# Patient Record
Sex: FEMALE | Race: WHITE | Employment: UNEMPLOYED | URBAN - METROPOLITAN AREA
[De-identification: names, ages, dates, MRNs, and addresses within clinical notes are randomized per-mention and may not be internally consistent; named-entity substitution may affect disease eponyms.]

---

## 2017-05-23 ENCOUNTER — ALLSCRIPTS OFFICE VISIT (OUTPATIENT)
Dept: OTHER | Facility: OTHER | Age: 27
End: 2017-05-23

## 2017-06-07 ENCOUNTER — GENERIC CONVERSION - ENCOUNTER (OUTPATIENT)
Dept: OTHER | Facility: OTHER | Age: 27
End: 2017-06-07

## 2017-06-27 ENCOUNTER — ALLSCRIPTS OFFICE VISIT (OUTPATIENT)
Dept: OTHER | Facility: OTHER | Age: 27
End: 2017-06-27

## 2017-06-27 DIAGNOSIS — S39.012A STRAIN OF MUSCLE, FASCIA AND TENDON OF LOWER BACK, INITIAL ENCOUNTER: ICD-10-CM

## 2017-09-12 ENCOUNTER — ALLSCRIPTS OFFICE VISIT (OUTPATIENT)
Dept: OTHER | Facility: OTHER | Age: 27
End: 2017-09-12

## 2017-09-12 DIAGNOSIS — Z00.00 ENCOUNTER FOR GENERAL ADULT MEDICAL EXAMINATION WITHOUT ABNORMAL FINDINGS: ICD-10-CM

## 2017-09-12 DIAGNOSIS — E03.9 HYPOTHYROIDISM: ICD-10-CM

## 2017-09-12 DIAGNOSIS — F25.9 SCHIZOAFFECTIVE DISORDER (HCC): ICD-10-CM

## 2017-09-12 DIAGNOSIS — F31.10 BIPOLAR DISORDER, CURRENT EPISODE MANIC WITHOUT PSYCHOTIC FEATURES (HCC): ICD-10-CM

## 2017-09-12 DIAGNOSIS — G47.00 INSOMNIA: ICD-10-CM

## 2017-09-12 DIAGNOSIS — R41.89 OTHER SYMPTOMS AND SIGNS INVOLVING COGNITIVE FUNCTIONS AND AWARENESS: ICD-10-CM

## 2017-10-24 ENCOUNTER — ALLSCRIPTS OFFICE VISIT (OUTPATIENT)
Dept: OTHER | Facility: OTHER | Age: 27
End: 2017-10-24

## 2017-10-24 ENCOUNTER — TRANSCRIBE ORDERS (OUTPATIENT)
Dept: LAB | Facility: CLINIC | Age: 27
End: 2017-10-24

## 2017-10-24 ENCOUNTER — APPOINTMENT (OUTPATIENT)
Dept: LAB | Facility: CLINIC | Age: 27
End: 2017-10-24
Payer: COMMERCIAL

## 2017-10-24 DIAGNOSIS — R10.9 ABDOMINAL PAIN: ICD-10-CM

## 2017-10-24 DIAGNOSIS — F31.10 BIPOLAR DISORDER, CURRENT EPISODE MANIC WITHOUT PSYCHOTIC FEATURES (HCC): ICD-10-CM

## 2017-10-24 LAB — LITHIUM SERPL-SCNC: 0.7 MMOL/L (ref 0.5–1)

## 2017-10-24 PROCEDURE — 82784 ASSAY IGA/IGD/IGG/IGM EACH: CPT

## 2017-10-24 PROCEDURE — 83516 IMMUNOASSAY NONANTIBODY: CPT

## 2017-10-24 PROCEDURE — 80178 ASSAY OF LITHIUM: CPT

## 2017-10-24 PROCEDURE — 86255 FLUORESCENT ANTIBODY SCREEN: CPT

## 2017-10-24 PROCEDURE — 36415 COLL VENOUS BLD VENIPUNCTURE: CPT

## 2017-10-26 ENCOUNTER — GENERIC CONVERSION - ENCOUNTER (OUTPATIENT)
Dept: OTHER | Facility: OTHER | Age: 27
End: 2017-10-26

## 2017-10-26 LAB
ENDOMYSIUM IGA SER QL: POSITIVE
GLIADIN PEPTIDE IGA SER-ACNC: 18 UNITS (ref 0–19)
GLIADIN PEPTIDE IGG SER-ACNC: 66 UNITS (ref 0–19)
IGA SERPL-MCNC: 118 MG/DL (ref 87–352)
TTG IGA SER-ACNC: 7 U/ML (ref 0–3)
TTG IGG SER-ACNC: 10 U/ML (ref 0–5)

## 2017-10-26 NOTE — PROGRESS NOTES
Assessment  1  Abdominal pain (789 00) (R10 9)    Plan   Abdominal pain    · (1) CELIAC DISEASE AB PROFILE; Status:Active; Requested EMILY:[id number];   Bipolar affective disorder, current episode manic without psychotic symptoms    · Lithium Carbonate  MG Oral Tablet Extended Release; TAKE 1 TABLET  TWICE DAILY  Bipolar affective disorder, current episode manic without psychotic symptoms,  Schizoaffective disorder    · Bethanechol Chloride 25 MG Oral Tablet; TAKE 1 TABLET 3 TIMES DAIY    (1) LITHIUM; Status:Resulted - Requires Verification;   Done: 96HSX1168 12:00AM  TAA:50WNI5278; Ordered; For:Bipolar affective disorder, current episode manic without psychotic symptoms; Ordered By:Anita Meyer;      Discussion/Summary    Will continue current regime  Check celiac panel and lithium level  RTO 1 month  Chief Complaint  Pt presents today for bowel problems: constipated or has diarrhea  Pt states that brother has celiac disease and mother is getting tested now for it  Pt questioning does she have it? History of Present Illness  HPI: Pt presents concerned about celiac disease  She states her brother was just diagnosed  She suffers from diarrhea, abdominal bloating and cramping  Review of Systems    Constitutional: as noted in HPI    ENT: no ear ache, no loss of hearing, no nosebleeds or nasal discharge, no sore throat or hoarseness  Cardiovascular: no complaints of slow or fast heart rate, no chest pain, no palpitations, no leg claudication or lower extremity edema  Respiratory: no complaints of shortness of breath, no wheezing, no dyspnea on exertion, no orthopnea or PND  Breasts: no complaints of breast pain, breast lump or nipple discharge  Gastrointestinal: no complaints of abdominal pain, no constipation, no nausea or diarrhea, no vomiting, no bloody stools     Genitourinary: no complaints of dysuria, no incontinence, no pelvic pain, no dysmenorrhea, no vaginal discharge or abnormal vaginal bleeding  Musculoskeletal: no complaints of arthralgia, no myalgia, no joint swelling or stiffness, no limb pain or swelling  Neurological: no complaints of headache, no confusion, no numbness or tingling, no dizziness or fainting  ROS reviewed  Active Problems  1  Abdominal pain (789 00) (R10 9)   2  Acute bilateral low back pain without sciatica (724 2,338 19) (M54 5)   3  Acute renal insufficiency (593 9) (N28 9)   4  Apneic episode (786 03) (R06 81)   5  Bipolar affective disorder, current episode manic without psychotic symptoms (296 00)   (F31 10)   6  Cognitive changes (799 59) (R41 89)   7  Hematuria (599 70) (R31 9)   8  Hypothyroidism (244 9) (E03 9)   9  Incomplete emptying of bladder (788 21) (R33 9)   10  Insomnia (780 52) (G47 00)   11  Lumbar strain (847 2) (S39 012A)   12  Schizoaffective disorder (295 70) (F25 9)   13  Urinary tract infection (599 0) (N39 0)    Past Medical History  Active Problems And Past Medical History Reviewed: The active problems and past medical history were reviewed and updated today  Surgical History  Surgical History Reviewed: The surgical history was reviewed and updated today  Social History   · Denied: History of Alcohol Use (History)   · Marital History - Single   · Never A Smoker  The social history was reviewed and updated today  The social history was reviewed and is unchanged  Family History  Family History Reviewed: The family history was reviewed and updated today  Current Meds   1  Bethanechol Chloride 25 MG Oral Tablet Recorded   2  ChlorproMAZINE HCl - 25 MG Oral Tablet; Take 1 tablet daily; Therapy: 21Jan2016 to Recorded   3  Clobetasol Propionate 0 05 % External Shampoo; SHAMPOO HAIR/SCALP, LEAVE ON   15 MINUTES AND RINSE WELL  Teresa Spruce USE DAILY FOR 4 WEEKS; Therapy: 68KNP4605 to (Last Rx:21Jan2016)  Requested for: 21Jan2016 Ordered   4  ClonazePAM 2 MG Oral Tablet; Take 1 tablet daily;  Last Rx:18Xcf2442; Status: ACTIVE -   Renewal Voided Ordered   5  FluvoxaMINE Maleate 50 MG Oral Tablet; TAKE ONE TABLET BY MOUTH AT BEDTIME; Therapy: 65KUA2282 to (Evaluate:06Jan2015)  Requested for: 58Svz2532; Last   Rx:28Cca5528 Ordered   6  Gabapentin 300 MG Oral Capsule; TAKE 4 CAPSULE Bedtime; Therapy: 50IIF3144 to (Evaluate:13Jan2015)  Requested for: 71Ore6881; Last   Rx:70Mfq2046; Status: ACTIVE - Renewal Voided Ordered   7  Liothyronine Sodium 5 MCG Oral Tablet; TAKE ONE TABLET BY MOUTH ONCE DAILY; Therapy: 21Jan2016 to (Deonna Sloan)  Requested for: 23Jvz5764; Last   Rx:15Nrt6078 Ordered   8  Lithium Carbonate  MG Oral Tablet Extended Release; TAKE 1 TABLET DAILY; Therapy: 20DHZ2959 to [phone])  Requested for: 09Scr8399; Last   Rx:63Zzy4416 Ordered   9  Senna-Tabs 8 6 MG Oral Tablet; TAKE AS DIRECTED; Therapy: 75BSO1931 to Recorded   10  Trihexyphenidyl HCl - 2 MG Oral Tablet; TAKE 1 TABLET DAILY AS DIRECTED; Therapy: 21Jan2016 to Recorded   11  Vitamin D3 2000 UNIT Oral Tablet; TAKE 2 DAILY TO EQUAL 4000 UNITS; Therapy: (203 1086) to Recorded    The medication list was reviewed and updated today  Allergies  1  No Known Drug Allergies    Vitals   Recorded: 14YIW2397 09:23AM   Temperature 97 6 F   Heart Rate 64   Respiration 16   Systolic 398   Diastolic 70   Height 5 ft 3 in   Weight 139 lb 8 oz   BMI Calculated 24 71   BSA Calculated 1 66   O2 Saturation 98     Physical Exam    Constitutional   General appearance: No acute distress, well appearing and well nourished  Ears, Nose, Mouth, and Throat   External inspection of ears and nose: Normal     Otoscopic examination: Tympanic membranes translucent with normal light reflex  Canals patent without erythema  Nasal mucosa, septum, and turbinates: Normal without edema or erythema  Oropharynx: Normal with no erythema, edema, exudate or lesions      Pulmonary   Respiratory effort: No increased work of breathing or signs of respiratory distress  Auscultation of lungs: Clear to auscultation  Cardiovascular   Auscultation of heart: Normal rate and rhythm, normal S1 and S2, without murmurs  Examination of extremities for edema and/or varicosities: Normal     Abdomen   Abdomen: Non-tender, no masses      Psychiatric   Orientation to person, place, and time: Normal     Mood and affect: Normal          Signatures   Electronically signed by : SANDY Keller; Oct 25 2017  4:00PM EST                       (Author)    Electronically signed by : DYLAN Tobin ; Oct 25 2017  4:34PM EST                       (Author)

## 2018-01-03 ENCOUNTER — ALLSCRIPTS OFFICE VISIT (OUTPATIENT)
Dept: OTHER | Facility: OTHER | Age: 28
End: 2018-01-03

## 2018-01-03 ENCOUNTER — TRANSCRIBE ORDERS (OUTPATIENT)
Dept: LAB | Facility: CLINIC | Age: 28
End: 2018-01-03

## 2018-01-03 ENCOUNTER — APPOINTMENT (OUTPATIENT)
Dept: LAB | Facility: CLINIC | Age: 28
End: 2018-01-03
Payer: COMMERCIAL

## 2018-01-03 DIAGNOSIS — Z79.899 ENCOUNTER FOR LONG-TERM (CURRENT) USE OF MEDICATIONS: ICD-10-CM

## 2018-01-03 DIAGNOSIS — F25.9 SCHIZOAFFECTIVE DISORDER, UNSPECIFIED TYPE (HCC): ICD-10-CM

## 2018-01-03 DIAGNOSIS — R10.9 ABDOMINAL PAIN: ICD-10-CM

## 2018-01-03 DIAGNOSIS — Z79.899 ENCOUNTER FOR LONG-TERM (CURRENT) USE OF MEDICATIONS: Primary | ICD-10-CM

## 2018-01-03 LAB
25(OH)D3 SERPL-MCNC: 39.8 NG/ML (ref 30–100)
ALBUMIN SERPL BCP-MCNC: 4 G/DL (ref 3.5–5)
ALP SERPL-CCNC: 84 U/L (ref 46–116)
ALT SERPL W P-5'-P-CCNC: 27 U/L (ref 12–78)
ANION GAP SERPL CALCULATED.3IONS-SCNC: 6 MMOL/L (ref 4–13)
AST SERPL W P-5'-P-CCNC: 12 U/L (ref 5–45)
BASOPHILS # BLD AUTO: 0.03 THOUSANDS/ΜL (ref 0–0.1)
BASOPHILS NFR BLD AUTO: 0 % (ref 0–1)
BILIRUB SERPL-MCNC: 0.4 MG/DL (ref 0.2–1)
BUN SERPL-MCNC: 16 MG/DL (ref 5–25)
CALCIUM SERPL-MCNC: 9.1 MG/DL (ref 8.3–10.1)
CHLORIDE SERPL-SCNC: 106 MMOL/L (ref 100–108)
CO2 SERPL-SCNC: 27 MMOL/L (ref 21–32)
CREAT SERPL-MCNC: 0.86 MG/DL (ref 0.6–1.3)
EOSINOPHIL # BLD AUTO: 0.3 THOUSAND/ΜL (ref 0–0.61)
EOSINOPHIL NFR BLD AUTO: 3 % (ref 0–6)
ERYTHROCYTE [DISTWIDTH] IN BLOOD BY AUTOMATED COUNT: 13.1 % (ref 11.6–15.1)
FOLATE SERPL-MCNC: 17.1 NG/ML (ref 3.1–17.5)
GFR SERPL CREATININE-BSD FRML MDRD: 92 ML/MIN/1.73SQ M
GLUCOSE P FAST SERPL-MCNC: 94 MG/DL (ref 65–99)
HCT VFR BLD AUTO: 40.8 % (ref 34.8–46.1)
HGB BLD-MCNC: 13.5 G/DL (ref 11.5–15.4)
LITHIUM SERPL-SCNC: 0.4 MMOL/L (ref 0.5–1)
LYMPHOCYTES # BLD AUTO: 2.88 THOUSANDS/ΜL (ref 0.6–4.47)
LYMPHOCYTES NFR BLD AUTO: 32 % (ref 14–44)
MCH RBC QN AUTO: 29 PG (ref 26.8–34.3)
MCHC RBC AUTO-ENTMCNC: 33.1 G/DL (ref 31.4–37.4)
MCV RBC AUTO: 88 FL (ref 82–98)
MONOCYTES # BLD AUTO: 0.93 THOUSAND/ΜL (ref 0.17–1.22)
MONOCYTES NFR BLD AUTO: 11 % (ref 4–12)
NEUTROPHILS # BLD AUTO: 4.74 THOUSANDS/ΜL (ref 1.85–7.62)
NEUTS SEG NFR BLD AUTO: 54 % (ref 43–75)
NRBC BLD AUTO-RTO: 0 /100 WBCS
PLATELET # BLD AUTO: 204 THOUSANDS/UL (ref 149–390)
PMV BLD AUTO: 11 FL (ref 8.9–12.7)
POTASSIUM SERPL-SCNC: 4.1 MMOL/L (ref 3.5–5.3)
PROT SERPL-MCNC: 7.2 G/DL (ref 6.4–8.2)
RBC # BLD AUTO: 4.66 MILLION/UL (ref 3.81–5.12)
SODIUM SERPL-SCNC: 139 MMOL/L (ref 136–145)
T4 FREE SERPL-MCNC: 1.03 NG/DL (ref 0.76–1.46)
TSH SERPL DL<=0.05 MIU/L-ACNC: 1.99 UIU/ML (ref 0.36–3.74)
VIT B12 SERPL-MCNC: 834 PG/ML (ref 100–900)
WBC # BLD AUTO: 8.89 THOUSAND/UL (ref 4.31–10.16)

## 2018-01-03 PROCEDURE — 36415 COLL VENOUS BLD VENIPUNCTURE: CPT

## 2018-01-03 PROCEDURE — 80053 COMPREHEN METABOLIC PANEL: CPT

## 2018-01-03 PROCEDURE — 85025 COMPLETE CBC W/AUTO DIFF WBC: CPT

## 2018-01-03 PROCEDURE — 82746 ASSAY OF FOLIC ACID SERUM: CPT

## 2018-01-03 PROCEDURE — 84439 ASSAY OF FREE THYROXINE: CPT

## 2018-01-03 PROCEDURE — 84443 ASSAY THYROID STIM HORMONE: CPT

## 2018-01-03 PROCEDURE — 82306 VITAMIN D 25 HYDROXY: CPT

## 2018-01-03 PROCEDURE — 82607 VITAMIN B-12: CPT

## 2018-01-03 PROCEDURE — 80178 ASSAY OF LITHIUM: CPT

## 2018-01-04 NOTE — CONSULTS
Assessment   1  Abdominal pain (789 00) (R10 9)    Plan   Abdominal pain    · (1) BASIC METABOLIC PROFILE; Status:Active; Requested NWW:72QBZ6464; Perform:[facility]; XOY:13MGL1367;NQYEPXS; For:Abdominal pain; Ordered By:Umberto Hummel;   · (1) CBC/PLT/DIFF; Status:Active; Requested CKK:42BCS4270; Perform:[facility]; VIS:16PMZ9013;OEEOAMH; For:Abdominal pain; Ordered By:Umberto Hummel;   · (1) HEPATIC FUNCTION PANEL; Status:Active; Requested NAH:95TQN5319; Perform:[facility]; QYR:84YWA0193;BKCWLRM; For:Abdominal pain; Ordered By:Donte Hummel;   · EGD; Status:Hold For - Scheduling; Requested ARNOL:86GKR3071; Perform:[facility]; MZP:75YJV4381; Ordered; For:Abdominal pain; Ordered By:Umberto Hummel;    Discussion/Summary   Discussion Summary:    Abdominal pain/change of bowel habits: Could be secondary to celiac disease as patient is sedated panel was positive  EGD with small-bowel biopsy to confirm the diagnosis  Patient was counseled on the benefits and risks of endoscopic intervention including but not limited to bleeding, infection, bowel perforation  Labs to rule out liver function test abnormalities  follow-up after procedure  Counseling Documentation With Imm: The patient was counseled regarding diagnostic results,-- instructions for management,-- impressions,-- risks and benefits of treatment options  Chief Complaint   Chief Complaint Free Text Note Form: Patient presents for consult, abdominal pain comes and goes 6 months  History of Present Illness   HPI: 29 yo F with hx of cognitive disorder on lithium presented for evaluation of abdominal pain  Patient reported she has intermittent lower abdominal pain and intermittent diarrhea  she denies certain correlation between her symptoms and gluten intake  She reported intermittent diarrhea alternating with constipation  She was found to have positive celiac disease panel in the end of October   She has never received an upper endoscopy  Her brother was recently diagnosed with celiac disease  She is not a good historian but is seems she has has not been losing weight  Her last hepatic panel was in 2013 which was normal       Review of Systems   Complete-Female GI Adult:      Constitutional: No fever, no chills, feels well, no tiredness, no recent weight gain or weight loss  Eyes: No complaints of eye pain, no red eyes, no eyesight problems, no discharge, no dry eyes, no itching of eyes  ENT: no complaints of earache, no loss of hearing, no nose bleeds, no nasal discharge, no sore throat, no hoarseness  Cardiovascular: No complaints of slow heart rate, no fast heart rate, no chest pain, no palpitations, no leg claudication, no lower extremity edema  Respiratory: No complaints of shortness of breath, no wheezing, no cough, no SOB on exertion, no orthopnea, no PND  Gastrointestinal: abdominal pain  Genitourinary: No complaints of dysuria, no incontinence, no pelvic pain, no dysmenorrhea, no vaginal discharge or bleeding  Musculoskeletal: No complaints of arthralgias, no myalgias, no joint swelling or stiffness, no limb pain or swelling  Integumentary: No complaints of skin rash or lesions, no itching, no skin wounds, no breast pain or lump  Neurological: No complaints of headache, no confusion, no convulsions, no numbness, no dizziness or fainting, no tingling, no limb weakness, no difficulty walking  Psychiatric: Not suicidal, no sleep disturbance, no anxiety or depression, no change in personality, no emotional problems  Endocrine: No complaints of proptosis, no hot flashes, no muscle weakness, no deepening of the voice, no feelings of weakness  Hematologic/Lymphatic: No complaints of swollen glands, no swollen glands in the neck, does not bleed easily, does not bruise easily  ROS Reviewed:    ROS reviewed  Active Problems   1  Abdominal pain (799 00) (R10 9)   2  Acute bilateral low back pain without sciatica (724 2,338 19) (M54 5)   3  Acute renal insufficiency (593 9) (N28 9)   4  Apneic episode (786 03) (R06 81)   5  Bipolar affective disorder, current episode manic without psychotic symptoms (296 00)     (F31 10)   6  Cognitive changes (799 59) (R41 89)   7  Hematuria (599 70) (R31 9)   8  Hypothyroidism (244 9) (E03 9)   9  Incomplete emptying of bladder (788 21) (R33 9)   10  Insomnia (780 52) (G47 00)   11  Lumbar strain (847 2) (S39 012A)   12  Schizoaffective disorder (295 70) (F25 9)   13  Urinary tract infection (599 0) (N39 0)    Past Medical History   Active Problems And Past Medical History Reviewed: The active problems and past medical history were reviewed and updated today  Surgical History   Surgical History Reviewed: The surgical history was reviewed and updated today  Family History   Father    1  Family history of Benign Essential Hypertension  Brother    2  Family history of Benign Essential Hypertension  Family History    3  Family history of Nasolacrimal duct obstruction, acquired (375 56) (H04 559)  Family History Reviewed: The family history was reviewed and updated today  Social History    · Denied: History of Alcohol Use (History)   · Marital History - Single   · Never A Smoker  Social History Reviewed: The social history was reviewed and updated today  Current Meds    1  Bethanechol Chloride 25 MG Oral Tablet; TAKE 1 TABLET 3 TIMES DAIY; Last     Rx:24Oct2017 Ordered   2  ChlorproMAZINE HCl - 25 MG Oral Tablet; Take 1 tablet daily; Therapy: 21Jan2016 to Recorded   3  Clobetasol Propionate 0 05 % External Shampoo; SHAMPOO HAIR/SCALP, LEAVE ON     15 MINUTES AND RINSE WELL  Jack Obi USE DAILY FOR 4 WEEKS; Therapy: 87ZYE8803 to (Last Rx:21Jan2016)  Requested for: 21Jan2016 Ordered   4  ClonazePAM 2 MG Oral Tablet; Take 1 tablet daily; Last Rx:23Cju1304; Status: ACTIVE -     Renewal Voided Ordered   5   FluvoxaMINE Maleate 50 MG Oral Tablet; TAKE ONE TABLET BY MOUTH AT BEDTIME; Therapy: 85BYL7333 to (Evaluate:06Jan2015)  Requested for: 24Ujj5671; Last     Rx:32Vqo9029 Ordered   6  Gabapentin 300 MG Oral Capsule; TAKE 4 CAPSULE Bedtime; Therapy: 01RAW1155 to (Evaluate:13Jan2015)  Requested for: 86Akv9312; Last     Rx:61Ber7247; Status: ACTIVE - Renewal Voided Ordered   7  Liothyronine Sodium 5 MCG Oral Tablet; TAKE ONE TABLET BY MOUTH ONCE DAILY; Therapy: 21Jan2016 to (Evaluate:43Okd3618)  Requested for: 56BQV4928; Last     Rx:21Nov2017 Ordered   8  Lithium Carbonate  MG Oral Tablet Extended Release; TAKE 1 TABLET TWICE     DAILY; Therapy: 92OGC9030 to ([facility]:93WJH2769)  Requested for: 24Oct2017; Last     HK:86QGC0467 Ordered   9  Senna-Tabs 8 6 MG Oral Tablet; TAKE AS DIRECTED; Therapy: 83BCE8515 to Recorded   10  Trihexyphenidyl HCl - 2 MG Oral Tablet; TAKE 1 TABLET DAILY AS DIRECTED; Therapy: 21Jan2016 to Recorded   11  Vitamin D3 2000 UNIT Oral Tablet; TAKE 2 DAILY TO EQUAL 4000 UNITS; Therapy: (WLCQDOLY:88JFB0140) to Recorded  Medication List Reviewed: The medication list was reviewed and updated today  Allergies   1  No Known Drug Allergies    Physical Exam        Constitutional      General appearance: No acute distress, well appearing and well nourished  Eyes      Conjunctiva and lids: No swelling, erythema or discharge  Ears, Nose, Mouth, and Throat      Oropharynx: Normal with no erythema, edema, exudate or lesions  Pulmonary      Respiratory effort: No increased work of breathing or signs of respiratory distress  Cardiovascular      Examination of extremities for edema and/or varicosities: Normal        Abdomen      Abdomen: Non-tender, no masses         Musculoskeletal      Gait and station: Normal        Psychiatric      Orientation to person, place, and time: Normal           Signatures    Electronically signed by : Chris Worley MD; Dani  3 2018 2:37PM EST                       (Author)

## 2018-01-12 NOTE — PROGRESS NOTES
Assessment    1  Encounter for preventive health examination (V70 0) (Z00 00)    Plan  Bipolar affective disorder, current episode manic without psychotic symptoms    · ClonazePAM 2 MG Oral Tablet; Take 1 tablet daily  Bipolar affective disorder, current episode manic without psychotic symptoms, Cognitive  changes, Health Maintenance, Hypothyroidism, Insomnia, Schizoaffective disorder    · (1) CBC/PLT/DIFF; Status:Active; Requested for:70Jey7807;    · (1) COMPREHENSIVE METABOLIC PANEL; Status:Active; Requested for:30Kzj8836;    · (1) LIPID PANEL, FASTING; Status:Active; Requested for:68Pbs8558;    · (1) TSH; Status:Active; Requested for:69Ckt1985;    · (1) VITAMIN D 25-HYDROXY; Status:Active; Requested for:71Ppb6071;   PMH: Abdominal pain, suprapubic    · Gabapentin 300 MG Oral Capsule; TAKE 4 CAPSULE Bedtime    Discussion/Summary  healthy adult female Currently, she eats an adequate diet and has an adequate exercise regimen  Chief Complaint  Pt presents today for physical       History of Present Illness  , Adult Female: The patient is being seen for a health maintenance evaluation  The last health maintenance visit was 1 year(s) ago  Social History: Household members include parents  She is unmarried  Work status: not currently employed  The patient has never smoked cigarettes  She reports never drinking alcohol  She has never used illicit drugs  General Health: The patient's health since the last visit is described as good  She does not have regular dental visits  She denies vision problems  She denies hearing loss  Immunizations status: up to date  Lifestyle:  She consumes a diverse and healthy diet  She does not have any weight concerns  She exercises regularly  She does not use tobacco  She denies alcohol use  She denies drug use  Reproductive health: the patient is premenopausal   she reports normal menses  she uses no contraception  she is not sexually active  she denies prior pregnancies  Screening: cancer screening reviewed and updated  metabolic screening reviewed and updated  risk screening reviewed and updated  Review of Systems    Constitutional: as noted in HPI  Eyes: No complaints of eye pain, no red eyes, no eyesight problems, no discharge, no dry eyes, no itching of eyes  ENT: no complaints of earache, no loss of hearing, no nose bleeds, no nasal discharge, no sore throat, no hoarseness  Cardiovascular: No complaints of slow heart rate, no fast heart rate, no chest pain, no palpitations, no leg claudication, no lower extremity edema  Respiratory: No complaints of shortness of breath, no wheezing, no cough, no SOB on exertion, no orthopnea, no PND  Gastrointestinal: No complaints of abdominal pain, no constipation, no nausea or vomiting, no diarrhea, no bloody stools  Genitourinary: No complaints of dysuria, no incontinence, no pelvic pain, no dysmenorrhea, no vaginal discharge or bleeding  Musculoskeletal: No complaints of arthralgias, no myalgias, no joint swelling or stiffness, no limb pain or swelling  Integumentary: No complaints of skin rash or lesions, no itching, no skin wounds, no breast pain or lump  ROS reviewed  Active Problems    1  Abdominal pain (789 00) (R10 9)   2  Acute bilateral low back pain without sciatica (724 2,338 19) (M54 5)   3  Acute renal insufficiency (593 9) (N28 9)   4  Apneic episode (786 03) (R06 81)   5  Bipolar affective disorder, current episode manic without psychotic symptoms (296 00)   (F31 10)   6  Cognitive changes (799 59) (R41 89)   7  Hematuria (599 70) (R31 9)   8  Hypothyroidism (244 9) (E03 9)   9  Incomplete emptying of bladder (788 21) (R33 9)   10  Insomnia (780 52) (G47 00)   11  Lumbar strain (847 2) (S39 012A)   12  Schizoaffective disorder (295 70) (F25 9)   13   Urinary tract infection (599 0) (N39 0)    Family History  Father    · Family history of Benign Essential Hypertension  Brother    · Family history of Benign Essential Hypertension  Family History    · Family history of Nasolacrimal duct obstruction, acquired (615 65) (A16 584)    Social History    · Denied: History of Alcohol Use (History)   · Marital History - Single   · Never A Smoker    Current Meds   1  Bethanechol Chloride 25 MG Oral Tablet Recorded   2  ChlorproMAZINE HCl - 25 MG Oral Tablet; Take 1 tablet daily; Therapy: 21Jan2016 to Recorded   3  Clobetasol Propionate 0 05 % External Shampoo; SHAMPOO HAIR/SCALP, LEAVE ON   15 MINUTES AND RINSE WELL  [place] USE DAILY FOR 4 WEEKS; Therapy: 89EYQ6719 to (Last Rx:21Jan2016)  Requested for: 21Jan2016 Ordered   4  ClonazePAM 2 MG Oral Tablet; Take 1 tablet daily; Last Rx:89Vdz4679 Ordered   5  FluvoxaMINE Maleate 50 MG Oral Tablet; TAKE ONE TABLET BY MOUTH AT BEDTIME; Therapy: 89NMZ8452 to (Evaluate:06Jan2015)  Requested for: 91Loi6285; Last   Rx:06Lkq6444 Ordered   6  Gabapentin 300 MG Oral Capsule; TAKE 4 CAPSULE Bedtime; [facility] 52YCV6780 [facility]  Requested for: 72XOP7137; Last   Rx:05Cqk9285 Ordered   7  Liothyronine Sodium 5 MCG Oral Tablet; TAKE ONE TABLET BY MOUTH ONCE DAILY; Therapy: 21Jan2016 to (Shubham Tang)  Requested for: 12Rpg5912; Last   Rx:34Urj3954 Ordered   8  Lithium Carbonate  MG Oral Tablet Extended Release; TAKE 1 TABLET DAILY; Therapy: 78YXD7907 to [phone])  Requested for: 21Sgc3617; Last   Rx:41Quz0318 Ordered   9  Senna-Tabs 8 6 MG Oral Tablet; TAKE AS DIRECTED; Therapy: 99PEL8496 to Recorded   10  Trihexyphenidyl HCl - 2 MG Oral Tablet; TAKE 1 TABLET DAILY AS DIRECTED; Therapy: 21Jan2016 to Recorded    Allergies    1   No Known Drug Allergies    Vitals   Recorded: 12Sep2017 10:13AM   Temperature 97 4 F   Heart Rate 64   Respiration 16   Systolic 94   Diastolic 56   Height 5 ft 3 in   Weight 132 lb 4 oz   BMI Calculated 23 43   BSA Calculated 1 62   O2 Saturation 98     Physical Exam    Constitutional   General appearance: No acute distress, well appearing and well nourished  Ears, Nose, Mouth, and Throat   External inspection of ears and nose: Normal     Otoscopic examination: Tympanic membranes translucent with normal light reflex  Canals patent without erythema  Hearing: Normal     Nasal mucosa, septum, and turbinates: Normal without edema or erythema  Lips, teeth, and gums: Normal, good dentition  Oropharynx: Normal with no erythema, edema, exudate or lesions  Neck   Neck: Supple, symmetric, trachea midline, no masses  Thyroid: Normal, no thyromegaly  Pulmonary   Respiratory effort: No increased work of breathing or signs of respiratory distress  Auscultation of lungs: Clear to auscultation  Cardiovascular   Auscultation of heart: Normal rate and rhythm, normal S1 and S2, no murmurs  Carotid pulses: 2+ bilaterally  Examination of extremities for edema and/or varicosities: Normal     Abdomen   Abdomen: Non-tender, no masses  Liver and spleen: No hepatomegaly or splenomegaly  Examination for hernias: No hernia appreciated  Anus, perineum, and rectum: Normal sphincter tone, no masses, no prolapse  Stool sample for occult blood: Negative  Musculoskeletal   Gait and station: Normal     Digits and nails: Normal without clubbing or cyanosis  Joints, bones, and muscles: Normal     Range of motion: Normal     Stability: Normal     Muscle strength/tone: Normal     Skin   Skin and subcutaneous tissue: Normal without rashes or lesions  Psychiatric   Judgment and insight: Normal     Orientation to person, place, and time: Normal     Recent and remote memory: Intact      Mood and affect: Normal        Signatures   Electronically signed by : Cinda Major [facility]; Sep 13 2017  1:25PM EST                       (Author)    Electronically signed by : DYLAN Escamilla ; Sep 13 2017  3:55PM EST                       (Author)

## 2018-01-14 VITALS
HEIGHT: 63 IN | DIASTOLIC BLOOD PRESSURE: 56 MMHG | HEART RATE: 64 BPM | TEMPERATURE: 97.4 F | BODY MASS INDEX: 23.43 KG/M2 | WEIGHT: 132.25 LBS | RESPIRATION RATE: 16 BRPM | SYSTOLIC BLOOD PRESSURE: 94 MMHG | OXYGEN SATURATION: 98 %

## 2018-01-14 VITALS
WEIGHT: 131 LBS | HEART RATE: 80 BPM | DIASTOLIC BLOOD PRESSURE: 56 MMHG | BODY MASS INDEX: 23.21 KG/M2 | TEMPERATURE: 100.1 F | SYSTOLIC BLOOD PRESSURE: 100 MMHG | HEIGHT: 63 IN

## 2018-01-14 VITALS
BODY MASS INDEX: 24.72 KG/M2 | RESPIRATION RATE: 16 BRPM | HEIGHT: 63 IN | DIASTOLIC BLOOD PRESSURE: 70 MMHG | SYSTOLIC BLOOD PRESSURE: 104 MMHG | HEART RATE: 64 BPM | TEMPERATURE: 97.6 F | OXYGEN SATURATION: 98 % | WEIGHT: 139.5 LBS

## 2018-01-14 VITALS
TEMPERATURE: 98.4 F | BODY MASS INDEX: 24.12 KG/M2 | WEIGHT: 136.13 LBS | RESPIRATION RATE: 16 BRPM | SYSTOLIC BLOOD PRESSURE: 110 MMHG | DIASTOLIC BLOOD PRESSURE: 80 MMHG | HEART RATE: 88 BPM | HEIGHT: 63 IN

## 2018-01-17 NOTE — PROGRESS NOTES
Assessment    1  Insomnia (780 52) (G47 00)    Plan  Bipolar affective disorder, current episode manic without psychotic symptoms    · (1) LITHIUM; Status:Active; Requested WOT:[id number];   Hypothyroidism    · (1) VITAMIN D 25-HYDROXY; Status:Active; Requested TZR:[id number]; Insomnia    · Zolpidem Tartrate 10 MG Oral Tablet (Ambien); TAKE 1 TABLET AT BEDTIME AS  NEEDED FOR SLEEP    Discussion/Summary    Will restart ambien  Will check labs to rule out metabolic cause for insomnia  RTO 1 month  The patient was counseled regarding instructions for management, risk factor reductions, patient and family education, risks and benefits of treatment options, importance of compliance with treatment  Possible side effects of new medications were reviewed with the patient/guardian today  The treatment plan was reviewed with the patient/guardian  The patient/guardian understands and agrees with the treatment plan      Chief Complaint  Pt here for insomnia for three days, she is unable to sleep, she is very groggy  History of Present Illness  HPI: Pt presents complaining of intense insomnia for the last several days  She states she has had zero sleep over the past 3 days  She is not on any new medications that may have been the source  She has an extensive psychiatric history  She has been on [place] previously and desires trying it again  She has an upcoming appt with her psychiatrist in 2 weeks  She is due for labs  Review of Systems    Constitutional: as noted in HPI    ENT: no ear ache, no loss of hearing, no nosebleeds or nasal discharge, no sore throat or hoarseness  Cardiovascular: no complaints of slow or fast heart rate, no chest pain, no palpitations, no leg claudication or lower extremity edema  Respiratory: no complaints of shortness of breath, no wheezing, no dyspnea on exertion, no orthopnea or PND  Breasts: no complaints of breast pain, breast lump or nipple discharge     Gastrointestinal: no complaints of abdominal pain, no constipation, no nausea or diarrhea, no vomiting, no bloody stools  Genitourinary: no complaints of dysuria, no incontinence, no pelvic pain, no dysmenorrhea, no vaginal discharge or abnormal vaginal bleeding  Musculoskeletal: no complaints of arthralgia, no myalgia, no joint swelling or stiffness, no limb pain or swelling  Integumentary: no complaints of skin rash or lesion, no itching or dry skin, no skin wounds  ROS reviewed  Active Problems    1  Abdominal pain, suprapubic (789 09) (R10 2)   2  Abnormal blood chemistry (790 6) (R79 9)   3  Acute bilateral low back pain without sciatica (724 2,338 19) (M54 5)   4  Acute conjunctivitis (372 00) (H10 30)   5  Acute renal insufficiency (593 9) (N28 9)   6  Bipolar affective disorder, current episode manic without psychotic symptoms (296 00)   (F31 10)   7  Cervical Disorder (723 9)   8  Cognitive changes (799 59) (R41 89)   9  Dysuria (788 1) (R30 0)   10  Hematuria (599 70) (R31 9)   11  Hypothyroidism (244 9) (E03 9)   12  Incomplete emptying of bladder (788 21) (R33 9)   13  Insomnia (780 52) (G47 00)   14  Lactation symptom (676 90) (O92 79)   15  Loss of hair (704 00) (L65 9)   16  Schizoaffective disorder (295 70) (F25 9)   17  Screening for depression (V79 0) (Z13 89)   18  Sebaceous cyst (706 2) (L72 3)   19  Sleep disorder (780 50) (G47 9)   20  Urinary frequency (788 41) (R35 0)   21  Urinary tract infection (599 0) (N39 0)   22  Urinary urgency (788 63) (R39 15)   23  Voice disturbance (784 40) (R49 9)    Past Medical History  Active Problems And Past Medical History Reviewed: The active problems and past medical history were reviewed and updated today  Surgical History  Surgical History Reviewed: The surgical history was reviewed and updated today         Social History    · Denied: History of Alcohol Use (History)   · Marital History - Single   · Never A Smoker  The social history was reviewed and updated today  The social history was reviewed and is unchanged  Family History  Family History Reviewed: The family history was reviewed and updated today  Current Meds   1  ChlorproMAZINE HCl - 25 MG Oral Tablet; Take 1 tablet daily; Therapy: 21Jan2016 to Recorded   2  Clobetasol Propionate 0 05 % External Shampoo; SHAMPOO HAIR/SCALP, LEAVE ON   15 MINUTES AND RINSE WELL  Beverlyn Frisk USE DAILY FOR 4 WEEKS; Therapy: 55MHS0408 to (Last Rx:21Jan2016)  Requested for: 21Jan2016 Ordered   3  ClonazePAM 2 MG Oral Tablet; Take 1 tablet daily; Last Rx:07Txi3646 Ordered   4  FluvoxaMINE Maleate 50 MG Oral Tablet; TAKE ONE TABLET BY MOUTH AT BEDTIME; Therapy: 34XJF6102 to (Evaluate:06Jan2015)  Requested for: 05Wzi5604; Last   Rx:63Ibm4876 Ordered   5  Gabapentin 300 MG Oral Capsule; TAKE 4 CAPSULE Bedtime; Therapy: 59YQM4789 to (LRKERHID:50CEJ2961)  Requested for: 07YGH7496; Last   Rx:75Wqs1233 Ordered   6  Liothyronine Sodium 5 MCG Oral Tablet; TAKE 1 TABLET DAILY; Therapy: 21Jan2016 to (Evaluate:38Kom5925)  Requested for: 39CWS2846; Last   Rx:46Vhl8129 Ordered   7  Lithium Carbonate  MG Oral Tablet Extended Release; TAKE 0 5 TABLET; Therapy: 02Jun2016 to Recorded   8  Lithium Carbonate  MG Oral Tablet Extended Release; TAKE 1 TABLET DAILY; Therapy: 91GAW4442 to [phone])  Requested for: 87Snp7475; Last   Rx:31Kze7302 Ordered   9  Senna-Tabs 8 6 MG Oral Tablet; TAKE AS DIRECTED; Therapy: 72YEU7437 to Recorded   10  Trihexyphenidyl HCl - 2 MG Oral Tablet; TAKE 1 TABLET DAILY AS DIRECTED; Therapy: 21Jan2016 to Recorded    The medication list was reviewed and updated today  Allergies    1   No Known Drug Allergies    Vitals   Recorded: 27OOC4303 10:12AM   Temperature 98 5 F   Heart Rate 82   Respiration 16   Systolic 501   Diastolic 72   Height 5 ft 3 in   Weight 127 lb    BMI Calculated 22 5   BSA Calculated 1 6     Physical Exam    Constitutional   General appearance: No acute distress, well appearing and well nourished  Eyes   Conjunctiva and lids: No swelling, erythema or discharge  Pupils and irises: Equal, round and reactive to light  Pulmonary   Respiratory effort: No increased work of breathing or signs of respiratory distress  Auscultation of lungs: Clear to auscultation  Cardiovascular   Auscultation of heart: Normal rate and rhythm, normal S1 and S2, without murmurs  Examination of extremities for edema and/or varicosities: Normal     Carotid pulses: Normal     Psychiatric   Orientation to person, place, and time: Normal     Mood and affect: Abnormal   Mood and Affect: flat          Future Appointments    Date/Time Provider Specialty Site   06/23/2016 01:15 PM [facility]     Signatures   Electronically signed by : Mabel Landry, [facility]; Jun 2 2016  2:12PM EST                       (Author)

## 2018-01-18 NOTE — RESULT NOTES
Verified Results  (1) CELIAC DISEASE AB PROFILE 24Oct2017 09:47AM Isadora Dexter Order Number: [id number]     Test Name Result Flag Reference   tTG IGG 10 U/mL H 0 - 5   Negative        0 - 5                                Weak Positive   6 - 9                                Positive           >9   tTG IGA 7 U/mL H 0 - 3   Negative        0 -  3                                Weak Positive   4 - 10                                Positive           >10   Tissue Transglutaminase (tTG) has been identified   as the endomysial antigen  Studies have demonstr-   ated that endomysial IgA antibodies have over 99%   specificity for gluten sensitive enteropathy     GLIADA 18 units  0 - 19   Negative                   0 - 19                     Weak Positive             20 - 30                     Moderate to Strong Positive   >30   GLIADG 66 units H 0 - 19   Negative                   0 - 19                     Weak Positive             20 - 30                     Moderate to Strong Positive   >30   ENDOMYSIAL AB IGA Positive A Negative   Performed at:  [address] [place] [address]  [id number]  : Irina Hyatt MD, Phone:  [phone]    mg/dL  87  352

## 2018-02-07 RX ORDER — GABAPENTIN 300 MG/1
300 CAPSULE ORAL
COMMUNITY
End: 2018-07-12

## 2018-02-07 RX ORDER — MULTIVIT-MIN/IRON/FOLIC ACID/K 18-600-40
CAPSULE ORAL DAILY
COMMUNITY
End: 2018-04-11 | Stop reason: SDUPTHER

## 2018-02-07 RX ORDER — TRIHEXYPHENIDYL HYDROCHLORIDE 2 MG/1
2 TABLET ORAL
COMMUNITY
End: 2018-07-27 | Stop reason: HOSPADM

## 2018-02-07 RX ORDER — BETHANECHOL CHLORIDE 25 MG/1
25 TABLET ORAL 3 TIMES DAILY
COMMUNITY
End: 2018-07-27 | Stop reason: HOSPADM

## 2018-02-07 RX ORDER — LIOTHYRONINE SODIUM 5 UG/1
5 TABLET ORAL DAILY
COMMUNITY
End: 2018-02-19 | Stop reason: SDUPTHER

## 2018-02-07 RX ORDER — CHLORPROMAZINE HYDROCHLORIDE 25 MG/1
25 TABLET, FILM COATED ORAL
COMMUNITY
End: 2018-07-27 | Stop reason: HOSPADM

## 2018-02-07 RX ORDER — CLONAZEPAM 0.5 MG/1
1.25 TABLET ORAL
COMMUNITY
End: 2018-07-27 | Stop reason: HOSPADM

## 2018-02-07 RX ORDER — LITHIUM CARBONATE 450 MG
450 TABLET, EXTENDED RELEASE ORAL 2 TIMES DAILY
COMMUNITY
End: 2018-07-27 | Stop reason: HOSPADM

## 2018-02-07 RX ORDER — FLUVOXAMINE MALEATE 50 MG/1
50 TABLET, COATED ORAL
COMMUNITY
End: 2018-07-27 | Stop reason: HOSPADM

## 2018-02-08 ENCOUNTER — ANESTHESIA EVENT (OUTPATIENT)
Dept: PERIOP | Facility: HOSPITAL | Age: 28
End: 2018-02-08
Payer: COMMERCIAL

## 2018-02-09 ENCOUNTER — ANESTHESIA (OUTPATIENT)
Dept: PERIOP | Facility: HOSPITAL | Age: 28
End: 2018-02-09
Payer: COMMERCIAL

## 2018-02-09 ENCOUNTER — HOSPITAL ENCOUNTER (OUTPATIENT)
Facility: HOSPITAL | Age: 28
Setting detail: OUTPATIENT SURGERY
Discharge: HOME | End: 2018-02-09
Attending: INTERNAL MEDICINE | Admitting: INTERNAL MEDICINE
Payer: COMMERCIAL

## 2018-02-09 VITALS
OXYGEN SATURATION: 100 % | SYSTOLIC BLOOD PRESSURE: 101 MMHG | TEMPERATURE: 99 F | HEART RATE: 58 BPM | DIASTOLIC BLOOD PRESSURE: 58 MMHG | RESPIRATION RATE: 16 BRPM

## 2018-02-09 DIAGNOSIS — R10.9 ABDOMINAL PAIN: ICD-10-CM

## 2018-02-09 LAB — EXT PREGNANCY TEST URINE: NEGATIVE

## 2018-02-09 PROCEDURE — 88342 IMHCHEM/IMCYTCHM 1ST ANTB: CPT | Performed by: INTERNAL MEDICINE

## 2018-02-09 PROCEDURE — 88305 TISSUE EXAM BY PATHOLOGIST: CPT | Performed by: PATHOLOGY

## 2018-02-09 PROCEDURE — 81025 URINE PREGNANCY TEST: CPT | Performed by: STUDENT IN AN ORGANIZED HEALTH CARE EDUCATION/TRAINING PROGRAM

## 2018-02-09 PROCEDURE — 43239 EGD BIOPSY SINGLE/MULTIPLE: CPT | Performed by: INTERNAL MEDICINE

## 2018-02-09 PROCEDURE — 88342 IMHCHEM/IMCYTCHM 1ST ANTB: CPT | Performed by: PATHOLOGY

## 2018-02-09 PROCEDURE — 88305 TISSUE EXAM BY PATHOLOGIST: CPT | Performed by: INTERNAL MEDICINE

## 2018-02-09 RX ORDER — 5HYDROXYTRYPTOPHAN(OXITRIPTAN)
POWDER (GRAM) MISCELLANEOUS
COMMUNITY
End: 2018-04-11 | Stop reason: ALTCHOICE

## 2018-02-09 RX ORDER — SODIUM CHLORIDE, SODIUM LACTATE, POTASSIUM CHLORIDE, CALCIUM CHLORIDE 600; 310; 30; 20 MG/100ML; MG/100ML; MG/100ML; MG/100ML
125 INJECTION, SOLUTION INTRAVENOUS CONTINUOUS
Status: DISCONTINUED | OUTPATIENT
Start: 2018-02-09 | End: 2018-02-09 | Stop reason: HOSPADM

## 2018-02-09 RX ORDER — PROPOFOL 10 MG/ML
INJECTION, EMULSION INTRAVENOUS AS NEEDED
Status: DISCONTINUED | OUTPATIENT
Start: 2018-02-09 | End: 2018-02-09 | Stop reason: SURG

## 2018-02-09 RX ADMIN — PROPOFOL 100 MG: 10 INJECTION, EMULSION INTRAVENOUS at 10:53

## 2018-02-09 RX ADMIN — SODIUM CHLORIDE, POTASSIUM CHLORIDE, SODIUM LACTATE AND CALCIUM CHLORIDE 125 ML/HR: 600; 310; 30; 20 INJECTION, SOLUTION INTRAVENOUS at 09:56

## 2018-02-09 RX ADMIN — PROPOFOL 20 MG: 10 INJECTION, EMULSION INTRAVENOUS at 10:58

## 2018-02-09 RX ADMIN — PROPOFOL 20 MG: 10 INJECTION, EMULSION INTRAVENOUS at 10:56

## 2018-02-09 NOTE — ANESTHESIA PREPROCEDURE EVALUATION
Review of Systems/Medical History  Patient summary reviewed  Chart reviewed  No history of anesthetic complications     Cardiovascular  Negative cardio ROS    Pulmonary  Negative pulmonary ROS        GI/Hepatic      Comment: Suspected celiac disease     Negative  ROS        Endo/Other  History of thyroid disease , hypothyroidism,      GYN  Negative gynecology ROS Not currently pregnant ,          Hematology  Negative hematology ROS      Musculoskeletal  Negative musculoskeletal ROS        Neurology  Negative neurology ROS      Psychology   Psychiatric history (schizoaffective d/o),            Physical Exam    Airway    Mallampati score: II         Dental   No notable dental hx     Cardiovascular  Comment: Negative ROS,     Pulmonary      Other Findings      Lab Results   Component Value Date    WBC 8 89 01/03/2018    HGB 13 5 01/03/2018     01/03/2018     Lab Results   Component Value Date     01/03/2018    K 4 1 01/03/2018    BUN 16 01/03/2018    CREATININE 0 86 01/03/2018     Anesthesia Plan  ASA Score- 2     Anesthesia Type- IV sedation with anesthesia with ASA Monitors  Additional Monitors:   Airway Plan:         Plan Factors-    Induction- intravenous  Postoperative Plan-     Informed Consent- Anesthetic plan and risks discussed with patient and father  I personally reviewed this patient with the CRNA  Discussed and agreed on the Anesthesia Plan with the CRNA  Makayla Wu

## 2018-02-09 NOTE — OP NOTE
**** GI/ENDOSCOPY REPORT ****     PATIENT NAME: Christine Garcia - VISIT ID:  Patient ID: [id number] YOB: 1990     INTRODUCTION: Esophagogastroduodenoscopy - A 33 female patient presents   for an outpatient Esophagogastroduodenoscopy at [facility]  INDICATIONS: Abdominal pain  CONSENT: The benefits, risks, and alternatives to the procedure were   discussed and informed consent was obtained from the patient  PREPARATION:  EKG, pulse, pulse oximetry and blood pressure were monitored   throughout the procedure  MEDICATIONS: Anesthesia-check records MAC anesthesia  PROCEDURE:  The endoscope was passed without difficulty through the mouth   under direct visualization and advanced to the 2nd portion of the   duodenum  The scope was withdrawn and the mucosa was carefully examined  FINDINGS:   Esophagus: Very mild longitudinal furrow in the esophagus  A   cold forceps biopsy was taken to rule out EoE  Stomach: The stomach   appeared to be normal  A biopsy was taken  Duodenum: mild scaloping of   duodenal mucosal  A cold forceps biopsy was taken to rule out celiac   disease  COMPLICATIONS: There were no complications  IMPRESSIONS: Normal stomach  Biopsy taken  Scalop  RECOMMENDATIONS: Follow-up on the results of the biopsy specimens  PATHOLOGY SPECIMENS: Cold forceps random biopsy taken  Random biopsy   taken  Cold forceps random biopsy taken  ESTIMATED BLOOD LOSS: Insignificant  PROCEDURE CODES:     ICD-9 Codes: 789 00 Abdominal pain, unspecified site     ICD-10 Codes: R10 Abdominal and pelvic pain     PERFORMED BY: DYLAN Chavez  on 02/09/2018  Version 1, electronically signed by DYLAN Morrissey  on 02/09/2018 at   11:17

## 2018-02-09 NOTE — ANESTHESIA POSTPROCEDURE EVALUATION
Post-Op Assessment Note      CV Status:  Stable    Mental Status:  Somnolent    Hydration Status:  Euvolemic    PONV Controlled:  Controlled    Airway Patency:  Patent    Post Op Vitals Reviewed: Yes          Staff: Anesthesiologist           BP   102/66   Temp 99   Pulse 53   Resp 14   SpO2 100% 2L NC

## 2018-02-09 NOTE — H&P
Progress Note - Rody Epperson 33 y o  female MRN: [id number]    Unit/Bed#: RODRIGO DARNELL Encounter: [id number]        HPI:  29 yo F with hx of cognitive disorder on lithium presented for evaluation of abdominal pain  Patient reported she has intermittent lower abdominal pain and intermittent diarrhea  she denies certain correlation between her symptoms and gluten intake  She reported intermittent diarrhea alternating with constipation  She was found to have positive celiac disease panel in the end of October  She has never received an upper endoscopy  Her brother was recently diagnosed with celiac disease  She is not a good historian but is seems she has has not been losing weight  Her last hepatic panel was in 2013 which was normal    Objective:     Vitals: There were no vitals taken for this visit  ,There is no height or weight on file to calculate BMI  No intake or output data in the 24 hours ending 02/09/18 0845    Physical Exam:     General Appearance: Alert, appears stated age and cooperative  Lungs: Clear to auscultation bilaterally, no rales or rhonchi, no labored breathing/accessory muscle use  Heart: Regular rate and rhythm, S1, S2 normal, no murmur, click, rub or gallop  Abdomen: Soft, non-tender, non-distended; bowel sounds normal; no masses or no organomegaly  Extremities: No cyanosis, edema    Invasive Devices          No matching active lines, drains, or airways          Lab Results:              Invalid input(s): LABALBU            Imaging Studies: I have personally reviewed pertinent imaging studies  No results found  ASSESMENT/ PLAN:   Active Problems:    * No active hospital problems   *      EGD

## 2018-02-19 DIAGNOSIS — E03.9 HYPOTHYROIDISM, UNSPECIFIED TYPE: Primary | ICD-10-CM

## 2018-02-19 RX ORDER — LIOTHYRONINE SODIUM 5 UG/1
TABLET ORAL
Qty: 30 TABLET | Refills: 2 | Status: SHIPPED | OUTPATIENT
Start: 2018-02-19 | End: 2018-06-02 | Stop reason: SDUPTHER

## 2018-03-06 ENCOUNTER — TELEPHONE (OUTPATIENT)
Dept: GASTROENTEROLOGY | Facility: MEDICAL CENTER | Age: 28
End: 2018-03-06

## 2018-03-06 NOTE — TELEPHONE ENCOUNTER
----- Message from Roberto Hirsch MD sent at 3/5/2018  4:15 PM EST -----  EGD biopsy showed slight increased lymphocytes but not diagnostic for celiac disease  Please make sure patient has follow up appointment with me in 1-2 months   Thanks    ----- Message -----  From: Lab, Background User  Sent: 2/13/2018   8:09 AM  To: Roberto Hirsch MD

## 2018-03-06 NOTE — TELEPHONE ENCOUNTER
Called pt; lmom to call office  Patient already scheduled for f/u appt in May; however Per Dr Jing Selby, patient needs sooner appointment

## 2018-03-08 ENCOUNTER — TELEPHONE (OUTPATIENT)
Dept: GASTROENTEROLOGY | Facility: MEDICAL CENTER | Age: 28
End: 2018-03-08

## 2018-03-08 NOTE — TELEPHONE ENCOUNTER
----- Message from Jil Rodríguez MD sent at 3/5/2018  4:15 PM EST -----  EGD biopsy showed slight increased lymphocytes but not diagnostic for celiac disease  Please make sure patient has follow up appointment with me in 1-2 months   Thanks    ----- Message -----  From: Lab, Background User  Sent: 2/13/2018   8:09 AM  To: Jil Rodríguez MD

## 2018-04-09 ENCOUNTER — TELEPHONE (OUTPATIENT)
Dept: GASTROENTEROLOGY | Facility: CLINIC | Age: 28
End: 2018-04-09

## 2018-04-09 RX ORDER — CLOBETASOL PROPIONATE 0.05 G/100ML
SHAMPOO TOPICAL DAILY
COMMUNITY
Start: 2016-01-21 | End: 2018-04-11 | Stop reason: ALTCHOICE

## 2018-04-09 RX ORDER — SENNA PLUS 8.6 MG/1
TABLET ORAL
COMMUNITY
Start: 2014-11-25 | End: 2018-07-12

## 2018-04-09 RX ORDER — CHOLECALCIFEROL (VITAMIN D3) 125 MCG
4000 CAPSULE ORAL
COMMUNITY
End: 2019-06-04 | Stop reason: HOSPADM

## 2018-04-09 RX ORDER — ENTACAPONE 200 MG/1
200 TABLET ORAL
COMMUNITY
End: 2018-04-11 | Stop reason: ALTCHOICE

## 2018-04-09 NOTE — TELEPHONE ENCOUNTER
Dr Latanya Jorge pt    Pt called in questioning if she has celiacs disease or not  Please call her back [phone]  She would like an answer before her appt tomorrow 4/10

## 2018-04-10 ENCOUNTER — TELEPHONE (OUTPATIENT)
Dept: GASTROENTEROLOGY | Facility: MEDICAL CENTER | Age: 28
End: 2018-04-10

## 2018-04-10 ENCOUNTER — OFFICE VISIT (OUTPATIENT)
Dept: GASTROENTEROLOGY | Facility: HOSPITAL | Age: 28
End: 2018-04-10
Payer: COMMERCIAL

## 2018-04-10 VITALS
DIASTOLIC BLOOD PRESSURE: 70 MMHG | BODY MASS INDEX: 24.34 KG/M2 | HEART RATE: 71 BPM | HEIGHT: 63 IN | SYSTOLIC BLOOD PRESSURE: 113 MMHG | TEMPERATURE: 97.6 F | WEIGHT: 137.4 LBS

## 2018-04-10 DIAGNOSIS — K90.0 CELIAC SPRUE: Primary | ICD-10-CM

## 2018-04-10 PROCEDURE — 99213 OFFICE O/P EST LOW 20 MIN: CPT | Performed by: INTERNAL MEDICINE

## 2018-04-10 NOTE — PROGRESS NOTES
[facility] - Outpatient Follow-up Note  Julian Richardson 33 y o  female MRN: [id number]  Encounter: [id number]          ASSESSMENT AND PLAN:      1  Celiac sprue  - Patient has positive serology and biopsy showed increased intraepithelial lymphocytes  It is likely she has early celiac sprue  - Her abdominal pain and change of bowel habits could be secondary to celiac disease    -I referred the patient to see a nutrition consult, she reported she has been taking too many days off for procedures and office visit and she prefers to follow her brother's diet instruction to avoid gluten  - repeat labs and EGD with repeat biopsy in 6 months to check remission      - Celiac Disease Antibody Profile; Future  - Hepatic function panel; Future    ______________________________________________________________________    SUBJECTIVE:  65-year-old female presented for follow-up after endoscopy  She was initially found to have positive celiac panel last year and she had reported intermittent abdominal pain and change of bowel habits mainly with constipation  Patient underwent upper endoscopy and biopsy showedIncreased intraepithelial lymphocytes (>6/20 enterocytes at villous tip) are identified in the setting of preserved villous architecture  This finding has been described in early celiac disease Beuford Spear I lesion), it lacks specificity, and may be seen in other conditions such as H  pylori gastritis, NSAID use, bacterial overgrowth, tropical sprue, and certain autoimmune diseases  Patient does take ibuprofen for colon sleep for her back pain  She already started using gluten free diet as her brother was diagnosed with celiac disease  REVIEW OF SYSTEMS IS OTHERWISE NEGATIVE        Historical Information   Past Medical History:   Diagnosis Date    Disease of thyroid gland     hypo    Schizoaffective disorder ([address] )     Urinary retention      Past Surgical History:   Procedure Laterality Date  EYE SURGERY Left     tear duct    NE ESOPHAGOGASTRODUODENOSCOPY TRANSORAL DIAGNOSTIC N/A 2/9/2018    Procedure: ESOPHAGOGASTRODUODENOSCOPY (EGD); Surgeon: Willow Gauthier MD;  Location: [facility];  Service: Gastroenterology     Social History   History   Alcohol Use No     History   Drug Use No     History   Smoking Status    Never Smoker   Smokeless Tobacco    Not on file     No family history on file  Meds/Allergies       Current Outpatient Prescriptions:     bethanechol (URECHOLINE) 25 mg tablet    chlorproMAZINE (THORAZINE) 25 mg tablet    Cholecalciferol (VITAMIN D) 2000 units CAPS    Cholecalciferol (VITAMIN D3) 2000 units TABS    Cholecalciferol 5000 units TABS    clonazePAM (KlonoPIN) 2 mg tablet    fluvoxaMINE (LUVOX) 50 mg tablet    gabapentin (NEURONTIN) 300 mg capsule    liothyronine (CYTOMEL) 5 mcg tablet    lithium carbonate (LITHOBID) 450 mg CR tablet    senna (SENNA-TABS) 8 6 MG tablet    trihexyphenidyl (ARTANE) 2 mg tablet    Tryptophan 500 MG TABS    clobetasol propionate (CLOBEX) 0 05 % shampoo    entacapone (COMTAN) 200 mg tablet    Hydroxytryptophan (5-HYDROXY-L-TRYPTOPHAN) POWD    No Known Allergies        Objective     Blood pressure 113/70, pulse 71, temperature 97 6 °F (36 4 °C), temperature source Tympanic, height 5' 3" (1 6 m), weight 62 3 kg (137 lb 6 4 oz), not currently breastfeeding  Body mass index is 24 34 kg/m²  PHYSICAL EXAM:      General Appearance:   Alert, cooperative, no distress, flat affect   HEENT:   Normocephalic, atraumatic, anicteric      Neck:  Supple, symmetrical, trachea midline   Lungs:   Clear to auscultation bilaterally; no rales, rhonchi or wheezing; respirations unlabored    Heart[de-identified]   Regular rate and rhythm; no murmur, rub, or gallop     Abdomen:   Soft, non-tender, non-distended; normal bowel sounds; no masses, no organomegaly    Genitalia:   Deferred    Rectal:   Deferred    Extremities:  No cyanosis, clubbing or edema    Pulses:  2+ and symmetric    Skin:  No jaundice, rashes, or lesions    Lymph nodes:  No palpable cervical lymphadenopathy        Lab Results:   No visits with results within 1 Day(s) from this visit  Latest known visit with results is:   Admission on 02/09/2018, Discharged on 02/09/2018   Component Date Value    EXT Preg Test, Ur 02/09/2018 Negative     Case Report 02/09/2018                      Value:Surgical Pathology Report                         Case: [id number]                                   Authorizing Provider:  Arabella Pearce MD              Collected:           02/09/2018 1056              Ordering Location:     [facility] Received:            02/09/2018 1041                                     Operating Room                                                               Pathologist:           Emerita Martinez DO                                                            Specimens:   A) - Duodenum, r/o celiac                                                                           B) - Stomach, r/o h-pylori                                                                          C) - Esophagus, r/o eoe                                                                    Final Diagnosis 02/09/2018                      Value: This result contains rich text formatting which cannot be displayed here   Additional Information 02/09/2018                      Value: This result contains rich text formatting which cannot be displayed here  Elizabeth Zakia Gross Description 02/09/2018                      Value: This result contains rich text formatting which cannot be displayed here  Radiology Results:   No results found

## 2018-04-10 NOTE — TELEPHONE ENCOUNTER
Dr Fazal Song pt called stating changed her mind and would like to be referred to a nutrionist  Pt can be reached at [phone]

## 2018-04-10 NOTE — TELEPHONE ENCOUNTER
Patient has appointment at 2:00 today  Her blood work and bx are suggestive of possible early celiac  Further discussion with Dr Jasper Duran today       Thank you

## 2018-04-11 ENCOUNTER — OFFICE VISIT (OUTPATIENT)
Dept: INTERNAL MEDICINE CLINIC | Facility: CLINIC | Age: 28
End: 2018-04-11
Payer: COMMERCIAL

## 2018-04-11 VITALS
SYSTOLIC BLOOD PRESSURE: 110 MMHG | WEIGHT: 137.4 LBS | TEMPERATURE: 97.9 F | HEART RATE: 65 BPM | RESPIRATION RATE: 16 BRPM | BODY MASS INDEX: 24.34 KG/M2 | OXYGEN SATURATION: 99 % | DIASTOLIC BLOOD PRESSURE: 76 MMHG | HEIGHT: 63 IN

## 2018-04-11 DIAGNOSIS — K90.0 CELIAC SPRUE: Primary | ICD-10-CM

## 2018-04-11 DIAGNOSIS — G47.30 SLEEP APNEA, UNSPECIFIED TYPE: ICD-10-CM

## 2018-04-11 DIAGNOSIS — S39.012S STRAIN OF LUMBAR REGION, SEQUELA: Primary | ICD-10-CM

## 2018-04-11 PROBLEM — S39.012A LUMBAR STRAIN: Status: ACTIVE | Noted: 2017-05-23

## 2018-04-11 PROCEDURE — 99213 OFFICE O/P EST LOW 20 MIN: CPT | Performed by: PHYSICIAN ASSISTANT

## 2018-04-11 RX ORDER — CYCLOBENZAPRINE HCL 10 MG
10 TABLET ORAL 2 TIMES DAILY PRN
Qty: 30 TABLET | Refills: 0 | Status: SHIPPED | OUTPATIENT
Start: 2018-04-11 | End: 2018-05-15

## 2018-04-11 RX ORDER — DICLOFENAC SODIUM 75 MG/1
75 TABLET, DELAYED RELEASE ORAL 2 TIMES DAILY
Qty: 60 TABLET | Refills: 2 | Status: SHIPPED | OUTPATIENT
Start: 2018-04-11 | End: 2018-05-15

## 2018-04-11 NOTE — PROGRESS NOTES
Assessment/Plan:    Sleep apnea  rx given for APAP machine    Lumbar strain  Will give diclofenac and muscle relaxer  Pt counseled muscle relaxer may make her drowsy  She is to call if no improvement by next week       Diagnoses and all orders for this visit:    Strain of lumbar region, sequela  -     diclofenac (VOLTAREN) 75 mg EC tablet; Take 1 tablet (75 mg total) by mouth 2 (two) times a day  -     cyclobenzaprine (FLEXERIL) 10 mg tablet; Take 1 tablet (10 mg total) by mouth 2 (two) times a day as needed for muscle spasms    Sleep apnea, unspecified type    Other orders  -     Discontinue: Cholecalciferol 5000 units TABS; Take 5,000 Units by mouth  -     Discontinue: clobetasol propionate (CLOBEX) 0 05 % shampoo; Apply topically Daily  -     Discontinue: entacapone (COMTAN) 200 mg tablet; Take 200 mg by mouth  -     senna (SENNA-TABS) 8 6 MG tablet; Take by mouth  -     Tryptophan 500 MG TABS; Take 500 tablets by mouth  -     Cholecalciferol (VITAMIN D3) 2000 units TABS; Take by mouth          Subjective:      Patient ID: Jose Martin Mike is a 33 y o  female  Pt presents for follow up from sleep study  We do not have the results, however, she did bring a prescription from the neurologist who performed the test and he is recommending APAP trial with the patient  She also complains of back pain x 2 days as noted below  Back Pain   This is a new problem  The current episode started in the past 7 days  The problem occurs constantly  The problem is unchanged  The pain is present in the lumbar spine  The quality of the pain is described as aching  The pain does not radiate  The pain is at a severity of 6/10  The pain is moderate  The pain is the same all the time  The symptoms are aggravated by bending and sitting  Pertinent negatives include no abdominal pain, chest pain, dysuria, fever, headaches, leg pain, numbness, paresthesias, tingling or weakness  She has tried NSAIDs for the symptoms   The treatment provided mild relief  The following portions of the patient's history were reviewed and updated as appropriate:   She  has a past medical history of Disease of thyroid gland; Schizoaffective disorder ([facility] ); and Urinary retention  She   Patient Active Problem List    Diagnosis Date Noted    Sleep apnea 04/11/2018    Lumbar strain 05/23/2017    Schizoaffective disorder ([facility] ) 07/07/2016    Hypothyroidism 07/25/2014    Bipolar affective disorder, current episode manic without psychotic symptoms ([facility] ) 10/10/2012     She  has a past surgical history that includes Eye surgery (Left) and pr esophagogastroduodenoscopy transoral diagnostic (N/A, 2/9/2018)  Her family history includes Hypertension in her brother and father; Other in her family  She  reports that she has never smoked  She has never used smokeless tobacco  She reports that she does not drink alcohol or use drugs    Current Outpatient Prescriptions   Medication Sig Dispense Refill    bethanechol (URECHOLINE) 25 mg tablet Take 25 mg by mouth 3 (three) times a day      chlorproMAZINE (THORAZINE) 25 mg tablet Take 25 mg by mouth daily      Cholecalciferol (VITAMIN D3) 2000 units TABS Take by mouth      clonazePAM (KlonoPIN) 2 mg tablet Take 0 5 mg by mouth daily      fluvoxaMINE (LUVOX) 50 mg tablet Take 50 mg by mouth daily at bedtime      gabapentin (NEURONTIN) 300 mg capsule Take 300 mg by mouth daily at bedtime Take 3 tabs       liothyronine (CYTOMEL) 5 mcg tablet TAKE ONE TABLET BY MOUTH ONCE DAILY 30 tablet 2    lithium carbonate (LITHOBID) 450 mg CR tablet Take 450 mg by mouth 2 (two) times a day      senna (SENNA-TABS) 8 6 MG tablet Take by mouth      trihexyphenidyl (ARTANE) 2 mg tablet Take 2 mg by mouth daily      Tryptophan 500 MG TABS Take 500 tablets by mouth      cyclobenzaprine (FLEXERIL) 10 mg tablet Take 1 tablet (10 mg total) by mouth 2 (two) times a day as needed for muscle spasms 30 tablet 0    diclofenac (VOLTAREN) 75 mg EC tablet Take 1 tablet (75 mg total) by mouth 2 (two) times a day 60 tablet 2     No current facility-administered medications for this visit  Current Outpatient Prescriptions on File Prior to Visit   Medication Sig    bethanechol (URECHOLINE) 25 mg tablet Take 25 mg by mouth 3 (three) times a day    chlorproMAZINE (THORAZINE) 25 mg tablet Take 25 mg by mouth daily    clonazePAM (KlonoPIN) 2 mg tablet Take 0 5 mg by mouth daily    fluvoxaMINE (LUVOX) 50 mg tablet Take 50 mg by mouth daily at bedtime    gabapentin (NEURONTIN) 300 mg capsule Take 300 mg by mouth daily at bedtime Take 3 tabs     liothyronine (CYTOMEL) 5 mcg tablet TAKE ONE TABLET BY MOUTH ONCE DAILY    lithium carbonate (LITHOBID) 450 mg CR tablet Take 450 mg by mouth 2 (two) times a day    trihexyphenidyl (ARTANE) 2 mg tablet Take 2 mg by mouth daily    [DISCONTINUED] Cholecalciferol (VITAMIN D) 2000 units CAPS Take by mouth daily 2 tabs    [DISCONTINUED] Hydroxytryptophan (5-HYDROXY-L-TRYPTOPHAN) POWD by Does not apply route     No current facility-administered medications on file prior to visit  She has No Known Allergies       Review of Systems   Constitutional: Negative for chills and fever  HENT: Negative for congestion, ear pain, hearing loss, postnasal drip, rhinorrhea, sinus pain, sinus pressure, sore throat and trouble swallowing  Eyes: Negative for pain and visual disturbance  Respiratory: Negative for cough, chest tightness, shortness of breath and wheezing  Cardiovascular: Negative  Negative for chest pain, palpitations and leg swelling  Gastrointestinal: Negative for abdominal pain, blood in stool, constipation, diarrhea, nausea and vomiting  Endocrine: Negative for cold intolerance, heat intolerance, polydipsia, polyphagia and polyuria  Genitourinary: Negative for difficulty urinating, dysuria, flank pain and urgency  Musculoskeletal: Positive for back pain   Negative for arthralgias, gait problem and myalgias  Skin: Negative for rash  Allergic/Immunologic: Negative  Neurological: Negative for dizziness, tingling, weakness, light-headedness, numbness, headaches and paresthesias  Hematological: Negative  Psychiatric/Behavioral: Negative for behavioral problems, dysphoric mood and sleep disturbance  The patient is not nervous/anxious  Objective:      /76 (BP Location: Left arm, Patient Position: Sitting, Cuff Size: Standard)   Pulse 65   Temp 97 9 °F (36 6 °C) (Tympanic)   Resp 16   Ht 5' 3" (1 6 m)   Wt 62 3 kg (137 lb 6 4 oz)   SpO2 99%   BMI 24 34 kg/m²          Physical Exam   Constitutional: She is oriented to person, place, and time  She appears well-developed and well-nourished  No distress  HENT:   Head: Normocephalic and atraumatic  Right Ear: External ear normal    Left Ear: External ear normal    Nose: Nose normal    Mouth/Throat: Oropharynx is clear and moist  No oropharyngeal exudate  Eyes: Conjunctivae and EOM are normal  Pupils are equal, round, and reactive to light  Right eye exhibits no discharge  Left eye exhibits no discharge  No scleral icterus  Neck: Normal range of motion  Neck supple  No thyromegaly present  Cardiovascular: Normal rate, regular rhythm and normal heart sounds  Exam reveals no gallop and no friction rub  No murmur heard  Pulmonary/Chest: Effort normal and breath sounds normal  No respiratory distress  She has no wheezes  She has no rales  Abdominal: Soft  Bowel sounds are normal  She exhibits no distension  There is no tenderness  Musculoskeletal: She exhibits tenderness  She exhibits no edema or deformity  Lumbar back: She exhibits decreased range of motion, tenderness, pain and spasm  Neurological: She is alert and oriented to person, place, and time  No cranial nerve deficit  Skin: Skin is warm and dry  She is not diaphoretic  Psychiatric: She has a normal mood and affect   Her behavior is normal  Judgment and thought content normal

## 2018-04-11 NOTE — ASSESSMENT & PLAN NOTE
Will give diclofenac and muscle relaxer  Pt counseled muscle relaxer may make her drowsy   She is to call if no improvement by next week

## 2018-04-11 NOTE — TELEPHONE ENCOUNTER
Okay, nutrition referral was entered  Please provide her with number for scheduling nutrition appt   Thank you

## 2018-04-29 ENCOUNTER — OFFICE VISIT (OUTPATIENT)
Dept: URGENT CARE | Facility: CLINIC | Age: 28
End: 2018-04-29
Payer: COMMERCIAL

## 2018-04-29 VITALS
DIASTOLIC BLOOD PRESSURE: 75 MMHG | OXYGEN SATURATION: 97 % | RESPIRATION RATE: 18 BRPM | SYSTOLIC BLOOD PRESSURE: 119 MMHG | HEART RATE: 75 BPM | TEMPERATURE: 98.3 F

## 2018-04-29 DIAGNOSIS — G47.00 INSOMNIA, UNSPECIFIED TYPE: Primary | ICD-10-CM

## 2018-04-29 PROCEDURE — 99283 EMERGENCY DEPT VISIT LOW MDM: CPT | Performed by: NURSE PRACTITIONER

## 2018-04-29 PROCEDURE — G0382 LEV 3 HOSP TYPE B ED VISIT: HCPCS | Performed by: NURSE PRACTITIONER

## 2018-04-29 NOTE — PATIENT INSTRUCTIONS
Instructed patient since she has not slept in the last 4 days recommend she call her therapist who manages her bipolar and schizoaffective disorder patient states she is unsure who her therapist is at this time  Recommended evaluation at ED then possibly by crisis patient is in agreement and did call brother to come and pick her up and take her to the ED via personal vehicle   Patient states she is going to [facility]

## 2018-04-29 NOTE — PROGRESS NOTES
[facility]        NAME: Joel Austin is a 33 y o  female  : 1990    MRN: [id number]  DATE: 2018  TIME: 4:23 PM    Assessment and Plan   Insomnia, unspecified type [G47 00]  1  Insomnia, unspecified type           Patient Instructions       Follow up with PCP in 3-5 days  Proceed to  ER if symptoms worsen  Chief Complaint     Chief Complaint   Patient presents with    Insomnia     Pt reports not sleeping for four days  History of Present Illness       30-year-old female presents urgent care with chief complaint of inability sleep for the last 4 days  Patient reports no change in her current medications reports no stressors in her current life  Denies any other symptoms        Review of Systems   Review of Systems   Constitutional: Positive for fatigue (sleep disturbance)  HENT: Negative  Eyes: Negative  Respiratory: Negative  Cardiovascular: Negative  Gastrointestinal: Negative  Endocrine: Negative  Genitourinary: Negative  Musculoskeletal: Negative  Skin: Negative  Allergic/Immunologic: Negative  Neurological: Negative  Hematological: Negative  Psychiatric/Behavioral: Negative  All other systems reviewed and are negative          Current Medications       Current Outpatient Prescriptions:     bethanechol (URECHOLINE) 25 mg tablet, Take 25 mg by mouth 3 (three) times a day, Disp: , Rfl:     chlorproMAZINE (THORAZINE) 25 mg tablet, Take 25 mg by mouth daily, Disp: , Rfl:     Cholecalciferol (VITAMIN D3) 2000 units TABS, Take by mouth, Disp: , Rfl:     clonazePAM (KlonoPIN) 2 mg tablet, Take 0 5 mg by mouth daily, Disp: , Rfl:     cyclobenzaprine (FLEXERIL) 10 mg tablet, Take 1 tablet (10 mg total) by mouth 2 (two) times a day as needed for muscle spasms, Disp: 30 tablet, Rfl: 0    diclofenac (VOLTAREN) 75 mg EC tablet, Take 1 tablet (75 mg total) by mouth 2 (two) times a day, Disp: 60 tablet, Rfl: 2    fluvoxaMINE (LUVOX) 50 mg tablet, Take 50 mg by mouth daily at bedtime, Disp: , Rfl:     gabapentin (NEURONTIN) 300 mg capsule, Take 300 mg by mouth daily at bedtime Take 3 tabs , Disp: , Rfl:     liothyronine (CYTOMEL) 5 mcg tablet, TAKE ONE TABLET BY MOUTH ONCE DAILY, Disp: 30 tablet, Rfl: 2    lithium carbonate (LITHOBID) 450 mg CR tablet, Take 450 mg by mouth 2 (two) times a day, Disp: , Rfl:     senna (SENNA-TABS) 8 6 MG tablet, Take by mouth, Disp: , Rfl:     trihexyphenidyl (ARTANE) 2 mg tablet, Take 2 mg by mouth daily, Disp: , Rfl:     Tryptophan 500 MG TABS, Take 500 tablets by mouth, Disp: , Rfl:     Current Allergies     Allergies as of 04/29/2018    (No Known Allergies)            The following portions of the patient's history were reviewed and updated as appropriate: allergies, current medications, past family history, past medical history, past social history, past surgical history and problem list      Past Medical History:   Diagnosis Date    Disease of thyroid gland     hypo    Schizoaffective disorder ([address] )     Urinary retention        Past Surgical History:   Procedure Laterality Date    EYE SURGERY Left     tear duct    ND ESOPHAGOGASTRODUODENOSCOPY TRANSORAL DIAGNOSTIC N/A 2/9/2018    Procedure: ESOPHAGOGASTRODUODENOSCOPY (EGD); Surgeon: Adriana Anne MD;  Location: [facility];  Service: Gastroenterology       Family History   Problem Relation Age of Onset    Hypertension Father      benign essential    Hypertension Brother      benign essential     Other Family      nasolacrimal duct obstruction, acquired         Medications have been verified  Objective   /75   Pulse 75   Temp 98 3 °F (36 8 °C)   Resp 18   SpO2 97%        Physical Exam     Physical Exam   Constitutional: She is oriented to person, place, and time  Vital signs are normal  She appears well-developed  HENT:   Head: Normocephalic and atraumatic     Right Ear: External ear normal    Left Ear: External ear normal    Nose: Nose normal    Mouth/Throat: Oropharynx is clear and moist    Eyes: Conjunctivae and EOM are normal  Pupils are equal, round, and reactive to light  Lids are everted and swept, no foreign bodies found  Neck: Normal range of motion  Neck supple  Cardiovascular: Normal rate, regular rhythm, normal heart sounds and intact distal pulses  Pulmonary/Chest: Effort normal and breath sounds normal    Abdominal: Soft  Normal appearance and bowel sounds are normal    Musculoskeletal: Normal range of motion  Neurological: She is alert and oriented to person, place, and time  She has normal reflexes  Skin: Skin is warm, dry and intact  Psychiatric: She has a normal mood and affect  Her speech is normal and behavior is normal  Judgment and thought content normal    Nursing note and vitals reviewed

## 2018-05-07 ENCOUNTER — TELEPHONE (OUTPATIENT)
Dept: INTERNAL MEDICINE CLINIC | Facility: CLINIC | Age: 28
End: 2018-05-07

## 2018-05-07 DIAGNOSIS — F51.01 PRIMARY INSOMNIA: Primary | ICD-10-CM

## 2018-05-07 RX ORDER — ZOLPIDEM TARTRATE 5 MG/1
5 TABLET ORAL
Qty: 30 TABLET | Refills: 0 | Status: SHIPPED | OUTPATIENT
Start: 2018-05-07 | End: 2018-07-12

## 2018-05-07 NOTE — TELEPHONE ENCOUNTER
Pt seen in [facility] ER room on 4/29/18 for insomnia x 4 days, she was given Ambien 5 mg which has helped  Her fu appt is 5/15/18 but only has 2 pills left  Will you call some in until her appt with you? I requested the records  Send to [facility]

## 2018-05-15 ENCOUNTER — OFFICE VISIT (OUTPATIENT)
Dept: INTERNAL MEDICINE CLINIC | Facility: CLINIC | Age: 28
End: 2018-05-15
Payer: COMMERCIAL

## 2018-05-15 VITALS
OXYGEN SATURATION: 99 % | SYSTOLIC BLOOD PRESSURE: 110 MMHG | RESPIRATION RATE: 18 BRPM | DIASTOLIC BLOOD PRESSURE: 78 MMHG | TEMPERATURE: 97.8 F | HEIGHT: 63 IN | HEART RATE: 70 BPM | WEIGHT: 134.2 LBS | BODY MASS INDEX: 23.78 KG/M2

## 2018-05-15 DIAGNOSIS — F51.01 PRIMARY INSOMNIA: Primary | ICD-10-CM

## 2018-05-15 DIAGNOSIS — G47.30 SLEEP APNEA, UNSPECIFIED TYPE: ICD-10-CM

## 2018-05-15 PROCEDURE — 99214 OFFICE O/P EST MOD 30 MIN: CPT | Performed by: PHYSICIAN ASSISTANT

## 2018-05-15 RX ORDER — GABAPENTIN 600 MG/1
600 TABLET ORAL 2 TIMES DAILY
COMMUNITY
End: 2018-07-27 | Stop reason: HOSPADM

## 2018-05-15 NOTE — PROGRESS NOTES
Assessment/Plan:    Primary insomnia  Continue ambien only prn    Sleep apnea  RX for APAP given at 6-18mm H2O per sleep study recommendation       Diagnoses and all orders for this visit:    Primary insomnia    Sleep apnea, unspecified type    Other orders  -     gabapentin (NEURONTIN) 600 MG tablet; Take 600 mg by mouth daily          Subjective:      Patient ID: Katie Dick is a 33 y o  female  Pt presents for ER follow up  She was evaluated on 4/29 for insomnia after not sleeping for 4 days  She was given an rx for [place] which does help but she feels groggy the following day  She spoke to her psychiatrist who only wants her to use this sparingly  She is in the process of getting her APAP machine through [employer] with pressure from 8-16mm H2O  She feels that once this is taken care of her insomnia and daytime tiredness will improve  The following portions of the patient's history were reviewed and updated as appropriate:   She  has a past medical history of Disease of thyroid gland; [facility]ffective disorder ([facility] ); and Urinary retention  She   Patient Active Problem List    Diagnosis Date Noted    Primary insomnia 05/15/2018    Sleep apnea 04/11/2018    Lumbar strain 05/23/2017    [facility] ) 07/07/2016    Hypothyroidism 07/25/2014    Bipolar affective disorder, current episode manic without psychotic symptoms ([facility] ) 10/10/2012     She  has a past surgical history that includes Eye surgery (Left) and pr esophagogastroduodenoscopy transoral diagnostic (N/A, 2/9/2018)  Her family history includes Hypertension in her brother and father; Other in her family  She  reports that she has never smoked  She has never used smokeless tobacco  She reports that she does not drink alcohol or use drugs    Current Outpatient Prescriptions   Medication Sig Dispense Refill    bethanechol (URECHOLINE) 25 mg tablet Take 25 mg by mouth 3 (three) times a day      chlorproMAZINE (THORAZINE) 25 mg tablet Take 25 mg by mouth daily      Cholecalciferol (VITAMIN D3) 2000 units TABS Take by mouth      clonazePAM (KlonoPIN) 1 mg tablet Take 0 5 mg by mouth 2 (two) times a day        fluvoxaMINE (LUVOX) 50 mg tablet Take 50 mg by mouth daily at bedtime      gabapentin (NEURONTIN) 300 mg capsule Take 300 mg by mouth daily at bedtime Take 3 tabs       gabapentin (NEURONTIN) 600 MG tablet Take 600 mg by mouth daily      liothyronine (CYTOMEL) 5 mcg tablet TAKE ONE TABLET BY MOUTH ONCE DAILY 30 tablet 2    lithium carbonate (LITHOBID) 450 mg CR tablet Take 450 mg by mouth 2 (two) times a day      senna (SENNA-TABS) 8 6 MG tablet Take by mouth      trihexyphenidyl (ARTANE) 2 mg tablet Take 2 mg by mouth daily      Tryptophan 500 MG TABS Take 500 tablets by mouth      zolpidem (AMBIEN) 5 mg tablet Take 1 tablet (5 mg total) by mouth daily at bedtime as needed for sleep 30 tablet 0     No current facility-administered medications for this visit        Current Outpatient Prescriptions on File Prior to Visit   Medication Sig    bethanechol (URECHOLINE) 25 mg tablet Take 25 mg by mouth 3 (three) times a day    chlorproMAZINE (THORAZINE) 25 mg tablet Take 25 mg by mouth daily    Cholecalciferol (VITAMIN D3) 2000 units TABS Take by mouth    clonazePAM (KlonoPIN) 1 mg tablet Take 0 5 mg by mouth 2 (two) times a day      fluvoxaMINE (LUVOX) 50 mg tablet Take 50 mg by mouth daily at bedtime    gabapentin (NEURONTIN) 300 mg capsule Take 300 mg by mouth daily at bedtime Take 3 tabs     liothyronine (CYTOMEL) 5 mcg tablet TAKE ONE TABLET BY MOUTH ONCE DAILY    lithium carbonate (LITHOBID) 450 mg CR tablet Take 450 mg by mouth 2 (two) times a day    senna (SENNA-TABS) 8 6 MG tablet Take by mouth    trihexyphenidyl (ARTANE) 2 mg tablet Take 2 mg by mouth daily    Tryptophan 500 MG TABS Take 500 tablets by mouth    zolpidem (AMBIEN) 5 mg tablet Take 1 tablet (5 mg total) by mouth daily at bedtime as needed for sleep    [DISCONTINUED] cyclobenzaprine (FLEXERIL) 10 mg tablet Take 1 tablet (10 mg total) by mouth 2 (two) times a day as needed for muscle spasms    [DISCONTINUED] diclofenac (VOLTAREN) 75 mg EC tablet Take 1 tablet (75 mg total) by mouth 2 (two) times a day     No current facility-administered medications on file prior to visit  She has No Known Allergies       Review of Systems   Constitutional: Positive for fatigue  Negative for chills and fever  HENT: Negative for congestion, ear pain, hearing loss, postnasal drip, rhinorrhea, sinus pain, sinus pressure, sore throat and trouble swallowing  Eyes: Negative for pain and visual disturbance  Respiratory: Negative for cough, chest tightness, shortness of breath and wheezing  Cardiovascular: Negative  Negative for chest pain, palpitations and leg swelling  Gastrointestinal: Negative for abdominal pain, blood in stool, constipation, diarrhea, nausea and vomiting  Endocrine: Negative for cold intolerance, heat intolerance, polydipsia, polyphagia and polyuria  Genitourinary: Negative for difficulty urinating, dysuria, flank pain and urgency  Musculoskeletal: Negative for arthralgias, back pain, gait problem and myalgias  Skin: Negative for rash  Allergic/Immunologic: Negative  Neurological: Negative for dizziness, weakness, light-headedness and headaches  Hematological: Negative  Psychiatric/Behavioral: Positive for sleep disturbance  Negative for behavioral problems and dysphoric mood  The patient is not nervous/anxious  Objective:      /78 (BP Location: Left arm, Patient Position: Sitting, Cuff Size: Adult)   Pulse 70   Temp 97 8 °F (36 6 °C) (Tympanic)   Resp 18   Ht 5' 3" (1 6 m)   Wt 60 9 kg (134 lb 3 2 oz)   SpO2 99%   BMI 23 77 kg/m²          Physical Exam   Constitutional: She is oriented to person, place, and time  She appears well-developed and well-nourished  No distress     HENT:   Head: Normocephalic and atraumatic  Right Ear: External ear normal    Left Ear: External ear normal    Nose: Nose normal    Mouth/Throat: Oropharynx is clear and moist  No oropharyngeal exudate  Eyes: Conjunctivae and EOM are normal  Pupils are equal, round, and reactive to light  Right eye exhibits no discharge  Left eye exhibits no discharge  No scleral icterus  Neck: Normal range of motion  Neck supple  No thyromegaly present  Cardiovascular: Normal rate, regular rhythm and normal heart sounds  Exam reveals no gallop and no friction rub  No murmur heard  Pulmonary/Chest: Effort normal and breath sounds normal  No respiratory distress  She has no wheezes  She has no rales  Abdominal: Soft  Bowel sounds are normal  She exhibits no distension  There is no tenderness  Musculoskeletal: Normal range of motion  She exhibits no edema, tenderness or deformity  Neurological: She is alert and oriented to person, place, and time  No cranial nerve deficit  Skin: Skin is warm and dry  She is not diaphoretic  Psychiatric: She has a normal mood and affect   Her behavior is normal  Judgment and thought content normal

## 2018-05-30 ENCOUNTER — CLINICAL SUPPORT (OUTPATIENT)
Dept: NUTRITION | Facility: HOSPITAL | Age: 28
End: 2018-05-30
Payer: COMMERCIAL

## 2018-05-30 VITALS — BODY MASS INDEX: 23.57 KG/M2 | WEIGHT: 133 LBS | HEIGHT: 63 IN

## 2018-05-30 DIAGNOSIS — K90.0 CELIAC SPRUE: ICD-10-CM

## 2018-05-30 PROCEDURE — S9470 NUTRITIONAL COUNSELING, DIET: HCPCS

## 2018-05-30 NOTE — PROGRESS NOTES
Initial Nutrition Assessment Form    Patient Name: Jose R Astorga    YOB: 1990    Sex: Female     Assessment Date: 5/30/2018  Start Time: 1030 Stop Time: 80 Total Minutes: 27     Data:  Present at session: self   Parent Concerns: Gluten    Medical Dx/Reason for Referral: Celiac disease   Past Medical History:   Diagnosis Date    Disease of thyroid gland     hypo    Schizoaffective disorder ([address] )     Urinary retention        Current Outpatient Prescriptions   Medication Sig Dispense Refill    bethanechol (URECHOLINE) 25 mg tablet Take 25 mg by mouth 3 (three) times a day      chlorproMAZINE (THORAZINE) 25 mg tablet Take 25 mg by mouth daily      Cholecalciferol (VITAMIN D3) 2000 units TABS Take by mouth      clonazePAM (KlonoPIN) 1 mg tablet Take 0 5 mg by mouth 2 (two) times a day        fluvoxaMINE (LUVOX) 50 mg tablet Take 50 mg by mouth daily at bedtime      gabapentin (NEURONTIN) 300 mg capsule Take 300 mg by mouth daily at bedtime Take 3 tabs       gabapentin (NEURONTIN) 600 MG tablet Take 600 mg by mouth daily      liothyronine (CYTOMEL) 5 mcg tablet TAKE ONE TABLET BY MOUTH ONCE DAILY 30 tablet 2    lithium carbonate (LITHOBID) 450 mg CR tablet Take 450 mg by mouth 2 (two) times a day      senna (SENNA-TABS) 8 6 MG tablet Take by mouth      trihexyphenidyl (ARTANE) 2 mg tablet Take 2 mg by mouth daily      Tryptophan 500 MG TABS Take 500 tablets by mouth      zolpidem (AMBIEN) 5 mg tablet Take 1 tablet (5 mg total) by mouth daily at bedtime as needed for sleep 30 tablet 0     No current facility-administered medications for this visit           Additional Meds/Supplements: none   Special Learning Needs: Difficulty with remembering per patient   Height: HC Readings from Last 3 Encounters:   No data found for [facility], [facility]       Weight: Wt Readings from Last 3 Encounters:   05/15/18 60 9 kg (134 lb 3 2 oz)   04/11/18 62 3 kg (137 lb 6 4 oz)   04/10/18 62 3 kg (137 lb 6 4 oz)     There is no height or weight on file to calculate BMI  Recent Weight Change: [x]Yes     []No  Amount: 12# wt gain reported due to medications      Energy Needs: 8335-3036 calories   No Known Allergies    History   Alcohol Use No       History   Smoking Status    Never Smoker   Smokeless Tobacco    Never Used       Who shops? patient and mother   Who cooks? mother   Exercise: sedentary   Prior Counseling? []Yes     [x]No  When:    Why:         Diet Hx:  Breakfast:  a m  Juice, GF bread, cheese, cavazos   Lunch:  p m   Eggs, nuts, prepackage GF products         Dinner:  p m  Prepackaged GT products         Snacks:             Nutrition Diagnosis:   Altered gastrointestinal function  related to Alteration in gastrointestinal tract structure and/or function as evidenced by  Conditions associated with a diagnosis or treatment (celiac disease)       Medical Nutrition Therapy Intervention:  [x]Individualized Meal Plan []Understanding Lab Values   []Basic Pathophysiology of Disease []Food/Medication Interactions   []Food Diary []Exercise   [x]Lifestyle/Behavior Modification Techniques []Medication, Mechanism of Action   [x]Label Reading []Self Blood Glucose Monitoring   []Weight/BMI Goals []Other -    Other Notes:        Comprehension: []Excellent  []Very Good  []Good  [x]Fair   []Poor    Receptivity: []Excellent  []Very Good  [x]Good  []Fair   []Poor    Expected Compliance: []Excellent  []Very Good  [x]Good  []Fair   []Poor        Goals:  1  Gluten free diet   2  Read food labels for offensive ingredients   3  Consider gluten free MVI       Follow up to be determined  RD contact information provided      Labs:  CMP  Lab Results   Component Value Date     01/03/2018    K 4 1 01/03/2018     01/03/2018    CO2 27 01/03/2018    ANIONGAP 6 01/03/2018    BUN 16 01/03/2018    CREATININE 0 86 01/03/2018    GLUF 94 01/03/2018    CALCIUM 9 1 01/03/2018    AST 12 01/03/2018    ALT 27 01/03/2018    ALKPHOS 84 01/03/2018    PROT 7 2 01/03/2018    BILITOT 0 40 01/03/2018    EGFR 92 01/03/2018       BMP  Lab Results   Component Value Date    CALCIUM 9 1 01/03/2018     01/03/2018    K 4 1 01/03/2018    CO2 27 01/03/2018     01/03/2018    BUN 16 01/03/2018    CREATININE 0 86 01/03/2018       Lipids  No results found for: CHOL  No results found for: HDL  No results found for: LDLCALC  No results found for: TRIG  No components found for: CHOLHDL    Hemoglobin A1C  Lab Results   Component Value Date    HGBA1C 5 1 11/25/2014       Fasting Glucose  Lab Results   Component Value Date    GLUF 94 01/03/2018       Insulin     Thyroid  Lab Results   Component Value Date    S7JLQZY 1 4 08/04/2015       Hepatic Function Panel  Lab Results   Component Value Date    ALT 27 01/03/2018    AST 12 01/03/2018    ALKPHOS 84 01/03/2018    BILITOT 0 40 01/03/2018       Celiac Disease Antibody Panel  Endomysial IgA   Date Value Ref Range Status   10/24/2017 Positive (A) Negative Final     Gliadin IgA   Date Value Ref Range Status   10/24/2017 18 0 - 19 units Final     Comment:                        Negative                   0 - 19                     Weak Positive             20 - 30                     Moderate to Strong Positive   >30     Gliadin IgG   Date Value Ref Range Status   10/24/2017 66 (H) 0 - 19 units Final     Comment:                        Negative                   0 - 19                     Weak Positive             20 - 30                     Moderate to Strong Positive   >30     IgA   Date Value Ref Range Status   10/24/2017 118 87 - 352 mg/dL Final     Tissue Transglut Ab IGG   Date Value Ref Range Status   10/24/2017 10 (H) 0 - 5 U/mL Final     Comment:                                   Negative        0 - 5                                Weak Positive   6 - 9                                Positive           >9     TISSUE TRANSGLUTAMINASE IGA   Date Value Ref Range Status   10/24/2017 7 (H) 0 - 3 U/mL Final     Comment: Negative        0 -  3                                Weak Positive   4 - 10                                Positive           >10   Tissue Transglutaminase (tTG) has been identified   as the endomysial antigen  Studies have demonstr-   ated that endomysial IgA antibodies have over 99%   specificity for gluten sensitive enteropathy        Iron  No results found for: IRON, TIBC, FERRITIN    Vitamins  No results found for: VITAMIN B2   No results found for: NICOTINAMIDE, NICOTINIC ACID   No results found for: VITAMINB6  Lab Results   Component Value Date    Keith King 834 01/03/2018     No results found for: VITB5  No results found for: U3NZGTTD  No results found for: THYROGLB  No results found for: VITAMIN K   No results found for: 25-HYDROXY VIT D   No results found for: [address] MA,RD,LDN,CDE  [address] [facility]  [address] [place] [id number]

## 2018-06-02 DIAGNOSIS — E03.9 HYPOTHYROIDISM, UNSPECIFIED TYPE: ICD-10-CM

## 2018-06-04 RX ORDER — LIOTHYRONINE SODIUM 5 UG/1
TABLET ORAL
Qty: 30 TABLET | Refills: 2 | Status: SHIPPED | OUTPATIENT
Start: 2018-06-04 | End: 2018-09-10 | Stop reason: SDUPTHER

## 2018-06-15 ENCOUNTER — TELEPHONE (OUTPATIENT)
Dept: INTERNAL MEDICINE CLINIC | Facility: CLINIC | Age: 28
End: 2018-06-15

## 2018-06-15 NOTE — TELEPHONE ENCOUNTER
I received a call from the pts former neurologist  She called requesting cognitive testing  They told her they would do it but that she would have to see him for an appt since she had not been seen in 2 years  She stated she didn't want to see him  He is asking that we reach out and tell her that if she wants to pursue this then she may want to go to a larger more specialized facility perhaps in [place] or [place]  He even suggested [facility] but she may have to go to [place]   Please notify pt

## 2018-06-15 NOTE — TELEPHONE ENCOUNTER
Spoke with pt who stated that her psychiatrist has given her forms and is setting up an appt with [facility] Neurology [facility]

## 2018-06-20 ENCOUNTER — TELEPHONE (OUTPATIENT)
Dept: INTERNAL MEDICINE CLINIC | Facility: CLINIC | Age: 28
End: 2018-06-20

## 2018-06-20 NOTE — TELEPHONE ENCOUNTER
Please see previous task, I believe it is more neuropsych not neurology and resources are hard to find

## 2018-06-20 NOTE — TELEPHONE ENCOUNTER
[place] for pt-I suggested that she talk to [place] at her next scheduled appt re this request  Chetan Billings

## 2018-07-12 ENCOUNTER — OFFICE VISIT (OUTPATIENT)
Dept: INTERNAL MEDICINE CLINIC | Facility: CLINIC | Age: 28
End: 2018-07-12
Payer: COMMERCIAL

## 2018-07-12 VITALS
DIASTOLIC BLOOD PRESSURE: 62 MMHG | RESPIRATION RATE: 16 BRPM | OXYGEN SATURATION: 98 % | BODY MASS INDEX: 24.06 KG/M2 | SYSTOLIC BLOOD PRESSURE: 104 MMHG | WEIGHT: 135.8 LBS | HEIGHT: 63 IN | TEMPERATURE: 99.3 F | HEART RATE: 66 BPM

## 2018-07-12 DIAGNOSIS — G47.30 SLEEP APNEA, UNSPECIFIED TYPE: ICD-10-CM

## 2018-07-12 DIAGNOSIS — R41.3 MEMORY DIFFICULTY: Primary | ICD-10-CM

## 2018-07-12 PROCEDURE — 99213 OFFICE O/P EST LOW 20 MIN: CPT | Performed by: PHYSICIAN ASSISTANT

## 2018-07-12 NOTE — PROGRESS NOTES
Assessment/Plan:    Memory difficulty  Neurology referral provided  Sleep apnea  Pt using APAP machine and should continue to do so  Diagnoses and all orders for this visit:    Memory difficulty  -     Ambulatory referral to Neurology; Future    Sleep apnea, unspecified type          Subjective:      Patient ID: Rafaela Baldwin is a 33 y o  female  Pt presents for routine visit  She states she needs a referral to a new neurologist to evaluate some cognitive changes  She feels her memory has worsened  She often forgets where things are that are always in the same location and forgets how to do certain tasks that she has done several times before  She also has some trouble with word finding  She also has been using her APAP machine and needed to be seen so that her insurance would allow her to keep the machine and equipment  She has not noticed many changes since starting to use it but it has only been about 1 month  The following portions of the patient's history were reviewed and updated as appropriate:   She  has a past medical history of Disease of thyroid gland; Memory difficulty (7/12/2018); Schizoaffective disorder ([facility] [address] ); and Urinary retention  She   Patient Active Problem List    Diagnosis Date Noted    Memory difficulty 07/12/2018    Primary insomnia 05/15/2018    Sleep apnea 04/11/2018    Lumbar strain 05/23/2017    Schizoaffective disorder ([facility] [address] ) 07/07/2016    Hypothyroidism 07/25/2014    Bipolar affective disorder, current episode manic without psychotic symptoms ([facility] [address] ) 10/10/2012     She  has a past surgical history that includes Eye surgery (Left) and pr esophagogastroduodenoscopy transoral diagnostic (N/A, 2/9/2018)  Her family history includes Hypertension in her brother and father; Other in her family  She  reports that she has never smoked  She has never used smokeless tobacco  She reports that she does not drink alcohol or use drugs    Current Outpatient Prescriptions Medication Sig Dispense Refill    bethanechol (URECHOLINE) 25 mg tablet Take 25 mg by mouth 3 (three) times a day      chlorproMAZINE (THORAZINE) 25 mg tablet Take 25 mg by mouth daily      Cholecalciferol (VITAMIN D3) 2000 units TABS Take by mouth      clonazePAM (KlonoPIN) 0 5 mg tablet TAKE 1 25 MG ONCE DAILY       fluvoxaMINE (LUVOX) 50 mg tablet Take 50 mg by mouth daily at bedtime      gabapentin (NEURONTIN) 600 MG tablet Take 600 mg by mouth 2 (two) times a day        liothyronine (CYTOMEL) 5 mcg tablet TAKE 1 TABLET BY MOUTH ONCE DAILY 30 tablet 2    lithium carbonate (LITHOBID) 450 mg CR tablet Take 450 mg by mouth 2 (two) times a day      trihexyphenidyl (ARTANE) 2 mg tablet Take 2 mg by mouth daily      Tryptophan 500 MG TABS Take 500 tablets by mouth       No current facility-administered medications for this visit        Current Outpatient Prescriptions on File Prior to Visit   Medication Sig    bethanechol (URECHOLINE) 25 mg tablet Take 25 mg by mouth 3 (three) times a day    chlorproMAZINE (THORAZINE) 25 mg tablet Take 25 mg by mouth daily    Cholecalciferol (VITAMIN D3) 2000 units TABS Take by mouth    clonazePAM (KlonoPIN) 0 5 mg tablet TAKE 1 25 MG ONCE DAILY     fluvoxaMINE (LUVOX) 50 mg tablet Take 50 mg by mouth daily at bedtime    gabapentin (NEURONTIN) 600 MG tablet Take 600 mg by mouth 2 (two) times a day      liothyronine (CYTOMEL) 5 mcg tablet TAKE 1 TABLET BY MOUTH ONCE DAILY    lithium carbonate (LITHOBID) 450 mg CR tablet Take 450 mg by mouth 2 (two) times a day    trihexyphenidyl (ARTANE) 2 mg tablet Take 2 mg by mouth daily    Tryptophan 500 MG TABS Take 500 tablets by mouth    [DISCONTINUED] gabapentin (NEURONTIN) 300 mg capsule Take 300 mg by mouth daily at bedtime Take 3 tabs     [DISCONTINUED] senna (SENNA-TABS) 8 6 MG tablet Take by mouth    [DISCONTINUED] zolpidem (AMBIEN) 5 mg tablet Take 1 tablet (5 mg total) by mouth daily at bedtime as needed for sleep No current facility-administered medications on file prior to visit  She has No Known Allergies       Review of Systems   Constitutional: Negative for chills and fever  HENT: Negative for congestion, ear pain, hearing loss, postnasal drip, rhinorrhea, sinus pain, sinus pressure, sore throat and trouble swallowing  Eyes: Negative for pain and visual disturbance  Respiratory: Negative for cough, chest tightness, shortness of breath and wheezing  Cardiovascular: Negative  Negative for chest pain, palpitations and leg swelling  Gastrointestinal: Negative for abdominal pain, blood in stool, constipation, diarrhea, nausea and vomiting  Endocrine: Negative for cold intolerance, heat intolerance, polydipsia, polyphagia and polyuria  Genitourinary: Negative for difficulty urinating, dysuria, flank pain and urgency  Musculoskeletal: Negative for arthralgias, back pain, gait problem and myalgias  Skin: Negative for rash  Allergic/Immunologic: Negative  Neurological: Negative for dizziness, weakness, light-headedness and headaches  Hematological: Negative  Psychiatric/Behavioral: Positive for confusion  Negative for behavioral problems, dysphoric mood and sleep disturbance  The patient is not nervous/anxious  Objective:      /62 (BP Location: Left arm, Patient Position: Sitting, Cuff Size: Large)   Pulse 66   Temp 99 3 °F (37 4 °C) (Tympanic)   Resp 16   Ht 5' 3" (1 6 m)   Wt 61 6 kg (135 lb 12 8 oz)   SpO2 98%   BMI 24 06 kg/m²          Physical Exam   Constitutional: She is oriented to person, place, and time  She appears well-developed and well-nourished  No distress  HENT:   Head: Normocephalic and atraumatic  Right Ear: External ear normal    Left Ear: External ear normal    Nose: Nose normal    Mouth/Throat: Oropharynx is clear and moist  No oropharyngeal exudate  Eyes: Conjunctivae and EOM are normal  Pupils are equal, round, and reactive to light   Right eye exhibits no discharge  Left eye exhibits no discharge  No scleral icterus  Neck: Normal range of motion  Neck supple  No thyromegaly present  Cardiovascular: Normal rate, regular rhythm and normal heart sounds  Exam reveals no gallop and no friction rub  No murmur heard  Pulmonary/Chest: Effort normal and breath sounds normal  No respiratory distress  She has no wheezes  She has no rales  Abdominal: Soft  Bowel sounds are normal  She exhibits no distension  There is no tenderness  Musculoskeletal: Normal range of motion  She exhibits no edema, tenderness or deformity  Neurological: She is alert and oriented to person, place, and time  No cranial nerve deficit  Skin: Skin is warm and dry  She is not diaphoretic  Psychiatric: Judgment and thought content normal  She is slowed  She exhibits abnormal remote memory

## 2018-07-19 ENCOUNTER — APPOINTMENT (OUTPATIENT)
Dept: LAB | Facility: CLINIC | Age: 28
End: 2018-07-19
Payer: COMMERCIAL

## 2018-07-19 ENCOUNTER — TRANSCRIBE ORDERS (OUTPATIENT)
Dept: LAB | Facility: CLINIC | Age: 28
End: 2018-07-19

## 2018-07-19 DIAGNOSIS — R41.840 DIFFICULTY CONCENTRATING: Primary | ICD-10-CM

## 2018-07-19 DIAGNOSIS — R41.840 DIFFICULTY CONCENTRATING: ICD-10-CM

## 2018-07-19 LAB
CREAT SERPL-MCNC: 1.17 MG/DL (ref 0.6–1.3)
GFR SERPL CREATININE-BSD FRML MDRD: 63 ML/MIN/1.73SQ M
T4 FREE SERPL-MCNC: 0.88 NG/DL (ref 0.76–1.46)
TSH SERPL DL<=0.05 MIU/L-ACNC: 0.88 UIU/ML (ref 0.36–3.74)
VIT B12 SERPL-MCNC: 893 PG/ML (ref 100–900)

## 2018-07-19 PROCEDURE — 82607 VITAMIN B-12: CPT

## 2018-07-19 PROCEDURE — 84443 ASSAY THYROID STIM HORMONE: CPT

## 2018-07-19 PROCEDURE — 84425 ASSAY OF VITAMIN B-1: CPT

## 2018-07-19 PROCEDURE — 36415 COLL VENOUS BLD VENIPUNCTURE: CPT

## 2018-07-19 PROCEDURE — 82565 ASSAY OF CREATININE: CPT

## 2018-07-19 PROCEDURE — 84446 ASSAY OF VITAMIN E: CPT

## 2018-07-19 PROCEDURE — 84439 ASSAY OF FREE THYROXINE: CPT

## 2018-07-19 PROCEDURE — 83918 ORGANIC ACIDS TOTAL QUANT: CPT

## 2018-07-21 ENCOUNTER — HOSPITAL ENCOUNTER (INPATIENT)
Facility: HOSPITAL | Age: 28
LOS: 6 days | Discharge: HOME | DRG: 750 | End: 2018-07-27
Attending: INTERNAL MEDICINE | Admitting: PSYCHIATRY & NEUROLOGY
Payer: COMMERCIAL

## 2018-07-21 DIAGNOSIS — F41.1 GAD (GENERALIZED ANXIETY DISORDER): ICD-10-CM

## 2018-07-21 DIAGNOSIS — F32.A DEPRESSION: ICD-10-CM

## 2018-07-21 DIAGNOSIS — F25.0 SCHIZOAFFECTIVE DISORDER, BIPOLAR TYPE (HCC): Chronic | ICD-10-CM

## 2018-07-21 DIAGNOSIS — F31.10 BIPOLAR AFFECTIVE DISORDER, CURRENT EPISODE MANIC WITHOUT PSYCHOTIC SYMPTOMS (HCC): ICD-10-CM

## 2018-07-21 DIAGNOSIS — T14.91XA SUICIDAL BEHAVIOR WITH ATTEMPTED SELF-INJURY (HCC): ICD-10-CM

## 2018-07-21 DIAGNOSIS — E03.9 HYPOTHYROIDISM: Primary | ICD-10-CM

## 2018-07-21 LAB
ALBUMIN SERPL BCP-MCNC: 4.7 G/DL (ref 3.5–5.7)
ALP SERPL-CCNC: 74 U/L (ref 40–150)
ALT SERPL W P-5'-P-CCNC: 20 U/L (ref 7–52)
AMPHETAMINES SERPL QL SCN: NEGATIVE
ANION GAP SERPL CALCULATED.3IONS-SCNC: 8 MMOL/L (ref 4–13)
AST SERPL W P-5'-P-CCNC: 18 U/L (ref 13–39)
B-HCG SERPL-ACNC: <0.6 MIU/ML (ref 0–11.6)
BARBITURATES UR QL: NEGATIVE
BASOPHILS # BLD AUTO: 0.1 THOUSANDS/ΜL (ref 0–0.1)
BASOPHILS NFR BLD AUTO: 1 % (ref 0–2)
BENZODIAZ UR QL: NEGATIVE
BILIRUB SERPL-MCNC: 0.6 MG/DL (ref 0.2–1)
BILIRUB UR QL STRIP: NEGATIVE
BUN SERPL-MCNC: 18 MG/DL (ref 7–25)
CALCIUM SERPL-MCNC: 10 MG/DL (ref 8.6–10.5)
CHLORIDE SERPL-SCNC: 102 MMOL/L (ref 98–107)
CLARITY UR: CLEAR
CO2 SERPL-SCNC: 25 MMOL/L (ref 21–31)
COCAINE UR QL: NEGATIVE
COLOR UR: ABNORMAL
CREAT SERPL-MCNC: 1.33 MG/DL (ref 0.6–1.2)
EOSINOPHIL # BLD AUTO: 0.2 THOUSAND/ΜL (ref 0–0.61)
EOSINOPHIL NFR BLD AUTO: 1 % (ref 0–5)
ERYTHROCYTE [DISTWIDTH] IN BLOOD BY AUTOMATED COUNT: 14.5 % (ref 11.5–14.5)
ETHANOL SERPL-MCNC: <10 MG/DL
GFR SERPL CREATININE-BSD FRML MDRD: 54 ML/MIN/1.73SQ M
GLUCOSE SERPL-MCNC: 107 MG/DL (ref 65–99)
GLUCOSE UR STRIP-MCNC: NEGATIVE MG/DL
HCT VFR BLD AUTO: 40.1 % (ref 34.8–46.1)
HGB BLD-MCNC: 13.4 G/DL (ref 12–16)
HGB UR QL STRIP.AUTO: NEGATIVE
KETONES UR STRIP-MCNC: NEGATIVE MG/DL
LEUKOCYTE ESTERASE UR QL STRIP: NEGATIVE
LYMPHOCYTES # BLD AUTO: 2.6 THOUSANDS/ΜL (ref 0.6–4.47)
LYMPHOCYTES NFR BLD AUTO: 20 % (ref 21–51)
MCH RBC QN AUTO: 28.2 PG (ref 26–34)
MCHC RBC AUTO-ENTMCNC: 33.4 G/DL (ref 31–37)
MCV RBC AUTO: 85 FL (ref 81–99)
METHADONE UR QL: NEGATIVE
MONOCYTES # BLD AUTO: 0.7 THOUSAND/ΜL (ref 0.17–1.22)
MONOCYTES NFR BLD AUTO: 6 % (ref 2–12)
NEUTROPHILS # BLD AUTO: 9.2 THOUSANDS/ΜL (ref 1.4–6.5)
NEUTS SEG NFR BLD AUTO: 72 % (ref 42–75)
NITRITE UR QL STRIP: NEGATIVE
NRBC BLD AUTO-RTO: 0 /100 WBCS
OPIATES UR QL SCN: NEGATIVE
PCP UR QL: NEGATIVE
PH UR STRIP.AUTO: 6 [PH] (ref 5–8)
PLATELET # BLD AUTO: 234 THOUSANDS/UL (ref 149–390)
PMV BLD AUTO: 8.4 FL (ref 8.6–11.7)
POTASSIUM SERPL-SCNC: 3.6 MMOL/L (ref 3.5–5.5)
PROT SERPL-MCNC: 7.6 G/DL (ref 6.4–8.9)
PROT UR STRIP-MCNC: NEGATIVE MG/DL
RBC # BLD AUTO: 4.74 MILLION/UL (ref 3.9–5.2)
SODIUM SERPL-SCNC: 135 MMOL/L (ref 134–143)
SP GR UR STRIP.AUTO: <=1.005 (ref 1–1.03)
THC UR QL: NEGATIVE
UROBILINOGEN UR QL STRIP.AUTO: 0.2 E.U./DL
WBC # BLD AUTO: 12.7 THOUSAND/UL (ref 4.8–10.8)

## 2018-07-21 PROCEDURE — 80307 DRUG TEST PRSMV CHEM ANLYZR: CPT | Performed by: INTERNAL MEDICINE

## 2018-07-21 PROCEDURE — 80053 COMPREHEN METABOLIC PANEL: CPT | Performed by: INTERNAL MEDICINE

## 2018-07-21 PROCEDURE — 85025 COMPLETE CBC W/AUTO DIFF WBC: CPT | Performed by: INTERNAL MEDICINE

## 2018-07-21 PROCEDURE — 84702 CHORIONIC GONADOTROPIN TEST: CPT | Performed by: INTERNAL MEDICINE

## 2018-07-21 PROCEDURE — 80320 DRUG SCREEN QUANTALCOHOLS: CPT | Performed by: INTERNAL MEDICINE

## 2018-07-21 PROCEDURE — 99285 EMERGENCY DEPT VISIT HI MDM: CPT

## 2018-07-21 PROCEDURE — 81003 URINALYSIS AUTO W/O SCOPE: CPT | Performed by: INTERNAL MEDICINE

## 2018-07-21 PROCEDURE — 36415 COLL VENOUS BLD VENIPUNCTURE: CPT | Performed by: INTERNAL MEDICINE

## 2018-07-21 RX ORDER — CLONAZEPAM 0.5 MG/1
0.5 TABLET ORAL 3 TIMES DAILY
Status: DISCONTINUED | OUTPATIENT
Start: 2018-07-21 | End: 2018-07-22

## 2018-07-21 RX ORDER — TRAZODONE HYDROCHLORIDE 50 MG/1
50 TABLET ORAL
Status: DISCONTINUED | OUTPATIENT
Start: 2018-07-21 | End: 2018-07-27 | Stop reason: HOSPADM

## 2018-07-21 RX ORDER — OLANZAPINE 5 MG/1
5 TABLET ORAL
Status: DISCONTINUED | OUTPATIENT
Start: 2018-07-21 | End: 2018-07-27 | Stop reason: HOSPADM

## 2018-07-21 RX ORDER — GINSENG 100 MG
1 CAPSULE ORAL ONCE
Status: COMPLETED | OUTPATIENT
Start: 2018-07-21 | End: 2018-07-21

## 2018-07-21 RX ORDER — TRAZODONE HYDROCHLORIDE 50 MG/1
50 TABLET ORAL
Status: CANCELLED | OUTPATIENT
Start: 2018-07-21

## 2018-07-21 RX ORDER — TRIHEXYPHENIDYL HYDROCHLORIDE 2 MG/1
2 TABLET ORAL
Status: DISCONTINUED | OUTPATIENT
Start: 2018-07-21 | End: 2018-07-23

## 2018-07-21 RX ORDER — LITHIUM CARBONATE 450 MG
450 TABLET, EXTENDED RELEASE ORAL EVERY 12 HOURS SCHEDULED
Status: DISCONTINUED | OUTPATIENT
Start: 2018-07-21 | End: 2018-07-27 | Stop reason: HOSPADM

## 2018-07-21 RX ORDER — BETHANECHOL CHLORIDE 25 MG/1
25 TABLET ORAL ONCE
Status: COMPLETED | OUTPATIENT
Start: 2018-07-21 | End: 2018-07-21

## 2018-07-21 RX ORDER — FLUVOXAMINE MALEATE 50 MG/1
50 TABLET, COATED ORAL
Status: DISCONTINUED | OUTPATIENT
Start: 2018-07-21 | End: 2018-07-22

## 2018-07-21 RX ORDER — ACETAMINOPHEN 325 MG/1
650 TABLET ORAL EVERY 6 HOURS PRN
Status: DISCONTINUED | OUTPATIENT
Start: 2018-07-21 | End: 2018-07-27 | Stop reason: HOSPADM

## 2018-07-21 RX ORDER — LIOTHYRONINE SODIUM 5 UG/1
5 TABLET ORAL DAILY
Status: DISCONTINUED | OUTPATIENT
Start: 2018-07-21 | End: 2018-07-27 | Stop reason: HOSPADM

## 2018-07-21 RX ORDER — CHLORPROMAZINE HYDROCHLORIDE 25 MG/1
25 TABLET, FILM COATED ORAL
Status: DISCONTINUED | OUTPATIENT
Start: 2018-07-21 | End: 2018-07-23

## 2018-07-21 RX ORDER — OLANZAPINE 5 MG/1
5 TABLET ORAL EVERY 8 HOURS PRN
Status: CANCELLED | OUTPATIENT
Start: 2018-07-21

## 2018-07-21 RX ORDER — HYDROXYZINE HYDROCHLORIDE 25 MG/1
25 TABLET, FILM COATED ORAL EVERY 4 HOURS PRN
Status: CANCELLED | OUTPATIENT
Start: 2018-07-21

## 2018-07-21 RX ORDER — HYDROXYZINE HYDROCHLORIDE 25 MG/1
25 TABLET, FILM COATED ORAL EVERY 4 HOURS PRN
Status: DISCONTINUED | OUTPATIENT
Start: 2018-07-21 | End: 2018-07-27 | Stop reason: HOSPADM

## 2018-07-21 RX ADMIN — CHLORPROMAZINE HYDROCHLORIDE 25 MG: 25 TABLET, SUGAR COATED ORAL at 21:43

## 2018-07-21 RX ADMIN — LITHIUM CARBONATE 450 MG: 450 TABLET, EXTENDED RELEASE ORAL at 11:08

## 2018-07-21 RX ADMIN — CLONAZEPAM 0.5 MG: 0.5 TABLET ORAL at 18:01

## 2018-07-21 RX ADMIN — BETHANECHOL CHLORIDE 25 MG: 25 TABLET ORAL at 12:09

## 2018-07-21 RX ADMIN — BACITRACIN ZINC 1 SMALL APPLICATION: 500 OINTMENT TOPICAL at 03:55

## 2018-07-21 RX ADMIN — TRIHEXYPHENIDYL HYDROCHLORIDE 2 MG: 2 TABLET ORAL at 21:43

## 2018-07-21 RX ADMIN — CLONAZEPAM 0.5 MG: 0.5 TABLET ORAL at 20:21

## 2018-07-21 RX ADMIN — LITHIUM CARBONATE 450 MG: 450 TABLET, EXTENDED RELEASE ORAL at 20:20

## 2018-07-21 RX ADMIN — FLUVOXAMINE MALEATE 50 MG: 50 TABLET, FILM COATED ORAL at 12:09

## 2018-07-21 RX ADMIN — LIOTHYRONINE SODIUM 5 MCG: 5 TABLET ORAL at 11:08

## 2018-07-21 NOTE — ED NOTES
Nicole Morse signed a 12 and was present with a copy of her rights    Crisis sent information to [facility]

## 2018-07-21 NOTE — ED CARE HANDOFF
Emergency Department Sign Out Note        Sign out and transfer of care from Dr Linda Gaston  See Separate Emergency Department note  The patient, Zan Serrato, was evaluated by the previous provider for  Dr Martinez Both   Workup Completed:  Patient was seen by crisis with admit  ED Course / Workup Pending (followup): Pt was seen and examined by crisis, meets the criteria for inpt admission  Pt agree and signed 201  ED Course as of Jul 21 1154   Sat Jul 21, 2018   4131 Patient is sleeping comfortably in bed and pending crisis evaluation  Patient is medically clear  Procedures  MDM  CritCare Time      Disposition  Final diagnoses:   Suicidal behavior with attempted self-injury [facility])   Depression     Time reflects when diagnosis was documented in both MDM as applicable and the Disposition within this note     Time User Action Codes Description Comment    7/21/2018 11:38 AM Eufemia Dragon Add [E03 9] Hypothyroidism     7/21/2018 11:38 AM Eufemia Dragon Modify [E03 9] Hypothyroidism     7/21/2018 11:38 AM Eufemia Dragon Modify [E03 9] Hypothyroidism     7/21/2018 11:48 AM Manuel Hung Add Bud Dietrich Suicidal behavior with attempted self-injury ([facility] [address] )     7/21/2018 11:48 AM Manuel Hung Add [F32 9] Depression       ED Disposition     ED Disposition Condition Comment    Admit  Case was discussed with Crisis  and the patient's admission status was agreed to be Admission Status: inpatient status to the service of Crisis who spoke with psychiatrist with accepted the patient           Follow-up Information    None       Patient's Medications   Discharge Prescriptions    No medications on file     No discharge procedures on file         ED Provider  Electronically Signed by     Prabhakar May MD  07/21/18 8512

## 2018-07-21 NOTE — ED NOTES
088/37/32 @ [phone]:  Crisis called insurance @ [phone] for update, and they stated that someone would be calling soon to complete precert    [address]: Crisis spoke to Jorge L Goetz from insurance company:    Auth#:  [id number]   Days:  3  Review:  7/23/18   Spoke to Jorge L Goetz, who says, "someone will call on Monday for update "    Mejia MS

## 2018-07-21 NOTE — ED NOTES
Crisis met with Jes Dugan in ER  She spoke in a flat, tearful manner  Jes Dugan stated that she has a history of schizoaffective /depressive type and see's Dr jimenez at [facility]   Patients stated that she has had an increase in anxiety and depression due to issues at work "not working fast enough, people calling her stupid"  She stated that she has had numerous inpatient stays the last being in 2014 in [place]  Jes Dugan stated that she is currently not feeling homicidal or suicidal  She was oriented 4x  She also states that she is anxious, has poo impulse control and concentration

## 2018-07-21 NOTE — ED ATTENDING ATTESTATION
Maryana Perkins DO, saw and evaluated the patient  I have discussed the patient with the resident/non-physician practitioner and agree with the resident's/non-physician practitioner's findings, Plan of Care, and MDM as documented in the resident's/non-physician practitioner's note, except where noted  All available labs and Radiology studies were reviewed  At this point I agree with the current assessment done in the Emergency Department    I have conducted an independent evaluation of this patient a history and physical is as follows:      Critical Care Time  CritCare Time    Procedures

## 2018-07-21 NOTE — PLAN OF CARE
Alteration in Thoughts and Perception     Treatment Goal: Gain control of psychotic behaviors/thinking, reduce/eliminate presenting symptoms and demonstrate improved reality functioning upon discharge Not Progressing     Verbalize thoughts and feelings Not Progressing     Refrain from acting on delusional thinking/internal stimuli Not Progressing     Agree to be compliant with medication regime, as prescribed and report medication side effects Not Progressing     Attend and participate in unit activities, including therapeutic, recreational, and educational groups Not Progressing     Recognize dysfunctional thoughts, communicate reality-based thoughts at the time of discharge Not Progressing        Anxiety     Anxiety is at manageable level Not Progressing        Depression     Treatment Goal: Demonstrate behavioral control of depressive symptoms, verbalize feelings of improved mood/affect, and adopt new coping skills prior to discharge Not Progressing     Refrain from self-neglect Not Progressing        DEPRESSION     Will be euthymic at discharge Not [address] Discharge to home or other facility with appropriate resources Not Progressing        Ineffective Coping     Identifies ineffective coping skills Not Progressing     Identifies healthy coping skills Not Progressing     Demonstrates healthy coping skills Not Progressing        METABOLIC, FLUID AND ELECTROLYTES - ADULT     Electrolytes maintained within normal limits Not Progressing     Fluid balance maintained Not Progressing     Glucose maintained within target range Not Progressing        Risk for Self Injury/Neglect     Treatment Goal: Remain safe during length of stay, learn and adopt new coping skills, and be free of self-injurious ideation, impulses and acts at the time of discharge Not Progressing     Refrain from harming self Not Progressing     Recognize maladaptive responses and adopt new coping mechanisms Not Progressing        SELF HARM/SUICIDALITY     Will have no self-injury during hospital stay Not Progressing

## 2018-07-21 NOTE — ED PROVIDER NOTES
History  Chief Complaint   Patient presents with    Extremity Laceration     right wrist   "I was self harming and it went too far"  Pt  states she has severe anxiety over her workplace  Reports recent demotion and also "people make fun of me there"     Psychiatric Evaluation     "I need med help"        History provided by:  Patient   used: No    Psychiatric Evaluation   Presenting symptoms: self-mutilation and suicidal thoughts    Onset quality:  Gradual  Duration:  1 week  Timing:  Constant  Progression:  Worsening  Chronicity:  Chronic  Context: stressful life event    Treatment compliance:  Most of the time  Time since last psychoactive medication taken:  1 day  Relieved by:  Nothing  Exacerbated by: Recent demotion at her job  Associated symptoms: feelings of worthlessness and poor judgment    Associated symptoms: no abdominal pain, no anxiety, no chest pain, no decreased need for sleep, no euphoric mood and no headaches    Risk factors: hx of suicide attempts        Prior to Admission Medications   Prescriptions Last Dose Informant Patient Reported? Taking?    Cholecalciferol (VITAMIN D3) 2000 units TABS   Yes No   Sig: Take by mouth   Tryptophan 500 MG TABS   Yes No   Sig: Take 500 tablets by mouth   bethanechol (URECHOLINE) 25 mg tablet   Yes No   Sig: Take 25 mg by mouth 3 (three) times a day   chlorproMAZINE (THORAZINE) 25 mg tablet   Yes No   Sig: Take 25 mg by mouth daily   clonazePAM (KlonoPIN) 0 5 mg tablet   Yes No   Sig: TAKE 1 25 MG ONCE DAILY    fluvoxaMINE (LUVOX) 50 mg tablet   Yes No   Sig: Take 50 mg by mouth daily at bedtime   gabapentin (NEURONTIN) 600 MG tablet   Yes No   Sig: Take 600 mg by mouth 2 (two) times a day     liothyronine (CYTOMEL) 5 mcg tablet   No No   Sig: TAKE 1 TABLET BY MOUTH ONCE DAILY   lithium carbonate (LITHOBID) 450 mg CR tablet   Yes No   Sig: Take 450 mg by mouth 2 (two) times a day   trihexyphenidyl (ARTANE) 2 mg tablet   Yes No   Sig: Take 2 mg by mouth daily      Facility-Administered Medications: None       Past Medical History:   Diagnosis Date    Disease of thyroid gland     hypo    Memory difficulty 7/12/2018    Schizoaffective disorder ([address] )     Urinary retention        Past Surgical History:   Procedure Laterality Date    EYE SURGERY Left     tear duct    PA ESOPHAGOGASTRODUODENOSCOPY TRANSORAL DIAGNOSTIC N/A 2/9/2018    Procedure: ESOPHAGOGASTRODUODENOSCOPY (EGD); Surgeon: Jonathan Franco MD;  Location: [facility];  Service: Gastroenterology       Family History   Problem Relation Age of Onset    Hypertension Father         benign essential    Hypertension Brother         benign essential     Other Family         nasolacrimal duct obstruction, acquired     I have reviewed and agree with the history as documented  Social History   Substance Use Topics    Smoking status: Never Smoker    Smokeless tobacco: Never Used    Alcohol use No        Review of Systems   Constitutional: Negative for chills and fever  HENT: Negative for rhinorrhea and sore throat  Eyes: Negative for visual disturbance  Respiratory: Negative for cough and shortness of breath  Cardiovascular: Negative for chest pain and leg swelling  Gastrointestinal: Negative for abdominal pain, diarrhea, nausea and vomiting  Genitourinary: Negative for dysuria  Musculoskeletal: Negative for back pain and myalgias  Skin: Negative for rash  Neurological: Negative for dizziness and headaches  Psychiatric/Behavioral: Positive for self-injury and suicidal ideas  Negative for confusion  The patient is not nervous/anxious  All other systems reviewed and are negative  Physical Exam  Physical Exam   Constitutional: She is oriented to person, place, and time  She appears well-developed and well-nourished  HENT:   Nose: Nose normal    Mouth/Throat: Oropharynx is clear and moist  No oropharyngeal exudate     Eyes: Conjunctivae and EOM are normal  Pupils are equal, round, and reactive to light  No scleral icterus  Neck: Normal range of motion  Neck supple  No JVD present  No tracheal deviation present  Cardiovascular: Normal rate, regular rhythm and normal heart sounds  No murmur heard  Pulmonary/Chest: Effort normal and breath sounds normal  No respiratory distress  She has no wheezes  She has no rales  Abdominal: Soft  Bowel sounds are normal  There is no tenderness  There is no guarding  Musculoskeletal: Normal range of motion  She exhibits no edema or tenderness  Neurological: She is alert and oriented to person, place, and time  No cranial nerve deficit or sensory deficit  She exhibits normal muscle tone  5/5 motor, nl sens   Skin: Skin is warm and dry  Superficial lacerations to the left wrist   Psychiatric:   Poor eye contact  Very soft spoken  Tearful dysthymic and minimally interactive  Expresses feelings of emotional pain  poor judgment and poor insight   Nursing note and vitals reviewed  Vital Signs  ED Triage Vitals [07/21/18 0058]   Temp Pulse Respirations Blood Pressure SpO2   -- 69 18 127/84 98 %      Temp src Heart Rate Source Patient Position - Orthostatic VS BP Location FiO2 (%)   -- -- -- -- --      Pain Score       No Pain           Vitals:    07/21/18 0058   BP: 127/84   Pulse: 69       Visual Acuity      ED Medications  Medications - No data to display    Diagnostic Studies  Results Reviewed     None                 No orders to display              Procedures  Procedures       Phone Contacts  ED Phone Contact    ED Course  ED Course as of Jul 21 0657   Sat Jul 21, 2018   0300 Crisis here to evaluate patient    [phone] Patient signed out to Dr Florentino Needraul Time    Disposition  Final diagnoses:   None     ED Disposition     None      Follow-up Information    None         Patient's Medications   Discharge Prescriptions    No medications on file     No discharge procedures on file      ED Provider  Electronically Signed by           Chava Mohamud DO  07/21/18 4114

## 2018-07-21 NOTE — NURSING NOTE
Pt arrived to unit on a 201 voluntary with a diagnosis of depression  Pt brought into ER by mother after making multiple superficial scratches on bilateral wrists  During assessment pt denies SI, HI, and hallucinations  Pt side "I dont cut to kill myself" "Each cut is a reminder for me of something that didn't work that I can't do again" Speech delayed, slow and slightly blocked  Affect blunted flat depressed  Bizarre  Socially awkward  Pt reports long history of treatment since the age of 4  Multiple long term in patient admissions as an adolescent  Pt reports insomnia and is fixated on not being able to sleep  History of schizoaffective disorder,Depression, anxiety, insomnia  Denies alcohol  Denies Drug Use  Prior to admission patient was demoted to a "" at the [employer]  Pt reports that made her feel in adequate and inferior and a disappointment  When patient becomes extremely stressed " I self harm" Pt agreeable to notify staff with any thoughts of self harm

## 2018-07-22 PROBLEM — F41.1 GAD (GENERALIZED ANXIETY DISORDER): Status: ACTIVE | Noted: 2018-07-22

## 2018-07-22 LAB — LITHIUM SERPL-SCNC: 0.8 MMOL/L (ref 1–1.2)

## 2018-07-22 PROCEDURE — 99222 1ST HOSP IP/OBS MODERATE 55: CPT | Performed by: PSYCHIATRY & NEUROLOGY

## 2018-07-22 PROCEDURE — 80178 ASSAY OF LITHIUM: CPT | Performed by: NURSE PRACTITIONER

## 2018-07-22 RX ORDER — LIOTHYRONINE SODIUM 5 UG/1
5 TABLET ORAL DAILY
Status: DISCONTINUED | OUTPATIENT
Start: 2018-07-22 | End: 2018-07-22

## 2018-07-22 RX ORDER — ESCITALOPRAM OXALATE 5 MG/1
5 TABLET ORAL DAILY
Status: COMPLETED | OUTPATIENT
Start: 2018-07-22 | End: 2018-07-24

## 2018-07-22 RX ORDER — AMOXICILLIN 500 MG
1500 CAPSULE ORAL
Status: DISCONTINUED | OUTPATIENT
Start: 2018-07-22 | End: 2018-07-27 | Stop reason: HOSPADM

## 2018-07-22 RX ORDER — BETHANECHOL CHLORIDE 25 MG/1
25 TABLET ORAL 3 TIMES DAILY
Status: DISCONTINUED | OUTPATIENT
Start: 2018-07-22 | End: 2018-07-23

## 2018-07-22 RX ORDER — GABAPENTIN 300 MG/1
600 CAPSULE ORAL 2 TIMES DAILY
Status: DISCONTINUED | OUTPATIENT
Start: 2018-07-22 | End: 2018-07-27 | Stop reason: HOSPADM

## 2018-07-22 RX ORDER — LORAZEPAM 1 MG/1
1 TABLET ORAL EVERY 8 HOURS PRN
Status: DISCONTINUED | OUTPATIENT
Start: 2018-07-22 | End: 2018-07-27 | Stop reason: HOSPADM

## 2018-07-22 RX ORDER — MELATONIN
4000 DAILY
Status: DISCONTINUED | OUTPATIENT
Start: 2018-07-22 | End: 2018-07-27 | Stop reason: HOSPADM

## 2018-07-22 RX ADMIN — VITAMIN D, TAB 1000IU (100/BT) 4000 UNITS: 25 TAB at 12:32

## 2018-07-22 RX ADMIN — TRAZODONE HYDROCHLORIDE 50 MG: 50 TABLET ORAL at 21:26

## 2018-07-22 RX ADMIN — BETHANECHOL CHLORIDE 25 MG: 25 TABLET ORAL at 17:39

## 2018-07-22 RX ADMIN — ESCITALOPRAM 5 MG: 5 TABLET, FILM COATED ORAL at 12:32

## 2018-07-22 RX ADMIN — BETHANECHOL CHLORIDE 25 MG: 25 TABLET ORAL at 21:37

## 2018-07-22 RX ADMIN — LITHIUM CARBONATE 450 MG: 450 TABLET, EXTENDED RELEASE ORAL at 09:10

## 2018-07-22 RX ADMIN — TRAZODONE HYDROCHLORIDE 50 MG: 50 TABLET ORAL at 00:42

## 2018-07-22 RX ADMIN — LIOTHYRONINE SODIUM 5 MCG: 5 TABLET ORAL at 09:10

## 2018-07-22 RX ADMIN — HYDROXYZINE HYDROCHLORIDE 25 MG: 25 TABLET ORAL at 00:42

## 2018-07-22 RX ADMIN — BETHANECHOL CHLORIDE 25 MG: 25 TABLET ORAL at 13:08

## 2018-07-22 RX ADMIN — GABAPENTIN 600 MG: 300 CAPSULE ORAL at 12:32

## 2018-07-22 RX ADMIN — Medication 1500 MG: at 22:00

## 2018-07-22 RX ADMIN — TRIHEXYPHENIDYL HYDROCHLORIDE 2 MG: 2 TABLET ORAL at 21:27

## 2018-07-22 RX ADMIN — LITHIUM CARBONATE 450 MG: 450 TABLET, EXTENDED RELEASE ORAL at 21:27

## 2018-07-22 RX ADMIN — GABAPENTIN 600 MG: 300 CAPSULE ORAL at 17:39

## 2018-07-22 RX ADMIN — CLONAZEPAM 0.5 MG: 0.5 TABLET ORAL at 09:10

## 2018-07-22 RX ADMIN — CLONAZEPAM 1.25 MG: 1 TABLET ORAL at 21:26

## 2018-07-22 RX ADMIN — CHLORPROMAZINE HYDROCHLORIDE 25 MG: 25 TABLET, SUGAR COATED ORAL at 21:27

## 2018-07-22 NOTE — PLAN OF CARE
Alteration in Thoughts and Perception     Treatment Goal: Gain control of psychotic behaviors/thinking, reduce/eliminate presenting symptoms and demonstrate improved reality functioning upon discharge Progressing     Verbalize thoughts and feelings Progressing     Refrain from acting on delusional thinking/internal stimuli Progressing     Agree to be compliant with medication regime, as prescribed and report medication side effects Progressing     Attend and participate in unit activities, including therapeutic, recreational, and educational groups Progressing     Recognize dysfunctional thoughts, communicate reality-based thoughts at the time of discharge Progressing        Anxiety     Anxiety is at manageable level Progressing        Depression     Treatment Goal: Demonstrate behavioral control of depressive symptoms, verbalize feelings of improved mood/affect, and adopt new coping skills prior to discharge Progressing     Refrain from self-neglect Progressing        DEPRESSION     Will be euthymic at discharge [address] Discharge to home or other facility with appropriate resources Progressing        Ineffective Coping     Identifies ineffective coping skills Progressing     Identifies healthy coping skills Progressing     Demonstrates healthy coping skills Progressing        METABOLIC, FLUID AND ELECTROLYTES - ADULT     Electrolytes maintained within normal limits Progressing     Fluid balance maintained Progressing     Glucose maintained within target range Progressing        Risk for Self Injury/Neglect     Treatment Goal: Remain safe during length of stay, learn and adopt new coping skills, and be free of self-injurious ideation, impulses and acts at the time of discharge Progressing     Refrain from harming self Progressing     Recognize maladaptive responses and adopt new coping mechanisms Progressing        SELF HARM/SUICIDALITY     Will have no self-injury during hospital stay Progressing

## 2018-07-22 NOTE — PROGRESS NOTES
Pt has been withdrawn to her room since the beginning of the shift  Pt did have questions about why her medications are different from what she normally takes at home  Reassurance given that these are temporary medications until she sees the doctor tomorrow  Pt speech is slow and delayed at times  Poor eye contact initially but did improve as pt became more comfortable speaking  Pt states she was having stressors at work and a new position was created for her  Pt states her medications were working for her until she experienced an increase in stress then she felt like she was taking nothing  Pt stated her job at works switched several times and she was dealing with it but became too much  Pt stated she did not cut her wrists to kill herself,  It was to remind herself not to make mistakes at work  Coping skills were discussed  Pt states that her dad encouraged her to find work elsewhere that was a slower pace, so she is thinking about this  Will continue to monitor

## 2018-07-22 NOTE — TREATMENT PLAN
TREATMENT PLAN REVIEW - [address] 33 y o  1990 female MRN: [id number]    [facility] [address] Room / Bed: Keny Woodard/[facility] [id number] Encounter: [id number]          Admit Date/Time:  7/21/2018 12:56 AM    Treatment Team: Attending Provider: Randee Hollingsworth DO; Patient Care Assistant: Seng Neil;  Patient Care Assistant: Beth Delarosa; Registered Nurse: Katiuska Tamayo RN    Diagnosis: Principal Problem:    Schizoaffective disorder ([facility] [address] )  Active Problems:    DENNIS (generalized anxiety disorder)    Patient Strengths: cooperative, motivated, patient is on a voluntary commitment, supportive family     Patient Barriers: difficulty adapting, low self esteem, poor insight, employment issues    Short Term Goals: decrease in depressive symptoms, decrease in anxiety symptoms, decrease in self abusive behaviors, improvement in reality testing, improvement in reasoning ability    Long Term Goals: improvement in depression, improvement in anxiety, stabilization of mood, free of suicidal thoughts, free of homicidal thoughts, no self abusive behavior, able to express basic needs, acceptance of need for psychiatric medications, adequate sleep    Progress Towards Goals: starting psychiatric medications as prescribed, continue psychiatric medications as prescribed    Recommended Treatment: medication management, patient medication education, group therapy, milieu therapy, continued Behavioral Health psychiatric evaluation/assessment process    Treatment Frequency: daily medication monitoring, group and milieu therapy daily, monitoring through interdisciplinary rounds, monitoring through weekly patient care conferences    Expected Discharge Date:  6 days    Discharge Plan: discharge to home, referral for outpatient medication management with a psychiatrist, referral for outpatient psychotherapy    Treatment Plan Created/Updated By: Osmel Saenz III, DO
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
## 2018-07-22 NOTE — PROGRESS NOTES
Pt has been visible in periphery of unit  No socialization with peers  Pt presents with poor eye contact  Delayed responses  Pt is somewhat blocked but is able to communicate more after speaking for an extended period  Preoccupied with admin and doses of her meds  Flat and depressed, denies SI  Compliant with meals and meds  Will continue to monitor

## 2018-07-22 NOTE — PROGRESS NOTES
Pt had difficulty falling asleep  Pt laying in bed the first part of the night and would turn her head to look at staff during 15 minute checks  Pt did request medication to help sleep  Medications Trazadone and Atarax administered at 0042 and appeared to be effective  Pt observed sleeping during checks  Will continue to monitor

## 2018-07-22 NOTE — PROGRESS NOTES
Pt's parents brought in folder with information containing pt's past hospital admission and discharges, and meds that were used in the past  Folder placed in thin chart

## 2018-07-22 NOTE — CONSULTS
Consultation - Julian Richardson 33 y o  female MRN: [id number]    Unit/Bed#: Jude Sep 251-02 Encounter: [id number]      Assessment/Plan     Assessment:  Depression with anxiety  Hypothyroidism  Urinary retention  Superficial abrasions to left wrist     Plan: All further care per psychiatry  Local cleansing of left wrist abrasions with soap and water daily  Continue home medications and follow up with PCP  History of Present Illness     HPI: Julian Richardson is a 33y o  year old female who presents for inpatient psychiatric admission  Per ER records:  "I was self harming and it went too far"  Pt  states she has severe anxiety over her workplace  Reports recent demotion and also "people make fun of me there"   We were asked to comment further from a medical standpoint  Patient offers no acute complaints today  Inpatient consult for [facility] for [facility]  Consult performed by: Katlyn Cheng ordered by: Chuck Fuentes          Review of Systems   Constitutional: Negative for chills, diaphoresis, fatigue and fever  HENT: Negative for congestion, ear pain, rhinorrhea, sneezing and sore throat  Respiratory: Negative for cough, shortness of breath, wheezing and stridor  Cardiovascular: Negative for chest pain, palpitations and leg swelling  Gastrointestinal: Negative for abdominal distention, abdominal pain, blood in stool, constipation, diarrhea, nausea and vomiting  Genitourinary: Negative for difficulty urinating, dysuria, frequency, hematuria and urgency  Musculoskeletal: Negative for gait problem, myalgias and neck pain  Skin: Positive for wound (self harm to left wrist)  Negative for rash  Neurological: Negative for dizziness, syncope, weakness, light-headedness and headaches  All other systems reviewed and are negative        Historical Information   Past Medical History:   Diagnosis Date    Anorexia nervosa     pt denies history at time of admission 7/21/18   Antoinette Baez     Depression     Disease of thyroid gland     hypo    Hallucination     Memory difficulty 7/12/2018    Psychiatric illness     [facility] [address] [facility]     Self-injurious behavior     Sleep difficulties     Suicide attempt ([facility] [address] )     history of attempt at age 22 by overdose     Urinary retention      Past Surgical History:   Procedure Laterality Date    EYE SURGERY Left     tear duct    [place] ESOPHAGOGASTRODUODENOSCOPY TRANSORAL DIAGNOSTIC N/A 2/9/2018    Procedure: ESOPHAGOGASTRODUODENOSCOPY (EGD); Surgeon: Luz Maria Pleitez MD;  Location: [facility] MA[facility];  Service: Gastroenterology     Social History   History   Alcohol Use No     History   Drug Use No     History   Smoking Status    Never Smoker   Smokeless Tobacco    Never Used     Family History: non-contributory    Meds/Allergies   all current active meds have been reviewed  No Known Allergies    Objective   Vitals: Blood pressure 110/69, pulse 88, temperature 98 °F (36 7 °C), temperature source Tympanic, resp  rate 16, height 5' 3" (1 6 m), weight 60 3 kg (133 lb), last menstrual period 07/04/2018, SpO2 97 %, not currently breastfeeding  No intake or output data in the 24 hours ending 07/22/18 1106  Invasive Devices          No matching active lines, drains, or airways          Physical Exam   Constitutional: She is oriented to person, place, and time  She appears well-developed and well-nourished  No distress  HENT:   Head: Normocephalic and atraumatic  Mouth/Throat: Oropharynx is clear and moist    Eyes: Conjunctivae and EOM are normal  Pupils are equal, round, and reactive to light  Left eye exhibits no discharge  Neck: Normal range of motion  Neck supple  No JVD present  Cardiovascular: Normal rate, regular rhythm, normal heart sounds and intact distal pulses  No murmur heard  Pulmonary/Chest: Effort normal and breath sounds normal  No respiratory distress  She has no wheezes  She has no rales  Abdominal: Soft   Bowel sounds are normal  She exhibits no distension and no mass  There is no tenderness  Musculoskeletal: Normal range of motion  She exhibits no edema, tenderness or deformity  Neurological: She is alert and oriented to person, place, and time  Skin: Skin is warm and dry  No rash noted  She is not diaphoretic  No erythema  Superficial abrasions to left wrist   Psychiatric: She has a normal mood and affect  Her behavior is normal  Judgment and thought content normal    Nursing note and vitals reviewed  Lab Results: I have personally reviewed pertinent reports  Code Status: No Order  Advance Directive and Living Will:      Power of :    POLST:      Counseling / Coordination of Care  Total floor / unit time spent today 35 minutes  Greater than 50% of total time was spent with the patient and / or family counseling and / or coordination of care   A description of the counseling / coordination of care:

## 2018-07-23 LAB
ANION GAP SERPL CALCULATED.3IONS-SCNC: 5 MMOL/L (ref 4–13)
BUN SERPL-MCNC: 15 MG/DL (ref 7–25)
CALCIUM SERPL-MCNC: 9.6 MG/DL (ref 8.6–10.5)
CHLORIDE SERPL-SCNC: 105 MMOL/L (ref 98–107)
CO2 SERPL-SCNC: 29 MMOL/L (ref 21–31)
CREAT SERPL-MCNC: 1.1 MG/DL (ref 0.6–1.2)
GFR SERPL CREATININE-BSD FRML MDRD: 68 ML/MIN/1.73SQ M
GLUCOSE SERPL-MCNC: 96 MG/DL (ref 65–99)
METHYLMALONATE SERPL-SCNC: 215 NMOL/L (ref 0–378)
POTASSIUM SERPL-SCNC: 4 MMOL/L (ref 3.5–5.5)
RPR SER QL: NORMAL
SL AMB DISCLAIMER: NORMAL
SODIUM SERPL-SCNC: 139 MMOL/L (ref 134–143)

## 2018-07-23 PROCEDURE — 99232 SBSQ HOSP IP/OBS MODERATE 35: CPT | Performed by: PSYCHIATRY & NEUROLOGY

## 2018-07-23 PROCEDURE — 80048 BASIC METABOLIC PNL TOTAL CA: CPT | Performed by: PSYCHIATRY & NEUROLOGY

## 2018-07-23 PROCEDURE — 86592 SYPHILIS TEST NON-TREP QUAL: CPT | Performed by: PSYCHIATRY & NEUROLOGY

## 2018-07-23 RX ORDER — BETHANECHOL CHLORIDE 25 MG/1
25 TABLET ORAL 3 TIMES DAILY
Status: DISCONTINUED | OUTPATIENT
Start: 2018-07-23 | End: 2018-07-27 | Stop reason: HOSPADM

## 2018-07-23 RX ORDER — ARIPIPRAZOLE 5 MG/1
5 TABLET ORAL DAILY
Status: DISCONTINUED | OUTPATIENT
Start: 2018-07-23 | End: 2018-07-27 | Stop reason: HOSPADM

## 2018-07-23 RX ORDER — ESCITALOPRAM OXALATE 10 MG/1
10 TABLET ORAL DAILY
Status: DISCONTINUED | OUTPATIENT
Start: 2018-07-25 | End: 2018-07-27 | Stop reason: HOSPADM

## 2018-07-23 RX ADMIN — BETHANECHOL CHLORIDE 25 MG: 25 TABLET ORAL at 16:50

## 2018-07-23 RX ADMIN — LITHIUM CARBONATE 450 MG: 450 TABLET, EXTENDED RELEASE ORAL at 21:35

## 2018-07-23 RX ADMIN — BETHANECHOL CHLORIDE 25 MG: 25 TABLET ORAL at 21:37

## 2018-07-23 RX ADMIN — Medication 1500 MG: at 21:44

## 2018-07-23 RX ADMIN — VITAMIN D, TAB 1000IU (100/BT) 4000 UNITS: 25 TAB at 09:08

## 2018-07-23 RX ADMIN — ARIPIPRAZOLE 5 MG: 5 TABLET ORAL at 15:00

## 2018-07-23 RX ADMIN — GABAPENTIN 600 MG: 300 CAPSULE ORAL at 09:08

## 2018-07-23 RX ADMIN — ESCITALOPRAM 5 MG: 5 TABLET, FILM COATED ORAL at 09:08

## 2018-07-23 RX ADMIN — GABAPENTIN 600 MG: 300 CAPSULE ORAL at 17:29

## 2018-07-23 RX ADMIN — LITHIUM CARBONATE 450 MG: 450 TABLET, EXTENDED RELEASE ORAL at 09:13

## 2018-07-23 RX ADMIN — BETHANECHOL CHLORIDE 25 MG: 25 TABLET ORAL at 10:22

## 2018-07-23 RX ADMIN — CLONAZEPAM 1.25 MG: 1 TABLET ORAL at 21:35

## 2018-07-23 RX ADMIN — LORAZEPAM 1 MG: 1 TABLET ORAL at 23:08

## 2018-07-23 RX ADMIN — LIOTHYRONINE SODIUM 5 MCG: 5 TABLET ORAL at 09:10

## 2018-07-23 NOTE — SOCIAL WORK
SW met with patient to complete the Psychosocial Eval and review the Treatment Plan  The patient was admitted due to increased depression/anxiety and self-injurious behaviors of superficial cutting of her wrists  On interview, the patient was alert, oriented, depressed with blunted affect  Thinking linear and goal-oriented, though responses delayed at times; speech low, but rapid/mumbling  Denies A/V hallucinations/delusions  Denies current SI/HI  Did report several past attempts by OD at ages 22 and 23, by thinking of jumping off a bindu at age 24 as well as several episodes of cutting without lethal intent  Patient reports stressors as work-related, having been unable to secure recent employment except at [employer] and 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 that she feels is too 'fast-paced and timed ' Patient has past Dx of Schizoaffective D/O, Depressed Type  Past AIP reported at [address] [place] [address] at age 24,  [place] for 45 days and [facility] for 8 months during her 18's  She currently receives Psychiatric services from [facility] with Dr Krista Vazquez and therapist,, [place]  Medical services provided by Dr Young Ferraro  Patient did not request an appt  With her PCP made on her behalf  Patient reports no past substance abuse history or treatment  Does not use tobacco  Patient denies current or past Domestic Violence/Trauma  She has no legal problems  Patient currently lives with her parents and is able to return  She did sign an MAKAYLA and allow family notification though declined a FS  Patient initially declined referral to the [facility] in [facility], though later stated she will consider attendance  Patient lists coping skills as listening to music and jogging on her treadmill

## 2018-07-23 NOTE — PROGRESS NOTES
Progress Note - Lona [age] y o  female MRN: [id number]  Unit/Bed#: Britton Mujica 251-02 Encounter: [id number]    The patient was seen for continuing care and reviewed with treatment team     Mental Status Evaluation:  Appearance:  Poor eye contact and disheveled   Behavior:  calm, cooperative and guarded   Mood:  depressed   Affect: Flat   Speech: Soft   Thought Process:  Goal directed and coherent   Thought Content:  Does not verbalize delusional material   Perceptual Disturbances: Denies hallucinations and does not appear to be responding to internal stimuli   Risk Potential: No suicidal or homicidal ideation   Attention/Concentration attention span and concentration were age appropriate   Orientation:   Alert and oriented x 3   Gait/Station: normal gait/station and normal balance   Motor Activity: No abnormal movement noted     Progress Toward Goals: Patient continues to have depressed mood, however reports that her anxiety has lessened  She attributes this decrease in anxiety to "not being in the real world" at this moment  Will increase Lexapro to 10mg daily after she receives three doses of the 5mg  Patient currently does not endorse any auditory or visual hallucinations, however she did state that this was the reason she was taking Thorazine  She reports that her outpatient psychiatrist wanted to change her antipsychotic, however the patient was worried about side effects related to changing this  Will discontinue Thorazine 25 mg and start patient on Abilfy 5 daily for both her history of psychosis and to augment her antidepressant  Patient currently on medication for urinary retention, which may have been caused by the artane and/or thorazine  Assessment/Plan    Principal Problem:    Schizoaffective disorder ([facility] [address] )  Active Problems:    DENNIS (generalized anxiety disorder)      Recommended Treatment: Continue with pharmacotherapy, group therapy, milieu therapy and occupational therapy    The patient will be maintained on the following medications:    Current Facility-Administered Medications:  acetaminophen 650 mg Oral Q6H PRN DEISY Lema   ARIPiprazole 5 mg Oral Daily DEISY Lema   bethanechol 25 mg Oral TID Garcia Kerr III, DO   cholecalciferol 4,000 Units Oral Daily Garcia Kerr III, DO   clonazePAM 1 25 mg Oral HS Garcia Kerr III, DO   [START ON 7/25/2018] escitalopram 10 mg Oral Daily DEISY Lema   escitalopram 5 mg Oral Daily DEISY Lema   gabapentin 600 mg Oral BID Garcia Kerr III, DO   hydrOXYzine HCL 25 mg Oral Q4H PRN DEISY Lema   L-Tryptophan 1,500 mg Oral HS Garcia Kerr III, DO   liothyronine 5 mcg Oral Daily Kong Sosa MD   lithium carbonate 450 mg Oral Q12H [facility] Kong Sosa MD   LORazepam 1 mg Oral Q8H PRN Garcia Kerr III, DO   OLANZapine 5 mg Oral Q3H PRN DEISY Lema   traZODone 50 mg Oral HS PRN DEISY Lema       Risks, benefits and possible side effects of Medications:   Risks, benefits, and possible side effects of medications explained to patient and patient verbalizes understanding

## 2018-07-23 NOTE — PROGRESS NOTES
Pt has been sleeping through out the night  No distress noted on visual assessment  Will continue to monitor

## 2018-07-23 NOTE — PROGRESS NOTES
Pt has been visible during the evening  Attended group  Reviewed 2100 scheduled medications with pt prior to administration, pt compliant  Pt states she had a sad day today because she feels her work does not want her  Pt states it took her several months to find this job and she does not look forward to filling all those applications again  Poor eye contact this evening  Will continue to monitor

## 2018-07-23 NOTE — PROGRESS NOTES
Patient is visible on the unit  Cooperative with medications  Positive for morning meal  Good eye contact when speaking with staff  Patient denies SI and HI, no behavioral problems  Patient verbalized feeling good on the medications he was recently started on  Verbalized that the combination was a good change for him  Patient is looking forward to discharge for this afternoon and feels he is ready to go  Alert and oriented  Able to make need known  No signs or symptoms of distress  SP1 and Q 15 minute checks will be maintained for patient safety  Will continue to monitor

## 2018-07-23 NOTE — PLAN OF CARE
Problem: Ineffective Coping  Goal: Participates in unit activities  Interventions:  - Provide therapeutic environment   - Provide required programming   - Redirect inappropriate behaviors    Outcome: Not Progressing  Pt was admitted over the weekend, and declined invitation to attend art therapy group Monday morning  Pt will attend 75% of art therapy groups to increase coping skills and creative expression of emotions  AT will continue to encourage PT to attend art therapy groups

## 2018-07-24 PROCEDURE — 99232 SBSQ HOSP IP/OBS MODERATE 35: CPT | Performed by: PSYCHIATRY & NEUROLOGY

## 2018-07-24 RX ORDER — BENZTROPINE MESYLATE 1 MG/1
1 TABLET ORAL 2 TIMES DAILY PRN
Status: DISCONTINUED | OUTPATIENT
Start: 2018-07-24 | End: 2018-07-26

## 2018-07-24 RX ADMIN — TRAZODONE HYDROCHLORIDE 50 MG: 50 TABLET ORAL at 22:36

## 2018-07-24 RX ADMIN — LIOTHYRONINE SODIUM 5 MCG: 5 TABLET ORAL at 09:06

## 2018-07-24 RX ADMIN — GABAPENTIN 600 MG: 300 CAPSULE ORAL at 09:08

## 2018-07-24 RX ADMIN — ESCITALOPRAM 5 MG: 5 TABLET, FILM COATED ORAL at 09:08

## 2018-07-24 RX ADMIN — CLONAZEPAM 1.25 MG: 1 TABLET ORAL at 22:25

## 2018-07-24 RX ADMIN — BETHANECHOL CHLORIDE 25 MG: 25 TABLET ORAL at 09:09

## 2018-07-24 RX ADMIN — BETHANECHOL CHLORIDE 25 MG: 25 TABLET ORAL at 17:29

## 2018-07-24 RX ADMIN — BETHANECHOL CHLORIDE 25 MG: 25 TABLET ORAL at 22:24

## 2018-07-24 RX ADMIN — Medication 1500 MG: at 22:24

## 2018-07-24 RX ADMIN — VITAMIN D, TAB 1000IU (100/BT) 4000 UNITS: 25 TAB at 09:07

## 2018-07-24 RX ADMIN — LITHIUM CARBONATE 450 MG: 450 TABLET, EXTENDED RELEASE ORAL at 22:29

## 2018-07-24 RX ADMIN — GABAPENTIN 600 MG: 300 CAPSULE ORAL at 17:29

## 2018-07-24 RX ADMIN — LITHIUM CARBONATE 450 MG: 450 TABLET, EXTENDED RELEASE ORAL at 09:09

## 2018-07-24 RX ADMIN — ARIPIPRAZOLE 5 MG: 5 TABLET ORAL at 09:07

## 2018-07-24 NOTE — SOCIAL WORK
olga placed phone call to [employer] - spoke to Fernanda Mariscal - pt scheduled with Dr Claudio Soto on [facility] 7/25 - olga canceled appt and rescheduled for 8/15 at 1030am;  Scheduled with [place] (therapist) on Friday 8/3 at [address]

## 2018-07-24 NOTE — PROGRESS NOTES
Progress Note - [address] Ar[address] Dodd 33 y o  female MRN: [id number]   Unit/Bed#: [facility] [place] 251-02 Encounter: [id number]    Behavior over the last 24 hours:  Pillo Ibarra was cooperative and engaged during interview  Per nursing staff, she has been withdrawn and isolative but has not had any behavioral issues on the unit  She is preoccupied with medication changes and appears anxious  She was tearful during conversation  She has been compliant with medications and meals and states that she was able to sleep last night  She currently denies any SANTA/AVH  She does not feel the urge to self-harm at this time  She is agreeable to medication changes as she realizes she is depressed and anxious and understands her previous medications contributed to urinary retention  ROS: reports muscle twitching    Mental Status Evaluation:    Appearance:  age appropriate, casually dressed   Behavior:  guarded, no eye contact   Speech:  normal rate, soft   Mood:  depressed, anxious   Affect:  tearful   Thought Process:  organized, more perseverative   Associations: intact associations   Thought Content:  no overt delusions   Perceptual Disturbances: none   Risk Potential: Suicidal ideation - None  Homicidal ideation - None  Potential for aggression - No   Sensorium:  oriented to person, place and time/date   Memory:  recent and remote memory grossly intact   Consciousness:  alert and awake   Attention: attention span and concentration are age appropriate   Insight:  limited   Judgment: limited   Gait/Station: normal gait/station   Motor Activity: abnormal movement noted: mild hand tremor present     Vital signs in last 24 hours:    Temp:  [96 4 °F (35 8 °C)-97 3 °F (36 3 °C)] 97 3 °F (36 3 °C)  HR:  [82-88] 88  Resp:  [16] 16  BP: (110-112)/(63-68) 110/68    Laboratory results:  I have personally reviewed all pertinent laboratory/tests results      Progress Toward Goals: progressing    Assessment/Plan   Principal Problem: Schizoaffective disorder ([facility] [address] )  Active Problems:    DENNIS (generalized anxiety disorder)    Recommended Treatment:   1  Will increase Lexapro to 10mg daily to decrease depressive symptoms  2  Will continue Abilify to treat mood lability and augment antidepressant  3  Will add Cogentin 1mg BID PRN for tremors  4  Will continue to monitor patient and adjust medications as needed and will encourage patient to participate in groups as tolerated  All current active medications have been reviewed  Current Facility-Administered Medications:  acetaminophen 650 mg Oral Q6H PRN DEISY Huddleston   ARIPiprazole 5 mg Oral Daily DEISY Huddleston   bethanechol 25 mg Oral TID Ashley Falcon III, DO   cholecalciferol 4,000 Units Oral Daily Ashley Falcon III, DO   clonazePAM 1 25 mg Oral HS Ashley Falcon III, DO   [START ON 7/25/2018] escitalopram 10 mg Oral Daily DEISY Huddleston   gabapentin 600 mg Oral BID Ashley Falcon III, DO   hydrOXYzine HCL 25 mg Oral Q4H PRN DEISY Huddleston   L-Tryptophan 1,500 mg Oral HS Ashley Falcon III, DO   liothyronine 5 mcg Oral Daily Fernando Kussmaul, MD   lithium carbonate 450 mg Oral Q12H [facility] Fernando Kussmaul, MD   LORazepam 1 mg Oral Q8H PRN Ashley Falcon III, DO   OLANZapine 5 mg Oral Q3H PRN DEISY Huddlesotn   traZODone 50 mg Oral HS PRN DEISY Huddleston       Risks / Benefits of Treatment:    Risks, benefits, and possible side effects of medications explained to patient and patient verbalizes understanding and agreement for treatment  Counseling / Coordination of Care:      Patient's progress discussed with staff in treatment team meeting  Medications, treatment progress and treatment plan reviewed with patient

## 2018-07-24 NOTE — PLAN OF CARE
Problem: DISCHARGE PLANNING  Goal: Discharge to home or other facility with appropriate resources  INTERVENTIONS:  - Identify barriers to discharge w/patient and caregiver  - Arrange for needed discharge resources and transportation as appropriate  - Coordinate discharge planning if the patient needs post-hospital services based on physician/advanced practitioner order or complex needs related to functional status, cognitive ability, or social support system    Outcome: Progressing

## 2018-07-24 NOTE — PLAN OF CARE
Patient is cooperative with medications, continues to have flat affect and declined to participating in group therapy  Will continue to encourage

## 2018-07-24 NOTE — DISCHARGE INSTR - APPOINTMENTS
The treatment team recommends participation in a partial hospitalization program with continued medication management, group and individual therapy services upon discharge; you agreed to a referral   A referral was made on your behalf to [address] [facility] located at [address] [place] [address] Phone: [phone]  You will begin the program on Monday, July 30th  You have stated that you will drive yourself  Please arrive at 745am on your first day to complete intake paperwork  Each consecutive day of participation will begin at 830am   A bagged lunch is included but you are welcome to bring your own lunch if preferred  There is a refrigerator and microwave on site  Please bring your photo ID and insurance card(s) as well as your medications in their prescription bottles  Your discharge will be faxed to this provider  The treatment team recommends ongoing medication management with your current providers at [address]  Phone: [phone]  You are scheduled to see your therapist, [place], on Friday, August 3rd at [facility] will see your psychiatrist, Dr Tejal Oneal, for medication management on Weds, August 15th at 1030am   Your discharge will be faxed to this provider  You identified Dr Rasheeda Palencia as your primary care physician but declined a scheduled follow up appointment at this time  Please scheduled as needed  Your discharge will be faxed to this provider

## 2018-07-24 NOTE — DISCHARGE INSTR - OTHER ORDERS
[facility]  Phone: [phone]    Triggers you have identified during your hospital that led to your distressed mood include employment stressors  Coping skills you have identified during your hospitalization include listening to music and jogging on the treadmill  If you are unable to deal with your distressed mood alone please talk to your mother, Tawny Perry, or your psychiatric providers at [facility], Phone: [phone]  If that is not effective and you continue to have distressed mood, please call [facility] at [phone], dial 911, or go to the nearest emergency center

## 2018-07-24 NOTE — PROGRESS NOTES
Patient flat and blunted during assessment  Upset over med changes  Mildly irritable during discussion  Internally preoccupied, lacks insight into problems  Obsessive over certain aspects of care  Maintained on Q 15 minute safety checks  No acting out, suicidal ideations, and/or homicidal behaviors  No changes in medical condition or complaints voiced  Fluids maintained at bedside to promote hydration

## 2018-07-24 NOTE — PROGRESS NOTES
Patient is visible on the unit  Withdrawn to self  Does not socialize with other patients  Poor eye contact, soft, whisper like voice  Patient denies SI and HI, no behavioral problems  Patient verbalize that she has anxiety about starting new medications  Patient agreed that she needed medication changes but doesn't like to change meds  Patient questioning medications several times before taking them  Positive for morning meal  Patient is forgetful, needing redirection several times  Patient verbalized sleeping better last night  Alert and oriented  Able to make needs known  No signs or symptoms of distress  SP 1 and Q 15 minute checks will be maintained for patient safety  Will continue to monitor

## 2018-07-25 LAB — VIT B1 BLD-SCNC: 119.4 NMOL/L (ref 66.5–200)

## 2018-07-25 PROCEDURE — 99232 SBSQ HOSP IP/OBS MODERATE 35: CPT | Performed by: PSYCHIATRY & NEUROLOGY

## 2018-07-25 PROCEDURE — 99231 SBSQ HOSP IP/OBS SF/LOW 25: CPT | Performed by: PSYCHIATRY & NEUROLOGY

## 2018-07-25 RX ADMIN — BETHANECHOL CHLORIDE 25 MG: 25 TABLET ORAL at 09:01

## 2018-07-25 RX ADMIN — BENZTROPINE MESYLATE 1 MG: 1 TABLET ORAL at 09:09

## 2018-07-25 RX ADMIN — BETHANECHOL CHLORIDE 25 MG: 25 TABLET ORAL at 21:20

## 2018-07-25 RX ADMIN — ARIPIPRAZOLE 5 MG: 5 TABLET ORAL at 09:01

## 2018-07-25 RX ADMIN — LIOTHYRONINE SODIUM 5 MCG: 5 TABLET ORAL at 09:01

## 2018-07-25 RX ADMIN — CLONAZEPAM 1.25 MG: 1 TABLET ORAL at 21:18

## 2018-07-25 RX ADMIN — ACETAMINOPHEN 650 MG: 325 TABLET ORAL at 10:36

## 2018-07-25 RX ADMIN — TRAZODONE HYDROCHLORIDE 50 MG: 50 TABLET ORAL at 21:27

## 2018-07-25 RX ADMIN — LITHIUM CARBONATE 450 MG: 450 TABLET, EXTENDED RELEASE ORAL at 21:21

## 2018-07-25 RX ADMIN — VITAMIN D, TAB 1000IU (100/BT) 4000 UNITS: 25 TAB at 09:01

## 2018-07-25 RX ADMIN — ESCITALOPRAM OXALATE 10 MG: 10 TABLET ORAL at 09:01

## 2018-07-25 RX ADMIN — Medication 1500 MG: at 21:21

## 2018-07-25 RX ADMIN — BETHANECHOL CHLORIDE 25 MG: 25 TABLET ORAL at 17:00

## 2018-07-25 RX ADMIN — LITHIUM CARBONATE 450 MG: 450 TABLET, EXTENDED RELEASE ORAL at 09:02

## 2018-07-25 RX ADMIN — GABAPENTIN 600 MG: 300 CAPSULE ORAL at 17:44

## 2018-07-25 RX ADMIN — GABAPENTIN 600 MG: 300 CAPSULE ORAL at 09:01

## 2018-07-25 NOTE — PROGRESS NOTES
Patient presents flat and depressed, poor eye contact  Obsessive and controlling about medication regime  States she will not take meds until 2220 as she requested a snack and times meds for 2 hrs after food  Did attend group and snack then retreated back to her room  No suicidal or homicidal thoughts expressed  No acting out  Will continue to monitor behavior

## 2018-07-25 NOTE — SOCIAL WORK
sw met with patient, physician and CRNP to discuss pt medications and discharge plans; With much conversation, pt agreeable to staying on new meds for mood stabilization;  Pt maintains poor or no eye contact;   Pt agreeable to discharge to [facility]; sw will continue to meet with patient as needed for tx and dc planning

## 2018-07-25 NOTE — PLAN OF CARE
Anxiety     Anxiety is at manageable level Progressing          DEPRESSION     Will be euthymic at discharge Progressing          Ineffective Coping     Identifies ineffective coping skills Progressing     Identifies healthy coping skills Progressing     Demonstrates healthy coping skills Progressing

## 2018-07-25 NOTE — PROGRESS NOTES
Progress Note - [address] Arin [address] [place] 33 y o  female MRN: [id number]   Unit/Bed#: [facility] 949-13 Encounter: [id number]    Behavior over the last 24 hours:  Marvel Medina was cooperative with interview  She continues to have difficulty deciding which medications she should take for her psychiatric symptoms  She rates her current mood as "angry" and states she doesn't want to take the medications prescribed here  She is fixated on stating her medications and doses repeatedly and was tearful and frustrated  She also offers many non-specific somatic complaints  Per nursing staff, she perseverates about her medications and and is obsessive and controlling about when she will take them  She is denying suicide/homicide ideation at this time  She isolates to room  After a long conversation with the patient, she agrees to take her current medications and see if they are helpful       ROS: no complaints    Mental Status Evaluation:    Appearance:  casually dressed, dressed appropriately   Behavior:  more agitated, remains guarded, poor eye contact   Speech:  decreased volume   Mood:  depressed, irritable   Affect:  flat, tearful   Thought Process:  organized, logical   Associations: intact associations   Thought Content:  normal   Perceptual Disturbances: none   Risk Potential: Suicidal ideation - None  Homicidal ideation - None  Potential for aggression - No   Sensorium:  oriented to person, place, time/date and situation   Memory:  recent and remote memory grossly intact   Consciousness:  alert and awake   Attention: attention span and concentration are age appropriate   Insight:  poor   Judgment: poor   Gait/Station: normal gait/station   Motor Activity: no abnormal movements     Vital signs in last 24 hours:    Temp:  [97 5 °F (36 4 °C)-98 5 °F (36 9 °C)] 98 5 °F (36 9 °C)  HR:  [92-93] 92  Resp:  [16] 16  BP: (117-121)/(70-73) 117/73    Laboratory results:  I have personally reviewed all pertinent laboratory/tests results  Progress Toward Goals: progressing    Assessment/Plan   Principal Problem:    Schizoaffective disorder (HCC)  Active Problems:    DENNIS (generalized anxiety disorder)    Recommended Treatment:   1  Will continue current medications as prescribed  2  Will continue to monitor patient and adjust medications as needed  3  Will continue to provide encouragement and reassurance regarding psychotropic regimen  4  Will continue to encourage patient to participate in groups and remain active in the community of the unit  All current active medications have been reviewed  Current Facility-Administered Medications:  acetaminophen 650 mg Oral Q6H PRN Nancie Pink, CRNP   ARIPiprazole 5 mg Oral Daily Nancie Pink, CRNP   benztropine 1 mg Oral BID PRN [place] Perch Lodics, CRNP   bethanechol 25 mg Oral TID Soy Bolls III, DO   cholecalciferol 4,000 Units Oral Daily [place] Bolls III, DO   clonazePAM 1 25 mg Oral HS [place] Bolls III, DO   escitalopram 10 mg Oral Daily Nancie Pink, CRNP   gabapentin 600 mg Oral BID [place] Bolls III, DO   hydrOXYzine HCL 25 mg Oral Q4H PRN Nancie Pink, CRNP   L-Tryptophan 1,500 mg Oral HS [place] Bolls III, DO   liothyronine 5 mcg Oral Daily Solange Fisher MD   lithium carbonate 450 mg Oral Q12H [address] Solange Fisher MD   LORazepam 1 mg Oral Q8H PRN [place] Bolls III, DO   OLANZapine 5 mg Oral Q3H PRN Nancie Pink, CRNP   traZODone 50 mg Oral HS PRN Nancie Pink, CRNP       Risks / Benefits of Treatment:    Risks, benefits, and possible side effects of medications explained to patient and patient verbalizes understanding and agreement for treatment  Counseling / Coordination of Care:      Patient's progress discussed with staff in treatment team meeting  Medications, treatment progress and treatment plan reviewed with patient

## 2018-07-25 NOTE — PROGRESS NOTES
Patient has been sleeping well througt the night when observed  No changes or behavior problems  Q 15 minute safety maintained

## 2018-07-26 PROCEDURE — 99232 SBSQ HOSP IP/OBS MODERATE 35: CPT | Performed by: PSYCHIATRY & NEUROLOGY

## 2018-07-26 RX ORDER — TRIHEXYPHENIDYL HYDROCHLORIDE 2 MG/1
2 TABLET ORAL DAILY
Status: DISCONTINUED | OUTPATIENT
Start: 2018-07-26 | End: 2018-07-27 | Stop reason: HOSPADM

## 2018-07-26 RX ADMIN — LORAZEPAM 1 MG: 1 TABLET ORAL at 21:57

## 2018-07-26 RX ADMIN — VITAMIN D, TAB 1000IU (100/BT) 4000 UNITS: 25 TAB at 08:57

## 2018-07-26 RX ADMIN — GABAPENTIN 600 MG: 300 CAPSULE ORAL at 08:57

## 2018-07-26 RX ADMIN — LITHIUM CARBONATE 450 MG: 450 TABLET, EXTENDED RELEASE ORAL at 08:58

## 2018-07-26 RX ADMIN — BETHANECHOL CHLORIDE 25 MG: 25 TABLET ORAL at 17:11

## 2018-07-26 RX ADMIN — Medication 1500 MG: at 21:53

## 2018-07-26 RX ADMIN — BENZTROPINE MESYLATE 1 MG: 1 TABLET ORAL at 09:34

## 2018-07-26 RX ADMIN — ARIPIPRAZOLE 5 MG: 5 TABLET ORAL at 08:54

## 2018-07-26 RX ADMIN — ESCITALOPRAM OXALATE 10 MG: 10 TABLET ORAL at 08:57

## 2018-07-26 RX ADMIN — BETHANECHOL CHLORIDE 25 MG: 25 TABLET ORAL at 08:57

## 2018-07-26 RX ADMIN — TRAZODONE HYDROCHLORIDE 50 MG: 50 TABLET ORAL at 22:03

## 2018-07-26 RX ADMIN — BETHANECHOL CHLORIDE 25 MG: 25 TABLET ORAL at 21:50

## 2018-07-26 RX ADMIN — GABAPENTIN 600 MG: 300 CAPSULE ORAL at 17:06

## 2018-07-26 RX ADMIN — CLONAZEPAM 1.25 MG: 1 TABLET ORAL at 21:48

## 2018-07-26 RX ADMIN — TRIHEXYPHENIDYL HYDROCHLORIDE 2 MG: 2 TABLET ORAL at 17:06

## 2018-07-26 RX ADMIN — LIOTHYRONINE SODIUM 5 MCG: 5 TABLET ORAL at 08:58

## 2018-07-26 RX ADMIN — LITHIUM CARBONATE 450 MG: 450 TABLET, EXTENDED RELEASE ORAL at 21:49

## 2018-07-26 NOTE — PLAN OF CARE
Alteration in Thoughts and Perception     Treatment Goal: Gain control of psychotic behaviors/thinking, reduce/eliminate presenting symptoms and demonstrate improved reality functioning upon discharge Progressing     Verbalize thoughts and feelings Progressing     Refrain from acting on delusional thinking/internal stimuli Progressing     Agree to be compliant with medication regime, as prescribed and report medication side effects Progressing     Attend and participate in unit activities, including therapeutic, recreational, and educational groups Progressing     Recognize dysfunctional thoughts, communicate reality-based thoughts at the time of discharge Progressing        Anxiety     Anxiety is at manageable level Progressing        Depression     Treatment Goal: Demonstrate behavioral control of depressive symptoms, verbalize feelings of improved mood/affect, and adopt new coping skills prior to discharge Progressing     Refrain from self-neglect Progressing        DEPRESSION     Will be euthymic at discharge [address] Discharge to home or other facility with appropriate resources Progressing        Ineffective Coping     Identifies ineffective coping skills Progressing     Identifies healthy coping skills Progressing     Demonstrates healthy coping skills Progressing        Ineffective Coping     Cooperates with admission process Progressing     Participates in unit activities Progressing        Nutrition/Hydration-ADULT     Nutrient/Hydration intake appropriate for improving, restoring or maintaining nutritional needs Progressing        SELF HARM/SUICIDALITY     Will have no self-injury during hospital stay Progressing

## 2018-07-26 NOTE — PROGRESS NOTES
Patient has been observed sleeping well through the night, no changes or problems  Q 15 minute safety checks maintained

## 2018-07-26 NOTE — PROGRESS NOTES
Patient visible on unit, social with select peers  Patient attends group  Patient flat and guarded  Patient medication compliant  Patient denies SI/HI and any hallucinations at this time  Patient stated she was admitted because she cut her wrists at work  When asked about what happened at work patient stated coworkers and customers would make fun of her  Patient had fair eye contact  Patient asked for cogentin for side effects describing muscle spasm in her neck and upper back shoulder area  Prn administered  Patient asked for same prn 2 hours later  Patient educated on medication rationale and frequency  Q 15 minute safety checks maintained  Monitoring continues

## 2018-07-26 NOTE — PROGRESS NOTES
Patient out on the unit this evening, attends groups  More interactive with peers tonight  Poor eye contact  Remains obsessive about medication  Requested and given current medication schedule  Compliant with meds, prn trazadone given per request  Will continue to observe

## 2018-07-26 NOTE — SOCIAL WORK
SW met with patient to review the Treatment Plan  Patient was quiet, soft-spoken, with eyes downcast during much of the encounter  She reports not being sure about d/c due to concern for her medications  Very focused on somatic concerns re: sx that are not occurring but "I could get spasms in the future " Stated she did have neck spasms the other day  Thinking concrete at times and obsessive  Denies A/V hallucinations; denies SI/HI  D/C plan for participation in the Partial program reviewed  Treatment plan reviewed and signed by patient

## 2018-07-26 NOTE — PROGRESS NOTES
Progress Note - Lona [age] y o  female MRN: [id number]  Unit/Bed#: [facility] [place] 251-02 Encounter: [id number]    The patient was seen for continuing care and reviewed with treatment team     Mental Status Evaluation:  Appearance:  Adequate hygiene and grooming   Behavior:  Poor eye contact, down cast had   Mood:  anxious   Affect: constricted   Speech: Soft and Monotonous   Thought Process:  Circumstantial   Thought Content:  Obsessions   Perceptual Disturbances: Denies hallucinations and does not appear to be responding to internal stimuli   Risk Potential: No suicidal or homicidal ideation   Attention/Concentration attention span appeared shorter than expected for age   Orientation:      Gait/Station: normal gait/station   Motor Activity: No abnormal movement noted no cogwheeling bilaterally on exam     Progress Toward Goals: Seen in patient's room with and walked to group with her and she asked this writer about her different medications  Interestingly she reports that she had she has been admitted to this unit and placed on the Cogentin that she takes with her Abilify she has experienced blurry vision  She would like to have the [place] with her Abilify  She is not tearful today and asked if she will be given GPS instructions to drive to the partial program  She is reassured somewhat after going over her medications and is willing to try them well as the partial program   She has no expressions of guilt or if we are disappointed in her today  Assessment/Plan    Principal Problem:    Schizoaffective disorder (HCC)  Active Problems:    DENNIS (generalized anxiety disorder)      Recommended Treatment: Continue with pharmacotherapy, group therapy, milieu therapy and occupational therapy    The patient will be maintained on the following medications:    Current Facility-Administered Medications:  acetaminophen 650 mg Oral Q6H PRN DEISY Brooks   ARIPiprazole 5 mg Oral Daily DEISY Brooks bethanechol 25 mg Oral TID Bandar Sachs III, DO   cholecalciferol 4,000 Units Oral Daily Bandar Sachs III, DO   clonazePAM 1 25 mg Oral HS Bandar Sachs III, DO   escitalopram 10 mg Oral Daily Chino Priestly, CRNP   gabapentin 600 mg Oral BID Bandar Sachs III, DO   hydrOXYzine HCL 25 mg Oral Q4H PRN Chino Priestly, CRNP   L-Tryptophan 1,500 mg Oral HS Bandar Sachs III, DO   liothyronine 5 mcg Oral Daily Laure Majano MD   lithium carbonate 450 mg Oral Q12H [facility] Laure Majano MD   LORazepam 1 mg Oral Q8H PRN Bandar Sachs III, DO   OLANZapine 5 mg Oral Q3H PRN Chino Priestly, CRNP   traZODone 50 mg Oral HS PRN Chino Priestly, CRNP   trihexyphenidyl 2 mg Oral Daily Getachew Sullivan MD       Risks, benefits and possible side effects of Medications:   Risks, benefits, and possible side effects of medications explained to patient and patient verbalizes understanding

## 2018-07-27 VITALS
OXYGEN SATURATION: 99 % | WEIGHT: 133 LBS | HEART RATE: 80 BPM | SYSTOLIC BLOOD PRESSURE: 108 MMHG | DIASTOLIC BLOOD PRESSURE: 70 MMHG | BODY MASS INDEX: 23.57 KG/M2 | TEMPERATURE: 99.2 F | HEIGHT: 63 IN | RESPIRATION RATE: 16 BRPM

## 2018-07-27 PROCEDURE — 99239 HOSP IP/OBS DSCHRG MGMT >30: CPT | Performed by: PSYCHIATRY & NEUROLOGY

## 2018-07-27 RX ORDER — CLONAZEPAM 0.5 MG/1
TABLET ORAL
Qty: 40 TABLET | Refills: 0 | Status: SHIPPED | OUTPATIENT
Start: 2018-07-27 | End: 2018-08-28

## 2018-07-27 RX ORDER — LITHIUM CARBONATE 450 MG
450 TABLET, EXTENDED RELEASE ORAL EVERY 12 HOURS SCHEDULED
Qty: 60 TABLET | Refills: 0 | Status: SHIPPED | OUTPATIENT
Start: 2018-07-27 | End: 2019-06-04 | Stop reason: HOSPADM

## 2018-07-27 RX ORDER — GABAPENTIN 300 MG/1
600 CAPSULE ORAL 2 TIMES DAILY
Qty: 60 CAPSULE | Refills: 0 | Status: SHIPPED | OUTPATIENT
Start: 2018-07-27 | End: 2019-06-04 | Stop reason: HOSPADM

## 2018-07-27 RX ORDER — TRIHEXYPHENIDYL HYDROCHLORIDE 2 MG/1
2 TABLET ORAL DAILY
Qty: 30 TABLET | Refills: 0 | Status: SHIPPED | OUTPATIENT
Start: 2018-07-28 | End: 2018-08-28

## 2018-07-27 RX ORDER — ESCITALOPRAM OXALATE 20 MG/1
10 TABLET ORAL DAILY
Qty: 15 TABLET | Refills: 0 | Status: SHIPPED | OUTPATIENT
Start: 2018-07-27 | End: 2018-08-28

## 2018-07-27 RX ORDER — BETHANECHOL CHLORIDE 25 MG/1
25 TABLET ORAL 3 TIMES DAILY
Qty: 90 TABLET | Refills: 0 | Status: SHIPPED | OUTPATIENT
Start: 2018-07-27 | End: 2019-06-04 | Stop reason: HOSPADM

## 2018-07-27 RX ORDER — ARIPIPRAZOLE 5 MG/1
5 TABLET ORAL DAILY
Qty: 30 TABLET | Refills: 0 | Status: SHIPPED | OUTPATIENT
Start: 2018-07-28 | End: 2018-08-28

## 2018-07-27 RX ORDER — ESCITALOPRAM OXALATE 10 MG/1
10 TABLET ORAL DAILY
Qty: 30 TABLET | Refills: 0 | Status: SHIPPED | OUTPATIENT
Start: 2018-07-28 | End: 2018-07-27 | Stop reason: HOSPADM

## 2018-07-27 RX ADMIN — ARIPIPRAZOLE 5 MG: 5 TABLET ORAL at 09:03

## 2018-07-27 RX ADMIN — LITHIUM CARBONATE 450 MG: 450 TABLET, EXTENDED RELEASE ORAL at 09:05

## 2018-07-27 RX ADMIN — LIOTHYRONINE SODIUM 5 MCG: 5 TABLET ORAL at 09:04

## 2018-07-27 RX ADMIN — VITAMIN D, TAB 1000IU (100/BT) 4000 UNITS: 25 TAB at 09:04

## 2018-07-27 RX ADMIN — TRIHEXYPHENIDYL HYDROCHLORIDE 2 MG: 2 TABLET ORAL at 09:05

## 2018-07-27 RX ADMIN — GABAPENTIN 600 MG: 300 CAPSULE ORAL at 09:04

## 2018-07-27 RX ADMIN — BETHANECHOL CHLORIDE 25 MG: 25 TABLET ORAL at 09:03

## 2018-07-27 RX ADMIN — BETHANECHOL CHLORIDE 25 MG: 25 TABLET ORAL at 16:07

## 2018-07-27 RX ADMIN — ESCITALOPRAM OXALATE 10 MG: 10 TABLET ORAL at 09:04

## 2018-07-27 NOTE — DISCHARGE SUMMARY
Discharge Summary - Lona Moon 33 y o  female MRN: [id number]  Unit/Bed#: [facility] 251-02 Encounter: [id number]     Admission Date:   Admission Orders     Ordered        07/21/18 1152  Admit Patient to [address] [place] [address] Unit (use in Admission Navigator for ED to [address] [place] [address])  Once         07/21/18 1141  Admit Patient to [address] [place] [address] Unit (use in Admission Navigator for ED to [address] [place] [address])  Once                   Discharge Date: 7-    Attending Psychiatrist: Patrick Underwood DO    Reason for Admission/HPI:  Patient is a 69-year-old female with a history of schizoaffective disorder  She came to the psychiatric unit on a voluntary admission  She said she was experiencing worsening depression, mood instability and lability  She works at [employer] and she reported she could no longer complete her job tasks  Due to her poor functioning on a daily basis and her depression passive suicidal ideation, was necessary for [facility] for treatment  History of Present Illness     Patient is a 33 y o  female presents with Signs of suicidal potential   Patient was admitted to psychiatric unit on a voluntarily 201 commitment basis  Primary complaints include: anxiety, depression worse and difficulty sleeping  Onset of symptoms was abrupt starting a few days ago with rapidly worsening course since that time  Psychosocial Stressors: family, financial, health, occupational and social     Hospital Course:   Hospital course has been uneventful  Patient underwent several medication changes and adjustments and reported improvement of mood  She is no longer feeling overwhelmed, upset, or experiencing any longstanding mood instability lability    She is stating at this time, she still has some difficulty with coping and stress issues so she will be following up in our partial hospitalization program     Mental Status at time of Discharge:     Appearance:  age appropriate   Behavior:  evasive   Speech:  soft   Mood:  euthymic   Affect:  constricted   Thought Process:  blocked   Thought Content:  obsessions   Perceptual Disturbances: None   Risk Potential: none   Sensorium:  person, place and time/date   Cognition:  grossly intact   Consciousness:  alert and awake    Attention: attention span appeared shorter than expected for age   Insight:  limited   Judgment: limited   Gait/Station: normal gait/station   Motor Activity: no abnormal movements       Discharge Diagnosis:   Schizoaffective Disorder    Resolved Problems  Date Reviewed: 7/27/2018    None              Discharge Medications:  See after visit summary for reconciled discharge medications provided to patient and family  Discharge instructions/Information to patient and family:   See after visit summary for information provided to patient and family  Provisions for Follow-Up Care:  See after visit summary for information related to follow-up care and any pertinent home health orders  Discharge Statement   I spent 30 minutes discharging the patient  This time was spent on the day of discharge  I had direct contact with the patient on the day of discharge  Additional documentation is required if more than 30 minutes were spent on discharge

## 2018-07-27 NOTE — PROGRESS NOTES
Patient somatic this morning about muscle spasms and focused on increasing dose of artane  Patient denies SI/HI and any hallucinations  Q 15 minute safety checks maintained  Monitoring continues

## 2018-07-27 NOTE — DISCHARGE INSTRUCTIONS
Aripiprazole (By mouth)   Aripiprazole (ar-i-PIP-ra-zole)  Treats schizophrenia, bipolar disorder, depression, and Tourette syndrome  Also treats irritability associated with autism  Brand Name(s): Abilify   There may be other brand names for this medicine  When This Medicine Should Not Be Used: This medicine is not right for everyone  Do not use it if you had an allergic reaction to aripiprazole  How to Use This Medicine:   Liquid, Tablet, Dissolving Tablet  · Take your medicine as directed  Your dose may need to be changed several times to find what works best for you  · Tablet: Swallow whole  Do not break, crush, or chew it  · Disintegrating tablet: Make sure your hands are dry before you handle the disintegrating tablet  Peel back the foil from the blister pack, then remove the tablet  Do not push the tablet through the foil  Place the tablet in your mouth  After it has melted, swallow or take a drink of water  · This medicine should come with a Medication Guide  Ask your pharmacist for a copy if you do not have one  · Missed dose: Take a dose as soon as you remember  If it is almost time for your next dose, wait until then and take a regular dose  Do not take extra medicine to make up for a missed dose  · Store the medicine in a closed container at room temperature, away from heat, moisture, and direct light  Drugs and Foods to Avoid:   Ask your doctor or pharmacist before using any other medicine, including over-the-counter medicines, vitamins, and herbal products  · Some medicines can affect how aripiprazole works   Tell your doctor if you are using any of the following:   ¨ Carbamazepine, clarithromycin, fluoxetine, itraconazole, ketoconazole, paroxetine, quinidine, or rifampin  ¨ Benzodiazepine or sedative medicine (including lorazepam)  ¨ Blood pressure medicine  Warnings While Using This Medicine:   · Tell your doctor if you are pregnant or breastfeeding, or if you have diabetes, heart failure, heart or blood vessel disease, heart rhythm problems, high or low blood pressure, high cholesterol, or a history of seizures, heart attack or stroke  · For some children, teenagers, and young adults, this medicine may increase mental or emotional problems  This may lead to thoughts of suicide and violence  Talk with your doctor right away if you have any thoughts or behavior changes that concern you  Tell your doctor if you or anyone in your family has a history of bipolar disorder or suicide attempts  · This medicine may cause the following problems:   ¨ Neuroleptic malignant syndrome (NMS), a neurologic disorder than can be life-threatening  ¨ Tardive dyskinesia (muscle movements you cannot control)  ¨ Changes in blood sugar levels  ¨ Unusual changes in behavior, such as gambling urges, binge or compulsive eating, or compulsive shopping, or sexual urges  · This medicine may make you dizzy or drowsy, or may cause trouble with thinking or controlling body movements, which may lead to falls, fractures or other injuries  Do not drive or do anything that could be dangerous until you know how this medicine affects you  Stand or sit up slowly if you feel lightheaded or dizzy  · You may get overheated more easily while you are using this medicine  Be careful when you exercise or you are outside in hot or humid weather  Drink plenty of water to stay hydrated  · Tell your doctor if you have phenylketonuria (PKU)  The disintegrating tablet contains phenylalanine  · Do not stop using this medicine suddenly  Your doctor will need to slowly decrease your dose before you stop it completely  · Your doctor will do lab tests at regular visits to check on the effects of this medicine  Keep all appointments  · Keep all medicine out of the reach of children  Never share your medicine with anyone    Possible Side Effects While Using This Medicine:   Call your doctor right away if you notice any of these side effects:  · Allergic reaction: Itching or hives, swelling in your face or hands, swelling or tingling in your mouth or throat, chest tightness, trouble breathing  · Anxiety, irritability, nervousness, restlessness, or trouble sleeping  · Compulsive behavior or intense urges you cannot control  · Confusion, unusual behavior, depressed mood, or thoughts of hurting yourself or others  · Fever, chills, cough, sore throat, body aches  · Increased hunger or thirst, change in how much or how often you urinate  · Jerky muscle movements you cannot control (often in your face, tongue, or jaw)  · Lightheadedness, dizziness, fainting  · Seizures  · Sweating, uneven heartbeat, or muscle stiffness  · Unusual tiredness or sleepiness  If you notice these less serious side effects, talk with your doctor:   · Headache  · Nausea, vomiting, drooling  · Unusual weight gain  If you notice other side effects that you think are caused by this medicine, tell your doctor  Call your doctor for medical advice about side effects  You may report side effects to FDA at 4-868-FDA-2677  © 2017 [address] [employer] Information is for End User's use only and may not be sold, redistributed or otherwise used for commercial purposes  The above information is an  only  It is not intended as medical advice for individual conditions or treatments  Talk to your doctor, nurse or pharmacist before following any medical regimen to see if it is safe and effective for you  Clonazepam (By mouth)   Clonazepam (rzak-BRX-v-neha)  Treats seizures and panic disorder  Brand Name(s): KlonoPIN   There may be other brand names for this medicine  When This Medicine Should Not Be Used: This medicine is not right for everyone  Do not use it if you had an allergic reaction to clonazepam or similar medicines, or if you are pregnant or breastfeeding    How to Use This Medicine:   Tablet, Dissolving Tablet  · Take your medicine as directed  Your dose may need to be changed several times to find what works best for you  · Disintegrating tablet: Dry your hands before you handle the tablet  Place the tablet on your tongue and let it dissolve  · Tablet: Swallow whole with water  · This medicine should come with a Medication Guide  Ask your pharmacist for a copy if you do not have one  · Missed dose: Take a dose as soon as you remember  If it is almost time for your next dose, wait until then and take a regular dose  Do not take extra medicine to make up for a missed dose  · Store the medicine in a closed container at room temperature, away from heat, moisture, and direct light  Drugs and Foods to Avoid:   Ask your doctor or pharmacist before using any other medicine, including over-the-counter medicines, vitamins, and herbal products  · Some medicines can affect how clonazepam works  Tell your doctor if you are using any of the following:   ¨ Carbamazepine, phenobarbital, phenytoin  ¨ Medicine to treat depression or mental health problems, including MAO inhibitors  ¨ Medicine to treat fungal infections  ¨ Phenothiazine medicine  · Do not drink alcohol while you are using this medicine  · Tell your doctor if you use anything else that makes you sleepy  Some examples are allergy medicine, narcotic pain medicine, and alcohol  Warnings While Using This Medicine:   · It is not safe to take this medicine during pregnancy  It could harm an unborn baby  Tell your doctor right away if you become pregnant  · Tell your doctor if you have kidney disease, liver disease, glaucoma, lung disease or breathing problems, porphyria, or a history of drug or alcohol abuse, depression, or mental health problems  · This medicine can increase thoughts of suicide  Tell your doctor right away if you start to feel depressed and have thoughts about hurting yourself  · This medicine can be habit-forming  Do not use more than your prescribed dose   Call your doctor if you think your medicine is not working  · Do not stop using this medicine suddenly  Your doctor will need to slowly decrease your dose before you stop it completely  · This medicine may make you dizzy or drowsy  Do not drive or do anything else that could be dangerous until you know how this medicine affects you  · Your doctor will do lab tests at regular visits to check on the effects of this medicine  Keep all appointments  · Keep all medicine out of the reach of children  Never share your medicine with anyone  Possible Side Effects While Using This Medicine:   Call your doctor right away if you notice any of these side effects:  · Allergic reaction: Itching or hives, swelling in your face or hands, swelling or tingling in your mouth or throat, chest tightness, trouble breathing  · Confusion, memory problems  · Depression, unusual moods or behavior, thoughts of hurting yourself  · Extreme drowsiness, tiredness, or weakness, slow heartbeat, problems with coordination or walking  · Worsening seizures  If you notice these less serious side effects, talk with your doctor:   · Cough, runny or stuffy nose, sore throat  · Increased saliva  If you notice other side effects that you think are caused by this medicine, tell your doctor  Call your doctor for medical advice about side effects  You may report side effects to FDA at 1-836-FDA-1855  © 2017 [address] [employer]  Information is for End User's use only and may not be sold, redistributed or otherwise used for commercial purposes  The above information is an  only  It is not intended as medical advice for individual conditions or treatments  Talk to your doctor, nurse or pharmacist before following any medical regimen to see if it is safe and effective for you  Escitalopram (By mouth)   Escitalopram (bn-xum-KFW-oh-pram)  Treats depression and generalized anxiety disorder ([place])     Brand Name(s): Lexapro   There may be other brand names for this medicine  When This Medicine Should Not Be Used: This medicine is not right for everyone  Do not use it if you had an allergic reaction to escitalopram or citalopram   How to Use This Medicine:   Liquid, Tablet  · Take this medicine as directed  You may need to take it for a month or more before you feel better  Your dose may need to be changed to find out what works best for you  · Measure the oral liquid medicine with a marked measuring spoon, oral syringe, or medicine cup  · This medicine should come with a Medication Guide  Ask your pharmacist for a copy if you do not have one  · Missed dose: Take a dose as soon as you remember  If it is almost time for your next dose, wait until then and take a regular dose  Do not take extra medicine to make up for a missed dose  · Store the medicine in a closed container at room temperature, away from heat, moisture, and direct light  Drugs and Foods to Avoid:   Ask your doctor or pharmacist before using any other medicine, including over-the-counter medicines, vitamins, and herbal products  · Do not use this medicine together with pimozide  Do not use this medicine and an MAO inhibitor (MAOI) within 14 days of each other  · Some medicines can affect how escitalopram works  Tell your doctor if you are using the following:   ¨ Buspirone, carbamazepine, fentanyl, lithium, Margoth's wort, tramadol, or tryptophan supplements  ¨ Amphetamines  ¨ Blood thinner (including warfarin)  ¨ Diuretic (water pill)  ¨ NSAID pain or arthritis medicine (including aspirin, celecoxib, diclofenac, ibuprofen, naproxen)  ¨ Triptan medicine to treat migraine headaches (including sumatriptan)  · Tell your doctor if you use anything else that makes you sleepy  Some examples are allergy medicine, narcotic pain medicine, and alcohol  · Do not drink alcohol while you are using this medicine    Warnings While Using This Medicine:   · Tell your doctor if you are pregnant or breastfeeding, or if you have kidney disease, liver disease, bleeding problems, glaucoma, heart disease, or a seizure disorder  · For some children, teenagers, and young adults, this medicine may increase mental or emotional problems  This may lead to thoughts of suicide and violence  Talk with your doctor right away if you have any thoughts or behavior changes that concern you  Tell your doctor if you or anyone in your family has a history of bipolar disorder or suicide attempts  · This medicine may cause the following problems:   ¨ Serotonin syndrome (more likely when taken with certain medicines)  ¨ Low sodium levels  ¨ Increased risk of bleeding problems  · This medicine may make you dizzy or drowsy  Do not drive or do anything that could be dangerous until you know how this medicine affects you  · Your doctor may want to monitor your child's weight and height, because this medicine may cause decreased appetite and weight loss in children  · Do not stop using this medicine suddenly  Your doctor will need to slowly decrease your dose before you stop it completely  · Your doctor will check your progress and the effects of this medicine at regular visits  Keep all appointments  · Keep all medicine out of the reach of children  Never share your medicine with anyone    Possible Side Effects While Using This Medicine:   Call your doctor right away if you notice any of these side effects:  · Allergic reaction: Itching or hives, swelling in your face or hands, swelling or tingling in your mouth or throat, chest tightness, trouble breathing  · Anxiety, restlessness, fever, sweating, muscle spasms, nausea, vomiting, diarrhea, seeing or hearing things that are not there  · Confusion, weakness, and muscle twitching  · Eye pain, vision changes, seeing halos around lights  · Fast, pounding, or uneven heartbeat  · Feeling more excited or energetic than usual, racing thoughts, trouble sleeping  · Seizures  · Thoughts of hurting yourself or others, unusual behavior  · Unusual bleeding or bruising  If you notice these less serious side effects, talk with your doctor:   · Dizziness, drowsiness, or sleepiness  · Dry mouth  · Headache  · Nausea, constipation, diarrhea  · Sexual problems  If you notice other side effects that you think are caused by this medicine, tell your doctor  Call your doctor for medical advice about side effects  You may report side effects to FDA at 3-274-FDA-9081  © 2017 [address] [employer] Information is for End User's use only and may not be sold, redistributed or otherwise used for commercial purposes  The above information is an  only  It is not intended as medical advice for individual conditions or treatments  Talk to your doctor, nurse or pharmacist before following any medical regimen to see if it is safe and effective for you  Gabapentin (By mouth)   Gabapentin (jagdeep-a-PEN-tin)  Treats seizures and pain caused by shingles  Brand Name(s): ACTIVE-PAC with Gabapentin, Convenience Nicola, Cyclo/Geena 10/300 Pack, FusePaq Fanatrex, Geena-V, Gralise, [address] Pack, Neurontin, SmartRx Geena Kit   There may be other brand names for this medicine  When This Medicine Should Not Be Used: This medicine is not right for everyone  Do not use it if you had an allergic reaction to gabapentin  How to Use This Medicine:   Capsule, Liquid, Tablet  · Take your medicine as directed  Your dose may need to be changed several times to find what works best for you  If you have epilepsy, do not allow more than 12 hours to pass between doses  · Capsule: Swallow the capsule whole with plenty of water  Do not open, crush, or chew it  · Gralise® tablet: Swallow the tablet whole   Do not crush, break, or chew it  · Neurontin® tablet: If you break a tablet into 2 pieces, use the second half as your next dose  If you don't use it within 28 days, throw it away    · Measure the oral liquid medicine with a marked measuring spoon, oral syringe, or medicine cup  · This medicine should come with a Medication Guide  Ask your pharmacist for a copy if you do not have one  · Missed dose: Take a dose as soon as you remember  If it is almost time for your next dose, wait until then and take a regular dose  Do not take extra medicine to make up for a missed dose  · Store the medicine in a closed container at room temperature, away from heat, moisture, and direct light  Store the Neurontin® oral liquid in the refrigerator  Do not freeze  Drugs and Foods to Avoid:   Ask your doctor or pharmacist before using any other medicine, including over-the-counter medicines, vitamins, and herbal products  · Some medicines can affect how gabapentin works  Tell your doctor if you also use any of the following:   ¨ Hydrocodone  ¨ Morphine  · If you take an antacid, wait at least 2 hours before you take gabapentin  · Tell your doctor if you use anything else that makes you sleepy  Some examples are allergy medicine, narcotic pain medicine, and alcohol  Warnings While Using This Medicine:   · Tell your doctor if you are pregnant or breastfeeding, or if you have kidney problems or are receiving dialysis  Tell your doctor if you have a history of depression or mental health problems  · This medicine may increase depression or thoughts of suicide  Tell your doctor right away if you start to feel more depressed or think about hurting yourself  · This medicine may cause a serious allergic reaction called multiorgan hypersensitivity, which can damage organs and be life-threatening  · Do not stop using this medicine suddenly  Your doctor will need to slowly decrease your dose before you stop it completely  If you take this medicine to prevent seizures, your seizures may return or occur more often if you stop this medicine suddenly  · This medicine may make you dizzy or drowsy   Do not drive or do anything else that could be dangerous until you know how this medicine affects you  · Tell any doctor or dentist who treats you that you are using this medicine  This medicine may affect certain medical test results  · Your doctor will check your progress and the effects of this medicine at regular visits  Keep all appointments  · Keep all medicine out of the reach of children  Never share your medicine with anyone  Possible Side Effects While Using This Medicine:   Call your doctor right away if you notice any of these side effects:  · Allergic reaction: Itching or hives, swelling in your face or hands, swelling or tingling in your mouth or throat, chest tightness, trouble breathing  · Behavior problems, aggression, restlessness, trouble concentrating, moodiness (especially in children)  · Blistering, peeling, red skin rash  · Change in how much or how often you urinate, bloody or cloudy urine,  · Chest pain, fast heartbeat, trouble breathing  · Dark urine or pale stools, nausea, vomiting, loss of appetite, stomach pain, yellow skin or eyes  · Fever, rash, swollen or tender glands in the neck, armpit, or groin  · Problems with coordination, shakiness, unsteadiness  · Rapid weight gain, swelling in your hands, ankles, or feet  · Unusual moods or behaviors, thoughts of hurting yourself, feeling depressed  If you notice these less serious side effects, talk with your doctor:   · Dizziness, drowsiness, sleepiness, tiredness  If you notice other side effects that you think are caused by this medicine, tell your doctor  Call your doctor for medical advice about side effects  You may report side effects to FDA at 7-123-FDA-5708  © 2017 [address] [employer]  Information is for End User's use only and may not be sold, redistributed or otherwise used for commercial purposes  The above information is an  only  It is not intended as medical advice for individual conditions or treatments   Talk to your doctor, nurse or pharmacist before following any medical regimen to see if it is safe and effective for you  Lithium (By mouth)   Lithium (LITH-ee-um)  Treats and helps prevent manic episodes of bipolar disorder  Brand Name(s): Lith-Lavern, Lithate, Lithobid   There may be other brand names for this medicine  When This Medicine Should Not Be Used: This medicine is not right for everyone  Do not use it if you had an allergic reaction to lithium, or if you are pregnant or breastfeeding  Do not give the extended-release tablets to anyone younger than 15years of age  How to Use This Medicine:   Capsule, Liquid, Tablet, Long Acting Tablet  · Take your medicine as directed  Your dose may need to be changed several times to find what works best for you  · There are several different forms of lithium  The dose for each is different and they are used at different times of the day  Do not change the type of medicine you take without talking to your doctor first   · Oral liquid: Measure the oral liquid medicine with a marked measuring spoon, oral syringe, or medicine cup  · Capsule, tablet, or extended-release tablet: Swallow the medicine whole  Do not crush, break, or chew it  · Use only the brand of medicine your doctor prescribed  Other brands may not work the same way  · Missed dose: Take a dose as soon as you remember  If it is almost time for your next dose, wait until then and take a regular dose  Do not take extra medicine to make up for a missed dose  · Store the medicine in a closed container at room temperature, away from heat, moisture, and direct light  Drugs and Foods to Avoid:   Ask your doctor or pharmacist before using any other medicine, including over-the-counter medicines, vitamins, and herbal products  · Some foods and medicines can affect how this medicine works   Tell your doctor if you are using any of the following:  ¨ Acetazolamide, carbamazepine, fluoxetine, methyldopa, metronidazole, phenytoin, potassium iodide, sodium bicarbonate, theophylline  ¨ Antacid  ¨ Blood pressure medicine  ¨ Diuretic (water pill)  ¨ Medicine for depression (including paroxetine)  ¨ Medicine to treat mental illness (including haloperidol)  ¨ NSAID pain or arthritis medicine (including celecoxib, ibuprofen, indomethacin, naproxen, piroxicam)  Warnings While Using This Medicine:   · It is not safe to take this medicine during pregnancy  It could harm an unborn baby  Tell your doctor right away if you become pregnant  · Tell your doctor if you have kidney problems, heart or blood vessel disease (including Brugada syndrome), brain or nerve problems, or thyroid problems  · This medicine may cause the following problems:  ¨ Lithium toxicity  ¨ Heart problems  ¨ Kidney problems  ¨ Encephalopathic syndrome (brain problem)  · Make sure any doctor or dentist who treats you knows that you are using this medicine  · This medicine may make you dizzy or drowsy  Do not drive or do anything else that could be dangerous until you know how this medicine affects you  · Your doctor will do lab tests at regular visits to check on the effects of this medicine  Keep all appointments  · Keep all medicine out of the reach of children  Never share your medicine with anyone    Possible Side Effects While Using This Medicine:   Call your doctor right away if you notice any of these side effects:  · Allergic reaction: Itching or hives, swelling in your face or hands, swelling or tingling in your mouth or throat, chest tightness, trouble breathing  · Change in how much or how often you urinate  · Confusion, problems with walking or balance, muscle movements you cannot control  · Diarrhea, vomiting, tremors, or drowsiness  · Fast, pounding, or uneven heartbeat  · Fever  · Lightheadedness or fainting  · Unusual tiredness or weakness  If you notice these less serious side effects, talk with your doctor:   · Mild nausea  · Mild thirst  If you notice other side effects that you think are caused by this medicine, tell your doctor  Call your doctor for medical advice about side effects  You may report side effects to FDA at 7-037-FDA-0359  © 2017 [address] [employer] Information is for End User's use only and may not be sold, redistributed or otherwise used for commercial purposes  The above information is an  only  It is not intended as medical advice for individual conditions or treatments  Talk to your doctor, nurse or pharmacist before following any medical regimen to see if it is safe and effective for you  Lithium Toxicity   WHAT YOU NEED TO KNOW:   Lithium toxicity happens when the amount of lithium in your blood is too high  Lithium is a medicine that is used to treat depression and bipolar disorder  Toxicity can occur if you take a large dose of lithium at one time  Toxicity can also be caused by taking a slightly higher dose of lithium over time  It can also occur if you are dehydrated, or you take other medicines that cause lithium to build up in your blood  A decreased intake of sodium (salt) can also lead to lithium toxicity  DISCHARGE INSTRUCTIONS:   Call 911 or have someone else call for any of the following:   · You have a seizure  · You cannot be awakened  Seek care immediately if:   · You are confused  · You are having trouble staying awake  · You have signs of dehydration such as increased thirst, dark yellow urine, urinating little or not at all, or dry eyes or mouth  · You have severe muscle spasms  Contact your healthcare provider if:   · You have nausea, vomiting, abdominal pain, or diarrhea  · You are shaky  · Your muscles feel weak  · You have questions or concerns about your condition or care  How to safely take lithium:   · Take this medicine exactly as directed  Contact your healthcare provider if you miss a dose or you have any questions about how to take lithium  · Drink liquids as directed    Ask how much liquid to drink each day and which liquids are best for you  Dehydration can increase your risk of lithium toxicity  · Do not decrease the amount of salt you eat without talking to your healthcare provider  A decreased salt intake can increase your risk of lithium toxicity  · Go to all your follow-up appointments  Your healthcare provider will need to monitor you closely while you are taking lithium  You will need regular blood tests  What to do if you think you or someone you know took too much lithium:  Call the [facility] at [phone] immediately  Follow up with your healthcare provider as directed:  Write down your questions so you remember to ask them during your visits  © 2017 [employer] Information is for End User's use only and may not be sold, redistributed or otherwise used for commercial purposes  All illustrations and images included in CareNotes® are the copyrighted property of A [employer] M [employer]  or Edward Noel  The above information is an  only  It is not intended as medical advice for individual conditions or treatments  Talk to your doctor, nurse or pharmacist before following any medical regimen to see if it is safe and effective for you  Trihexyphenidyl Hydrochloride (By mouth)   Trihexyphenidyl Hydrochloride (chmd-xmu-lm-FEN-i-dil roxanne-droe-KLOR-jamila)  Treats symptoms of Parkinson's disease or side effects of other drugs  Brand Name(s):   There may be other brand names for this medicine  When This Medicine Should Not Be Used: You should not use this medicine if you have had an allergic reaction to trihexyphenidyl  How to Use This Medicine:   Tablet, Long Acting Capsule, Liquid  · Your doctor will tell you how much to take and how often  · This medicine may be taken before or after meals  Swallow whole  Do not crush, chew, or open the capsules before swallowing    · Measure the oral liquid with a measuring spoon or medicine cup  If a dose is missed:   · Take the missed dose as soon as possible  · If it is almost time for your next regular dose, wait until then to take your medicine, and skip the missed dose  · You should not use two doses at the same time  How to Store and Dispose of This Medicine:   · Store at room temperature, away from heat, moisture, and direct light  Do not freeze the oral liquid  · Keep all medicine out of the reach of children  Drugs and Foods to Avoid:   Ask your doctor or pharmacist before using any other medicine, including over-the-counter medicines, vitamins, and herbal products  · Avoid drinking alcohol while taking this medicine  · Make sure your doctor knows if you are taking any other medicines that may make you sleepy such as sleeping pills, cold and allergy medicine, muscle relaxants, tranquilizers, or narcotic pain killers  Warnings While Using This Medicine:   · Talk with your doctor before taking this medicine if you have glaucoma, an enlarged prostate, bowel or stomach problems, fast or irregular heartbeat, liver, heart, or kidney disease, or any other medical problems  · This medicine may cause drowsiness  Be careful if you are driving, using machinery, or doing other dangerous jobs  · This medicine may make you sweat less and cause your body to get too hot  Be careful during hot weather, while you are exercising, or if using a sauna or whirlpool    Possible Side Effects While Using This Medicine:   Call your doctor right away if you notice any of these side effects:  · Confusion or agitation  · Hearing or seeing or unusual things  · Severe stomach pain or constipation  · Pain when urinating or decrease in amount of urine  · Fast or irregular heartbeat  · Eye pain  If you notice these less serious side effects, talk with your doctor:   · Dry mouth  · Dizziness or drowsiness  · Nausea  · Nervousness  · Blurred vision  If you notice other side effects that you think are caused by this medicine, tell your doctor  Call your doctor for medical advice about side effects  You may report side effects to FDA at 9-245-FDA-0667  © 2017 [employer] Information is for End User's use only and may not be sold, redistributed or otherwise used for commercial purposes  The above information is an  only  It is not intended as medical advice for individual conditions or treatments  Talk to your doctor, nurse or pharmacist before following any medical regimen to see if it is safe and effective for you  Metabolic Syndrome X   WHAT YOU NEED TO KNOW:   Metabolic syndrome is a group of medical conditions that can lead to heart disease, stroke, or type 2 diabetes  The medical conditions include high triglycerides, low HDL cholesterol, high blood pressure, high blood sugar levels, and extra abdominal fat  DISCHARGE INSTRUCTIONS:   Medicines:   · Blood pressure medicine: This is given to lower your blood pressure  A controlled blood pressure helps protect your organs, such as your heart, lungs, brain, and kidneys  Take your blood pressure medicine exactly as directed  · Cholesterol medicine: This type of medicine is given to help decrease (lower) the amount of cholesterol (fat) in your blood  Cholesterol medicine works best if you also exercise and eat a healthy diet that is low in certain kinds of fats  Some cholesterol medicines may cause liver problems  You may need to have blood taken for tests while using this medicine  · Take your medicine as directed  Contact your healthcare provider if you think your medicine is not helping or if you have side effects  Tell him or her if you are allergic to any medicine  Keep a list of the medicines, vitamins, and herbs you take  Include the amounts, and when and why you take them  Bring the list or the pill bottles to follow-up visits  Carry your medicine list with you in case of an emergency      · Hypoglycemic medicine: This medicine may be given to decrease the amount of sugar in your blood  Hypoglycemic medicine helps your body move the sugar to your cells, where it is needed for energy  Follow up with your healthcare provider as directed:  Write down your questions so you remember to ask them during your visits  Manage metabolic syndrome:   · Maintain a healthy weight:  Weight loss helps lower cholesterol, triglycerides, blood pressure, and blood glucose levels  It can also raise HDL (good cholesterol)  Ask your healthcare provider how much you should weigh  Ask him to help you create a weight loss plan if you are overweight  · Eat a variety of healthy foods:  Healthy foods include fruits, vegetables, whole-grain breads, low-fat dairy products, beans, lean meats, and fish  Eat foods that are low in fat and sodium (salt)  A dietitian can help you plan healthy meals  · Exercise:  Ask your healthcare provider to help you create an exercise plan  Exercise can help lower your blood pressure, cholesterol, and blood sugar levels  Exercise can also help raise your HDL level and help you to lose weight  Get at least 30 minutes of exercise, 5 days each week  · Check your blood pressure as directed: You may be asked to keep a record of your blood pressure and bring it with you to follow-up visits  Ask your healthcare provider what your blood pressure should be and how to check it  · Limit alcohol:  Women should limit alcohol to 1 drink a day  Men should limit alcohol to 2 drinks a day  A drink of alcohol is 12 ounces of beer, 5 ounces of wine, or 1½ ounces of liquor  · Do not smoke: If you smoke, it is never too late to quit  Smoking further increases your risk for heart disease and stroke  Ask your healthcare provider for information if you need help quitting  Contact your healthcare provider if:   · You have more thirst or hunger than usual      · You urinate more frequently       · You have blurred vision  · You have questions or concerns about your condition or care  Seek care immediately or call 911 if:   · Your blood pressure is higher than healthcare providers told it should be  · You have chest pain or discomfort that spreads to your arms, jaw, or back  · You have a severe headache or dizziness  · You have trouble thinking, speaking, or understanding others  © 2017 [employer]  Information is for End User's use only and may not be sold, redistributed or otherwise used for commercial purposes  All illustrations and images included in CareNotes® are the copyrighted property of [employer]  or Edward Noel  The above information is an  only  It is not intended as medical advice for individual conditions or treatments  Talk to your doctor, nurse or pharmacist before following any medical regimen to see if it is safe and effective for you

## 2018-07-27 NOTE — PROGRESS NOTES
Patient blocked and bizarre  Maintained on Q 15 minute safety checks  No acting out, suicidal ideations, and/or homicidal behaviors  No changes in medical condition or complaints voiced  Fluids maintained at bedside to promote hydration

## 2018-07-27 NOTE — PROGRESS NOTES
Prior to retiring patient was pacing in hallway  Very flat  Ruminating about her medications  Tomasa Brittle was observed sleeping during most Q15 minute safety checks (second portion of shift)  No suicidal ideations, acting out or homicidal behaviors  No change in medical condition or complaints voiced  Fluids maintained at bedside to promote hydration

## 2018-07-28 ENCOUNTER — TRANSCRIBE ORDERS (OUTPATIENT)
Dept: ADMINISTRATIVE | Facility: HOSPITAL | Age: 28
End: 2018-07-28

## 2018-07-28 DIAGNOSIS — R41.840 ATTENTION OR CONCENTRATION DEFICIT: Primary | ICD-10-CM

## 2018-07-30 ENCOUNTER — APPOINTMENT (OUTPATIENT)
Dept: PSYCHOLOGY | Facility: CLINIC | Age: 28
End: 2018-07-30
Payer: COMMERCIAL

## 2018-07-30 PROCEDURE — H0035 MH PARTIAL HOSP TX UNDER 24H: HCPCS

## 2018-07-31 ENCOUNTER — APPOINTMENT (OUTPATIENT)
Dept: PSYCHOLOGY | Facility: CLINIC | Age: 28
End: 2018-07-31
Payer: COMMERCIAL

## 2018-07-31 PROCEDURE — H0035 MH PARTIAL HOSP TX UNDER 24H: HCPCS

## 2018-08-01 ENCOUNTER — APPOINTMENT (OUTPATIENT)
Dept: PSYCHOLOGY | Facility: CLINIC | Age: 28
End: 2018-08-01
Payer: COMMERCIAL

## 2018-08-01 LAB
A-TOCOPHEROL VIT E SERPL-MCNC: 7.7 MG/L (ref 5.9–19.4)
GAMMA TOCOPHEROL SERPL-MCNC: 0.4 MG/L (ref 0.7–4.9)

## 2018-08-01 PROCEDURE — H0035 MH PARTIAL HOSP TX UNDER 24H: HCPCS

## 2018-08-02 ENCOUNTER — APPOINTMENT (OUTPATIENT)
Dept: PSYCHOLOGY | Facility: CLINIC | Age: 28
End: 2018-08-02
Payer: COMMERCIAL

## 2018-08-02 PROCEDURE — H0035 MH PARTIAL HOSP TX UNDER 24H: HCPCS

## 2018-08-03 ENCOUNTER — APPOINTMENT (OUTPATIENT)
Dept: PSYCHOLOGY | Facility: CLINIC | Age: 28
End: 2018-08-03
Payer: COMMERCIAL

## 2018-08-03 PROCEDURE — H0035 MH PARTIAL HOSP TX UNDER 24H: HCPCS

## 2018-08-06 ENCOUNTER — APPOINTMENT (OUTPATIENT)
Dept: PSYCHOLOGY | Facility: CLINIC | Age: 28
End: 2018-08-06
Payer: COMMERCIAL

## 2018-08-06 PROCEDURE — H0035 MH PARTIAL HOSP TX UNDER 24H: HCPCS

## 2018-08-07 ENCOUNTER — APPOINTMENT (OUTPATIENT)
Dept: PSYCHOLOGY | Facility: CLINIC | Age: 28
End: 2018-08-07
Payer: COMMERCIAL

## 2018-08-07 PROCEDURE — H0035 MH PARTIAL HOSP TX UNDER 24H: HCPCS

## 2018-08-08 ENCOUNTER — APPOINTMENT (OUTPATIENT)
Dept: PSYCHOLOGY | Facility: CLINIC | Age: 28
End: 2018-08-08
Payer: COMMERCIAL

## 2018-08-08 PROCEDURE — H0035 MH PARTIAL HOSP TX UNDER 24H: HCPCS

## 2018-08-09 ENCOUNTER — APPOINTMENT (OUTPATIENT)
Dept: PSYCHOLOGY | Facility: CLINIC | Age: 28
End: 2018-08-09
Payer: COMMERCIAL

## 2018-08-09 PROCEDURE — H0035 MH PARTIAL HOSP TX UNDER 24H: HCPCS

## 2018-08-10 ENCOUNTER — APPOINTMENT (OUTPATIENT)
Dept: PSYCHOLOGY | Facility: CLINIC | Age: 28
End: 2018-08-10
Payer: COMMERCIAL

## 2018-08-10 PROCEDURE — H0035 MH PARTIAL HOSP TX UNDER 24H: HCPCS

## 2018-08-13 ENCOUNTER — APPOINTMENT (OUTPATIENT)
Dept: PSYCHOLOGY | Facility: CLINIC | Age: 28
End: 2018-08-13
Payer: COMMERCIAL

## 2018-08-13 PROCEDURE — H0035 MH PARTIAL HOSP TX UNDER 24H: HCPCS

## 2018-08-14 ENCOUNTER — APPOINTMENT (OUTPATIENT)
Dept: PSYCHOLOGY | Facility: CLINIC | Age: 28
End: 2018-08-14
Payer: COMMERCIAL

## 2018-08-14 PROCEDURE — H0035 MH PARTIAL HOSP TX UNDER 24H: HCPCS

## 2018-08-15 ENCOUNTER — APPOINTMENT (OUTPATIENT)
Dept: PSYCHOLOGY | Facility: CLINIC | Age: 28
End: 2018-08-15
Payer: COMMERCIAL

## 2018-08-15 PROCEDURE — H0035 MH PARTIAL HOSP TX UNDER 24H: HCPCS

## 2018-08-16 ENCOUNTER — OFFICE VISIT (OUTPATIENT)
Dept: PSYCHOLOGY | Facility: CLINIC | Age: 28
End: 2018-08-16
Payer: COMMERCIAL

## 2018-08-16 PROCEDURE — H0035 MH PARTIAL HOSP TX UNDER 24H: HCPCS

## 2018-08-17 ENCOUNTER — TRANSITIONAL CARE MANAGEMENT (OUTPATIENT)
Dept: INTERNAL MEDICINE CLINIC | Facility: CLINIC | Age: 28
End: 2018-08-17

## 2018-08-28 ENCOUNTER — OFFICE VISIT (OUTPATIENT)
Dept: INTERNAL MEDICINE CLINIC | Facility: CLINIC | Age: 28
End: 2018-08-28
Payer: COMMERCIAL

## 2018-08-28 VITALS
HEIGHT: 63 IN | SYSTOLIC BLOOD PRESSURE: 108 MMHG | WEIGHT: 137 LBS | BODY MASS INDEX: 24.27 KG/M2 | RESPIRATION RATE: 16 BRPM | DIASTOLIC BLOOD PRESSURE: 60 MMHG | TEMPERATURE: 98.3 F | OXYGEN SATURATION: 99 % | HEART RATE: 72 BPM

## 2018-08-28 DIAGNOSIS — F25.0 SCHIZOAFFECTIVE DISORDER, BIPOLAR TYPE (HCC): Primary | Chronic | ICD-10-CM

## 2018-08-28 PROCEDURE — 99495 TRANSJ CARE MGMT MOD F2F 14D: CPT | Performed by: PHYSICIAN ASSISTANT

## 2018-08-28 RX ORDER — QUETIAPINE 150 MG/1
150 TABLET, FILM COATED, EXTENDED RELEASE ORAL
COMMUNITY
End: 2019-06-04 | Stop reason: HOSPADM

## 2018-08-28 RX ORDER — CLONAZEPAM 1 MG/1
1 TABLET ORAL
COMMUNITY
End: 2019-06-04 | Stop reason: HOSPADM

## 2018-08-28 RX ORDER — QUETIAPINE FUMARATE 25 MG/1
25 TABLET, FILM COATED ORAL
COMMUNITY
End: 2018-09-26 | Stop reason: ALTCHOICE

## 2018-08-28 RX ORDER — BENZTROPINE MESYLATE 1 MG/1
1 TABLET ORAL 2 TIMES DAILY
COMMUNITY
End: 2019-06-04 | Stop reason: HOSPADM

## 2018-08-28 NOTE — PROGRESS NOTES
Transition of Care  Follow-up After Hospitalization    Lisandra Hamilton 33 y o  female   Date:  8/28/2018    Date and time hospital follow up call was made:  8/17/2018  1:43 PM  Hospital care reviewed:  Records not available  Patient was hopsitalized at:  [facility]  Date of admission:  7/21/18  Date of discharge:  8/16/18  Diagnosis:  [address]  I have advised the patient to call PCP with any new or worsening symptoms (please type in name along with any credentials):    Comments:  TCM 8/28/2018       Admit Date: 7/21/18  Discharge Date: 7/27/18  Diagnosis: MDD, schizoaffective d/o  Location: [facility] were reviewed  Medications upon discharge reviewed/updated  Medication Changes: D/C lexapro, abilify and artane  Started cogentin and seroquel  Imaging:  Consults:   Discharge Disposition:  Home with [facility] from 7/28-8/16  Follow up visits with other specialists: psych at [place] [id number] with Dr Malachi Galloway and Plan:    Renny Cano was seen today for transition of care management  Diagnoses and all orders for this visit:    Schizoaffective disorder, bipolar type ([facility] [address] )            HPI:  Pt [facility] for [facility] after psych hospitalization  She notes she became frustrated after getting demoted at her job  Her mood became labile and she became more depressed and withdrawn  She was admitted at [facility] on 7/21/18  Her lexapro and abilify and artane were discontinued  She was started on cogentin and seroquel in its place  She was transitioned to [facility] from 7/28-8/16  She saw pschiatry on 8/22 and seroquel was increased  She is thinking of pursuing another job as the recent demotion triggered her mood decline  She is feeling somewhat better overall  She has an appt [facility]ry [facility] in September  ROS: Review of Systems   Constitutional: Negative for chills and fever     HENT: Negative for congestion, ear pain, hearing loss, postnasal drip, rhinorrhea, sinus pain, sinus pressure, sore throat and trouble swallowing  Eyes: Negative for pain and visual disturbance  Respiratory: Negative for cough, chest tightness, shortness of breath and wheezing  Cardiovascular: Negative  Negative for chest pain, palpitations and leg swelling  Gastrointestinal: Negative for abdominal pain, blood in stool, constipation, diarrhea, nausea and vomiting  Endocrine: Negative for cold intolerance, heat intolerance, polydipsia, polyphagia and polyuria  Genitourinary: Negative for difficulty urinating, dysuria, flank pain and urgency  Musculoskeletal: Negative for arthralgias, back pain, gait problem and myalgias  Skin: Negative for rash  Allergic/Immunologic: Negative  Neurological: Negative for dizziness, weakness, light-headedness and headaches  Hematological: Negative  Psychiatric/Behavioral: Negative for behavioral problems, dysphoric mood and sleep disturbance  The patient is not nervous/anxious  Past Medical History:   Diagnosis Date    Anorexia nervosa     pt denies history at time of admission 7/21/18    Anxiety     Depression     Disease of thyroid gland     hypo    Hallucination     Memory difficulty 7/12/2018    Psychiatric illness     Schizoaffective disorder ([facility] )     Self-injurious behavior     Sleep difficulties     Suicide attempt ([facility] )     history of attempt at age 22 by overdose     Urinary retention        Past Surgical History:   Procedure Laterality Date    EYE SURGERY Left     tear duct    HI ESOPHAGOGASTRODUODENOSCOPY TRANSORAL DIAGNOSTIC N/A 2/9/2018    Procedure: ESOPHAGOGASTRODUODENOSCOPY (EGD);   Surgeon: Tali Monroy MD;  Location: [facility];  Service: Gastroenterology       Social History     Social History    Marital status: Single     Spouse name: N/A    Number of children: N/A    Years of education: N/A     Social History Main Topics    Smoking status: Never Smoker    Smokeless tobacco: Never Used    Alcohol use No  Drug use: No    Sexual activity: Not Currently     Other Topics Concern    None     Social History Narrative    None       Family History   Problem Relation Age of Onset    Hypertension Father         benign essential    Hypertension Brother         benign essential     OCD Brother     Depression Brother     Other Family         nasolacrimal duct obstruction, acquired       No Known Allergies      Current Outpatient Prescriptions:     benztropine (COGENTIN) 1 mg tablet, Take 1 mg by mouth 2 (two) times a day, Disp: , Rfl:     bethanechol (URECHOLINE) 25 mg tablet, Take 1 tablet (25 mg total) by mouth 3 (three) times a day, Disp: 90 tablet, Rfl: 0    Cholecalciferol (VITAMIN D3) 2000 units TABS, Take 4,000 Units by mouth  , Disp: , Rfl:     clonazePAM (KlonoPIN) 1 mg tablet, Take 1 mg by mouth daily at bedtime , Disp: , Rfl:     gabapentin (NEURONTIN) 300 mg capsule, Take 2 capsules (600 mg total) by mouth 2 (two) times a day (Patient taking differently: Take 300 mg by mouth 2 (two) times a day  ), Disp: 60 capsule, Rfl: 0    liothyronine (CYTOMEL) 5 mcg tablet, TAKE 1 TABLET BY MOUTH ONCE DAILY, Disp: 30 tablet, Rfl: 2    lithium carbonate (LITHOBID) 450 mg CR tablet, Take 1 tablet (450 mg total) by mouth every 12 (twelve) hours, Disp: 60 tablet, Rfl: 0    QUEtiapine (SEROquel XR) 150 mg 24 hr tablet, Take 150 mg by mouth daily at bedtime, Disp: , Rfl:     QUEtiapine (SEROquel) 25 mg tablet, Take 25 mg by mouth daily at bedtime, Disp: , Rfl:     Tryptophan 500 MG TABS, Take 500 tablets by mouth, Disp: , Rfl:       Physical Exam:  /60 (BP Location: Left arm, Patient Position: Sitting, Cuff Size: Large)   Pulse 72   Temp 98 3 °F (36 8 °C)   Resp 16   Ht 5' 3" (1 6 m)   Wt 62 1 kg (137 lb)   SpO2 99%   BMI 24 27 kg/m²     Physical Exam   Constitutional: She is oriented to person, place, and time  She appears well-developed and well-nourished  No distress     HENT:   Head: Normocephalic and atraumatic  Right Ear: External ear normal    Left Ear: External ear normal    Nose: Nose normal    Mouth/Throat: Oropharynx is clear and moist  No oropharyngeal exudate  Eyes: Conjunctivae and EOM are normal  Pupils are equal, round, and reactive to light  Right eye exhibits no discharge  Left eye exhibits no discharge  No scleral icterus  Neck: Normal range of motion  Neck supple  No thyromegaly present  Cardiovascular: Normal rate, regular rhythm and normal heart sounds  Exam reveals no gallop and no friction rub  No murmur heard  Pulmonary/Chest: Effort normal and breath sounds normal  No respiratory distress  She has no wheezes  She has no rales  Abdominal: Soft  Bowel sounds are normal  She exhibits no distension  There is no tenderness  Musculoskeletal: Normal range of motion  She exhibits no edema, tenderness or deformity  Neurological: She is alert and oriented to person, place, and time  No cranial nerve deficit  Skin: Skin is warm and dry  She is not diaphoretic  Psychiatric: She has a normal mood and affect  Judgment and thought content normal  She is slowed and withdrawn  Labs:  Lab Results   Component Value Date    WBC 12 70 (H) 07/21/2018    HGB 13 4 07/21/2018    HCT 40 1 07/21/2018    MCV 85 07/21/2018     07/21/2018     Lab Results   Component Value Date     07/23/2018    K 4 0 07/23/2018     07/23/2018    CO2 29 07/23/2018    BUN 15 07/23/2018    CREATININE 1 10 07/23/2018    GLUF 94 01/03/2018    CALCIUM 9 6 07/23/2018    AST 18 07/21/2018    ALT 20 07/21/2018    ALKPHOS 74 07/21/2018    PROT 7 2 11/25/2014    BILITOT 1 1 (H) 11/25/2014    EGFR 68 07/23/2018       1  Schizoaffective disorder: Continue current regime   Continue with Dr Rudolph Cuellar and upcoming appt with [facility]

## 2018-08-28 NOTE — ASSESSMENT & PLAN NOTE
Pts symptoms are stable with current regime  No changes at present     Continue current regime and follow with psychiatry

## 2018-09-05 ENCOUNTER — OFFICE VISIT (OUTPATIENT)
Dept: GASTROENTEROLOGY | Facility: HOSPITAL | Age: 28
End: 2018-09-05
Payer: COMMERCIAL

## 2018-09-05 VITALS
HEIGHT: 63 IN | SYSTOLIC BLOOD PRESSURE: 115 MMHG | TEMPERATURE: 97.6 F | BODY MASS INDEX: 24.59 KG/M2 | HEART RATE: 82 BPM | WEIGHT: 138.8 LBS | DIASTOLIC BLOOD PRESSURE: 75 MMHG

## 2018-09-05 DIAGNOSIS — R13.12 OROPHARYNGEAL DYSPHAGIA: ICD-10-CM

## 2018-09-05 DIAGNOSIS — K21.9 GASTROESOPHAGEAL REFLUX DISEASE, ESOPHAGITIS PRESENCE NOT SPECIFIED: ICD-10-CM

## 2018-09-05 DIAGNOSIS — K90.0 CELIAC DISEASE: ICD-10-CM

## 2018-09-05 DIAGNOSIS — R11.10 REGURGITATION OF FOOD: Primary | ICD-10-CM

## 2018-09-05 PROCEDURE — 99214 OFFICE O/P EST MOD 30 MIN: CPT | Performed by: PHYSICIAN ASSISTANT

## 2018-09-05 RX ORDER — RANITIDINE 150 MG/1
150 CAPSULE ORAL 2 TIMES DAILY
Qty: 60 CAPSULE | Refills: 5 | Status: SHIPPED | OUTPATIENT
Start: 2018-09-05 | End: 2019-06-04 | Stop reason: HOSPADM

## 2018-09-05 NOTE — PROGRESS NOTES
[facility] Outpatient Follow-up [facility]  Nova Merlin 33 y o  female MRN: [id number]  Encounter: [id number]          ASSESSMENT AND PLAN:      1  Celiac sprue   - She has a positive serology and biopsy showed increased intraepithelial lymphocytes on last EGD  It is likely she has early celiac sprue  - Her abdominal pain improved on a gluten free diet    -She is due for repeat EGD with biopsy as well as repeat serology to ensure remission; she is agreeable to schedule this    -Continue gluten free diet, follow up in the office after the procedure     2  Constipation   -She has a BM about twice per week  -Uses occasional Senna   -Recommend Miralax 17g daily as needed instead  3  Regurgitation  4  Dysphagia  5  GERD   -  She describes a sensation of tightness throughout her chest as well as food coming back up her esophagus, occasionally she needs to regurgitate this  She also reports that she has difficulty swallowing her secretions while laying down, she states that she has difficulty initiating a swallow but does not  Feel that food gets stuck  -  EGD is scheduled as above, previous EGD earlier this year did not show any signs of stricture or obstruction    -Trial of [id number] as this could be related to reflux  Also discussed with her that it could be related to her anxiety    -  Ordered a barium esophagram to assess for esophageal dysmotility and reflux    -  Offered referral to speech language pathology as she feels that she has difficulty initiating a swallow, I recommended that she try to sit up to swallow as much as possible  - Follow up in the office after the above to see how she is doing   ____________________________________________________________    SUBJECTIVE:     14-year-old female presented for follow-up  She reports her abdominal pain has resolved on a gluten free diet but recently she has some new concerns    She states that she has a sensation of food coming up into her esophagus and occasionally she does regurgitate this  She also noticed a chest tightness , she feels this is somewhat associated with anxiety although she is not sure of the chest tightness causes anxiety or the anxiety is causing the chest tightness  She also has had difficulty with swallowing, she states that she has trouble initiating a swallow all lying down  This primarily is with her secretions, she denies any difficulty swallowing food or liquids while sitting up  She states she does not have a sensation of food becoming stuck but rather that she simply can not start the swallow  She also reports that she continues to have constipation with about 2 bowel movements per week, she uses occasional senna for this  She was recently diagnosed with celiac disease on serology and biopsies from EGD, her brother also has celiac disease  REVIEW OF SYSTEMS IS OTHERWISE NEGATIVE  Historical Information   Past Medical History:   Diagnosis Date    Anorexia nervosa     pt denies history at time of admission 7/21/18    Anxiety     Depression     Disease of thyroid gland     hypo    Hallucination     Memory difficulty 7/12/2018    Psychiatric illness     [facility]izoaffective disorder ([facility] )     Self-injurious behavior     Sleep difficulties     Suicide attempt ([facility] )     history of attempt at age 22 by overdose     Urinary retention      Past Surgical History:   Procedure Laterality Date    EYE SURGERY Left     tear duct    SC ESOPHAGOGASTRODUODENOSCOPY TRANSORAL DIAGNOSTIC N/A 2/9/2018    Procedure: ESOPHAGOGASTRODUODENOSCOPY (EGD);   Surgeon: Aleksandra Retana MD;  Location: [facility] MAIN [facility];  Service: Gastroenterology     Social History   History   Alcohol Use No     History   Drug Use No     History   Smoking Status    Never Smoker   Smokeless Tobacco    Never Used     Family History   Problem Relation Age of Onset    Hypertension Father         benign essential    Hypertension Brother benign essential     OCD Brother     Depression Brother     Other Family         nasolacrimal duct obstruction, acquired       Meds/Allergies       Current Outpatient Prescriptions:     benztropine (COGENTIN) 1 mg tablet    bethanechol (URECHOLINE) 25 mg tablet    Cholecalciferol (VITAMIN D3) 2000 units TABS    clonazePAM (KlonoPIN) 1 mg tablet    gabapentin (NEURONTIN) 300 mg capsule    liothyronine (CYTOMEL) 5 mcg tablet    lithium carbonate (LITHOBID) 450 mg CR tablet    QUEtiapine (SEROquel XR) 150 mg 24 hr tablet    QUEtiapine (SEROquel) 25 mg tablet    Tryptophan 500 MG TABS    No Known Allergies        Objective     Blood pressure 115/75, pulse 82, temperature 97 6 °F (36 4 °C), temperature source Tympanic, height 5' 3" (1 6 m), weight 63 kg (138 lb 12 8 oz), not currently breastfeeding  PHYSICAL EXAM:      Physical Exam   Constitutional: She is oriented to person, place, and time  She appears well-developed and well-nourished  No distress  HENT:   Head: Normocephalic and atraumatic  Eyes: Right eye exhibits no discharge  Left eye exhibits no discharge  No scleral icterus  Neck: Neck supple  No tracheal deviation present  Cardiovascular: Normal rate, regular rhythm and normal heart sounds  Exam reveals no gallop and no friction rub  No murmur heard  Pulmonary/Chest: Effort normal  No respiratory distress  She has no wheezes  She has no rales  Abdominal: Soft  Bowel sounds are normal  She exhibits no distension  There is no tenderness  There is no rebound and no guarding  Neurological: She is alert and oriented to person, place, and time  Skin: Skin is warm and dry  Psychiatric: Her affect is blunt  Lab Results:   No visits with results within 1 Day(s) from this visit     Latest known visit with results is:   Appointment on 07/19/2018   Component Date Value    Vitamin E(Alpha Tocopher* 07/19/2018 7 7     Vitamin E(Gamma Tocopher* 07/19/2018 0 4*    Vitamin B-12 07/19/2018 893     Vitamin B1, Whole Blood 07/19/2018 119 4     Methylmalonic Acid, S 07/19/2018 215     Disclaimer: 07/19/2018 Comment     TSH 3RD GENERATON 07/19/2018 0 875     Free T4 07/19/2018 0 88     Creatinine 07/19/2018 1 17     eGFR 07/19/2018 63          Radiology Results:   No results found

## 2018-09-06 NOTE — TELEPHONE ENCOUNTER
----- Message from Megan Leavitt PA-C sent at 9/5/2018  2:51 PM EDT -----    Can you please call her and asked her to get celiac serology done? I ordered this  I believe she left her office visit today without the script  Thanks!

## 2018-09-07 ENCOUNTER — TELEPHONE (OUTPATIENT)
Dept: GASTROENTEROLOGY | Facility: CLINIC | Age: 28
End: 2018-09-07

## 2018-09-07 NOTE — TELEPHONE ENCOUNTER
Medication ranitidine 150mg cap not covered by insurance  Prior authorization faxed to insurance waiting for respond

## 2018-09-10 DIAGNOSIS — E03.9 HYPOTHYROIDISM, UNSPECIFIED TYPE: ICD-10-CM

## 2018-09-11 RX ORDER — LIOTHYRONINE SODIUM 5 UG/1
TABLET ORAL
Qty: 30 TABLET | Refills: 2 | Status: SHIPPED | OUTPATIENT
Start: 2018-09-11 | End: 2018-12-21 | Stop reason: SDUPTHER

## 2018-09-14 ENCOUNTER — TELEPHONE (OUTPATIENT)
Dept: GASTROENTEROLOGY | Facility: CLINIC | Age: 28
End: 2018-09-14

## 2018-09-17 ENCOUNTER — HOSPITAL ENCOUNTER (OUTPATIENT)
Dept: RADIOLOGY | Facility: HOSPITAL | Age: 28
Discharge: HOME | End: 2018-09-17
Payer: COMMERCIAL

## 2018-09-17 ENCOUNTER — TRANSCRIBE ORDERS (OUTPATIENT)
Dept: LAB | Facility: CLINIC | Age: 28
End: 2018-09-17

## 2018-09-17 ENCOUNTER — APPOINTMENT (OUTPATIENT)
Dept: LAB | Facility: CLINIC | Age: 28
End: 2018-09-17
Payer: COMMERCIAL

## 2018-09-17 DIAGNOSIS — R11.10 REGURGITATION OF FOOD: ICD-10-CM

## 2018-09-17 DIAGNOSIS — K21.9 GASTROESOPHAGEAL REFLUX DISEASE, ESOPHAGITIS PRESENCE NOT SPECIFIED: ICD-10-CM

## 2018-09-17 DIAGNOSIS — Z79.899 ENCOUNTER FOR LONG-TERM (CURRENT) USE OF MEDICATIONS: Primary | ICD-10-CM

## 2018-09-17 DIAGNOSIS — F25.9 SCHIZOAFFECTIVE DISORDER, UNSPECIFIED TYPE (HCC): ICD-10-CM

## 2018-09-17 DIAGNOSIS — K90.0 CELIAC DISEASE: ICD-10-CM

## 2018-09-17 DIAGNOSIS — Z79.899 ENCOUNTER FOR LONG-TERM (CURRENT) USE OF MEDICATIONS: ICD-10-CM

## 2018-09-17 LAB
IGA SERPL-MCNC: 146 MG/DL (ref 70–400)
T4 FREE SERPL-MCNC: 0.87 NG/DL (ref 0.76–1.46)
TSH SERPL DL<=0.05 MIU/L-ACNC: 3.58 UIU/ML (ref 0.36–3.74)

## 2018-09-17 PROCEDURE — 83516 IMMUNOASSAY NONANTIBODY: CPT

## 2018-09-17 PROCEDURE — 74220 X-RAY XM ESOPHAGUS 1CNTRST: CPT

## 2018-09-17 PROCEDURE — 84443 ASSAY THYROID STIM HORMONE: CPT

## 2018-09-17 PROCEDURE — 82784 ASSAY IGA/IGD/IGG/IGM EACH: CPT

## 2018-09-17 PROCEDURE — 84439 ASSAY OF FREE THYROXINE: CPT

## 2018-09-17 PROCEDURE — 36415 COLL VENOUS BLD VENIPUNCTURE: CPT

## 2018-09-18 ENCOUNTER — TELEPHONE (OUTPATIENT)
Dept: GASTROENTEROLOGY | Facility: CLINIC | Age: 28
End: 2018-09-18

## 2018-09-18 PROBLEM — R13.12 OROPHARYNGEAL DYSPHAGIA: Status: ACTIVE | Noted: 2018-09-18

## 2018-09-18 PROBLEM — K90.0 CELIAC DISEASE: Status: ACTIVE | Noted: 2018-09-18

## 2018-09-18 PROBLEM — R11.10 REGURGITATION OF FOOD: Status: ACTIVE | Noted: 2018-09-18

## 2018-09-18 PROBLEM — K21.9 GASTROESOPHAGEAL REFLUX DISEASE: Status: ACTIVE | Noted: 2018-09-18

## 2018-09-18 LAB — TTG IGA SER-ACNC: <2 U/ML (ref 0–3)

## 2018-09-19 ENCOUNTER — TELEPHONE (OUTPATIENT)
Dept: GASTROENTEROLOGY | Facility: CLINIC | Age: 28
End: 2018-09-19

## 2018-09-26 ENCOUNTER — OFFICE VISIT (OUTPATIENT)
Dept: INTERNAL MEDICINE CLINIC | Facility: CLINIC | Age: 28
End: 2018-09-26
Payer: COMMERCIAL

## 2018-09-26 VITALS
HEIGHT: 63 IN | RESPIRATION RATE: 16 BRPM | DIASTOLIC BLOOD PRESSURE: 72 MMHG | TEMPERATURE: 98.4 F | OXYGEN SATURATION: 98 % | WEIGHT: 135.2 LBS | BODY MASS INDEX: 23.96 KG/M2 | SYSTOLIC BLOOD PRESSURE: 104 MMHG | HEART RATE: 68 BPM

## 2018-09-26 DIAGNOSIS — F31.10 BIPOLAR AFFECTIVE DISORDER, CURRENT EPISODE MANIC WITHOUT PSYCHOTIC SYMPTOMS (HCC): ICD-10-CM

## 2018-09-26 DIAGNOSIS — F51.01 PRIMARY INSOMNIA: Primary | ICD-10-CM

## 2018-09-26 PROBLEM — S39.012A LUMBAR STRAIN: Status: RESOLVED | Noted: 2017-05-23 | Resolved: 2018-09-26

## 2018-09-26 PROBLEM — R13.12 OROPHARYNGEAL DYSPHAGIA: Status: RESOLVED | Noted: 2018-09-18 | Resolved: 2018-09-26

## 2018-09-26 PROCEDURE — 99213 OFFICE O/P EST LOW 20 MIN: CPT | Performed by: PHYSICIAN ASSISTANT

## 2018-09-26 PROCEDURE — 3008F BODY MASS INDEX DOCD: CPT | Performed by: PHYSICIAN ASSISTANT

## 2018-09-26 NOTE — ASSESSMENT & PLAN NOTE
Continue following with pyschiatry for medication management   Consider opinion from [facility] for neuropsych testing

## 2018-09-26 NOTE — PROGRESS NOTES
Assessment/Plan:    Bipolar affective disorder, current episode manic without psychotic symptoms ([facility] [address] )  Continue following with pyschiatry for medication management  Consider opinion from [facility] for neuropsych testing        Diagnoses and all orders for this visit:    Primary insomnia    Bipolar affective disorder, current episode manic without psychotic symptoms ([facility] [address] )          Subjective:      Patient ID: Lisandra Hamilton is a 33 y o  female  Pt presents for routine visit  She continues to follow with psychiartry  She continues with insomnia and complains of some weight gain with seroquel  She has not been able to use her APAP machine due to it blowing in her face and being hard to tolerate  She denies any specific complaints or concerns  She has been unable to proceed with neuropsych testing in [place] and is considering [facility] as an alternative  The following portions of the patient's history were reviewed and updated as appropriate:   She  has a past medical history of Anorexia nervosa; Anxiety; Depression; Disease of thyroid gland; Gastroesophageal reflux disease (9/18/2018); Hallucination; Memory difficulty (7/12/2018); Psychiatric illness; Schizoaffective disorder ([facility] ); Self-injurious behavior; Sleep difficulties; Suicide attempt [facility]); and Urinary retention  She   Patient Active Problem List    Diagnosis Date Noted    Celiac disease 09/18/2018    Regurgitation of food 09/18/2018    Gastroesophageal reflux disease 09/18/2018    DENNIS (generalized anxiety disorder) 07/22/2018    Memory difficulty 07/12/2018    Primary insomnia 05/15/2018    Sleep apnea 04/11/2018    Schizoaffective disorder ([facility] ) 07/07/2016    Hypothyroidism 07/25/2014    Bipolar affective disorder, current episode manic without psychotic symptoms ([facility] ) 10/10/2012     She  has a past surgical history that includes Eye surgery (Left) and pr esophagogastroduodenoscopy transoral diagnostic (N/A, 2/9/2018)    Her family history includes Depression in her brother; Hypertension in her brother and father; OCD in her brother; Other in her family  She  reports that she has never smoked  She has never used smokeless tobacco  She reports that she does not drink alcohol or use drugs  Current Outpatient Prescriptions   Medication Sig Dispense Refill    benztropine (COGENTIN) 1 mg tablet Take 1 mg by mouth 2 (two) times a day      bethanechol (URECHOLINE) 25 mg tablet Take 1 tablet (25 mg total) by mouth 3 (three) times a day 90 tablet 0    Cholecalciferol (VITAMIN D3) 2000 units TABS Take 4,000 Units by mouth        clonazePAM (KlonoPIN) 1 mg tablet Take 1 mg by mouth daily at bedtime       gabapentin (NEURONTIN) 300 mg capsule Take 2 capsules (600 mg total) by mouth 2 (two) times a day (Patient taking differently: Take 300 mg by mouth 2 (two) times a day  ) 60 capsule 0    liothyronine (CYTOMEL) 5 mcg tablet TAKE 1 TABLET BY MOUTH ONCE DAILY 30 tablet 2    lithium carbonate (LITHOBID) 450 mg CR tablet Take 1 tablet (450 mg total) by mouth every 12 (twelve) hours 60 tablet 0    QUEtiapine (SEROquel XR) 150 mg 24 hr tablet Take 150 mg by mouth daily at bedtime      Tryptophan 500 MG TABS Take 500 tablets by mouth      ranitidine (ZANTAC) 150 MG capsule Take 1 capsule (150 mg total) by mouth 2 (two) times a day (Patient not taking: Reported on 9/26/2018 ) 60 capsule 5     No current facility-administered medications for this visit        Current Outpatient Prescriptions on File Prior to Visit   Medication Sig    benztropine (COGENTIN) 1 mg tablet Take 1 mg by mouth 2 (two) times a day    bethanechol (URECHOLINE) 25 mg tablet Take 1 tablet (25 mg total) by mouth 3 (three) times a day    Cholecalciferol (VITAMIN D3) 2000 units TABS Take 4,000 Units by mouth      clonazePAM (KlonoPIN) 1 mg tablet Take 1 mg by mouth daily at bedtime     gabapentin (NEURONTIN) 300 mg capsule Take 2 capsules (600 mg total) by mouth 2 (two) times a day (Patient taking differently: Take 300 mg by mouth 2 (two) times a day  )    liothyronine (CYTOMEL) 5 mcg tablet TAKE 1 TABLET BY MOUTH ONCE DAILY    lithium carbonate (LITHOBID) 450 mg CR tablet Take 1 tablet (450 mg total) by mouth every 12 (twelve) hours    QUEtiapine (SEROquel XR) 150 mg 24 hr tablet Take 150 mg by mouth daily at bedtime    Tryptophan 500 MG TABS Take 500 tablets by mouth    ranitidine (ZANTAC) 150 MG capsule Take 1 capsule (150 mg total) by mouth 2 (two) times a day (Patient not taking: Reported on 9/26/2018 )    [DISCONTINUED] QUEtiapine (SEROquel) 25 mg tablet Take 25 mg by mouth daily at bedtime     No current facility-administered medications on file prior to visit  She is allergic to gluten meal     Review of Systems   Constitutional: Negative for chills and fever  HENT: Negative for congestion, ear pain, hearing loss, postnasal drip, rhinorrhea, sinus pain, sinus pressure, sore throat and trouble swallowing  Eyes: Negative for pain and visual disturbance  Respiratory: Negative for cough, chest tightness, shortness of breath and wheezing  Cardiovascular: Negative  Negative for chest pain, palpitations and leg swelling  Gastrointestinal: Negative for abdominal pain, blood in stool, constipation, diarrhea, nausea and vomiting  Endocrine: Negative for cold intolerance, heat intolerance, polydipsia, polyphagia and polyuria  Genitourinary: Negative for difficulty urinating, dysuria, flank pain and urgency  Musculoskeletal: Negative for arthralgias, back pain, gait problem and myalgias  Skin: Negative for rash  Allergic/Immunologic: Negative  Neurological: Negative for dizziness, weakness, light-headedness and headaches  Hematological: Negative  Psychiatric/Behavioral: Negative for behavioral problems, dysphoric mood and sleep disturbance  The patient is not nervous/anxious            Objective:      /72 (BP Location: Left arm, Patient Position: Sitting, Cuff Size: Standard)   Pulse 68   Temp 98 4 °F (36 9 °C) (Tympanic)   Resp 16   Ht 5' 3" (1 6 m)   Wt 61 3 kg (135 lb 3 2 oz)   SpO2 98%   BMI 23 95 kg/m²          Physical Exam   Constitutional: She is oriented to person, place, and time  She appears well-developed and well-nourished  No distress  HENT:   Head: Normocephalic and atraumatic  Right Ear: External ear normal    Left Ear: External ear normal    Nose: Nose normal    Mouth/Throat: Oropharynx is clear and moist  No oropharyngeal exudate  Eyes: Conjunctivae and EOM are normal  Pupils are equal, round, and reactive to light  Right eye exhibits no discharge  Left eye exhibits no discharge  No scleral icterus  Neck: Normal range of motion  Neck supple  No thyromegaly present  Cardiovascular: Normal rate, regular rhythm and normal heart sounds  Exam reveals no gallop and no friction rub  No murmur heard  Pulmonary/Chest: Effort normal and breath sounds normal  No respiratory distress  She has no wheezes  She has no rales  Abdominal: Soft  Bowel sounds are normal  She exhibits no distension  There is no tenderness  Musculoskeletal: Normal range of motion  She exhibits no edema, tenderness or deformity  Neurological: She is alert and oriented to person, place, and time  No cranial nerve deficit  Skin: Skin is warm and dry  She is not diaphoretic  Psychiatric: Judgment and thought content normal  Her affect is blunt  She is slowed

## 2018-10-02 ENCOUNTER — HOSPITAL ENCOUNTER (OUTPATIENT)
Dept: MRI IMAGING | Facility: HOSPITAL | Age: 28
Discharge: HOME | End: 2018-10-02
Payer: COMMERCIAL

## 2018-10-02 DIAGNOSIS — R41.840 ATTENTION OR CONCENTRATION DEFICIT: ICD-10-CM

## 2018-10-02 PROCEDURE — 70553 MRI BRAIN STEM W/O & W/DYE: CPT

## 2018-10-02 PROCEDURE — A9585 GADOBUTROL INJECTION: HCPCS | Performed by: RADIOLOGY

## 2018-10-02 RX ADMIN — GADOBUTROL 6 ML: 604.72 INJECTION INTRAVENOUS at 17:05

## 2018-10-08 ENCOUNTER — TELEPHONE (OUTPATIENT)
Dept: GASTROENTEROLOGY | Facility: AMBULARY SURGERY CENTER | Age: 28
End: 2018-10-08

## 2018-10-08 NOTE — TELEPHONE ENCOUNTER
Dr Liu's pt called requesting to resched her procedure that is sched for tomorrow 10/9/18   Please call pt to resched

## 2018-10-08 NOTE — TELEPHONE ENCOUNTER
Pt rescheduled to 11/6/18 with dr Madina Rasmussen at [facility], pt states she is not feeling well

## 2018-10-15 ENCOUNTER — TELEPHONE (OUTPATIENT)
Dept: INTERNAL MEDICINE CLINIC | Facility: CLINIC | Age: 28
End: 2018-10-15

## 2018-10-15 DIAGNOSIS — F31.10 BIPOLAR AFFECTIVE DISORDER, CURRENT EPISODE MANIC WITHOUT PSYCHOTIC SYMPTOMS (HCC): Primary | ICD-10-CM

## 2018-10-30 ENCOUNTER — TELEPHONE (OUTPATIENT)
Dept: SPEECH THERAPY | Facility: CLINIC | Age: 28
End: 2018-10-30

## 2018-10-30 NOTE — TELEPHONE ENCOUNTER
Spoke with patient, she will need a new updated script before her evaluation  She will place a phonecall back to schedule once a new script is received

## 2018-11-05 ENCOUNTER — ANESTHESIA EVENT (OUTPATIENT)
Dept: PERIOP | Facility: HOSPITAL | Age: 28
End: 2018-11-05
Payer: COMMERCIAL

## 2018-11-06 ENCOUNTER — ANESTHESIA (OUTPATIENT)
Dept: PERIOP | Facility: HOSPITAL | Age: 28
End: 2018-11-06
Payer: COMMERCIAL

## 2018-11-06 ENCOUNTER — HOSPITAL ENCOUNTER (OUTPATIENT)
Facility: HOSPITAL | Age: 28
Setting detail: OUTPATIENT SURGERY
Discharge: HOME | End: 2018-11-06
Attending: INTERNAL MEDICINE | Admitting: INTERNAL MEDICINE
Payer: COMMERCIAL

## 2018-11-06 VITALS
HEIGHT: 63 IN | DIASTOLIC BLOOD PRESSURE: 70 MMHG | HEART RATE: 67 BPM | TEMPERATURE: 99.5 F | OXYGEN SATURATION: 99 % | SYSTOLIC BLOOD PRESSURE: 105 MMHG | BODY MASS INDEX: 22.68 KG/M2 | WEIGHT: 128 LBS | RESPIRATION RATE: 18 BRPM

## 2018-11-06 DIAGNOSIS — K21.9 GASTROESOPHAGEAL REFLUX DISEASE, ESOPHAGITIS PRESENCE NOT SPECIFIED: ICD-10-CM

## 2018-11-06 DIAGNOSIS — K90.0 CELIAC DISEASE: ICD-10-CM

## 2018-11-06 DIAGNOSIS — R13.12 OROPHARYNGEAL DYSPHAGIA: ICD-10-CM

## 2018-11-06 DIAGNOSIS — R11.10 REGURGITATION OF FOOD: ICD-10-CM

## 2018-11-06 PROCEDURE — 43239 EGD BIOPSY SINGLE/MULTIPLE: CPT | Performed by: INTERNAL MEDICINE

## 2018-11-06 PROCEDURE — C1726 CATH, BAL DIL, NON-VASCULAR: HCPCS | Performed by: INTERNAL MEDICINE

## 2018-11-06 PROCEDURE — 88305 TISSUE EXAM BY PATHOLOGIST: CPT | Performed by: PATHOLOGY

## 2018-11-06 RX ORDER — ONDANSETRON 2 MG/ML
4 INJECTION INTRAMUSCULAR; INTRAVENOUS ONCE AS NEEDED
Status: DISCONTINUED | OUTPATIENT
Start: 2018-11-06 | End: 2018-11-06 | Stop reason: HOSPADM

## 2018-11-06 RX ORDER — SODIUM CHLORIDE, SODIUM LACTATE, POTASSIUM CHLORIDE, CALCIUM CHLORIDE 600; 310; 30; 20 MG/100ML; MG/100ML; MG/100ML; MG/100ML
75 INJECTION, SOLUTION INTRAVENOUS CONTINUOUS
Status: DISCONTINUED | OUTPATIENT
Start: 2018-11-06 | End: 2018-11-06 | Stop reason: HOSPADM

## 2018-11-06 RX ORDER — SODIUM CHLORIDE 9 MG/ML
125 INJECTION, SOLUTION INTRAVENOUS CONTINUOUS
Status: DISCONTINUED | OUTPATIENT
Start: 2018-11-06 | End: 2018-11-06 | Stop reason: HOSPADM

## 2018-11-06 RX ORDER — ALBUTEROL SULFATE 2.5 MG/3ML
2.5 SOLUTION RESPIRATORY (INHALATION) ONCE AS NEEDED
Status: DISCONTINUED | OUTPATIENT
Start: 2018-11-06 | End: 2018-11-06 | Stop reason: HOSPADM

## 2018-11-06 RX ORDER — PROPOFOL 10 MG/ML
INJECTION, EMULSION INTRAVENOUS AS NEEDED
Status: DISCONTINUED | OUTPATIENT
Start: 2018-11-06 | End: 2018-11-06 | Stop reason: SURG

## 2018-11-06 RX ADMIN — PROPOFOL 50 MG: 10 INJECTION, EMULSION INTRAVENOUS at 10:37

## 2018-11-06 RX ADMIN — SODIUM CHLORIDE, SODIUM LACTATE, POTASSIUM CHLORIDE, AND CALCIUM CHLORIDE 75 ML/HR: .6; .31; .03; .02 INJECTION, SOLUTION INTRAVENOUS at 11:04

## 2018-11-06 RX ADMIN — PROPOFOL 50 MG: 10 INJECTION, EMULSION INTRAVENOUS at 10:41

## 2018-11-06 RX ADMIN — SODIUM CHLORIDE, SODIUM LACTATE, POTASSIUM CHLORIDE, AND CALCIUM CHLORIDE 75 ML/HR: .6; .31; .03; .02 INJECTION, SOLUTION INTRAVENOUS at 08:50

## 2018-11-06 RX ADMIN — PROPOFOL 100 MG: 10 INJECTION, EMULSION INTRAVENOUS at 10:32

## 2018-11-06 RX ADMIN — LIDOCAINE HYDROCHLORIDE 80 MG: 20 INJECTION, SOLUTION INTRAVENOUS at 10:32

## 2018-11-06 RX ADMIN — PROPOFOL 50 MG: 10 INJECTION, EMULSION INTRAVENOUS at 10:34

## 2018-11-06 NOTE — DISCHARGE INSTR - AVS FIRST PAGE
OPERATIVE REPORT  PATIENT NAME: Beau Brown    :  1990  MRN: [id number]  Pt Location: [facility] OR ROOM 03    SURGERY DATE: 2018    Surgeon(s) and Role:     Tawny Brownlee MD - Primary    Preop Diagnosis:  Celiac disease [K90 0]  Oropharyngeal dysphagia [R13 12]  Regurgitation of food [R11 10]  Gastroesophageal reflux disease, esophagitis presence not specified [K21 9]    Post-Op Diagnosis Codes:     * Celiac disease [K90 0]     * Oropharyngeal dysphagia [R13 12]     * Regurgitation of food [R11 10]     * Gastroesophageal reflux disease, esophagitis presence not specified [K21 9]    Procedure(s) (LRB):  ESOPHAGOGASTRODUODENOSCOPY (EGD) (N/A)    Specimen(s):  ID Type Source Tests Collected by Time Destination   1 : 2nd portion bx by cold bx forcep hx celiac Tissue Duodenum TISSUE EXAM Renae Moreno MD 2018 1036    2 : bulb bx by cold bx forcep hx celiac Tissue Duodenum TISSUE EXAM Renae Moreno MD 2018 1037    3 : gastric body bx by cold bx forcep Tissue Stomach TISSUE EXAM Renae Moreno MD 2018 1038    4 : random bx by cold bx forcep Tissue Esophagus TISSUE EXAM Renae Moreno MD 2018 1040      ESOPHAGOGASTRODUODENOSCOPY    PROCEDURE: EGD/ Biopsy, Esophageal Dilation (Over Guidewire)    INDICATIONS: Dysphagia and Celiac disease    POST-OP DIAGNOSIS: See the impression below    SEDATION: Monitored anesthesia care, check anesthesia records    PHYSICAL EXAM:    Vitals:    18 0829   BP: 117/78   Pulse: 76   Resp: 18   Temp: 97 9 °F (36 6 °C)   SpO2: 99%    Body mass index is 22 67 kg/m²  General: NAD  Heart: S1 & S2 normal, RRR  Lungs: CTA, No rales or rhonchi  Abdomen: Soft, nontender, nondistended, good bowel sounds    CONSENT:  Informed consent was obtained for the procedure, including sedation after explaining the risks and benefits of the procedure  Risks including but not limited to bleeding, perforation, infection, aspiration were discussed in detail   Also explained about less than 100% sensitivity with the exam and other alternatives  PREPARATION:   EKG tracing, pulse oximetry, blood pressure were monitored throughout the procedure  Patient was identified by myself both verbally and by visual inspection of ID band  DESCRIPTION:   Patient was placed in the left lateral decubitus position and was sedated with the above medication  The gastroscope was introduced in to the oropharynx and the esophagus was intubated under direct visualization  Scope was passed down the esophagus up to 2nd part of the duodenum  A careful inspection was made as the gastroscope was withdrawn, including a retroflexed view of the stomach; findings and interventions are described below  FINDINGS:    #1  Esophagus and GEJ- the esophagus appeared to be normal   The GE junction was at 40 cm  Random biopsies were done to rule out celiac disease  Dilation was done using a 20 mm CRE balloon which was pulled retrograde from the stomach up  No mucosal tear was seen  #2  Stomach- there was some residual food seen in the stomach  Otherwise the stomach appeared to be normal   Random biopsies were done  #3  Duodenum- the duodenal bulb in the 2nd portion were normal   Biopsies were done separately in the bulb and the 2nd portion  IMPRESSIONS:      1  Normal esophagus  Random biopsies were done to rule out eosinophilic esophagitis  2  Dilation was done and the esophagus using a 20 mm CRE balloon  3  Gastric biopsies were done  4  Normal duodenum  Biopsies were done  RECOMMENDATIONS:     1  Follow up with the results of the biopsies with Dr Lokesh Moreland in 2 weeks  2  Continue treatment for gastroesophageal reflux  3  Follow up in the office in the next few months  COMPLICATIONS:  None; patient tolerated the procedure well            DISPOSITION: PACU           CONDITION: Stable

## 2018-11-06 NOTE — OP NOTE
OPERATIVE REPORT  PATIENT NAME: Colt Denney    :  1990  MRN: [id number]  Pt Location: [facility] OR ROOM 03    SURGERY DATE: 2018    Surgeon(s) and Role:     Raul Rajan MD - Primary    Preop Diagnosis:  Celiac disease [K90 0]  Oropharyngeal dysphagia [R13 12]  Regurgitation of food [R11 10]  Gastroesophageal reflux disease, esophagitis presence not specified [K21 9]    Post-Op Diagnosis Codes:     * Celiac disease [K90 0]     * Oropharyngeal dysphagia [R13 12]     * Regurgitation of food [R11 10]     * Gastroesophageal reflux disease, esophagitis presence not specified [K21 9]    Procedure(s) (LRB):  ESOPHAGOGASTRODUODENOSCOPY (EGD) (N/A)    Specimen(s):  ID Type Source Tests Collected by Time Destination   1 : 2nd portion bx by cold bx forcep hx celiac Tissue Duodenum TISSUE EXAM Stalin Mosher MD 2018 1036    2 : bulb bx by cold bx forcep hx celiac Tissue Duodenum TISSUE EXAM Stalin Mosher MD 2018 1037    3 : gastric body bx by cold bx forcep Tissue Stomach TISSUE EXAM Stalin Mosher MD 2018 1038    4 : random bx by cold bx forcep Tissue Esophagus TISSUE EXAM Stalin Mosher MD 2018 1040      ESOPHAGOGASTRODUODENOSCOPY    PROCEDURE: EGD/ Biopsy, Esophageal Dilation (Over Guidewire)    INDICATIONS: Dysphagia and Celiac disease    POST-OP DIAGNOSIS: See the impression below    SEDATION: Monitored anesthesia care, check anesthesia records    PHYSICAL EXAM:    Vitals:    18 0829   BP: 117/78   Pulse: 76   Resp: 18   Temp: 97 9 °F (36 6 °C)   SpO2: 99%    Body mass index is 22 67 kg/m²  General: NAD  Heart: S1 & S2 normal, RRR  Lungs: CTA, No rales or rhonchi  Abdomen: Soft, nontender, nondistended, good bowel sounds    CONSENT:  Informed consent was obtained for the procedure, including sedation after explaining the risks and benefits of the procedure  Risks including but not limited to bleeding, perforation, infection, aspiration were discussed in detail   Also explained about less than 100% sensitivity with the exam and other alternatives  PREPARATION:   EKG tracing, pulse oximetry, blood pressure were monitored throughout the procedure  Patient was identified by myself both verbally and by visual inspection of ID band  DESCRIPTION:   Patient was placed in the left lateral decubitus position and was sedated with the above medication  The gastroscope was introduced in to the oropharynx and the esophagus was intubated under direct visualization  Scope was passed down the esophagus up to 2nd part of the duodenum  A careful inspection was made as the gastroscope was withdrawn, including a retroflexed view of the stomach; findings and interventions are described below  FINDINGS:    #1  Esophagus and GEJ- the esophagus appeared to be normal   The GE junction was at 40 cm  Random biopsies were done to rule out celiac disease  Dilation was done using a 20 mm CRE balloon which was pulled retrograde from the stomach up  No mucosal tear was seen  #2  Stomach- there was some residual food seen in the stomach  Otherwise the stomach appeared to be normal   Random biopsies were done  #3  Duodenum- the duodenal bulb in the 2nd portion were normal   Biopsies were done separately in the bulb and the 2nd portion  IMPRESSIONS:      1  Normal esophagus  Random biopsies were done to rule out eosinophilic esophagitis  2  Dilation was done and the esophagus using a 20 mm CRE balloon  3  Gastric biopsies were done  4  Normal duodenum  Biopsies were done  RECOMMENDATIONS:     1  Follow up with the results of the biopsies with Dr Arlene Lombardo in 2 weeks  2  Continue treatment for gastroesophageal reflux  3  Follow up in the office in the next few months  COMPLICATIONS:  None; patient tolerated the procedure well            DISPOSITION: PACU           CONDITION: Stable

## 2018-11-06 NOTE — H&P
History and Physical -  [facility]  Jessica Yang 33 y o  female MRN: [id number]    HPI: Jessica Yang is a 33y o  year old female who presents with h/o gerd/ dysphagia and celiac  Review of Systems    Historical Information   Past Medical History:   Diagnosis Date    Anorexia nervosa     pt denies history at time of admission 7/21/18    Anxiety     Depression     Disease of thyroid gland     hypo    Gastroesophageal reflux disease 9/18/2018    Hallucination     Memory difficulty 7/12/2018    Psychiatric illness     [facility] )     Self-injurious behavior     Sleep difficulties     Suicide attempt ([facility] )     history of attempt at age 22 by overdose     Urinary retention      Past Surgical History:   Procedure Laterality Date    EYE SURGERY Left     tear duct    [place] ESOPHAGOGASTRODUODENOSCOPY TRANSORAL DIAGNOSTIC N/A 2/9/2018    Procedure: ESOPHAGOGASTRODUODENOSCOPY (EGD);   Surgeon: Brandon Abel MD;  Location: [facility];  Service: Gastroenterology     Social History   History   Alcohol Use No     History   Drug Use No     History   Smoking Status    Never Smoker   Smokeless Tobacco    Never Used     Family History   Problem Relation Age of Onset    Hypertension Father         benign essential    Hypertension Brother         benign essential     OCD Brother     Depression Brother     Other Family         nasolacrimal duct obstruction, acquired       Meds/Allergies     Prescriptions Prior to Admission   Medication    benztropine (COGENTIN) 1 mg tablet    bethanechol (URECHOLINE) 25 mg tablet    Cholecalciferol (VITAMIN D3) 2000 units TABS    clonazePAM (KlonoPIN) 1 mg tablet    gabapentin (NEURONTIN) 300 mg capsule    liothyronine (CYTOMEL) 5 mcg tablet    lithium carbonate (LITHOBID) 450 mg CR tablet    QUEtiapine (SEROquel XR) 150 mg 24 hr tablet    ranitidine (ZANTAC) 150 MG capsule    Tryptophan 500 MG TABS       Allergies   Allergen Reactions    Gluten Meal        Objective     not currently breastfeeding  PHYSICAL EXAM    Gen: NAD  CV: RRR  CHEST: Clear  ABD: soft, NT/ND  EXT: no edema  Neuro: AAO      ASSESSMENT/PLAN:  This is a Andrewshirey o  year old female here for egd for GERD and dysphagia and celiac disease  PLAN:   Procedure: EGD with biopsies

## 2018-11-06 NOTE — ANESTHESIA PREPROCEDURE EVALUATION
Review of Systems/Medical History  Patient summary reviewed  Chart reviewed  No history of anesthetic complications     Cardiovascular  Negative cardio ROS    Pulmonary    Comment: ? LOIS     GI/Hepatic    GERD ,        Negative  ROS        Endo/Other  History of thyroid disease , hypothyroidism,      GYN  Negative gynecology ROS          Hematology  Negative hematology ROS      Musculoskeletal  Negative musculoskeletal ROS        Neurology  Negative neurology ROS      Psychology   Anxiety, Depression ,     Comment: Bipolar, schizoaffective          Physical Exam    Airway    Mallampati score: I  TM Distance: >3 FB  Neck ROM: full     Dental   No notable dental hx     Cardiovascular  Comment: Negative ROS,     Pulmonary      Other Findings        Anesthesia Plan  ASA Score- 2     Anesthesia Type- IV sedation with anesthesia with ASA Monitors  Additional Monitors:   Airway Plan:         Plan Factors-  Patient did not smoke on day of surgery  Induction-     Postoperative Plan-     Informed Consent- Anesthetic plan and risks discussed with patient  I personally reviewed this patient with the CRNA  Discussed and agreed on the Anesthesia Plan with the REDD Martin

## 2018-11-19 ENCOUNTER — TELEPHONE (OUTPATIENT)
Dept: INTERNAL MEDICINE CLINIC | Facility: CLINIC | Age: 28
End: 2018-11-19

## 2018-11-19 NOTE — TELEPHONE ENCOUNTER
Called pt to find out what she is coming in for on Wednesday  Pt states she'd going to [facility] evaluation  She needs a referral for this    Do you still want to see her on Wednesday or just give her the referral?

## 2018-11-21 DIAGNOSIS — F31.60 BIPOLAR AFFECTIVE DISORDER, CURRENT EPISODE MIXED, CURRENT EPISODE SEVERITY UNSPECIFIED (HCC): Primary | ICD-10-CM

## 2018-11-21 DIAGNOSIS — F31.12 BIPOLAR 1 DISORDER, MANIC, MODERATE (HCC): Primary | ICD-10-CM

## 2018-11-27 ENCOUNTER — TELEPHONE (OUTPATIENT)
Dept: GASTROENTEROLOGY | Facility: AMBULARY SURGERY CENTER | Age: 28
End: 2018-11-27

## 2018-11-27 NOTE — TELEPHONE ENCOUNTER
ASHLEY: Spoke with patient  H/O celiac sprue, regurgitation, dysphagia, constipation, dysphagia, Gerd    Patient c/o epigastric pain for 2 weeks  She occasionally has diarrhea twice week, and normal formed BM between  She is following a celiac diet  She tried zantac in September and states it gave her an upset stomach  Advised patient to try pepcid OTC BID  For 2 weeks, follow anti-reflux diet and let us know how she feels  She will call us back to let us know

## 2018-11-27 NOTE — TELEPHONE ENCOUNTER
DR RUSSO'S PT    Pt called requesting advise for uncontrollable gas and upper abd pain   [phone]

## 2018-12-21 DIAGNOSIS — E03.9 HYPOTHYROIDISM, UNSPECIFIED TYPE: ICD-10-CM

## 2018-12-21 RX ORDER — LIOTHYRONINE SODIUM 5 UG/1
TABLET ORAL
Qty: 30 TABLET | Refills: 2 | Status: SHIPPED | OUTPATIENT
Start: 2018-12-21 | End: 2019-03-12 | Stop reason: SDUPTHER

## 2019-01-16 ENCOUNTER — APPOINTMENT (OUTPATIENT)
Dept: LAB | Facility: CLINIC | Age: 29
End: 2019-01-16
Payer: COMMERCIAL

## 2019-01-16 ENCOUNTER — TRANSCRIBE ORDERS (OUTPATIENT)
Dept: LAB | Facility: CLINIC | Age: 29
End: 2019-01-16

## 2019-01-16 DIAGNOSIS — Z79.899 ENCOUNTER FOR LONG-TERM (CURRENT) USE OF MEDICATIONS: Primary | ICD-10-CM

## 2019-01-16 DIAGNOSIS — F25.9 SCHIZOAFFECTIVE DISORDER, UNSPECIFIED TYPE (HCC): ICD-10-CM

## 2019-01-16 DIAGNOSIS — Z79.899 ENCOUNTER FOR LONG-TERM (CURRENT) USE OF MEDICATIONS: ICD-10-CM

## 2019-01-16 LAB
ALBUMIN SERPL BCP-MCNC: 4 G/DL (ref 3.5–5)
ALP SERPL-CCNC: 72 U/L (ref 46–116)
ALT SERPL W P-5'-P-CCNC: 23 U/L (ref 12–78)
ANION GAP SERPL CALCULATED.3IONS-SCNC: 5 MMOL/L (ref 4–13)
AST SERPL W P-5'-P-CCNC: 13 U/L (ref 5–45)
BASOPHILS # BLD AUTO: 0.05 THOUSANDS/ΜL (ref 0–0.1)
BASOPHILS NFR BLD AUTO: 1 % (ref 0–1)
BILIRUB SERPL-MCNC: 0.75 MG/DL (ref 0.2–1)
BUN SERPL-MCNC: 12 MG/DL (ref 5–25)
CALCIUM SERPL-MCNC: 9.5 MG/DL (ref 8.3–10.1)
CHLORIDE SERPL-SCNC: 105 MMOL/L (ref 100–108)
CHOLEST SERPL-MCNC: 104 MG/DL (ref 50–200)
CO2 SERPL-SCNC: 28 MMOL/L (ref 21–32)
CREAT SERPL-MCNC: 0.98 MG/DL (ref 0.6–1.3)
EOSINOPHIL # BLD AUTO: 0.34 THOUSAND/ΜL (ref 0–0.61)
EOSINOPHIL NFR BLD AUTO: 4 % (ref 0–6)
ERYTHROCYTE [DISTWIDTH] IN BLOOD BY AUTOMATED COUNT: 13 % (ref 11.6–15.1)
GFR SERPL CREATININE-BSD FRML MDRD: 78 ML/MIN/1.73SQ M
GLUCOSE SERPL-MCNC: 94 MG/DL (ref 65–140)
HCT VFR BLD AUTO: 43.2 % (ref 34.8–46.1)
HDLC SERPL-MCNC: 55 MG/DL (ref 40–60)
HGB BLD-MCNC: 13.6 G/DL (ref 11.5–15.4)
IMM GRANULOCYTES # BLD AUTO: 0.02 THOUSAND/UL (ref 0–0.2)
IMM GRANULOCYTES NFR BLD AUTO: 0 % (ref 0–2)
LDLC SERPL CALC-MCNC: 39 MG/DL (ref 0–100)
LITHIUM SERPL-SCNC: 1 MMOL/L (ref 0.5–1)
LYMPHOCYTES # BLD AUTO: 2.17 THOUSANDS/ΜL (ref 0.6–4.47)
LYMPHOCYTES NFR BLD AUTO: 25 % (ref 14–44)
MCH RBC QN AUTO: 28.3 PG (ref 26.8–34.3)
MCHC RBC AUTO-ENTMCNC: 31.5 G/DL (ref 31.4–37.4)
MCV RBC AUTO: 90 FL (ref 82–98)
MONOCYTES # BLD AUTO: 0.72 THOUSAND/ΜL (ref 0.17–1.22)
MONOCYTES NFR BLD AUTO: 8 % (ref 4–12)
NEUTROPHILS # BLD AUTO: 5.27 THOUSANDS/ΜL (ref 1.85–7.62)
NEUTS SEG NFR BLD AUTO: 62 % (ref 43–75)
NONHDLC SERPL-MCNC: 49 MG/DL
NRBC BLD AUTO-RTO: 0 /100 WBCS
PLATELET # BLD AUTO: 223 THOUSANDS/UL (ref 149–390)
PMV BLD AUTO: 11.5 FL (ref 8.9–12.7)
POTASSIUM SERPL-SCNC: 4.1 MMOL/L (ref 3.5–5.3)
PROT SERPL-MCNC: 7.1 G/DL (ref 6.4–8.2)
RBC # BLD AUTO: 4.8 MILLION/UL (ref 3.81–5.12)
SODIUM SERPL-SCNC: 138 MMOL/L (ref 136–145)
SP GR UR STRIP.AUTO: 1.02 (ref 1–1.03)
TRIGL SERPL-MCNC: 48 MG/DL
WBC # BLD AUTO: 8.57 THOUSAND/UL (ref 4.31–10.16)

## 2019-01-16 PROCEDURE — 36415 COLL VENOUS BLD VENIPUNCTURE: CPT

## 2019-01-16 PROCEDURE — 80053 COMPREHEN METABOLIC PANEL: CPT

## 2019-01-16 PROCEDURE — 81003 URINALYSIS AUTO W/O SCOPE: CPT | Performed by: PSYCHIATRY & NEUROLOGY

## 2019-01-16 PROCEDURE — 85025 COMPLETE CBC W/AUTO DIFF WBC: CPT

## 2019-01-16 PROCEDURE — 80178 ASSAY OF LITHIUM: CPT

## 2019-01-16 PROCEDURE — 80061 LIPID PANEL: CPT

## 2019-01-19 DIAGNOSIS — F25.0 SCHIZOAFFECTIVE DISORDER, BIPOLAR TYPE (HCC): Chronic | ICD-10-CM

## 2019-01-20 RX ORDER — BETHANECHOL CHLORIDE 25 MG/1
TABLET ORAL
Qty: 90 TABLET | Refills: 0 | OUTPATIENT
Start: 2019-01-20

## 2019-01-21 ENCOUNTER — TELEPHONE (OUTPATIENT)
Dept: UROLOGY | Facility: MEDICAL CENTER | Age: 29
End: 2019-01-21

## 2019-01-21 ENCOUNTER — TELEPHONE (OUTPATIENT)
Dept: INTERNAL MEDICINE CLINIC | Facility: CLINIC | Age: 29
End: 2019-01-21

## 2019-01-21 NOTE — TELEPHONE ENCOUNTER
Reason for appointment/Complaint/Diagnosis : Trouble Emptying Bladder    Insurance:     History of Cancer? No                     If yes, what kind? N/A    Previous urologist?   Beena             Records requested/where? No    Outside testing/where? No    Location Preference for office visit?  [facility]

## 2019-01-21 NOTE — TELEPHONE ENCOUNTER
pato called today asking if you can call in her bethanechol 25 mg tid-she called the urologist-dr Cope Wallis and it said that this number is no longer in service

## 2019-01-21 NOTE — TELEPHONE ENCOUNTER
I called and spoke to pato's dad-he took the tele number of the [facility] urology for her to call them and set up an appt in [place]

## 2019-03-12 DIAGNOSIS — E03.9 HYPOTHYROIDISM, UNSPECIFIED TYPE: ICD-10-CM

## 2019-03-12 RX ORDER — LIOTHYRONINE SODIUM 5 UG/1
TABLET ORAL
Qty: 30 TABLET | Refills: 2 | Status: SHIPPED | OUTPATIENT
Start: 2019-03-12 | End: 2019-07-01 | Stop reason: SDUPTHER

## 2019-03-18 ENCOUNTER — TRANSCRIBE ORDERS (OUTPATIENT)
Dept: LAB | Facility: CLINIC | Age: 29
End: 2019-03-18

## 2019-03-18 ENCOUNTER — APPOINTMENT (OUTPATIENT)
Dept: LAB | Facility: CLINIC | Age: 29
End: 2019-03-18
Payer: COMMERCIAL

## 2019-03-18 DIAGNOSIS — Z79.899 ENCOUNTER FOR LONG-TERM (CURRENT) USE OF MEDICATIONS: ICD-10-CM

## 2019-03-18 DIAGNOSIS — F25.9 SCHIZOAFFECTIVE DISORDER, UNSPECIFIED TYPE (HCC): ICD-10-CM

## 2019-03-18 DIAGNOSIS — Z79.899 ENCOUNTER FOR LONG-TERM (CURRENT) USE OF MEDICATIONS: Primary | ICD-10-CM

## 2019-03-18 LAB
ALBUMIN SERPL BCP-MCNC: 4 G/DL (ref 3.5–5)
ALP SERPL-CCNC: 75 U/L (ref 46–116)
ALT SERPL W P-5'-P-CCNC: 22 U/L (ref 12–78)
ANION GAP SERPL CALCULATED.3IONS-SCNC: 3 MMOL/L (ref 4–13)
AST SERPL W P-5'-P-CCNC: 11 U/L (ref 5–45)
BILIRUB SERPL-MCNC: 0.85 MG/DL (ref 0.2–1)
BUN SERPL-MCNC: 13 MG/DL (ref 5–25)
CALCIUM SERPL-MCNC: 9.2 MG/DL (ref 8.3–10.1)
CHLORIDE SERPL-SCNC: 107 MMOL/L (ref 100–108)
CO2 SERPL-SCNC: 27 MMOL/L (ref 21–32)
CREAT SERPL-MCNC: 0.9 MG/DL (ref 0.6–1.3)
FOLATE SERPL-MCNC: 18.5 NG/ML (ref 3.1–17.5)
GFR SERPL CREATININE-BSD FRML MDRD: 86 ML/MIN/1.73SQ M
GLUCOSE P FAST SERPL-MCNC: 91 MG/DL (ref 65–99)
LITHIUM SERPL-SCNC: 0.8 MMOL/L (ref 0.5–1)
POTASSIUM SERPL-SCNC: 4 MMOL/L (ref 3.5–5.3)
PROT SERPL-MCNC: 7.1 G/DL (ref 6.4–8.2)
SODIUM SERPL-SCNC: 137 MMOL/L (ref 136–145)
SP GR UR STRIP.AUTO: 1.02 (ref 1–1.03)
VIT B12 SERPL-MCNC: 694 PG/ML (ref 100–900)

## 2019-03-18 PROCEDURE — 80178 ASSAY OF LITHIUM: CPT

## 2019-03-18 PROCEDURE — 82607 VITAMIN B-12: CPT

## 2019-03-18 PROCEDURE — 80053 COMPREHEN METABOLIC PANEL: CPT

## 2019-03-18 PROCEDURE — 36415 COLL VENOUS BLD VENIPUNCTURE: CPT

## 2019-03-18 PROCEDURE — 81003 URINALYSIS AUTO W/O SCOPE: CPT | Performed by: PSYCHIATRY & NEUROLOGY

## 2019-03-18 PROCEDURE — 82746 ASSAY OF FOLIC ACID SERUM: CPT

## 2019-04-09 ENCOUNTER — APPOINTMENT (OUTPATIENT)
Dept: LAB | Facility: CLINIC | Age: 29
End: 2019-04-09
Payer: COMMERCIAL

## 2019-04-09 DIAGNOSIS — K90.0 CELIAC SPRUE: ICD-10-CM

## 2019-04-09 LAB
ALBUMIN SERPL BCP-MCNC: 3.7 G/DL (ref 3.5–5)
ALP SERPL-CCNC: 67 U/L (ref 46–116)
ALT SERPL W P-5'-P-CCNC: 37 U/L (ref 12–78)
AST SERPL W P-5'-P-CCNC: 19 U/L (ref 5–45)
BILIRUB DIRECT SERPL-MCNC: 0.24 MG/DL (ref 0–0.2)
BILIRUB SERPL-MCNC: 0.86 MG/DL (ref 0.2–1)
PROT SERPL-MCNC: 6.8 G/DL (ref 6.4–8.2)

## 2019-04-09 PROCEDURE — 83516 IMMUNOASSAY NONANTIBODY: CPT

## 2019-04-09 PROCEDURE — 86255 FLUORESCENT ANTIBODY SCREEN: CPT

## 2019-04-09 PROCEDURE — 82784 ASSAY IGA/IGD/IGG/IGM EACH: CPT

## 2019-04-09 PROCEDURE — 36415 COLL VENOUS BLD VENIPUNCTURE: CPT

## 2019-04-09 PROCEDURE — 80076 HEPATIC FUNCTION PANEL: CPT

## 2019-04-10 LAB
ENDOMYSIUM IGA SER QL: NEGATIVE
GLIADIN PEPTIDE IGA SER-ACNC: 4 UNITS (ref 0–19)
GLIADIN PEPTIDE IGG SER-ACNC: 5 UNITS (ref 0–19)
IGA SERPL-MCNC: 129 MG/DL (ref 87–352)
TTG IGA SER-ACNC: <2 U/ML (ref 0–3)
TTG IGG SER-ACNC: 4 U/ML (ref 0–5)

## 2019-06-04 RX ORDER — CHLORPROMAZINE HYDROCHLORIDE 25 MG/1
25 TABLET, FILM COATED ORAL
COMMUNITY
End: 2019-06-21 | Stop reason: CLARIF

## 2019-06-04 RX ORDER — TRIHEXYPHENIDYL HYDROCHLORIDE 2 MG/1
2 TABLET ORAL DAILY
COMMUNITY
End: 2019-07-25

## 2019-06-04 RX ORDER — FLUVOXAMINE MALEATE 50 MG/1
50 TABLET, COATED ORAL
COMMUNITY
Start: 2013-05-09 | End: 2019-07-25

## 2019-06-05 ENCOUNTER — OFFICE VISIT (OUTPATIENT)
Dept: GASTROENTEROLOGY | Facility: HOSPITAL | Age: 29
End: 2019-06-05
Payer: COMMERCIAL

## 2019-06-05 VITALS
BODY MASS INDEX: 19.95 KG/M2 | DIASTOLIC BLOOD PRESSURE: 76 MMHG | HEART RATE: 76 BPM | SYSTOLIC BLOOD PRESSURE: 113 MMHG | WEIGHT: 112.6 LBS | HEIGHT: 63 IN | TEMPERATURE: 98.6 F

## 2019-06-05 DIAGNOSIS — K21.9 GASTROESOPHAGEAL REFLUX DISEASE WITHOUT ESOPHAGITIS: Primary | ICD-10-CM

## 2019-06-05 DIAGNOSIS — R10.13 EPIGASTRIC ABDOMINAL PAIN: ICD-10-CM

## 2019-06-05 DIAGNOSIS — K90.0 CELIAC DISEASE: ICD-10-CM

## 2019-06-05 DIAGNOSIS — R63.4 WEIGHT LOSS: ICD-10-CM

## 2019-06-05 DIAGNOSIS — K59.00 CONSTIPATION, UNSPECIFIED CONSTIPATION TYPE: ICD-10-CM

## 2019-06-05 PROCEDURE — 99214 OFFICE O/P EST MOD 30 MIN: CPT | Performed by: PHYSICIAN ASSISTANT

## 2019-06-05 RX ORDER — LITHIUM CARBONATE 450 MG
450 TABLET, EXTENDED RELEASE ORAL 2 TIMES DAILY
COMMUNITY
End: 2020-05-26

## 2019-06-05 RX ORDER — PANTOPRAZOLE SODIUM 40 MG/1
40 TABLET, DELAYED RELEASE ORAL DAILY
Qty: 30 TABLET | Refills: 3 | Status: SHIPPED | OUTPATIENT
Start: 2019-06-05 | End: 2019-07-25

## 2019-06-05 RX ORDER — BENZTROPINE MESYLATE 1 MG/1
1 TABLET ORAL
COMMUNITY

## 2019-06-05 RX ORDER — GABAPENTIN 300 MG/1
200 CAPSULE ORAL EVERY MORNING
COMMUNITY
End: 2020-08-03 | Stop reason: CLARIF

## 2019-06-05 RX ORDER — PANTOPRAZOLE SODIUM 20 MG/1
20 TABLET, DELAYED RELEASE ORAL DAILY
Qty: 30 TABLET | Refills: 3 | Status: SHIPPED | OUTPATIENT
Start: 2019-06-05 | End: 2019-06-05 | Stop reason: DRUGHIGH

## 2019-06-05 RX ORDER — CLONAZEPAM 0.5 MG/1
TABLET ORAL DAILY
COMMUNITY
End: 2020-08-03 | Stop reason: CLARIF

## 2019-06-18 ENCOUNTER — TRANSCRIBE ORDERS (OUTPATIENT)
Dept: LAB | Facility: CLINIC | Age: 29
End: 2019-06-18

## 2019-06-18 ENCOUNTER — APPOINTMENT (OUTPATIENT)
Dept: LAB | Facility: CLINIC | Age: 29
End: 2019-06-18
Payer: COMMERCIAL

## 2019-06-18 DIAGNOSIS — R63.4 WEIGHT LOSS: Primary | ICD-10-CM

## 2019-06-18 DIAGNOSIS — R63.4 WEIGHT LOSS: ICD-10-CM

## 2019-06-18 LAB
ALBUMIN SERPL BCP-MCNC: 4 G/DL (ref 3.5–5)
ALP SERPL-CCNC: 73 U/L (ref 46–116)
ALT SERPL W P-5'-P-CCNC: 32 U/L (ref 12–78)
ANION GAP SERPL CALCULATED.3IONS-SCNC: 1 MMOL/L (ref 4–13)
AST SERPL W P-5'-P-CCNC: 16 U/L (ref 5–45)
BASOPHILS # BLD AUTO: 0.03 THOUSANDS/ΜL (ref 0–0.1)
BASOPHILS NFR BLD AUTO: 0 % (ref 0–1)
BILIRUB SERPL-MCNC: 1.04 MG/DL (ref 0.2–1)
BUN SERPL-MCNC: 16 MG/DL (ref 5–25)
CALCIUM SERPL-MCNC: 9.4 MG/DL (ref 8.3–10.1)
CHLORIDE SERPL-SCNC: 110 MMOL/L (ref 100–108)
CO2 SERPL-SCNC: 28 MMOL/L (ref 21–32)
CREAT SERPL-MCNC: 1.05 MG/DL (ref 0.6–1.3)
EOSINOPHIL # BLD AUTO: 0.18 THOUSAND/ΜL (ref 0–0.61)
EOSINOPHIL NFR BLD AUTO: 2 % (ref 0–6)
ERYTHROCYTE [DISTWIDTH] IN BLOOD BY AUTOMATED COUNT: 13.2 % (ref 11.6–15.1)
FERRITIN SERPL-MCNC: 22 NG/ML (ref 8–388)
GFR SERPL CREATININE-BSD FRML MDRD: 71 ML/MIN/1.73SQ M
GLUCOSE P FAST SERPL-MCNC: 91 MG/DL (ref 65–99)
HCT VFR BLD AUTO: 44.4 % (ref 34.8–46.1)
HGB BLD-MCNC: 13.7 G/DL (ref 11.5–15.4)
IMM GRANULOCYTES # BLD AUTO: 0.02 THOUSAND/UL (ref 0–0.2)
IMM GRANULOCYTES NFR BLD AUTO: 0 % (ref 0–2)
IRON SATN MFR SERPL: 36 %
IRON SERPL-MCNC: 142 UG/DL (ref 50–170)
LYMPHOCYTES # BLD AUTO: 1.85 THOUSANDS/ΜL (ref 0.6–4.47)
LYMPHOCYTES NFR BLD AUTO: 23 % (ref 14–44)
MCH RBC QN AUTO: 28.6 PG (ref 26.8–34.3)
MCHC RBC AUTO-ENTMCNC: 30.9 G/DL (ref 31.4–37.4)
MCV RBC AUTO: 93 FL (ref 82–98)
MONOCYTES # BLD AUTO: 0.61 THOUSAND/ΜL (ref 0.17–1.22)
MONOCYTES NFR BLD AUTO: 7 % (ref 4–12)
NEUTROPHILS # BLD AUTO: 5.55 THOUSANDS/ΜL (ref 1.85–7.62)
NEUTS SEG NFR BLD AUTO: 68 % (ref 43–75)
NRBC BLD AUTO-RTO: 0 /100 WBCS
PLATELET # BLD AUTO: 220 THOUSANDS/UL (ref 149–390)
PMV BLD AUTO: 11.3 FL (ref 8.9–12.7)
POTASSIUM SERPL-SCNC: 4.3 MMOL/L (ref 3.5–5.3)
PROT SERPL-MCNC: 6.9 G/DL (ref 6.4–8.2)
RBC # BLD AUTO: 4.79 MILLION/UL (ref 3.81–5.12)
SODIUM SERPL-SCNC: 139 MMOL/L (ref 136–145)
T3 SERPL-MCNC: 1 NG/ML (ref 0.6–1.8)
T4 SERPL-MCNC: 7.1 UG/DL (ref 4.7–13.3)
TIBC SERPL-MCNC: 397 UG/DL (ref 250–450)
TSH SERPL DL<=0.05 MIU/L-ACNC: 1.8 UIU/ML (ref 0.36–3.74)
WBC # BLD AUTO: 8.24 THOUSAND/UL (ref 4.31–10.16)

## 2019-06-18 PROCEDURE — 83550 IRON BINDING TEST: CPT

## 2019-06-18 PROCEDURE — 84480 ASSAY TRIIODOTHYRONINE (T3): CPT

## 2019-06-18 PROCEDURE — 84443 ASSAY THYROID STIM HORMONE: CPT

## 2019-06-18 PROCEDURE — 83540 ASSAY OF IRON: CPT

## 2019-06-18 PROCEDURE — 36415 COLL VENOUS BLD VENIPUNCTURE: CPT | Performed by: PHYSICIAN ASSISTANT

## 2019-06-18 PROCEDURE — 84436 ASSAY OF TOTAL THYROXINE: CPT

## 2019-06-18 PROCEDURE — 85025 COMPLETE CBC W/AUTO DIFF WBC: CPT | Performed by: PHYSICIAN ASSISTANT

## 2019-06-18 PROCEDURE — 82728 ASSAY OF FERRITIN: CPT

## 2019-06-18 PROCEDURE — 80053 COMPREHEN METABOLIC PANEL: CPT | Performed by: PHYSICIAN ASSISTANT

## 2019-06-21 ENCOUNTER — OFFICE VISIT (OUTPATIENT)
Dept: UROLOGY | Facility: CLINIC | Age: 29
End: 2019-06-21
Payer: COMMERCIAL

## 2019-06-21 VITALS
WEIGHT: 115 LBS | SYSTOLIC BLOOD PRESSURE: 120 MMHG | DIASTOLIC BLOOD PRESSURE: 80 MMHG | HEIGHT: 63 IN | BODY MASS INDEX: 20.38 KG/M2

## 2019-06-21 DIAGNOSIS — R33.9 INCOMPLETE BLADDER EMPTYING: Primary | ICD-10-CM

## 2019-06-21 DIAGNOSIS — E80.6 HYPERBILIRUBINEMIA: Primary | ICD-10-CM

## 2019-06-21 LAB
POST-VOID RESIDUAL VOLUME, ML POC: 358 ML
SL AMB  POCT GLUCOSE, UA: NORMAL
SL AMB LEUKOCYTE ESTERASE,UA: NORMAL
SL AMB POCT BILIRUBIN,UA: NORMAL
SL AMB POCT BLOOD,UA: NORMAL
SL AMB POCT CLARITY,UA: CLEAR
SL AMB POCT COLOR,UA: YELLOW
SL AMB POCT KETONES,UA: NORMAL
SL AMB POCT NITRITE,UA: NORMAL
SL AMB POCT PH,UA: 5
SL AMB POCT SPECIFIC GRAVITY,UA: 1.02
SL AMB POCT URINE PROTEIN: NORMAL
SL AMB POCT UROBILINOGEN: NORMAL

## 2019-06-21 PROCEDURE — 99244 OFF/OP CNSLTJ NEW/EST MOD 40: CPT | Performed by: UROLOGY

## 2019-06-21 PROCEDURE — 51798 US URINE CAPACITY MEASURE: CPT | Performed by: UROLOGY

## 2019-06-21 PROCEDURE — 81002 URINALYSIS NONAUTO W/O SCOPE: CPT | Performed by: UROLOGY

## 2019-06-21 RX ORDER — BREXPIPRAZOLE 1 MG/1
1 TABLET ORAL EVERY EVENING
Refills: 0 | COMMUNITY
Start: 2019-06-16 | End: 2020-05-26

## 2019-06-21 RX ORDER — BENZTROPINE MESYLATE 1 MG/1
TABLET ORAL
COMMUNITY
End: 2019-07-25

## 2019-06-28 ENCOUNTER — HOSPITAL ENCOUNTER (OUTPATIENT)
Dept: ULTRASOUND IMAGING | Facility: HOSPITAL | Age: 29
Discharge: HOME | End: 2019-06-28
Payer: COMMERCIAL

## 2019-06-28 DIAGNOSIS — R10.13 EPIGASTRIC ABDOMINAL PAIN: ICD-10-CM

## 2019-06-28 PROCEDURE — 76705 ECHO EXAM OF ABDOMEN: CPT

## 2019-07-01 DIAGNOSIS — E03.9 HYPOTHYROIDISM, UNSPECIFIED TYPE: ICD-10-CM

## 2019-07-01 DIAGNOSIS — K82.4 GALLBLADDER POLYP: Primary | ICD-10-CM

## 2019-07-02 ENCOUNTER — TELEPHONE (OUTPATIENT)
Dept: GASTROENTEROLOGY | Facility: AMBULARY SURGERY CENTER | Age: 29
End: 2019-07-02

## 2019-07-02 RX ORDER — LIOTHYRONINE SODIUM 5 UG/1
TABLET ORAL
Qty: 30 TABLET | Refills: 2 | Status: SHIPPED | OUTPATIENT
Start: 2019-07-02 | End: 2019-09-29 | Stop reason: SDUPTHER

## 2019-07-02 NOTE — TELEPHONE ENCOUNTER
I called her and explained that the gallbladder polyp is very small so it should be monitored with repeat ultrasound which which Zhao already ordered  I asked her to speak to her PCP regarding the renal findings since that is out of my specialty

## 2019-07-25 ENCOUNTER — OFFICE VISIT (OUTPATIENT)
Dept: INTERNAL MEDICINE CLINIC | Facility: CLINIC | Age: 29
End: 2019-07-25
Payer: COMMERCIAL

## 2019-07-25 VITALS
OXYGEN SATURATION: 90 % | SYSTOLIC BLOOD PRESSURE: 96 MMHG | DIASTOLIC BLOOD PRESSURE: 62 MMHG | RESPIRATION RATE: 16 BRPM | HEIGHT: 63 IN | HEART RATE: 65 BPM | BODY MASS INDEX: 19.45 KG/M2 | TEMPERATURE: 99.2 F | WEIGHT: 109.8 LBS

## 2019-07-25 DIAGNOSIS — G93.0 ARACHNOID CYST: ICD-10-CM

## 2019-07-25 DIAGNOSIS — N26.1 RENAL ATROPHY: Primary | ICD-10-CM

## 2019-07-25 PROCEDURE — 99214 OFFICE O/P EST MOD 30 MIN: CPT | Performed by: PHYSICIAN ASSISTANT

## 2019-07-25 PROCEDURE — 3008F BODY MASS INDEX DOCD: CPT | Performed by: PHYSICIAN ASSISTANT

## 2019-07-26 ENCOUNTER — TELEPHONE (OUTPATIENT)
Dept: GASTROENTEROLOGY | Facility: AMBULARY SURGERY CENTER | Age: 29
End: 2019-07-26

## 2019-07-26 PROBLEM — G93.0 ARACHNOID CYST: Status: ACTIVE | Noted: 2019-07-26

## 2019-07-26 PROBLEM — N26.1 RENAL ATROPHY: Status: ACTIVE | Noted: 2019-07-26

## 2019-07-26 NOTE — ASSESSMENT & PLAN NOTE
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

## 2019-07-26 NOTE — TELEPHONE ENCOUNTER
DR Aleyda Sweeney PT    Pt called requesting advise for her stomach medication while taking rexulti

## 2019-07-26 NOTE — PROGRESS NOTES
Assessment/Plan:  Problem List Items Addressed This Visit        Nervous and Auditory    Arachnoid cyst     MRI last year revealed lesion consistent with bernard cisterna magna vs arachnoid cyst  Will get updated MRI for comparison and to see if we can better determine what this is and if it has changed at all in the last year  Relevant Orders    MRI brain w wo contrast       Genitourinary    Renal atrophy - Primary     Will refer to nephrology for evaluation and treatment if needed  Relevant Orders    Ambulatory referral to Nephrology           Diagnoses and all orders for this visit:    Renal atrophy  -     Ambulatory referral to Nephrology; Future    Arachnoid cyst  -     MRI brain w wo contrast; Future        Renal atrophy  Will refer to nephrology for evaluation and treatment if needed  Arachnoid cyst  MRI last year revealed lesion consistent with bernard cisterna magna vs arachnoid cyst  Will get updated MRI for comparison and to see if we can better determine what this is and if it has changed at all in the last year  Subjective:      Patient ID: Efe Stallworth is a 26 y o  female  Pt presents for visit to evaluate newly found right sided renal atropy consistent with possible intrinsic renal disease noted on recent abdominal ultrasound ordered by her gastroenterologist  Pt and I discussed this finding  We discussed that this could have been congenital vs medication related vs infectious cause that needs to be investigated  Recent kidney function testing is normal  Pt does desire to see a nephrologist for an opinion  She denies flank pain or hematuria but does have a long hx of recurrent UTI  She also had an MRI of her brain last October that revealed bernard cisterna magna vs arachnoid cyst and it was recommended that she get a repeat to better identify the abnormality and check for possible growth         The following portions of the patient's history were reviewed and updated as appropriate:   She has a past medical history of Anorexia nervosa, Anxiety, Chronic kidney disease, CPAP (continuous positive airway pressure) dependence, Depression, Disease of thyroid gland, Gastroesophageal reflux disease (9/18/2018), Hallucination, Memory difficulty (7/12/2018), Psychiatric illness, Schizoaffective disorder ([facility] ), Self-injurious behavior, Sleep apnea, Sleep difficulties, Suicide attempt ([facility] ), and Urinary retention  ,  does not have any pertinent problems on file  ,   has a past surgical history that includes Eye surgery (Left); pr esophagogastroduodenoscopy transoral diagnostic (N/A, 2/9/2018); and pr esophagogastroduodenoscopy transoral diagnostic (N/A, 11/6/2018)  ,  family history includes Depression in her brother; Hypertension in her brother and father; OCD in her brother; Other in her family  ,   reports that she has never smoked  She has never used smokeless tobacco  She reports that she does not drink alcohol or use drugs  ,  is allergic to gluten meal   Current Outpatient Medications   Medication Sig Dispense Refill    benztropine (COGENTIN) 1 mg tablet Take 1 mg by mouth TAKE 1 TABLET IN AM AND 2 TABLETS IN PM      clonazePAM (KLONOPIN) 2 mg tablet Take 0 5 mg by mouth 2 (two) times a day      gabapentin (NEURONTIN) 300 mg capsule Take 300 mg by mouth 2 (two) times a day       liothyronine (CYTOMEL) 5 mcg tablet TAKE 1 TABLET BY MOUTH ONCE DAILY 30 tablet 2    lithium carbonate (LITHOBID) 450 mg CR tablet Take 450 mg by mouth 2 (two) times a day      REXULTI 1 MG tablet Take 1 mg by mouth every evening  0    triamcinolone (KENALOG) 0 1 % ointment        No current facility-administered medications for this visit  Review of Systems   Constitutional: Negative for chills and fever  HENT: Negative for congestion, ear pain, hearing loss, postnasal drip, rhinorrhea, sinus pressure, sinus pain, sore throat and trouble swallowing  Eyes: Negative for pain and visual disturbance  Respiratory: Negative for cough, chest tightness, shortness of breath and wheezing  Cardiovascular: Negative  Negative for chest pain, palpitations and leg swelling  Gastrointestinal: Negative for abdominal pain, blood in stool, constipation, diarrhea, nausea and vomiting  Endocrine: Negative for cold intolerance, heat intolerance, polydipsia, polyphagia and polyuria  Genitourinary: Negative for difficulty urinating, dysuria, flank pain and urgency  Musculoskeletal: Negative for arthralgias, back pain, gait problem and myalgias  Skin: Negative for rash  Allergic/Immunologic: Negative  Neurological: Negative for dizziness, weakness, light-headedness and headaches  Hematological: Negative  Psychiatric/Behavioral: Negative for behavioral problems, dysphoric mood and sleep disturbance  The patient is not nervous/anxious  Objective:  Vitals:    07/25/19 1413   BP: 96/62   BP Location: Left arm   Patient Position: Sitting   Cuff Size: Standard   Pulse: 65   Resp: 16   Temp: 99 2 °F (37 3 °C)   SpO2: 90%   Weight: 49 8 kg (109 lb 12 8 oz)   Height: 5' 3" (1 6 m)     Body mass index is 19 45 kg/m²       Physical Exam

## 2019-07-26 NOTE — TELEPHONE ENCOUNTER
Patient with h/o GERD, epigastric abdominal pain, weight loss,celiac sprue, constipation  She is calling today to find out if her new medication Ruxulti,  will have an interaction with her Protonix  She received samples of the Rexulti  I looked it up in [employer] and the only thing it mentioned was not to drink grapefruit juice for the Rexulti  Please confirm  Thank you

## 2019-08-20 ENCOUNTER — HOSPITAL ENCOUNTER (OUTPATIENT)
Dept: MRI IMAGING | Facility: HOSPITAL | Age: 29
Discharge: HOME | End: 2019-08-20
Payer: COMMERCIAL

## 2019-08-20 DIAGNOSIS — G93.0 ARACHNOID CYST: ICD-10-CM

## 2019-08-20 PROCEDURE — 70553 MRI BRAIN STEM W/O & W/DYE: CPT

## 2019-08-20 PROCEDURE — A9585 GADOBUTROL INJECTION: HCPCS | Performed by: INTERNAL MEDICINE

## 2019-08-20 RX ADMIN — GADOBUTROL 5 ML: 604.72 INJECTION INTRAVENOUS at 13:08

## 2019-09-03 ENCOUNTER — CONSULT (OUTPATIENT)
Dept: NEPHROLOGY | Facility: CLINIC | Age: 29
End: 2019-09-03
Payer: COMMERCIAL

## 2019-09-03 VITALS
BODY MASS INDEX: 19.45 KG/M2 | HEIGHT: 63 IN | DIASTOLIC BLOOD PRESSURE: 62 MMHG | SYSTOLIC BLOOD PRESSURE: 98 MMHG | WEIGHT: 109.8 LBS

## 2019-09-03 DIAGNOSIS — F25.9 SCHIZOAFFECTIVE DISORDER, UNSPECIFIED TYPE (HCC): Chronic | ICD-10-CM

## 2019-09-03 DIAGNOSIS — N18.2 CHRONIC KIDNEY DISEASE (CKD) STAGE G2/A2, MILDLY DECREASED GLOMERULAR FILTRATION RATE (GFR) BETWEEN 60-89 ML/MIN/1.73 SQUARE METER AND ALBUMINURIA CREATININE RATIO BETWEEN 30-299 MG/G: Primary | ICD-10-CM

## 2019-09-03 DIAGNOSIS — N26.1 RENAL ATROPHY: ICD-10-CM

## 2019-09-03 PROBLEM — R33.9 RETENTION, URINE: Status: ACTIVE | Noted: 2019-09-03

## 2019-09-03 PROCEDURE — 99244 OFF/OP CNSLTJ NEW/EST MOD 40: CPT | Performed by: INTERNAL MEDICINE

## 2019-09-03 RX ORDER — PANTOPRAZOLE SODIUM 40 MG/1
40 TABLET, DELAYED RELEASE ORAL DAILY
Refills: 3 | COMMUNITY
Start: 2019-08-06 | End: 2020-03-24

## 2019-09-03 NOTE — ASSESSMENT & PLAN NOTE
Patient is currently on lithium, this has known long-term renal complications, however may be very appropriate given the patient's psychiatric diagnoses  If in the future, an opportunity arises to potentially transition the patient to another medication, instead of Lithium, I would strongly recommend proceeding as the patient most likely has Lower kidney function a noted on eGFR given her petite stature

## 2019-09-03 NOTE — ASSESSMENT & PLAN NOTE
Imaging noted, as mentioned above patient to have a full retroperitoneal ultrasound to evaluate anatomy at this time  Further evaluation as indicated

## 2019-09-03 NOTE — PROGRESS NOTES
Assessment/Plan:    Chronic kidney disease (CKD) stage G2/A2, mildly decreased glomerular filtration rate (GFR) between 60-89 mL/min/1 73 square meter and albuminuria creatinine ratio between  mg/g    Patient appears to have baseline creatinine of around 1 mg/ dL  This level of creatinine may be inappropriately high given the petite stature of this patient  Check renal ultrasound, assess urine for protein and blood  We may proceed with a 24 hour urine to further assess true creatinine clearance  Patient most likely has underlying chronic tubulointerstitial disease from the use of lithium  Patient also uses nonsteroidal anti-inflammatory medications to assist with common aches and pains, she does not appear to over use those medications at this time  Renal atrophy    Imaging noted, as mentioned above patient to have a full retroperitoneal ultrasound to evaluate anatomy at this time  Further evaluation as indicated  Retention, urine   Patient has urinary retention, she is followed by Urology  Will have a full retroperitoneal ultrasound to further evaluate at this time  Unclear etiology for the retention, will defer to our urology colleagues for further assessment as indicated  Unclear if this is impacting her renal function at this time  Schizoaffective disorder ([facility] )    Patient is currently on lithium, this has known long-term renal complications, however may be very appropriate given the patient's psychiatric diagnoses  If in the future, an opportunity arises to potentially transition the patient to another medication, instead of Lithium, I would strongly recommend proceeding as the patient most likely has Lower kidney function a noted on eGFR given her petite stature  Problem List Items Addressed This Visit        Genitourinary    Renal atrophy       Imaging noted, as mentioned above patient to have a full retroperitoneal ultrasound to evaluate anatomy at this time    Further evaluation as indicated  Relevant Orders    US retroperitoneal complete    Microalbumin / creatinine urine ratio    Urinalysis with reflex to microscopic    Chronic kidney disease (CKD) stage G2/A2, mildly decreased glomerular filtration rate (GFR) between 60-89 mL/min/1 73 square meter and albuminuria creatinine ratio between  mg/g - Primary       Patient appears to have baseline creatinine of around 1 mg/ dL  This level of creatinine may be inappropriately high given the petite stature of this patient  Check renal ultrasound, assess urine for protein and blood  We may proceed with a 24 hour urine to further assess true creatinine clearance  Patient most likely has underlying chronic tubulointerstitial disease from the use of lithium  Patient also uses nonsteroidal anti-inflammatory medications to assist with common aches and pains, she does not appear to over use those medications at this time  Relevant Orders    US retroperitoneal complete    Microalbumin / creatinine urine ratio    Urinalysis with reflex to microscopic       Other    Schizoaffective disorder (HCC) (Chronic)       Patient is currently on lithium, this has known long-term renal complications, however may be very appropriate given the patient's psychiatric diagnoses  If in the future, an opportunity arises to potentially transition the patient to another medication, instead of Lithium, I would strongly recommend proceeding as the patient most likely has Lower kidney function a noted on eGFR given her petite stature  Subjective:      Patient ID: Jaun Bauer is a 26 y o  female  Patient noted that she is here due to an issue noted on her kidney during a right upper quadrant ultrasound  Patient denies CV pain, and denies any history of kidney issues that she is aware of  Patient has a history of schizoaffective disorder and has been on lithium for several years now    She denies any symptoms which are consistent with polydipsia and polyuria  Patient uses nonsteroidal anti-inflammatory medications from time to time to help with general aches and pains  But this is nothing consistent, for example on a daily basis, rather once or twice a week at the most   At this time she denies any specific symptoms  She denies gross hematuria  Patient takes all her medications as prescribed with no side effects  Medical records reviewed during the course this consultation  The following portions of the patient's history were reviewed and updated as appropriate: allergies, current medications, past family history, past medical history, past social history, past surgical history and problem list     Review of Systems   All other systems reviewed and are negative          Social History     Socioeconomic History    Marital status: Single     Spouse name: None    Number of children: None    Years of education: None    Highest education level: None   Occupational History    Occupation: UNEMPLOYED   Social Needs    Financial resource strain: None    Food insecurity:     Worry: None     Inability: None    Transportation needs:     Medical: None     Non-medical: None   Tobacco Use    Smoking status: Never Smoker    Smokeless tobacco: Never Used   Substance and Sexual Activity    Alcohol use: No    Drug use: No    Sexual activity: Not Currently   Lifestyle    Physical activity:     Days per week: None     Minutes per session: None    Stress: None   Relationships    Social connections:     Talks on phone: None     Gets together: None     Attends Sabianism service: None     Active member of club or organization: None     Attends meetings of clubs or organizations: None     Relationship status: None    Intimate partner violence:     Fear of current or ex partner: None     Emotionally abused: None     Physically abused: None     Forced sexual activity: None   Other Topics Concern    None   Social History Narrative    UNEMPLOYED     Past Medical History:   Diagnosis Date    Anorexia nervosa     pt denies history at time of admission 7/21/18    Anxiety     Chronic kidney disease     CPAP (continuous positive airway pressure) dependence     waiting for a new mask    Depression     Disease of thyroid gland     hypo    Gastroesophageal reflux disease 9/18/2018    Hallucination     Memory difficulty 7/12/2018    Psychiatric illness     [facility]     Self-injurious behavior     Sleep apnea     Sleep difficulties     Suicide attempt ([facility] )     history of attempt at age 22 by overdose     Urinary retention      Past Surgical History:   Procedure Laterality Date    EYE SURGERY Left     tear duct    [place] ESOPHAGOGASTRODUODENOSCOPY TRANSORAL DIAGNOSTIC N/A 2/9/2018    Procedure: ESOPHAGOGASTRODUODENOSCOPY (EGD); Surgeon: Susannah Rowe MD;  [facility]: [facility];  Service: Gastroenterology    [place] ESOPHAGOGASTRODUODENOSCOPY TRANSORAL DIAGNOSTIC N/A 11/6/2018    Procedure: ESOPHAGOGASTRODUODENOSCOPY (EGD); Surgeon: Heidi Black MD;  Location: [facility];  Service: Gastroenterology         Objective:      BP 98/62 (BP Location: Right arm, Patient Position: Sitting, Cuff Size: Standard)   Ht 5' 3" (1 6 m)   Wt 49 8 kg (109 lb 12 8 oz)   BMI 19 45 kg/m²          Physical Exam   Constitutional: She is oriented to person, place, and time  She appears well-developed and well-nourished  HENT:   Head: Normocephalic  Eyes: Conjunctivae and EOM are normal    Neck: Neck supple  Cardiovascular: Normal rate  Pulmonary/Chest: Effort normal and breath sounds normal    Abdominal: Soft  Bowel sounds are normal    Musculoskeletal: Normal range of motion  She exhibits no edema  Neurological: She is alert and oriented to person, place, and time  Skin: Skin is warm and dry  Psychiatric: She has a normal mood and affect  Her behavior is normal    Vitals reviewed

## 2019-09-03 NOTE — ASSESSMENT & PLAN NOTE
Patient has urinary retention, she is followed by Urology  Will have a full retroperitoneal ultrasound to further evaluate at this time  Unclear etiology for the retention, will defer to our urology colleagues for further assessment as indicated  Unclear if this is impacting her renal function at this time

## 2019-09-03 NOTE — ASSESSMENT & PLAN NOTE
Patient appears to have baseline creatinine of around 1 mg/ dL  This level of creatinine may be inappropriately high given the petite stature of this patient  Check renal ultrasound, assess urine for protein and blood  We may proceed with a 24 hour urine to further assess true creatinine clearance  Patient most likely has underlying chronic tubulointerstitial disease from the use of lithium  Patient also uses nonsteroidal anti-inflammatory medications to assist with common aches and pains, she does not appear to over use those medications at this time

## 2019-09-11 ENCOUNTER — APPOINTMENT (OUTPATIENT)
Dept: LAB | Facility: CLINIC | Age: 29
End: 2019-09-11
Payer: COMMERCIAL

## 2019-09-11 ENCOUNTER — OFFICE VISIT (OUTPATIENT)
Dept: INTERNAL MEDICINE CLINIC | Facility: CLINIC | Age: 29
End: 2019-09-11
Payer: COMMERCIAL

## 2019-09-11 VITALS
HEART RATE: 79 BPM | RESPIRATION RATE: 16 BRPM | DIASTOLIC BLOOD PRESSURE: 64 MMHG | HEIGHT: 63 IN | BODY MASS INDEX: 19.31 KG/M2 | TEMPERATURE: 99.4 F | OXYGEN SATURATION: 98 % | WEIGHT: 109 LBS | SYSTOLIC BLOOD PRESSURE: 98 MMHG

## 2019-09-11 DIAGNOSIS — R20.0 NUMBNESS AND TINGLING OF BOTH FEET: ICD-10-CM

## 2019-09-11 DIAGNOSIS — K82.4 GALLBLADDER POLYP: Primary | ICD-10-CM

## 2019-09-11 DIAGNOSIS — N26.1 RENAL ATROPHY: ICD-10-CM

## 2019-09-11 DIAGNOSIS — R20.2 NUMBNESS AND TINGLING OF BOTH FEET: ICD-10-CM

## 2019-09-11 LAB
EST. AVERAGE GLUCOSE BLD GHB EST-MCNC: 100 MG/DL
HBA1C MFR BLD: 5.1 % (ref 4.2–6.3)

## 2019-09-11 PROCEDURE — 99214 OFFICE O/P EST MOD 30 MIN: CPT | Performed by: PHYSICIAN ASSISTANT

## 2019-09-11 PROCEDURE — 36415 COLL VENOUS BLD VENIPUNCTURE: CPT

## 2019-09-11 PROCEDURE — 83036 HEMOGLOBIN GLYCOSYLATED A1C: CPT

## 2019-09-11 PROCEDURE — 3008F BODY MASS INDEX DOCD: CPT | Performed by: PHYSICIAN ASSISTANT

## 2019-09-11 NOTE — PROGRESS NOTES
Assessment/Plan:  Problem List Items Addressed This Visit        Digestive    Gallbladder polyp     Presently at 1 2mm  Will recheck U/S in 1 year to ensure stability  Genitourinary    Renal atrophy - Primary     Pt following with nephrology  She has upcoming urine studies and retroperitoneal u/s scheduled  Other    Numbness and tingling of both feet     Will get A1C per pt request to better evaluate  Relevant Orders    Hemoglobin A1C           Diagnoses and all orders for this visit:    Renal atrophy    Numbness and tingling of both feet  -     Hemoglobin A1C; Future    Gallbladder polyp        Gallbladder polyp  Presently at 1 2mm  Will recheck U/S in 1 year to ensure stability  Renal atrophy  Pt following with nephrology  She has upcoming urine studies and retroperitoneal u/s scheduled  Numbness and tingling of both feet  Will get A1C per pt request to better evaluate  Subjective:      Patient ID: Katie Dick is a 26 y o  female  Pt presents with a few concerns  She was recently found to have a slightly atrophied right kidney and has been seen by nephrology who feels this may be secondary to lithium use  She is scheduled for a retroperitoneal ultrasound as well as some urine tests to better evaluate  She also follows with urology for her urinary retention  She is concerned as the initial ultrasound that was ordered by GI and found the atrophied kidney also noted a 1 2 mm gallbladder polyp  We discussed gallbladder polyps and 95% of these being benign  We discussed that we risk stratify these based off of size  We discussed that 1 2mm is very small and this can be considered benign at this point  Would recommend repeat U/S in 1 year to ensure stability and pt is agreeable and satisfied with this plan  She also complains of numbness in her feet  She notes her mother has diabetes and this concerns her and she would like to be evaluated for this         The following portions of the patient's history were reviewed and updated as appropriate:   She has a past medical history of Anorexia nervosa, Anxiety, Chronic kidney disease, CPAP (continuous positive airway pressure) dependence, Depression, Disease of thyroid gland, Gall bladder polyp, Gastroesophageal reflux disease (9/18/2018), Hallucination, Memory difficulty (7/12/2018), Psychiatric illness, Renal atrophy, Schizoaffective disorder ([facility] [address] ), Self-injurious behavior, Sleep apnea, Sleep difficulties, Suicide attempt ([facility] [address] ), and Urinary retention  ,  does not have any pertinent problems on file  ,   has a past surgical history that includes Eye surgery (Left); pr esophagogastroduodenoscopy transoral diagnostic (N/A, 2/9/2018); and pr esophagogastroduodenoscopy transoral diagnostic (N/A, 11/6/2018)  ,  family history includes Depression in her brother; Hypertension in her brother and father; OCD in her brother; Other in her family  ,   reports that she has never smoked  She has never used smokeless tobacco  She reports that she does not drink alcohol or use drugs  ,  is allergic to gluten meal   Current Outpatient Medications   Medication Sig Dispense Refill    benztropine (COGENTIN) 1 mg tablet Take 1 mg by mouth TAKE 1 TABLET IN AM AND 2 TABLETS IN PM      clonazePAM (KLONOPIN) 0 5 mg tablet Take by mouth 2 (two) times a day       gabapentin (NEURONTIN) 300 mg capsule Take 300 mg by mouth 2 (two) times a day       liothyronine (CYTOMEL) 5 mcg tablet TAKE 1 TABLET BY MOUTH ONCE DAILY 30 tablet 2    lithium carbonate (LITHOBID) 450 mg CR tablet Take 450 mg by mouth 2 (two) times a day      pantoprazole (PROTONIX) 40 mg tablet Take 40 mg by mouth daily  3    REXULTI 1 MG tablet Take 1 mg by mouth every evening  0    triamcinolone (KENALOG) 0 1 % ointment        No current facility-administered medications for this visit  Review of Systems   Constitutional: Negative for chills and fever     HENT: Negative for congestion, ear pain, hearing loss, postnasal drip, rhinorrhea, sinus pressure, sinus pain, sore throat and trouble swallowing  Eyes: Negative for pain and visual disturbance  Respiratory: Negative for cough, chest tightness, shortness of breath and wheezing  Cardiovascular: Negative  Negative for chest pain, palpitations and leg swelling  Gastrointestinal: Negative for abdominal pain, blood in stool, constipation, diarrhea, nausea and vomiting  Endocrine: Negative for cold intolerance, heat intolerance, polydipsia, polyphagia and polyuria  Genitourinary: Negative for difficulty urinating, dysuria, flank pain and urgency  Musculoskeletal: Negative for arthralgias, back pain, gait problem and myalgias  Skin: Negative for rash  Allergic/Immunologic: Negative  Neurological: Negative for dizziness, weakness, light-headedness and headaches  Hematological: Negative  Psychiatric/Behavioral: Negative for behavioral problems, dysphoric mood and sleep disturbance  The patient is not nervous/anxious  PHQ-9 Depression Screening    PHQ-9:    Frequency of the following problems over the past two weeks:       Little interest or pleasure in doing things:  0 - not at all  Feeling down, depressed, or hopeless:  0 - not at all  PHQ-2 Score:  0          Objective:  Vitals:    09/11/19 1412   BP: 98/64   BP Location: Left arm   Patient Position: Sitting   Cuff Size: Standard   Pulse: 79   Resp: 16   Temp: 99 4 °F (37 4 °C)   TempSrc: Tympanic   SpO2: 98%   Weight: 49 4 kg (109 lb)   Height: 5' 3" (1 6 m)     Body mass index is 19 31 kg/m²  Physical Exam   Constitutional: She is oriented to person, place, and time  She appears well-developed and well-nourished  No distress  HENT:   Head: Normocephalic and atraumatic  Right Ear: External ear normal    Left Ear: External ear normal    Nose: Nose normal    Mouth/Throat: Oropharynx is clear and moist  No oropharyngeal exudate     Eyes: Pupils are equal, round, and reactive to light  Conjunctivae and EOM are normal  Right eye exhibits no discharge  Left eye exhibits no discharge  No scleral icterus  Neck: Normal range of motion  Neck supple  No thyromegaly present  Cardiovascular: Normal rate, regular rhythm and normal heart sounds  Exam reveals no gallop and no friction rub  No murmur heard  Pulmonary/Chest: Effort normal and breath sounds normal  No respiratory distress  She has no wheezes  She has no rales  Abdominal: Soft  Bowel sounds are normal  She exhibits no distension  There is no tenderness  Musculoskeletal: Normal range of motion  She exhibits no edema, tenderness or deformity  Neurological: She is alert and oriented to person, place, and time  No cranial nerve deficit  Skin: Skin is warm and dry  She is not diaphoretic  Psychiatric: Her behavior is normal  Judgment and thought content normal  Her affect is blunt

## 2019-09-29 DIAGNOSIS — E03.9 HYPOTHYROIDISM, UNSPECIFIED TYPE: ICD-10-CM

## 2019-09-30 RX ORDER — LIOTHYRONINE SODIUM 5 UG/1
TABLET ORAL
Qty: 30 TABLET | Refills: 2 | Status: SHIPPED | OUTPATIENT
Start: 2019-09-30 | End: 2020-01-01

## 2019-10-08 DIAGNOSIS — N26.1 RENAL ATROPHY: ICD-10-CM

## 2019-10-08 DIAGNOSIS — N18.2 CHRONIC KIDNEY DISEASE (CKD) STAGE G2/A2, MILDLY DECREASED GLOMERULAR FILTRATION RATE (GFR) BETWEEN 60-89 ML/MIN/1.73 SQUARE METER AND ALBUMINURIA CREATININE RATIO BETWEEN 30-299 MG/G: Primary | ICD-10-CM

## 2019-10-12 ENCOUNTER — HOSPITAL ENCOUNTER (OUTPATIENT)
Dept: ULTRASOUND IMAGING | Facility: HOSPITAL | Age: 29
Discharge: HOME | End: 2019-10-12
Attending: INTERNAL MEDICINE
Payer: COMMERCIAL

## 2019-10-12 DIAGNOSIS — N18.2 CHRONIC KIDNEY DISEASE (CKD) STAGE G2/A2, MILDLY DECREASED GLOMERULAR FILTRATION RATE (GFR) BETWEEN 60-89 ML/MIN/1.73 SQUARE METER AND ALBUMINURIA CREATININE RATIO BETWEEN 30-299 MG/G: ICD-10-CM

## 2019-10-12 DIAGNOSIS — N26.1 RENAL ATROPHY: ICD-10-CM

## 2019-10-12 PROCEDURE — 76770 US EXAM ABDO BACK WALL COMP: CPT

## 2019-10-17 ENCOUNTER — TELEPHONE (OUTPATIENT)
Dept: NEPHROLOGY | Facility: CLINIC | Age: 29
End: 2019-10-17

## 2019-10-17 NOTE — TELEPHONE ENCOUNTER
----- Message from Sydna Bosworth, DO sent at 10/16/2019 11:10 PM EDT -----  Kidney US came back looking very good

## 2019-10-17 NOTE — TELEPHONE ENCOUNTER
Relayed message to Titus Goodson regarding US that everything came back looking good  She understood and had no questions at this time

## 2019-10-30 ENCOUNTER — TRANSCRIBE ORDERS (OUTPATIENT)
Dept: LAB | Facility: CLINIC | Age: 29
End: 2019-10-30

## 2019-10-30 ENCOUNTER — APPOINTMENT (OUTPATIENT)
Dept: LAB | Facility: CLINIC | Age: 29
End: 2019-10-30
Payer: COMMERCIAL

## 2019-10-30 DIAGNOSIS — E80.6 HYPERBILIRUBINEMIA: ICD-10-CM

## 2019-10-30 DIAGNOSIS — N26.1 RENAL ATROPHY: ICD-10-CM

## 2019-10-30 DIAGNOSIS — F25.9 SCHIZOAFFECTIVE DISORDER, UNSPECIFIED TYPE (HCC): Chronic | ICD-10-CM

## 2019-10-30 DIAGNOSIS — N18.2 CHRONIC KIDNEY DISEASE (CKD) STAGE G2/A2, MILDLY DECREASED GLOMERULAR FILTRATION RATE (GFR) BETWEEN 60-89 ML/MIN/1.73 SQUARE METER AND ALBUMINURIA CREATININE RATIO BETWEEN 30-299 MG/G: ICD-10-CM

## 2019-10-30 DIAGNOSIS — Z79.899 ENCOUNTER FOR LONG-TERM (CURRENT) USE OF OTHER MEDICATIONS: ICD-10-CM

## 2019-10-30 DIAGNOSIS — F25.9 SCHIZOAFFECTIVE DISORDER, UNSPECIFIED TYPE (HCC): Primary | Chronic | ICD-10-CM

## 2019-10-30 LAB
ALBUMIN SERPL BCP-MCNC: 4.3 G/DL (ref 3.5–5)
ALP SERPL-CCNC: 80 U/L (ref 46–116)
ALT SERPL W P-5'-P-CCNC: 29 U/L (ref 12–78)
ANION GAP SERPL CALCULATED.3IONS-SCNC: 6 MMOL/L (ref 4–13)
AST SERPL W P-5'-P-CCNC: 14 U/L (ref 5–45)
BACTERIA UR QL AUTO: ABNORMAL /HPF
BASOPHILS # BLD AUTO: 0.08 THOUSANDS/ΜL (ref 0–0.1)
BASOPHILS NFR BLD AUTO: 1 % (ref 0–1)
BILIRUB SERPL-MCNC: 1.06 MG/DL (ref 0.2–1)
BILIRUB UR QL STRIP: NEGATIVE
BUN SERPL-MCNC: 11 MG/DL (ref 5–25)
CALCIUM SERPL-MCNC: 9.6 MG/DL (ref 8.3–10.1)
CHLORIDE SERPL-SCNC: 107 MMOL/L (ref 100–108)
CHOLEST SERPL-MCNC: 116 MG/DL (ref 50–200)
CLARITY UR: ABNORMAL
CO2 SERPL-SCNC: 28 MMOL/L (ref 21–32)
COLOR UR: YELLOW
CREAT SERPL-MCNC: 1.24 MG/DL (ref 0.6–1.3)
CREAT UR-MCNC: 207 MG/DL
EOSINOPHIL # BLD AUTO: 0.31 THOUSAND/ΜL (ref 0–0.61)
EOSINOPHIL NFR BLD AUTO: 4 % (ref 0–6)
ERYTHROCYTE [DISTWIDTH] IN BLOOD BY AUTOMATED COUNT: 13.1 % (ref 11.6–15.1)
GFR SERPL CREATININE-BSD FRML MDRD: 58 ML/MIN/1.73SQ M
GLUCOSE P FAST SERPL-MCNC: 91 MG/DL (ref 65–99)
GLUCOSE UR STRIP-MCNC: NEGATIVE MG/DL
HCT VFR BLD AUTO: 42.8 % (ref 34.8–46.1)
HDLC SERPL-MCNC: 65 MG/DL
HGB BLD-MCNC: 13.3 G/DL (ref 11.5–15.4)
HGB UR QL STRIP.AUTO: NEGATIVE
HYALINE CASTS #/AREA URNS LPF: ABNORMAL /LPF
IMM GRANULOCYTES # BLD AUTO: 0.02 THOUSAND/UL (ref 0–0.2)
IMM GRANULOCYTES NFR BLD AUTO: 0 % (ref 0–2)
KETONES UR STRIP-MCNC: NEGATIVE MG/DL
LDLC SERPL CALC-MCNC: 42 MG/DL (ref 0–100)
LEUKOCYTE ESTERASE UR QL STRIP: NEGATIVE
LITHIUM SERPL-SCNC: 1 MMOL/L (ref 0.5–1)
LYMPHOCYTES # BLD AUTO: 2.34 THOUSANDS/ΜL (ref 0.6–4.47)
LYMPHOCYTES NFR BLD AUTO: 32 % (ref 14–44)
MCH RBC QN AUTO: 28.2 PG (ref 26.8–34.3)
MCHC RBC AUTO-ENTMCNC: 31.1 G/DL (ref 31.4–37.4)
MCV RBC AUTO: 91 FL (ref 82–98)
MICROALBUMIN UR-MCNC: 201 MG/L (ref 0–20)
MICROALBUMIN/CREAT 24H UR: 97 MG/G CREATININE (ref 0–30)
MONOCYTES # BLD AUTO: 0.66 THOUSAND/ΜL (ref 0.17–1.22)
MONOCYTES NFR BLD AUTO: 9 % (ref 4–12)
NEUTROPHILS # BLD AUTO: 3.92 THOUSANDS/ΜL (ref 1.85–7.62)
NEUTS SEG NFR BLD AUTO: 54 % (ref 43–75)
NITRITE UR QL STRIP: NEGATIVE
NON-SQ EPI CELLS URNS QL MICRO: ABNORMAL /HPF
NONHDLC SERPL-MCNC: 51 MG/DL
NRBC BLD AUTO-RTO: 0 /100 WBCS
PH UR STRIP.AUTO: 7 [PH]
PLATELET # BLD AUTO: 256 THOUSANDS/UL (ref 149–390)
PMV BLD AUTO: 10.7 FL (ref 8.9–12.7)
POTASSIUM SERPL-SCNC: 3.6 MMOL/L (ref 3.5–5.3)
PROT SERPL-MCNC: 7.4 G/DL (ref 6.4–8.2)
PROT UR STRIP-MCNC: ABNORMAL MG/DL
RBC # BLD AUTO: 4.71 MILLION/UL (ref 3.81–5.12)
RBC #/AREA URNS AUTO: ABNORMAL /HPF
SODIUM SERPL-SCNC: 141 MMOL/L (ref 136–145)
SP GR UR STRIP.AUTO: 1.02 (ref 1–1.03)
TRIGL SERPL-MCNC: 45 MG/DL
UROBILINOGEN UR QL STRIP.AUTO: 0.2 E.U./DL
WBC # BLD AUTO: 7.33 THOUSAND/UL (ref 4.31–10.16)
WBC #/AREA URNS AUTO: ABNORMAL /HPF

## 2019-10-30 PROCEDURE — 36415 COLL VENOUS BLD VENIPUNCTURE: CPT

## 2019-10-30 PROCEDURE — 85025 COMPLETE CBC W/AUTO DIFF WBC: CPT

## 2019-10-30 PROCEDURE — 82570 ASSAY OF URINE CREATININE: CPT

## 2019-10-30 PROCEDURE — 82043 UR ALBUMIN QUANTITATIVE: CPT

## 2019-10-30 PROCEDURE — 80053 COMPREHEN METABOLIC PANEL: CPT

## 2019-10-30 PROCEDURE — 81001 URINALYSIS AUTO W/SCOPE: CPT

## 2019-10-30 PROCEDURE — 80061 LIPID PANEL: CPT

## 2019-10-30 PROCEDURE — 80178 ASSAY OF LITHIUM: CPT

## 2019-10-31 DIAGNOSIS — E80.6 HYPERBILIRUBINEMIA: Primary | ICD-10-CM

## 2019-11-04 ENCOUNTER — TELEPHONE (OUTPATIENT)
Dept: NEPHROLOGY | Facility: CLINIC | Age: 29
End: 2019-11-04

## 2019-11-04 NOTE — TELEPHONE ENCOUNTER
----- Message from Hellen Crespo DO sent at 11/3/2019 12:26 PM EST -----  Patient urine studies reviewed, there are very minor issues noted which we will review at her upcoming appointment in late November  No changes at this on our needed in medications or which she is doing

## 2019-11-13 RX ORDER — VALBENAZINE 40 MG/1
CAPSULE ORAL
Refills: 2 | COMMUNITY
Start: 2019-10-08 | End: 2019-11-15 | Stop reason: ALTCHOICE

## 2019-11-15 ENCOUNTER — OFFICE VISIT (OUTPATIENT)
Dept: INTERNAL MEDICINE CLINIC | Facility: CLINIC | Age: 29
End: 2019-11-15
Payer: COMMERCIAL

## 2019-11-15 ENCOUNTER — TRANSCRIBE ORDERS (OUTPATIENT)
Dept: LAB | Facility: CLINIC | Age: 29
End: 2019-11-15

## 2019-11-15 ENCOUNTER — APPOINTMENT (OUTPATIENT)
Dept: LAB | Facility: CLINIC | Age: 29
End: 2019-11-15
Payer: COMMERCIAL

## 2019-11-15 VITALS
BODY MASS INDEX: 19.67 KG/M2 | RESPIRATION RATE: 16 BRPM | HEART RATE: 54 BPM | TEMPERATURE: 97.8 F | DIASTOLIC BLOOD PRESSURE: 68 MMHG | SYSTOLIC BLOOD PRESSURE: 100 MMHG | OXYGEN SATURATION: 100 % | HEIGHT: 63 IN | WEIGHT: 111 LBS

## 2019-11-15 DIAGNOSIS — E80.6 HYPERBILIRUBINEMIA: ICD-10-CM

## 2019-11-15 DIAGNOSIS — B07.0 PLANTAR WART OF LEFT FOOT: Primary | ICD-10-CM

## 2019-11-15 DIAGNOSIS — F25.9 SCHIZOAFFECTIVE DISORDER, CHRONIC CONDITION WITH ACUTE EXACERBATION (HCC): Primary | ICD-10-CM

## 2019-11-15 DIAGNOSIS — Z79.899 ENCOUNTER FOR LONG-TERM (CURRENT) USE OF OTHER MEDICATIONS: ICD-10-CM

## 2019-11-15 DIAGNOSIS — F25.9 SCHIZOAFFECTIVE DISORDER, CHRONIC CONDITION WITH ACUTE EXACERBATION (HCC): ICD-10-CM

## 2019-11-15 PROBLEM — N18.2 CHRONIC KIDNEY DISEASE (CKD) STAGE G2/A2, MILDLY DECREASED GLOMERULAR FILTRATION RATE (GFR) BETWEEN 60-89 ML/MIN/1.73 SQUARE METER AND ALBUMINURIA CREATININE RATIO BETWEEN 30-299 MG/G: Status: RESOLVED | Noted: 2019-09-03 | Resolved: 2019-11-15

## 2019-11-15 PROBLEM — F41.1 GAD (GENERALIZED ANXIETY DISORDER): Status: RESOLVED | Noted: 2018-07-22 | Resolved: 2019-11-15

## 2019-11-15 LAB
BILIRUB DIRECT SERPL-MCNC: 0.2 MG/DL (ref 0–0.2)
BILIRUB UR QL STRIP: NEGATIVE
CLARITY UR: NORMAL
COLOR UR: YELLOW
GLUCOSE UR STRIP-MCNC: NEGATIVE MG/DL
HGB UR QL STRIP.AUTO: NEGATIVE
KETONES UR STRIP-MCNC: NEGATIVE MG/DL
LEUKOCYTE ESTERASE UR QL STRIP: NEGATIVE
LITHIUM SERPL-SCNC: 1.1 MMOL/L (ref 0.5–1)
NITRITE UR QL STRIP: NEGATIVE
PH UR STRIP.AUTO: 7.5 [PH]
PROT UR STRIP-MCNC: NEGATIVE MG/DL
SP GR UR STRIP.AUTO: 1.01 (ref 1–1.03)
UROBILINOGEN UR QL STRIP.AUTO: 0.2 E.U./DL

## 2019-11-15 PROCEDURE — 81003 URINALYSIS AUTO W/O SCOPE: CPT | Performed by: PSYCHIATRY & NEUROLOGY

## 2019-11-15 PROCEDURE — 80178 ASSAY OF LITHIUM: CPT

## 2019-11-15 PROCEDURE — 82248 BILIRUBIN DIRECT: CPT

## 2019-11-15 PROCEDURE — 36415 COLL VENOUS BLD VENIPUNCTURE: CPT

## 2019-11-15 PROCEDURE — 99214 OFFICE O/P EST MOD 30 MIN: CPT | Performed by: PHYSICIAN ASSISTANT

## 2019-11-15 RX ORDER — SALICYLIC ACID 6 G/100G
CREAM TOPICAL DAILY
Qty: 227 G | Refills: 0 | Status: SHIPPED | OUTPATIENT
Start: 2019-11-15 | End: 2020-08-03 | Stop reason: CLARIF

## 2019-11-15 NOTE — ASSESSMENT & PLAN NOTE
Salacylic acid cream ordered to apply daily  Pt counseled on importance of continued use  If no improvement consider cryotherapy or podiatry appt

## 2019-11-15 NOTE — PROGRESS NOTES
Assessment/Plan:  Problem List Items Addressed This Visit        Musculoskeletal and Integument    Plantar wart of left foot - Primary     Salacylic acid cream ordered to apply daily  Pt counseled on importance of continued use  If no improvement consider cryotherapy or podiatry appt  Relevant Medications    Salicylic Acid 6 % CREA           Diagnoses and all orders for this visit:    Plantar wart of left foot  -     Salicylic Acid 6 % CREA; Apply topically daily    Other orders  -     Discontinue: INGREZZA 40 MG CAPS; TAKE 1 CAPSULE (40MG) BY MOUTH ONCE DAILY        Plantar wart of left foot  Salacylic acid cream ordered to apply daily  Pt counseled on importance of continued use  If no improvement consider cryotherapy or podiatry appt  Subjective:      Patient ID: Armando Malcolm is a 26 y o  female  Pt presents for evaluation of what she believes to be a plantar wart on her left foot just below her toes  It is tender with weight bearing  It is approx 2mm in size, round and rough  It is consistent with a plantar wart  She initially scheduled her appt to evaluate an eye complaint but states it resolved completely in the meantime  The following portions of the patient's history were reviewed and updated as appropriate:   She has a past medical history of Anorexia nervosa, Anxiety, Chronic kidney disease, CPAP (continuous positive airway pressure) dependence, Depression, Disease of thyroid gland, Gall bladder polyp, Gastroesophageal reflux disease (9/18/2018), Hallucination, Memory difficulty (7/12/2018), Plantar wart of left foot, Psychiatric illness, Renal atrophy, Schizoaffective disorder ([address] ), Self-injurious behavior, Sleep apnea, Sleep difficulties, Suicide attempt ([address] ), and Urinary retention  ,  does not have any pertinent problems on file  ,   has a past surgical history that includes Eye surgery (Left); pr esophagogastroduodenoscopy transoral diagnostic (N/A, 2/9/2018); and pr esophagogastroduodenoscopy transoral diagnostic (N/A, 11/6/2018)  ,  family history includes Depression in her brother; Hypertension in her brother and father; OCD in her brother; Other in her family  ,   reports that she has never smoked  She has never used smokeless tobacco  She reports that she does not drink alcohol or use drugs  ,  is allergic to gluten meal   Current Outpatient Medications   Medication Sig Dispense Refill    benztropine (COGENTIN) 1 mg tablet Take 1 mg by mouth TAKE 1 TABLET IN AM AND 2 TABLETS IN PM      clonazePAM (KLONOPIN) 0 5 mg tablet Take by mouth 2 (two) times a day       gabapentin (NEURONTIN) 300 mg capsule Take 300 mg by mouth 2 (two) times a day       liothyronine (CYTOMEL) 5 mcg tablet TAKE 1 TABLET BY MOUTH ONCE DAILY 30 tablet 2    lithium carbonate (LITHOBID) 450 mg CR tablet Take 450 mg by mouth 2 (two) times a day      pantoprazole (PROTONIX) 40 mg tablet Take 40 mg by mouth daily  3    REXULTI 1 MG tablet Take 1 mg by mouth every evening  0    triamcinolone (KENALOG) 0 1 % ointment       Salicylic Acid 6 % CREA Apply topically daily 227 g 0     No current facility-administered medications for this visit  Review of Systems   Constitutional: Negative for chills and fever  HENT: Negative for congestion, ear pain, hearing loss, postnasal drip, rhinorrhea, sinus pressure, sinus pain, sore throat and trouble swallowing  Eyes: Negative for pain and visual disturbance  Respiratory: Negative for cough, chest tightness, shortness of breath and wheezing  Cardiovascular: Negative  Negative for chest pain, palpitations and leg swelling  Gastrointestinal: Negative for abdominal pain, blood in stool, constipation, diarrhea, nausea and vomiting  Endocrine: Negative for cold intolerance, heat intolerance, polydipsia, polyphagia and polyuria  Genitourinary: Negative for difficulty urinating, dysuria, flank pain and urgency     Musculoskeletal: Negative for arthralgias, back pain, gait problem and myalgias  Skin: Positive for rash  Allergic/Immunologic: Negative  Neurological: Negative for dizziness, weakness, light-headedness and headaches  Hematological: Negative  Psychiatric/Behavioral: Negative for behavioral problems, dysphoric mood and sleep disturbance  The patient is not nervous/anxious  PHQ-9 Depression Screening    PHQ-9:    Frequency of the following problems over the past two weeks:       Little interest or pleasure in doing things:  0 - not at all  Feeling down, depressed, or hopeless:  0 - not at all  PHQ-2 Score:  0          Objective:  Vitals:    11/15/19 0850   BP: 100/68   BP Location: Left arm   Patient Position: Sitting   Cuff Size: Standard   Pulse: (!) 54   Resp: 16   Temp: 97 8 °F (36 6 °C)   TempSrc: Tympanic   SpO2: 100%   Weight: 50 3 kg (111 lb)   Height: 5' 3" (1 6 m)     Body mass index is 19 66 kg/m²  Physical Exam   Constitutional: She is oriented to person, place, and time  She appears well-developed and well-nourished  No distress  HENT:   Head: Normocephalic and atraumatic  Right Ear: External ear normal    Left Ear: External ear normal    Nose: Nose normal    Mouth/Throat: Oropharynx is clear and moist  No oropharyngeal exudate  Eyes: Pupils are equal, round, and reactive to light  Conjunctivae and EOM are normal  Right eye exhibits no discharge  Left eye exhibits no discharge  No scleral icterus  Neck: Normal range of motion  Neck supple  No thyromegaly present  Cardiovascular: Normal rate, regular rhythm and normal heart sounds  Exam reveals no gallop and no friction rub  No murmur heard  Pulmonary/Chest: Effort normal and breath sounds normal  No respiratory distress  She has no wheezes  She has no rales  Abdominal: Soft  Bowel sounds are normal  She exhibits no distension  There is no tenderness  Musculoskeletal: Normal range of motion  She exhibits no edema, tenderness or deformity  Neurological: She is alert and oriented to person, place, and time  No cranial nerve deficit  Skin: Skin is warm and dry  She is not diaphoretic  Psychiatric: She has a normal mood and affect   Her behavior is normal  Judgment and thought content normal

## 2019-11-24 DIAGNOSIS — K21.9 GASTROESOPHAGEAL REFLUX DISEASE WITHOUT ESOPHAGITIS: ICD-10-CM

## 2019-11-25 RX ORDER — PANTOPRAZOLE SODIUM 40 MG/1
TABLET, DELAYED RELEASE ORAL
Qty: 30 TABLET | Refills: 3 | Status: SHIPPED | OUTPATIENT
Start: 2019-11-25 | End: 2019-11-26

## 2019-11-26 ENCOUNTER — OFFICE VISIT (OUTPATIENT)
Dept: NEPHROLOGY | Facility: CLINIC | Age: 29
End: 2019-11-26
Payer: COMMERCIAL

## 2019-11-26 VITALS
HEART RATE: 71 BPM | DIASTOLIC BLOOD PRESSURE: 80 MMHG | SYSTOLIC BLOOD PRESSURE: 120 MMHG | BODY MASS INDEX: 20.2 KG/M2 | HEIGHT: 63 IN | WEIGHT: 114 LBS

## 2019-11-26 DIAGNOSIS — N18.2 CHRONIC KIDNEY DISEASE (CKD) STAGE G2/A2, MILDLY DECREASED GLOMERULAR FILTRATION RATE (GFR) BETWEEN 60-89 ML/MIN/1.73 SQUARE METER AND ALBUMINURIA CREATININE RATIO BETWEEN 30-299 MG/G: Primary | ICD-10-CM

## 2019-11-26 PROCEDURE — 99213 OFFICE O/P EST LOW 20 MIN: CPT | Performed by: INTERNAL MEDICINE

## 2019-11-26 NOTE — PROGRESS NOTES
[facility] [place], [place]    Name: Bruce Crawford  YOB: 1990      Assessment/Plan:    Chronic kidney disease (CKD) stage G2/A2, mildly decreased glomerular filtration rate (GFR) between 60-89 mL/min/1 73 square meter and albuminuria creatinine ratio between  mg/g  Creatinine is slightly higher compared to prior check  Patient to have labs repeated in the next few weeks for recheck  Continue to avoid NSAIDs and other potential nephrotoxins  Problem List Items Addressed This Visit        Genitourinary    Chronic kidney disease (CKD) stage G2/A2, mildly decreased glomerular filtration rate (GFR) between 60-89 mL/min/1 73 square meter and albuminuria creatinine ratio between  mg/g - Primary     Creatinine is slightly higher compared to prior check  Patient to have labs repeated in the next few weeks for recheck  Continue to avoid NSAIDs and other potential nephrotoxins  Relevant Orders    Basic metabolic panel          Patient is doing well at this time  We will see her back for a regular appointment in about 6 months  Subjective:      Patient ID: Bruce Crawford is a 26 y o  female  We reviewed the patient's labs, creatinine noted to be increased since the last check in 6/2019  Patient is also complaining of increased urine frequency, no dysuria  She has been drinking extra fluids  The following portions of the patient's history were reviewed and updated as appropriate: allergies, current medications, past family history, past medical history, past social history, past surgical history and problem list     Review of Systems   All other systems reviewed and are negative          Social History     Socioeconomic History    Marital status: Single     Spouse name: None    Number of children: None    Years of education: None    Highest education level: None   Occupational History    Occupation: UNEMPLOYED Social Needs    Financial resource strain: None    Food insecurity:     Worry: None     Inability: None    Transportation needs:     Medical: None     Non-medical: None   Tobacco Use    Smoking status: Never Smoker    Smokeless tobacco: Never Used   Substance and Sexual Activity    Alcohol use: No    Drug use: No    Sexual activity: Not Currently   Lifestyle    Physical activity:     Days per week: None     Minutes per session: None    Stress: None   Relationships    Social connections:     Talks on phone: None     Gets together: None     Attends [employer] service: None     Active member of club or organization: None     Attends meetings of clubs or organizations: None     Relationship status: None    Intimate partner violence:     Fear of current or ex partner: None     Emotionally abused: None     Physically abused: None     Forced sexual activity: None   Other Topics Concern    None   Social History Narrative    UNEMPLOYED     Past Medical History:   Diagnosis Date    Anorexia nervosa     pt denies history at time of admission 7/21/18    Anxiety     Chronic kidney disease     CPAP (continuous positive airway pressure) dependence     waiting for a new mask    Depression     Disease of thyroid gland     hypo    Gall bladder polyp     Gastroesophageal reflux disease 9/18/2018    Hallucination     Memory difficulty 7/12/2018    Plantar wart of left foot     Psychiatric illness     Renal atrophy     Schizoaffective disorder ([facility] [address] )     Self-injurious behavior     Sleep apnea     Sleep difficulties     Suicide attempt ([facility] [address] )     history of attempt at age 22 by overdose     Urinary retention      Past Surgical History:   Procedure Laterality Date    EYE SURGERY Left     tear duct    IL ESOPHAGOGASTRODUODENOSCOPY TRANSORAL DIAGNOSTIC N/A 2/9/2018    Procedure: ESOPHAGOGASTRODUODENOSCOPY (EGD);   Surgeon: Georgina Riley MD;  Location: [facility];  Service: Gastroenterology    IL ESOPHAGOGASTRODUODENOSCOPY TRANSORAL DIAGNOSTIC N/A 11/6/2018    Procedure: ESOPHAGOGASTRODUODENOSCOPY (EGD);   Surgeon: Danika Mary MD;  Location: [facility];  Service: Gastroenterology       Current Outpatient Medications:     benztropine (COGENTIN) 1 mg tablet, Take 1 mg by mouth TAKE 1 TABLET IN AM AND 2 TABLETS IN PM, Disp: , Rfl:     clonazePAM (KLONOPIN) 0 5 mg tablet, Take by mouth 2 (two) times a day , Disp: , Rfl:     gabapentin (NEURONTIN) 300 mg capsule, Take 300 mg by mouth 2 (two) times a day , Disp: , Rfl:     liothyronine (CYTOMEL) 5 mcg tablet, TAKE 1 TABLET BY MOUTH ONCE DAILY, Disp: 30 tablet, Rfl: 2    lithium carbonate (LITHOBID) 450 mg CR tablet, Take 450 mg by mouth 2 (two) times a day , Disp: , Rfl:     pantoprazole (PROTONIX) 40 mg tablet, Take 40 mg by mouth daily, Disp: , Rfl: 3    REXULTI 1 MG tablet, Take 1 mg by mouth every evening, Disp: , Rfl: 0    triamcinolone (KENALOG) 0 1 % ointment, , Disp: , Rfl:     Salicylic Acid 3 % CREA, Apply topically 2 (two) times a day, Disp: 104 mL, Rfl: 0    Salicylic Acid 6 % CREA, Apply topically daily, Disp: 227 g, Rfl: 0    Lab Results   Component Value Date    SODIUM 141 10/30/2019    K 3 6 10/30/2019     10/30/2019    CO2 28 10/30/2019    AGAP 6 10/30/2019    BUN 11 10/30/2019    CREATININE 1 24 10/30/2019    GLUC 94 01/16/2019    GLUF 91 10/30/2019    CALCIUM 9 6 10/30/2019    AST 14 10/30/2019    ALT 29 10/30/2019    ALKPHOS 80 10/30/2019    PROT 7 2 11/25/2014    TP 7 4 10/30/2019    BILITOT 1 1 (H) 11/25/2014    TBILI 1 06 (H) 10/30/2019    EGFR 58 10/30/2019     Lab Results   Component Value Date    WBC 7 33 10/30/2019    HGB 13 3 10/30/2019    HCT 42 8 10/30/2019    MCV 91 10/30/2019     10/30/2019     Lab Results   Component Value Date    CHOLESTEROL 116 10/30/2019    CHOLESTEROL 104 01/16/2019     Lab Results   Component Value Date    HDL 65 10/30/2019    HDL 55 01/16/2019     Lab Results   Component Value Date LDLCALC 42 10/30/2019    LDLCALC 39 01/16/2019     Lab Results   Component Value Date    TRIG 45 10/30/2019    TRIG 48 01/16/2019     No results found for: [place], [place]  Lab Results   Component Value Date    [id number] 1 800 06/18/2019     Lab Results   Component Value Date    CALCIUM 9 6 10/30/2019     No results found for: SPEP, UPEP  No results found for: MICROALBUR, [id number]        Objective:      /80 (BP Location: Left arm, Patient Position: Sitting, Cuff Size: Standard)   Pulse 71   Ht 5' 3" (1 6 m)   Wt 51 7 kg (114 lb)   BMI 20 19 kg/m²          Physical Exam   Constitutional: She is oriented to person, place, and time  She appears well-developed and well-nourished  No distress  HENT:   Head: Normocephalic and atraumatic  Eyes: Conjunctivae are normal    Neck: Neck supple  Cardiovascular: Normal rate and regular rhythm  Pulmonary/Chest: Effort normal and breath sounds normal    Abdominal: Soft  Musculoskeletal: She exhibits no edema  Neurological: She is alert and oriented to person, place, and time  No cranial nerve deficit  Skin: Skin is warm  No rash noted  Psychiatric: She has a normal mood and affect   Her behavior is normal

## 2019-12-02 ENCOUNTER — TELEPHONE (OUTPATIENT)
Dept: INTERNAL MEDICINE CLINIC | Facility: CLINIC | Age: 29
End: 2019-12-02

## 2019-12-02 DIAGNOSIS — B07.0 PLANTAR WART OF LEFT FOOT: Primary | ICD-10-CM

## 2019-12-02 NOTE — ASSESSMENT & PLAN NOTE
Creatinine is slightly higher compared to prior check  Patient to have labs repeated in the next few weeks for recheck  Continue to avoid NSAIDs and other potential nephrotoxins

## 2019-12-04 ENCOUNTER — TELEPHONE (OUTPATIENT)
Dept: INTERNAL MEDICINE CLINIC | Facility: CLINIC | Age: 29
End: 2019-12-04

## 2019-12-04 NOTE — TELEPHONE ENCOUNTER
Pt called, rx for Salicylic Acid 3% cream was sent to [facility], pharmacist told her  they do not have that strength, will need something else

## 2019-12-30 NOTE — PROGRESS NOTES
1/3/2020      Chief Complaint   Patient presents with    voiding dysfcuntion       Assessment and Plan    26 y o  female managed by Dr Amol Springer    1  Voiding dysfunction  - PVR 34mL  - discussed with the patient that I believe the root cause of her urinary issues is her constipation, the patient and her mother are resistant to any further treatment of her constipation as this has been lifelong and likely secondary to multiple psychiatric medications  - I did encourage daily fiber use and proper hydration  - aside for this, we can trial terazosin for symptomatic management, the patient was on Flomax in the past with success    Recommend we see the patient back in 6 weeks for symptom reassessment      History of Present Illness  Adelaide Roca is a 26 y o  female here for follow up evaluation of incomplete bladder emptying  Upon presentation today the patient states that she has been having some urinary frequency and the sensation she is not emptying her bladder to completion  Her PVR in the office is 34 milliliters  She does have a significant past medical history who with psychiatric issues  She is on multiple antipsychotic said  She presents today with her mother  She does have trouble with lifelong constipation  She states she has mentioned it to her gastroenterologist in the past who did not provide her with any medications for this  Currently, she takes as needed magnesium citrate as well as a daily stool softener  Review of Systems   Constitutional: Negative for activity change, chills and fever  Gastrointestinal: Negative for abdominal distention and abdominal pain  Musculoskeletal: Negative for back pain and gait problem  Psychiatric/Behavioral: Negative for behavioral problems and confusion         Past Medical History  Past Medical History:   Diagnosis Date    Anorexia nervosa     pt denies history at time of admission 7/21/18    Anxiety     Chronic kidney disease     CPAP (continuous positive airway pressure) dependence     waiting for a new mask    Depression     Disease of thyroid gland     hypo    Gall bladder polyp     Gastroesophageal reflux disease 9/18/2018    Hallucination     Memory difficulty 7/12/2018    Plantar wart of left foot     Psychiatric illness     Renal atrophy     [facility] [address] [facility]     Self-injurious behavior     Sleep apnea     Sleep difficulties     [facility]     history of attempt at age 22 by overdose     Urinary retention        Past Social History  Past Surgical History:   Procedure Laterality Date    EYE SURGERY Left     tear duct    [place] ESOPHAGOGASTRODUODENOSCOPY TRANSORAL DIAGNOSTIC N/A 2/9/2018    Procedure: ESOPHAGOGASTRODUODENOSCOPY (EGD); Surgeon: Rio Aguirre MD;  Location: [facility];  Service: Gastroenterology    [place] ESOPHAGOGASTRODUODENOSCOPY TRANSORAL DIAGNOSTIC N/A 11/6/2018    Procedure: ESOPHAGOGASTRODUODENOSCOPY (EGD);   Surgeon: Issa Ruiz MD;  Location: [facility];  Service: Gastroenterology     Social History     Tobacco Use   Smoking Status Never Smoker   Smokeless Tobacco Never Used       Past Family History  Family History   Problem Relation Age of Onset    Hypertension Father         benign essential    Hypertension Brother         benign essential     OCD Brother     Depression Brother     Other Family         nasolacrimal duct obstruction, acquired       Past Social history  Social History     Socioeconomic History    Marital status: Single     Spouse name: Not on file    Number of children: Not on file    Years of education: Not on file    Highest education level: Not on file   Occupational History    Occupation: UNEMPLOYED   Social Needs    Financial resource strain: Not on file    Food insecurity:     Worry: Not on file     Inability: Not on file    Transportation needs:     Medical: Not on file     Non-medical: Not on file   Tobacco Use    Smoking status: Never Smoker    Smokeless tobacco: Never Used   Substance and Sexual Activity    Alcohol use: No    Drug use: No    Sexual activity: Not Currently   Lifestyle    Physical activity:     Days per week: Not on file     Minutes per session: Not on file    Stress: Not on file   Relationships    Social connections:     Talks on phone: Not on file     Gets together: Not on file     Attends Muslim service: Not on file     Active member of club or organization: Not on file     Attends meetings of clubs or organizations: Not on file     Relationship status: Not on file    Intimate partner violence:     Fear of current or ex partner: Not on file     Emotionally abused: Not on file     Physically abused: Not on file     Forced sexual activity: Not on file   Other Topics Concern    Not on file   Social History Narrative    UNEMPLOYED       Current Medications  Current Outpatient Medications   Medication Sig Dispense Refill    gabapentin (NEURONTIN) 300 mg capsule Take 300 mg by mouth 2 (two) times a day       liothyronine (CYTOMEL) 5 mcg tablet TAKE 1 TABLET BY MOUTH ONCE DAILY 30 tablet 0    lithium carbonate (LITHOBID) 450 mg CR tablet Take 450 mg by mouth 2 (two) times a day       pantoprazole (PROTONIX) 40 mg tablet Take 40 mg by mouth daily  3    REXULTI 1 MG tablet Take 1 mg by mouth every evening  0    triamcinolone (KENALOG) 0 1 % ointment       benztropine (COGENTIN) 1 mg tablet Take 1 mg by mouth TAKE 1 TABLET IN AM AND 2 TABLETS IN PM      clonazePAM (KLONOPIN) 0 5 mg tablet Take by mouth 2 (two) times a day       Salicylic Acid 3 % CREA Apply topically 2 (two) times a day 118 mL 0    Salicylic Acid 6 % CREA Apply topically daily 227 g 0    terazosin (HYTRIN) 2 mg capsule Take 1 capsule (2 mg total) by mouth daily at bedtime 30 capsule 2     No current facility-administered medications for this visit          Allergies  Allergies   Allergen Reactions    Gluten Meal          The following portions of the patient's history were reviewed and updated as appropriate: allergies, current medications, past medical history, past social history, past surgical history and problem list       Vitals  Vitals:    01/03/20 1312   Weight: 51 3 kg (113 lb)   Height: 5' 3" (1 6 m)       Physical Exam  Constitutional   General appearance: Patient is seated and in no acute distress, well appearing and well nourished  Head and Face   Head and face: Normal     Eyes   Conjunctiva and lids: No erythema, swelling or discharge  Ears, Nose, Mouth, and Throat   Hearing: Normal     Pulmonary   Respiratory effort: No increased work of breathing or signs of respiratory distress  Cardiovascular   Examination of extremities for edema and/or varicosities: Normal     Abdomen   Abdomen: Non-tender, no masses  Musculoskeletal   Gait and station: Normal    Skin   Skin and subcutaneous tissue: Warm, dry, and intact  No visible lesions or rashes    Psychiatric   Judgment and insight: Normal  Recent and remote memory:  Normal  Mood and affect: Normal      Results  Recent Results (from the past 1 hour(s))   POCT Measure PVR    Collection Time: 01/03/20  1:28 PM   Result Value Ref Range    POST-VOID RESIDUAL VOLUME, ML POC 34 mL   ]  No results found for: PSA  Lab Results   Component Value Date    CALCIUM 9 6 10/30/2019    K 3 6 10/30/2019    CO2 28 10/30/2019     10/30/2019    BUN 11 10/30/2019    CREATININE 1 24 10/30/2019     Lab Results   Component Value Date    WBC 7 33 10/30/2019    HGB 13 3 10/30/2019    HCT 42 8 10/30/2019    MCV 91 10/30/2019     10/30/2019       Orders  Orders Placed This Encounter   Procedures    POCT Measure PVR

## 2020-01-01 DIAGNOSIS — E03.9 HYPOTHYROIDISM, UNSPECIFIED TYPE: ICD-10-CM

## 2020-01-01 RX ORDER — LIOTHYRONINE SODIUM 5 UG/1
TABLET ORAL
Qty: 30 TABLET | Refills: 0 | Status: SHIPPED | OUTPATIENT
Start: 2020-01-01 | End: 2020-01-05

## 2020-01-03 ENCOUNTER — OFFICE VISIT (OUTPATIENT)
Dept: UROLOGY | Facility: CLINIC | Age: 30
End: 2020-01-03
Payer: COMMERCIAL

## 2020-01-03 VITALS — WEIGHT: 113 LBS | BODY MASS INDEX: 20.02 KG/M2 | HEIGHT: 63 IN

## 2020-01-03 DIAGNOSIS — R33.9 RETENTION, URINE: Primary | ICD-10-CM

## 2020-01-03 LAB — POST-VOID RESIDUAL VOLUME, ML POC: 34 ML

## 2020-01-03 PROCEDURE — 99213 OFFICE O/P EST LOW 20 MIN: CPT | Performed by: PHYSICIAN ASSISTANT

## 2020-01-03 PROCEDURE — 51798 US URINE CAPACITY MEASURE: CPT | Performed by: PHYSICIAN ASSISTANT

## 2020-01-03 RX ORDER — TERAZOSIN 2 MG/1
2 CAPSULE ORAL
Qty: 30 CAPSULE | Refills: 2 | Status: SHIPPED | OUTPATIENT
Start: 2020-01-03 | End: 2020-02-13

## 2020-01-05 DIAGNOSIS — E03.9 HYPOTHYROIDISM, UNSPECIFIED TYPE: ICD-10-CM

## 2020-01-05 RX ORDER — LIOTHYRONINE SODIUM 5 UG/1
TABLET ORAL
Qty: 30 TABLET | Refills: 0 | Status: SHIPPED | OUTPATIENT
Start: 2020-01-05 | End: 2020-03-02

## 2020-02-11 ENCOUNTER — APPOINTMENT (OUTPATIENT)
Dept: LAB | Facility: CLINIC | Age: 30
End: 2020-02-11
Payer: COMMERCIAL

## 2020-02-11 ENCOUNTER — TRANSCRIBE ORDERS (OUTPATIENT)
Dept: LAB | Facility: CLINIC | Age: 30
End: 2020-02-11

## 2020-02-11 DIAGNOSIS — Z79.899 ENCOUNTER FOR LONG-TERM (CURRENT) USE OF OTHER MEDICATIONS: Primary | ICD-10-CM

## 2020-02-11 DIAGNOSIS — F25.9 SCHIZOAFFECTIVE DISORDER, UNSPECIFIED TYPE (HCC): Chronic | ICD-10-CM

## 2020-02-11 DIAGNOSIS — Z79.899 ENCOUNTER FOR LONG-TERM (CURRENT) USE OF OTHER MEDICATIONS: ICD-10-CM

## 2020-02-11 LAB
LITHIUM SERPL-SCNC: 0.8 MMOL/L (ref 0.5–1)
T4 FREE SERPL-MCNC: 0.9 NG/DL (ref 0.76–1.46)
TSH SERPL DL<=0.05 MIU/L-ACNC: 1.48 UIU/ML (ref 0.36–3.74)

## 2020-02-11 PROCEDURE — 84439 ASSAY OF FREE THYROXINE: CPT

## 2020-02-11 PROCEDURE — 36415 COLL VENOUS BLD VENIPUNCTURE: CPT

## 2020-02-11 PROCEDURE — 84443 ASSAY THYROID STIM HORMONE: CPT

## 2020-02-11 PROCEDURE — 80178 ASSAY OF LITHIUM: CPT

## 2020-02-12 PROBLEM — N39.8 VOIDING DYSFUNCTION: Status: ACTIVE | Noted: 2020-02-12

## 2020-02-12 NOTE — PROGRESS NOTES
2/13/2020      Chief Complaint   Patient presents with    Follow-up    VOIDING DYSFUNCTION       Assessment and Plan    31 y o  female managed by Dr Mildred Garcia    1  Voiding dysfunction  - again discussed with the patient that I believe the root cause of her urinary issues is her constipation, the patient remains resistant to any further treatment of her constipation as this has been lifelong and likely secondary to multiple psychiatric medications, I did encourage daily fiber use and proper hydration  - stop terazosin as this had no benefit  - proceed with pelvic floor PT  - if pelvic floor PT ineffective can attempt Myrbetriq, would avoid anticholinergics given this could worsen her constipation    FU 3 months for symptom reassessment      History of Present Illness  Bruce Crawford is a 31 y o  female here for follow up evaluation of voiding dysfunction  The patient complains mostly of urinary frequency and urgency with small voids as well as hesitancy and needing to push to urinate  The patient has multiple psychiatric medications which she is on and has had lifelong constipation  At her last visit was highly recommended she control her constipation but the patient and her mother were not amendable to this plan  Because of this, she was started on terazosin in hopes this would help relax her bladder neck, she had no benefit with this  She presents today with continued complaints of overactivity and urinary hesitancy  At her last visit she presented with her mother but today she is alone  Review of Systems   Constitutional: Negative for activity change, chills and fever  Gastrointestinal: Negative for abdominal distention and abdominal pain  Musculoskeletal: Negative for back pain and gait problem  Psychiatric/Behavioral: Negative for behavioral problems and confusion  Urinary Incontinence Screening      Most Recent Value   Urinary Incontinence   Urinary Incontinence?   No   Incomplete emptying? Yes   Urinary frequency? Yes   Urinary urgency? Yes   Urinary hesitancy? Yes   Dysuria (painful difficult urination)? No   Nocturia (waking up to use the bathroom)? No   Straining (having to push to go)? Yes   Weak stream?  Yes   Intermittent stream?  Yes          Past Medical History  Past Medical History:   Diagnosis Date    Anorexia nervosa     pt denies history at time of admission 7/21/18    Anxiety     Chronic kidney disease     CPAP (continuous positive airway pressure) dependence     waiting for a new mask    Depression     Disease of thyroid gland     hypo    Gall bladder polyp     Gastroesophageal reflux disease 9/18/2018    Hallucination     Memory difficulty 7/12/2018    Plantar wart of left foot     Psychiatric illness     Renal atrophy     [facility] [address] [facility]     Self-injurious behavior     Sleep apnea     Sleep difficulties     Suicide attempt ([facility] [address] [facility]     history of attempt at age 22 by overdose     Urinary retention        Past Social History  Past Surgical History:   Procedure Laterality Date    EYE SURGERY Left     tear duct    [place] ESOPHAGOGASTRODUODENOSCOPY TRANSORAL DIAGNOSTIC N/A 2/9/2018    Procedure: ESOPHAGOGASTRODUODENOSCOPY (EGD); Surgeon: Anh Waite MD;  [facility]: [facility];  Service: Gastroenterology    [place] ESOPHAGOGASTRODUODENOSCOPY TRANSORAL DIAGNOSTIC N/A 11/6/2018    Procedure: ESOPHAGOGASTRODUODENOSCOPY (EGD);   Surgeon: Dimas Nguyen MD;  [facility]: [facility];  Service: Gastroenterology     Social History     Tobacco Use   Smoking Status Never Smoker   Smokeless Tobacco Never Used       Past Family History  Family History   Problem Relation Age of Onset    Hypertension Father         benign essential    Hypertension Brother         benign essential     OCD Brother     Depression Brother     Other Family         nasolacrimal duct obstruction, acquired       Past Social history  Social History     Socioeconomic History    Marital status: Single     Spouse name: Not on file    Number of children: Not on file    Years of education: Not on file    Highest education level: Not on file   Occupational History    Occupation: UNEMPLOYED   Social Needs    Financial resource strain: Not on file    Food insecurity:     Worry: Not on file     Inability: Not on file    Transportation needs:     Medical: Not on file     Non-medical: Not on file   Tobacco Use    Smoking status: Never Smoker    Smokeless tobacco: Never Used   Substance and Sexual Activity    Alcohol use: No    Drug use: No    Sexual activity: Not Currently   Lifestyle    Physical activity:     Days per week: Not on file     Minutes per session: Not on file    Stress: Not on file   Relationships    Social connections:     Talks on phone: Not on file     Gets together: Not on file     Attends Pentecostal service: Not on file     Active member of club or organization: Not on file     Attends meetings of clubs or organizations: Not on file     Relationship status: Not on file    Intimate partner violence:     Fear of current or ex partner: Not on file     Emotionally abused: Not on file     Physically abused: Not on file     Forced sexual activity: Not on file   Other Topics Concern    Not on file   Social History Narrative    UNEMPLOYED       Current Medications  Current Outpatient Medications   Medication Sig Dispense Refill    benztropine (COGENTIN) 1 mg tablet Take 1 mg by mouth TAKE 1 TABLET IN AM AND 2 TABLETS IN PM      busPIRone (BUSPAR) 5 mg tablet       clobetasol (TEMOVATE) 0 05 % cream Apply topically 2 (two) times a day      clonazePAM (KLONOPIN) 0 5 mg tablet Take by mouth daily       gabapentin (NEURONTIN) 300 mg capsule Take 300 mg by mouth 2 (two) times a day       liothyronine (CYTOMEL) 5 mcg tablet TAKE 1 TABLET BY MOUTH ONCE DAILY 30 tablet 0    lithium carbonate (LITHOBID) 300 mg CR tablet Take 300 mg by mouth 2 (two) times a day      lithium carbonate (LITHOBID) 450 mg CR tablet Take 450 mg by mouth 2 (two) times a day       pantoprazole (PROTONIX) 40 mg tablet Take 40 mg by mouth daily  3    REXULTI 1 MG tablet Take 1 mg by mouth every evening  0    terazosin (HYTRIN) 2 mg capsule Take 1 capsule (2 mg total) by mouth daily at bedtime 30 capsule 2    Salicylic Acid 3 % CREA Apply topically 2 (two) times a day 018 mL 0    Salicylic Acid 6 % CREA Apply topically daily 227 g 0    triamcinolone (KENALOG) 0 1 % ointment        No current facility-administered medications for this visit  Allergies  Allergies   Allergen Reactions    Gluten Meal          The following portions of the patient's history were reviewed and updated as appropriate: allergies, current medications, past medical history, past social history, past surgical history and problem list       Vitals  Vitals:    02/13/20 1501   Weight: 52 3 kg (115 lb 3 2 oz)   Height: 5' 3" (1 6 m)         Physical Exam  Constitutional   General appearance: Patient is seated and in no acute distress, well appearing and well nourished  Head and Face   Head and face: Normal     Eyes   Conjunctiva and lids: No erythema, swelling or discharge  Ears, Nose, Mouth, and Throat   Hearing: Normal     Pulmonary   Respiratory effort: No increased work of breathing or signs of respiratory distress  Cardiovascular   Examination of extremities for edema and/or varicosities: Normal     Abdomen   Abdomen: Non-tender, no masses  Musculoskeletal   Gait and station: Normal     Skin   Skin and subcutaneous tissue: Warm, dry, and intact  No visible lesions or rashes  Psychiatric   Judgment and insight: Normal  Recent and remote memory:  Normal  Mood and affect: Normal      Results  No results found for this or any previous visit (from the past 1 hour(s))  ]  No results found for: PSA  Lab Results   Component Value Date    CALCIUM 9 6 10/30/2019    K 3 6 10/30/2019    CO2 28 10/30/2019     10/30/2019 BUN 11 10/30/2019    CREATININE 1 24 10/30/2019     Lab Results   Component Value Date    WBC 7 33 10/30/2019    HGB 13 3 10/30/2019    HCT 42 8 10/30/2019    MCV 91 10/30/2019     10/30/2019       Orders  No orders of the defined types were placed in this encounter

## 2020-02-13 ENCOUNTER — OFFICE VISIT (OUTPATIENT)
Dept: UROLOGY | Facility: CLINIC | Age: 30
End: 2020-02-13
Payer: COMMERCIAL

## 2020-02-13 VITALS
HEIGHT: 63 IN | HEART RATE: 65 BPM | DIASTOLIC BLOOD PRESSURE: 80 MMHG | BODY MASS INDEX: 20.41 KG/M2 | SYSTOLIC BLOOD PRESSURE: 106 MMHG | WEIGHT: 115.2 LBS

## 2020-02-13 DIAGNOSIS — N39.8 VOIDING DYSFUNCTION: Primary | ICD-10-CM

## 2020-02-13 PROCEDURE — 1036F TOBACCO NON-USER: CPT | Performed by: PHYSICIAN ASSISTANT

## 2020-02-13 PROCEDURE — 3008F BODY MASS INDEX DOCD: CPT | Performed by: PHYSICIAN ASSISTANT

## 2020-02-13 PROCEDURE — 99213 OFFICE O/P EST LOW 20 MIN: CPT | Performed by: PHYSICIAN ASSISTANT

## 2020-02-13 RX ORDER — CLOBETASOL PROPIONATE 0.5 MG/G
CREAM TOPICAL 2 TIMES DAILY
COMMUNITY
End: 2020-05-26

## 2020-02-13 RX ORDER — LITHIUM CARBONATE 300 MG/1
300 TABLET, FILM COATED, EXTENDED RELEASE ORAL 2 TIMES DAILY
COMMUNITY
End: 2020-05-26

## 2020-02-13 RX ORDER — BUSPIRONE HYDROCHLORIDE 5 MG/1
TABLET ORAL
COMMUNITY
End: 2020-05-26

## 2020-03-01 DIAGNOSIS — E03.9 HYPOTHYROIDISM, UNSPECIFIED TYPE: ICD-10-CM

## 2020-03-02 RX ORDER — LIOTHYRONINE SODIUM 5 UG/1
TABLET ORAL
Qty: 30 TABLET | Refills: 5 | Status: SHIPPED | OUTPATIENT
Start: 2020-03-02 | End: 2020-08-31

## 2020-03-22 DIAGNOSIS — K21.9 GASTROESOPHAGEAL REFLUX DISEASE, ESOPHAGITIS PRESENCE NOT SPECIFIED: Primary | ICD-10-CM

## 2020-03-24 RX ORDER — PANTOPRAZOLE SODIUM 40 MG/1
TABLET, DELAYED RELEASE ORAL
Qty: 30 TABLET | Refills: 4 | Status: SHIPPED | OUTPATIENT
Start: 2020-03-24 | End: 2020-08-24

## 2020-04-29 ENCOUNTER — TELEPHONE (OUTPATIENT)
Dept: NEPHROLOGY | Facility: CLINIC | Age: 30
End: 2020-04-29

## 2020-05-19 ENCOUNTER — APPOINTMENT (OUTPATIENT)
Dept: LAB | Facility: CLINIC | Age: 30
End: 2020-05-19
Payer: COMMERCIAL

## 2020-05-19 DIAGNOSIS — N18.2 CHRONIC KIDNEY DISEASE (CKD) STAGE G2/A2, MILDLY DECREASED GLOMERULAR FILTRATION RATE (GFR) BETWEEN 60-89 ML/MIN/1.73 SQUARE METER AND ALBUMINURIA CREATININE RATIO BETWEEN 30-299 MG/G: ICD-10-CM

## 2020-05-19 LAB
ANION GAP SERPL CALCULATED.3IONS-SCNC: 4 MMOL/L (ref 4–13)
BUN SERPL-MCNC: 15 MG/DL (ref 5–25)
CALCIUM SERPL-MCNC: 9.3 MG/DL (ref 8.3–10.1)
CHLORIDE SERPL-SCNC: 108 MMOL/L (ref 100–108)
CO2 SERPL-SCNC: 28 MMOL/L (ref 21–32)
CREAT SERPL-MCNC: 1.16 MG/DL (ref 0.6–1.3)
GFR SERPL CREATININE-BSD FRML MDRD: 63 ML/MIN/1.73SQ M
GLUCOSE P FAST SERPL-MCNC: 96 MG/DL (ref 65–99)
POTASSIUM SERPL-SCNC: 4.4 MMOL/L (ref 3.5–5.3)
SODIUM SERPL-SCNC: 140 MMOL/L (ref 136–145)

## 2020-05-19 PROCEDURE — 36415 COLL VENOUS BLD VENIPUNCTURE: CPT

## 2020-05-19 PROCEDURE — 80048 BASIC METABOLIC PNL TOTAL CA: CPT

## 2020-05-26 ENCOUNTER — OFFICE VISIT (OUTPATIENT)
Dept: NEPHROLOGY | Facility: CLINIC | Age: 30
End: 2020-05-26
Payer: COMMERCIAL

## 2020-05-26 VITALS
TEMPERATURE: 98.5 F | BODY MASS INDEX: 19.84 KG/M2 | WEIGHT: 112 LBS | HEART RATE: 75 BPM | DIASTOLIC BLOOD PRESSURE: 60 MMHG | OXYGEN SATURATION: 98 % | HEIGHT: 63 IN | SYSTOLIC BLOOD PRESSURE: 102 MMHG

## 2020-05-26 DIAGNOSIS — N18.2 CHRONIC KIDNEY DISEASE (CKD) STAGE G2/A2, MILDLY DECREASED GLOMERULAR FILTRATION RATE (GFR) BETWEEN 60-89 ML/MIN/1.73 SQUARE METER AND ALBUMINURIA CREATININE RATIO BETWEEN 30-299 MG/G: Primary | ICD-10-CM

## 2020-05-26 DIAGNOSIS — N25.81 SECONDARY HYPERPARATHYROIDISM OF RENAL ORIGIN (HCC): ICD-10-CM

## 2020-05-26 DIAGNOSIS — F25.9 SCHIZOAFFECTIVE DISORDER, UNSPECIFIED TYPE (HCC): Chronic | ICD-10-CM

## 2020-05-26 PROCEDURE — 1036F TOBACCO NON-USER: CPT | Performed by: INTERNAL MEDICINE

## 2020-05-26 PROCEDURE — 99213 OFFICE O/P EST LOW 20 MIN: CPT | Performed by: INTERNAL MEDICINE

## 2020-05-26 PROCEDURE — 3008F BODY MASS INDEX DOCD: CPT | Performed by: INTERNAL MEDICINE

## 2020-05-26 RX ORDER — GABAPENTIN 100 MG/1
CAPSULE ORAL
COMMUNITY
End: 2020-08-03 | Stop reason: CLARIF

## 2020-05-26 RX ORDER — LITHIUM CARBONATE 450 MG
TABLET, EXTENDED RELEASE ORAL
COMMUNITY
End: 2020-08-03 | Stop reason: CLARIF

## 2020-05-26 RX ORDER — CLONAZEPAM 0.5 MG/1
TABLET ORAL
COMMUNITY
End: 2020-08-03 | Stop reason: CLARIF

## 2020-05-26 RX ORDER — TRIAMCINOLONE ACETONIDE 1 MG/G
CREAM TOPICAL
COMMUNITY
Start: 2020-03-03 | End: 2021-03-03

## 2020-05-26 RX ORDER — BUSPIRONE HYDROCHLORIDE 7.5 MG/1
10 TABLET ORAL 2 TIMES DAILY
COMMUNITY
Start: 2020-02-26 | End: 2020-08-03 | Stop reason: CLARIF

## 2020-05-26 RX ORDER — LITHIUM CARBONATE 300 MG/1
TABLET, FILM COATED, EXTENDED RELEASE ORAL
COMMUNITY
End: 2020-08-03 | Stop reason: CLARIF

## 2020-05-26 RX ORDER — GABAPENTIN 400 MG/1
400 CAPSULE ORAL
COMMUNITY

## 2020-06-29 NOTE — PROGRESS NOTES
PT Evaluation     Today's date: 2020  Patient name: Jose Abdalla  : 1990  MRN: [id number]  Referring provider: PITER Cordoba*  Dx:   Encounter Diagnosis     ICD-10-CM    1  Voiding dysfunction N39 8 PT plan of care cert/re-cert       Start Time: 1250  Stop Time: 1350  Total time in clinic (min): 60 minutes    Assessment  Assessment details: Christina Rodrigues is a 31year old female who presents with the above outlined impairments  These impairments may contribute to urinary symptoms of urinary urgency, frequency and incomplete emptying  She may benefit from a trial of pelvic floor PT to address these impairments and improve overall quality of life  Impairments: abnormal coordination, abnormal muscle firing, abnormal or restricted ROM, impaired physical strength, lacks appropriate home exercise program and pain with function  Understanding of Dx/Px/POC: good   Prognosis: good    Goals  STG 2-6 weeks  1  Shushan in ongoing HEP throughout POC  2  Demonstrate proper posture for most effective voiding  3  Full relaxation of PFM post activation    LTG 7-12 weeks  1  Decreased urinary urgency  2  Positive use of relaxation techniques to decrease urgency  3  Urinary frequency 8 voids or less in a 24 hour cycle  4  3/5 PFM with full relaxation post activation  5  Maximize bladder emptying with each void  6  Discharge planning    Plan  Patient would benefit from: skilled physical therapy  Planned modality interventions: biofeedback  Planned therapy interventions: manual therapy, motor coordination training, neuromuscular re-education, patient education, behavior modification, therapeutic exercise, therapeutic activities and home exercise program  Frequency: 1x week  Duration in weeks: 12  Plan of Care beginning date: 2020  Plan of Care expiration date: 2020  Treatment plan discussed with: caregiver        PT Pelvic Floor Subjective:   History of Present Illness:    Onset of urinary urgency and frequency with feeling of incomplete bladder emptying that has gotten progressively worse over the past year Has been under the care of urology who suggested pelvic floor PT as well as controlling chronic constipation  Placed on medication to relax bladder neck a few months ago with no change in status  Recurrent probem      Social Support:     Lives with:  Parents    Relationship status: never     Work status: unemployed    History of Depression: yes  Hand dominance:  Right  Diet and Exercise:    Diet:gluten free    Exercise type: running    push ups    Exercise frequency: daily  OB/ gyn History    Gestational History:     Prior Pregnancy: No      Menstrual History:      Menstrual irregularities irregular menses  Bladder Function:     Voiding Difficulties positive for: urgency, frequent urination, hesitancy, straining and incomplete emptying      Voiding Difficulties comments:     Voiding frequency: every 15-30 minutes and every 3-4 hours    Urinary leakage: no urine leakage    Nocturia (episodes per night): 0    Painful urination: No      Fluid Intake Type:  Coffee, sports drinks and water  Bowel Function:     Voiding DIfficulties: stool frequency abnormal, painful defecating, unfinished feeling after defecating and constipation      Bowel frequency: every 3 days and more than every 4 days    Stool softener use: stool softeners    Enema use: no enema    Uses "squatty potty": no Squatty Potty  Pain:     Current pain ratin    At worst pain ratin    Location:  Bladder and perineal area with a full bladder  Onset:  7-12 months ago    Quality:  Dull ache and sharp    Progression:  Worsening  Treatments:     Current treatment: medication    Patient Goals: Other patient goals:  Decreased frequency; no bladder pain      Objective     Static Posture     Head  Forward  Shoulders  Rounded  Lumbar Spine   Flattened       Active Range of Motion     Lumbar   Normal active range of motion    Strength/Myotome Testing     Left Hip   Planes of Motion   Flexion: 4+  Abduction: 4  Adduction: 4-    Right Hip   Planes of Motion   Flexion: 4+  Abduction: 4  Adduction: 4-    Functional Assessment        Comments  Strain to have BM  Chronic constipation  Urinary urgency with bladder pain  Urinary frequency that varies daily but interferes with daily function and quality of life  Pelvic Floor Exam   Position: supine exam    Diastatis   Diastasis recti present? no    Skin inspection:   no scars present  General Perineum Exam:   perineum intact  Positive for hemorrhoids  Visual Inspection of Perineum:   Excursion of perineal body in cephalad direction with contraction of pelvic floor muscles (PFM): fair   Excursion of perineal body in caudal direction with relaxation of pelvic floor muscles (PFM): fair   Involuntary contraction with coughing: no  Involuntary relaxation with bearing down: yes    Pelvic Organ Prolapse   no pelvic organ prolapse    Pelvic Floor Muscle Exam     Palpation   Moderate increased muscle tension in the puborectalis and pubococcygeus  No tenderness on right in the puborectalis and pubococcygeus  No tenderness on left in the puborectalis and pubococcygeus    Breathing pattern with contraction: apical  Pelvic floor muscle relaxation is delayed       PERFECT Score   Power right: 2+/5  Power left: 2/5  Endurance (seconds to max): 4  Repetitions (before fatigue): 5  Fast flicks (in 10 seconds): 4               Precautions: GERD, depression, anxiety, schizoaffective disorder, anorexia, constipation      Manuals 7/1            TAWNY dixon  10                                                   Neuro Re-Ed                                                                                                        Ther Ex                                                                                                                     Ther Activity                                       Gait Training                                       Modalities

## 2020-07-01 ENCOUNTER — EVALUATION (OUTPATIENT)
Dept: PHYSICAL THERAPY | Age: 30
End: 2020-07-01
Payer: COMMERCIAL

## 2020-07-01 DIAGNOSIS — N39.8 VOIDING DYSFUNCTION: Primary | ICD-10-CM

## 2020-07-01 PROCEDURE — 97162 PT EVAL MOD COMPLEX 30 MIN: CPT | Performed by: PHYSICAL THERAPIST

## 2020-07-01 PROCEDURE — 97140 MANUAL THERAPY 1/> REGIONS: CPT | Performed by: PHYSICAL THERAPIST

## 2020-07-08 ENCOUNTER — OFFICE VISIT (OUTPATIENT)
Dept: PHYSICAL THERAPY | Age: 30
End: 2020-07-08
Payer: COMMERCIAL

## 2020-07-08 DIAGNOSIS — N39.8 VOIDING DYSFUNCTION: Primary | ICD-10-CM

## 2020-07-08 PROCEDURE — 97110 THERAPEUTIC EXERCISES: CPT | Performed by: PHYSICAL THERAPIST

## 2020-07-08 PROCEDURE — 97140 MANUAL THERAPY 1/> REGIONS: CPT | Performed by: PHYSICAL THERAPIST

## 2020-07-08 PROCEDURE — 97530 THERAPEUTIC ACTIVITIES: CPT | Performed by: PHYSICAL THERAPIST

## 2020-07-08 NOTE — PROGRESS NOTES
Daily Note     Today's date: 2020  Patient name: Marija Moraes  : 1990  MRN: [id number]  Referring provider: PITER Condon*  Dx:   Encounter Diagnosis     ICD-10-CM    1  Voiding dysfunction N39 8        Start Time: 1300  Stop Time: 1400  Total time in clinic (min): 60 minutes    Subjective: Patient notes she has no questions or concerns from last initial session  Reports she continues to have difficulty emptying bladder fully and complains of urinary frequency due to this  Objective: See treatment diary below      Assessment: Tolerated treatment fairly well  Patient would benefit from a continued trial of PT TA for patient extensive education on voiding mechanisms and need to have PFM awareness and ability to relax PFM well to allow for functional voiding  Patient reported headache with diaphragmatic breathing      Plan: Continue per plan of care        Precautions: GERD, depression, anxiety, schizoaffective disorder, anorexia, constipation      Manuals            LE eval  10 10           Pelvic transverse  10                                     Neuro Re-Ed                                                                              HEP  Below  LE                        Ther Ex             Diaphragmatic breathing  5'           LE stretch  10           Contract relax Kegel  3"/10"                                                                              Ther Activity             Voiding ed  10                        Gait Training                                       Modalities

## 2020-07-17 ENCOUNTER — OFFICE VISIT (OUTPATIENT)
Dept: PHYSICAL THERAPY | Age: 30
End: 2020-07-17
Payer: COMMERCIAL

## 2020-07-17 DIAGNOSIS — N39.8 VOIDING DYSFUNCTION: Primary | ICD-10-CM

## 2020-07-17 PROCEDURE — 97140 MANUAL THERAPY 1/> REGIONS: CPT | Performed by: PHYSICAL THERAPIST

## 2020-07-17 PROCEDURE — 97110 THERAPEUTIC EXERCISES: CPT | Performed by: PHYSICAL THERAPIST

## 2020-07-17 NOTE — PROGRESS NOTES
Daily Note     Today's date: 2020  Patient name: Naz Delvalle  : 1990  MRN: [id number]  Referring provider: PITER Daniel*  Dx:   Encounter Diagnosis     ICD-10-CM    1  Voiding dysfunction N39 8        Start Time: 1100  Stop Time: 1150  Total time in clinic (min): 50 minutes    Subjective:  No new complaints  Notes no change in urinary symptoms  Reports frustration in difficulty in sensing pFM relaxation and in coordinating breath with exercises  Objective: See treatment diary below      Assessment: Tolerated treatment well  Patient would benefit from continued PTReviewed technique for focus on relaxation during PFM release exercises  Patient deferred sEMG biofeedback as a teaching means  Encouraged daily HEP and reviewed voiding mechanics with good verbal understanding  Plan: Continue per plan of care        Precautions: GERD, depression, anxiety, schizoaffective disorder, anorexia, constipation      Manuals           LE eval  10 10 10          Pelvic transverse  10 15                                    Neuro Re-Ed                                                                              HEP  Below  LE Child pose                       Ther Ex             Diaphragmatic breathing  5'           LE stretch  10 10          Contract relax Kegel  3"/10"   10x          Ball contract relax   5"/10x          Child pose   3'                                                 Ther Activity             Voiding ed  10                        Gait Training                                       Modalities

## 2020-07-29 ENCOUNTER — OFFICE VISIT (OUTPATIENT)
Dept: PHYSICAL THERAPY | Age: 30
End: 2020-07-29
Payer: COMMERCIAL

## 2020-07-29 DIAGNOSIS — N39.8 VOIDING DYSFUNCTION: Primary | ICD-10-CM

## 2020-07-29 PROCEDURE — 97110 THERAPEUTIC EXERCISES: CPT | Performed by: PHYSICAL THERAPIST

## 2020-07-29 PROCEDURE — 97140 MANUAL THERAPY 1/> REGIONS: CPT | Performed by: PHYSICAL THERAPIST

## 2020-08-03 RX ORDER — LITHIUM CARBONATE 150 MG/1
CAPSULE ORAL
COMMUNITY
End: 2020-08-24 | Stop reason: ALTCHOICE

## 2020-08-03 RX ORDER — EMOLLIENT COMBINATION NO.32
EMULSION, EXTENDED RELEASE TOPICAL
Status: ON HOLD | COMMUNITY
End: 2021-01-12 | Stop reason: ALTCHOICE

## 2020-08-05 ENCOUNTER — APPOINTMENT (OUTPATIENT)
Dept: PHYSICAL THERAPY | Age: 30
End: 2020-08-05
Payer: COMMERCIAL

## 2020-08-07 ENCOUNTER — OFFICE VISIT (OUTPATIENT)
Dept: INTERNAL MEDICINE CLINIC | Facility: CLINIC | Age: 30
End: 2020-08-07
Payer: COMMERCIAL

## 2020-08-07 VITALS
RESPIRATION RATE: 14 BRPM | BODY MASS INDEX: 19.84 KG/M2 | WEIGHT: 112 LBS | SYSTOLIC BLOOD PRESSURE: 100 MMHG | HEIGHT: 63 IN | OXYGEN SATURATION: 100 % | TEMPERATURE: 97.9 F | DIASTOLIC BLOOD PRESSURE: 68 MMHG | HEART RATE: 63 BPM

## 2020-08-07 DIAGNOSIS — G93.0 ARACHNOID CYST: ICD-10-CM

## 2020-08-07 DIAGNOSIS — K62.3 RECTAL PROLAPSE: ICD-10-CM

## 2020-08-07 DIAGNOSIS — K82.4 GALLBLADDER POLYP: Primary | ICD-10-CM

## 2020-08-07 PROCEDURE — 3725F SCREEN DEPRESSION PERFORMED: CPT | Performed by: PHYSICIAN ASSISTANT

## 2020-08-07 PROCEDURE — 3008F BODY MASS INDEX DOCD: CPT | Performed by: PHYSICIAN ASSISTANT

## 2020-08-07 PROCEDURE — 1036F TOBACCO NON-USER: CPT | Performed by: PHYSICIAN ASSISTANT

## 2020-08-07 PROCEDURE — 99214 OFFICE O/P EST MOD 30 MIN: CPT | Performed by: PHYSICIAN ASSISTANT

## 2020-08-07 RX ORDER — GABAPENTIN 100 MG/1
100 CAPSULE ORAL DAILY
COMMUNITY
End: 2020-08-24 | Stop reason: ALTCHOICE

## 2020-08-07 RX ORDER — BUSPIRONE HYDROCHLORIDE 10 MG/1
10 TABLET ORAL 2 TIMES DAILY
COMMUNITY

## 2020-08-07 RX ORDER — CLONAZEPAM 0.5 MG/1
1 TABLET ORAL
COMMUNITY

## 2020-08-07 NOTE — ASSESSMENT & PLAN NOTE
Pts symptoms sound most concerning for rectal prolapse vs hemorrhoid   Will refer to colorectal surgery for opinion

## 2020-08-07 NOTE — PROGRESS NOTES
Assessment/Plan:  Problem List Items Addressed This Visit        Digestive    Gallbladder polyp - Primary     Will get updated ultrasound of gallbladder to evaluate for stability         Relevant Orders    US gallbladder    Rectal prolapse     Pts symptoms sound most concerning for rectal prolapse vs hemorrhoid  Will refer to colorectal surgery for opinion         Relevant Orders    Ambulatory referral to Colorectal Surgery       Nervous and Auditory    Arachnoid cyst     Will get updated MRI to ensure stability         Relevant Orders    MRI brain w wo contrast           Diagnoses and all orders for this visit:    Gallbladder polyp  -     US gallbladder; Future    Arachnoid cyst  -     MRI brain w wo contrast; Future    Rectal prolapse  -     Ambulatory referral to Colorectal Surgery; Future    Other orders  -     busPIRone (BUSPAR) 10 mg tablet; Take 10 mg by mouth 2 (two) times a day  -     gabapentin (NEURONTIN) 100 mg capsule; Take 100 mg by mouth daily  -     clonazePAM (KlonoPIN) 0 5 mg tablet; Take 1 mg by mouth daily at bedtime        Rectal prolapse  Pts symptoms sound most concerning for rectal prolapse vs hemorrhoid  Will refer to colorectal surgery for opinion    Arachnoid cyst  Will get updated MRI to ensure stability    Gallbladder polyp  Will get updated ultrasound of gallbladder to evaluate for stability      Subjective:      Patient ID: Fletcher Paul is a 31 y o  female  Pt presents for routine visit  She is doing fairly well overall  She is due for annual ultrasound of her gallbladder for stability of known gallbladder polyp  She is also due for annual brain MRI for stability of arachnoid cyst  She notes that after a BM she gets a rectal protrusion that is at least 5 inches long and painful and she manually has to tuck this back into her body  She thought it may be a hemorrhoid  She notes a hx of chronic constipation for most of her life  She notes intermittent BRBPR  Denies itching         The following portions of the patient's history were reviewed and updated as appropriate:   She has a past medical history of Anorexia nervosa, Anxiety, Chronic kidney disease, CPAP (continuous positive airway pressure) dependence, Depression, Disease of thyroid gland, Gall bladder polyp, Gastroesophageal reflux disease (9/18/2018), Hallucination, Memory difficulty (7/12/2018), Plantar wart of left foot, Psychiatric illness, Renal atrophy, Schizoaffective disorder ([address] ), Self-injurious behavior, Sleep apnea, Sleep difficulties, Suicide attempt ([facility] [address] ), and Urinary retention  ,  does not have any pertinent problems on file  ,   has a past surgical history that includes Eye surgery (Left); pr esophagogastroduodenoscopy transoral diagnostic (N/A, 2/9/2018); and pr esophagogastroduodenoscopy transoral diagnostic (N/A, 11/6/2018)  ,  family history includes Depression in her brother; Hypertension in her brother and father; OCD in her brother; Other in her family  ,   reports that she has never smoked  She has never used smokeless tobacco  She reports that she does not drink alcohol or use drugs  ,  is allergic to gluten meal and latex     Current Outpatient Medications   Medication Sig Dispense Refill    benztropine (COGENTIN) 1 mg tablet Take 1 mg by mouth TAKE 1 TABLET IN AM AND 2 TABLETS IN PM      Brexpiprazole (Rexulti) 1 MG tablet       busPIRone (BUSPAR) 10 mg tablet Take 10 mg by mouth 2 (two) times a day      clonazePAM (KlonoPIN) 0 5 mg tablet Take 1 mg by mouth daily at bedtime      Dermatological Products, Misc  (EpiCeram) lotion       gabapentin (NEURONTIN) 100 mg capsule Take 100 mg by mouth daily      gabapentin (NEURONTIN) 400 mg capsule 400 mg daily at bedtime       liothyronine (CYTOMEL) 5 mcg tablet Take 1 tablet by mouth once daily 30 tablet 5    lithium carbonate 150 mg capsule 150 mg in am  300 mg in pm      pantoprazole (PROTONIX) 40 mg tablet Take 1 tablet by mouth once daily 30 tablet 4    triamcinolone (KENALOG) 0 1 % cream APPLY TO RASH ON HANDS TWICE DAILY FOR 2 WEEKS THEN TWICE DAILY FOR 2 3 DAYS A WEEK AS NEEDED FOR MILD FLARE UPS       No current facility-administered medications for this visit  Review of Systems   Constitutional: Negative for chills and fever  HENT: Negative for congestion, ear pain, hearing loss, postnasal drip, rhinorrhea, sinus pressure, sinus pain, sore throat and trouble swallowing  Eyes: Negative for pain and visual disturbance  Respiratory: Negative for cough, chest tightness, shortness of breath and wheezing  Cardiovascular: Negative  Negative for chest pain, palpitations and leg swelling  Gastrointestinal: Positive for blood in stool and rectal pain  Negative for abdominal pain, constipation, diarrhea, nausea and vomiting  Endocrine: Negative for cold intolerance, heat intolerance, polydipsia, polyphagia and polyuria  Genitourinary: Negative for difficulty urinating, dysuria, flank pain and urgency  Musculoskeletal: Negative for arthralgias, back pain, gait problem and myalgias  Skin: Negative for rash  Allergic/Immunologic: Negative  Neurological: Negative for dizziness, weakness, light-headedness and headaches  Hematological: Negative  Psychiatric/Behavioral: Negative for behavioral problems, dysphoric mood and sleep disturbance  The patient is not nervous/anxious  PHQ-9 Depression Screening    PHQ-9:    Frequency of the following problems over the past two weeks:       Little interest or pleasure in doing things:  0 - not at all  Feeling down, depressed, or hopeless:  0 - not at all  PHQ-2 Score:  0          Objective:  Vitals:    08/07/20 0909   BP: 100/68   Pulse: 63   Resp: 14   Temp: 97 9 °F (36 6 °C)   TempSrc: Tympanic   SpO2: 100%   Weight: 50 8 kg (112 lb)   Height: 5' 3" (1 6 m)     Body mass index is 19 84 kg/m²  Physical Exam  Constitutional:       General: She is not in acute distress       Appearance: She is well-developed  She is not diaphoretic  HENT:      Head: Normocephalic and atraumatic  Right Ear: External ear normal       Left Ear: External ear normal       Nose: Nose normal       Mouth/Throat:      Pharynx: No oropharyngeal exudate  Eyes:      General: No scleral icterus  Right eye: No discharge  Left eye: No discharge  Conjunctiva/sclera: Conjunctivae normal       Pupils: Pupils are equal, round, and reactive to light  Neck:      Musculoskeletal: Normal range of motion and neck supple  Thyroid: No thyromegaly  Cardiovascular:      Rate and Rhythm: Normal rate and regular rhythm  Heart sounds: Normal heart sounds  No murmur  No friction rub  No gallop  Pulmonary:      Effort: Pulmonary effort is normal  No respiratory distress  Breath sounds: Normal breath sounds  No wheezing or rales  Abdominal:      General: Bowel sounds are normal  There is no distension  Palpations: Abdomen is soft  Tenderness: There is no abdominal tenderness  Musculoskeletal: Normal range of motion  General: No tenderness or deformity  Skin:     General: Skin is warm and dry  Neurological:      Mental Status: She is alert and oriented to person, place, and time  Cranial Nerves: No cranial nerve deficit  Psychiatric:         Behavior: Behavior normal          Thought Content:  Thought content normal          Judgment: Judgment normal

## 2020-08-12 ENCOUNTER — APPOINTMENT (OUTPATIENT)
Dept: PHYSICAL THERAPY | Age: 30
End: 2020-08-12
Payer: COMMERCIAL

## 2020-08-19 ENCOUNTER — OFFICE VISIT (OUTPATIENT)
Dept: PHYSICAL THERAPY | Age: 30
End: 2020-08-19
Payer: COMMERCIAL

## 2020-08-19 DIAGNOSIS — N39.8 VOIDING DYSFUNCTION: Primary | ICD-10-CM

## 2020-08-19 PROCEDURE — 97110 THERAPEUTIC EXERCISES: CPT | Performed by: PHYSICAL THERAPIST

## 2020-08-19 PROCEDURE — 97140 MANUAL THERAPY 1/> REGIONS: CPT | Performed by: PHYSICAL THERAPIST

## 2020-08-19 PROCEDURE — 97530 THERAPEUTIC ACTIVITIES: CPT | Performed by: PHYSICAL THERAPIST

## 2020-08-19 NOTE — PROGRESS NOTES
Daily Note     Today's date: 2020  Patient name: Lord Jama  : 1990  MRN: [id number]  Referring provider: PITER Jackson*  Dx:   Encounter Diagnosis     ICD-10-CM    1  Voiding dysfunction  N39 8        Start Time:   Stop Time:   Total time in clinic (min): 61 minutes    Subjective: Patient notes she has been using a stool for toileting at home with some greater ease in having a BM although continues to use magnesium to soften stool  Notes working on urge suppression techniques to decrease voids per day and taking her time to relax to void  Patient notes she cancelled PT secondary to pulling her back out two weeks ago  Has been slowly returning to exercise yesterday since back pain onset  Today LBP at 3/10  Objective: See treatment diary below      Assessment: Tolerated treatment well  Patient would benefit from continued PT Verbal cues for importance of relaxing PFM  In noiding effectively and intention of each exercises  Verbalized understanding after discussion  Multiple cues for breathing  Plan: Continue per plan of care        Precautions: GERD, depression, anxiety, schizoaffective disorder, anorexia, constipation      Manuals         LE eval  10 10 10 10 10        Pelvic transverse  10 15 15 15                                  Neuro Re-Ed                                                                              HEP  Below  LE Child pose                       Ther Ex             Diaphragmatic breathing  5'           LE stretch  10 10 10 10        Contract relax Kegel  3"/10"   10x 10 10x        Ball contract relax   5"/10x  5"/10x        Child pose   3'                                                 Ther Activity             Voiding ed  10  15 15                     Gait Training                                       Modalities

## 2020-08-23 DIAGNOSIS — K21.9 GASTROESOPHAGEAL REFLUX DISEASE, ESOPHAGITIS PRESENCE NOT SPECIFIED: ICD-10-CM

## 2020-08-24 ENCOUNTER — HOSPITAL ENCOUNTER (OUTPATIENT)
Dept: ULTRASOUND IMAGING | Facility: HOSPITAL | Age: 30
Discharge: HOME | End: 2020-08-24
Payer: COMMERCIAL

## 2020-08-24 DIAGNOSIS — K82.4 GALLBLADDER POLYP: ICD-10-CM

## 2020-08-24 PROCEDURE — 76705 ECHO EXAM OF ABDOMEN: CPT

## 2020-08-24 RX ORDER — PANTOPRAZOLE SODIUM 40 MG/1
TABLET, DELAYED RELEASE ORAL
Qty: 30 TABLET | Refills: 5 | Status: SHIPPED | OUTPATIENT
Start: 2020-08-24 | End: 2021-02-22

## 2020-08-24 NOTE — PROGRESS NOTES
Daily Note     Today's date: 2020  Patient name: Luis Lew  : 1990  MRN: [id number]  Referring provider: PITER Burr*  Dx:   Encounter Diagnosis     ICD-10-CM    1  Voiding dysfunction  N39 8        Start Time: 1000  Stop Time: 1056  Total time in clinic (min): 56 minutes    Subjective: "I think I am getting better," Notes when voiding being able to go  "more than a few drops " Reports working on relaxing PFM for better voids and using double voiding ( stand - sit) with some success  Objective: See treatment diary below      Assessment: Tolerated treatment well  Patient would benefit from continued PT Better relaxation this date throughout session  Emphasis on relaxation of entire body and in particular pelvic floor during exercises  Patient education on voiding posture, breathing and muscle relaxation for voiding  Positive subjective gains reported  Plan: Continue per plan of care        Precautions: GERD, depression, anxiety, schizoaffective disorder, anorexia, constipation      Manuals        LE eval  10 10 10 10 10 10       Pelvic transverse  10 15 15 15 15                                 Neuro Re-Ed                                                                              HEP  Below  LE Child pose                       Ther Ex             Diaphragmatic breathing  5'           LE stretch  10 10 10 10 10       Contract relax Kegel  3"/10"   10x 10 10x 10x       Ball contract relax   5"/10x  5"/10x 10x       Child pose   3'          Ball heel slides      10x                                 Ther Activity             Voiding ed  10  15 15 8                    Gait Training                                       Modalities

## 2020-08-26 ENCOUNTER — OFFICE VISIT (OUTPATIENT)
Dept: PHYSICAL THERAPY | Age: 30
End: 2020-08-26
Payer: COMMERCIAL

## 2020-08-26 DIAGNOSIS — N39.8 VOIDING DYSFUNCTION: Primary | ICD-10-CM

## 2020-08-26 PROCEDURE — 97140 MANUAL THERAPY 1/> REGIONS: CPT | Performed by: PHYSICAL THERAPIST

## 2020-08-26 PROCEDURE — 97530 THERAPEUTIC ACTIVITIES: CPT | Performed by: PHYSICAL THERAPIST

## 2020-08-26 PROCEDURE — 97110 THERAPEUTIC EXERCISES: CPT | Performed by: PHYSICAL THERAPIST

## 2020-08-28 ENCOUNTER — OFFICE VISIT (OUTPATIENT)
Dept: UROLOGY | Facility: MEDICAL CENTER | Age: 30
End: 2020-08-28
Payer: COMMERCIAL

## 2020-08-28 VITALS
DIASTOLIC BLOOD PRESSURE: 60 MMHG | HEART RATE: 61 BPM | SYSTOLIC BLOOD PRESSURE: 112 MMHG | WEIGHT: 116 LBS | BODY MASS INDEX: 20.55 KG/M2 | TEMPERATURE: 98.2 F | HEIGHT: 63 IN

## 2020-08-28 DIAGNOSIS — N39.8 VOIDING DYSFUNCTION: ICD-10-CM

## 2020-08-28 DIAGNOSIS — R33.9 RETENTION, URINE: Primary | ICD-10-CM

## 2020-08-28 LAB
BACTERIA UR QL AUTO: NORMAL /HPF
BILIRUB UR QL STRIP: NEGATIVE
CLARITY UR: CLEAR
COLOR UR: YELLOW
GLUCOSE UR STRIP-MCNC: NEGATIVE MG/DL
HGB UR QL STRIP.AUTO: NEGATIVE
HYALINE CASTS #/AREA URNS LPF: NORMAL /LPF
KETONES UR STRIP-MCNC: NEGATIVE MG/DL
LEUKOCYTE ESTERASE UR QL STRIP: NEGATIVE
NITRITE UR QL STRIP: NEGATIVE
NON-SQ EPI CELLS URNS QL MICRO: NORMAL /HPF
PH UR STRIP.AUTO: 6.5 [PH]
POST-VOID RESIDUAL VOLUME, ML POC: 21 ML
PROT UR STRIP-MCNC: NEGATIVE MG/DL
RBC #/AREA URNS AUTO: NORMAL /HPF
SL AMB  POCT GLUCOSE, UA: ABNORMAL
SL AMB LEUKOCYTE ESTERASE,UA: ABNORMAL
SL AMB POCT BILIRUBIN,UA: ABNORMAL
SL AMB POCT BLOOD,UA: ABNORMAL
SL AMB POCT CLARITY,UA: CLEAR
SL AMB POCT COLOR,UA: YELLOW
SL AMB POCT KETONES,UA: ABNORMAL
SL AMB POCT NITRITE,UA: ABNORMAL
SL AMB POCT PH,UA: 6
SL AMB POCT SPECIFIC GRAVITY,UA: 1.01
SL AMB POCT URINE PROTEIN: ABNORMAL
SL AMB POCT UROBILINOGEN: 0.2
SP GR UR STRIP.AUTO: 1.01 (ref 1–1.03)
UROBILINOGEN UR QL STRIP.AUTO: 0.2 E.U./DL
WBC #/AREA URNS AUTO: NORMAL /HPF

## 2020-08-28 PROCEDURE — 87147 CULTURE TYPE IMMUNOLOGIC: CPT | Performed by: NURSE PRACTITIONER

## 2020-08-28 PROCEDURE — 87086 URINE CULTURE/COLONY COUNT: CPT | Performed by: NURSE PRACTITIONER

## 2020-08-28 PROCEDURE — 81001 URINALYSIS AUTO W/SCOPE: CPT | Performed by: NURSE PRACTITIONER

## 2020-08-28 PROCEDURE — 51798 US URINE CAPACITY MEASURE: CPT | Performed by: NURSE PRACTITIONER

## 2020-08-28 PROCEDURE — 81003 URINALYSIS AUTO W/O SCOPE: CPT | Performed by: NURSE PRACTITIONER

## 2020-08-28 PROCEDURE — 99214 OFFICE O/P EST MOD 30 MIN: CPT | Performed by: NURSE PRACTITIONER

## 2020-08-28 PROCEDURE — 3008F BODY MASS INDEX DOCD: CPT | Performed by: PHYSICIAN ASSISTANT

## 2020-08-28 NOTE — PROGRESS NOTES
8/28/2020      No chief complaint on file  Assessment and Plan    31 y o  female managed by Dr Radha Castañeda    1  Overactive Bladder Symptoms  · Prescription for Myrbetriq provided  · Given her significant history of severe constipation anticholinergic agent should be avoided at all cost  · Continue with pelvic floor therapy instructions  · Follow up the office in 3 months    2  Constipation  · Continue with recommendations under the guidance of gastroenterology  · Maintain adequate hydration, adequate diet    History of Present Illness  Fletcher Paul is a 31 y o  female here for follow up evaluation of    voiding dysfunction  The patient complains mostly of urinary frequency and urgency with small voids as well as hesitancy and needing to push to urinate  The patient has multiple psychiatric medications which she is on and has had lifelong constipation  She continues to attempt to control her constipation  She presents today with continued complaints of overactivity and urinary hesitancy  She continues to seek interventions that are successful at gaining control over her overactive bladder symptoms  Review of Systems   Constitutional: Negative for chills and fever  Respiratory: Negative for cough and shortness of breath  Cardiovascular: Negative for chest pain  Gastrointestinal: Positive for constipation  Negative for abdominal distention, abdominal pain, blood in stool, nausea and vomiting  Genitourinary: Positive for frequency and urgency  Negative for difficulty urinating, dysuria, enuresis, flank pain and hematuria  Musculoskeletal: Negative for back pain  Skin: Negative for rash  Neurological: Negative for dizziness       Past Medical History  Past Medical History:   Diagnosis Date    Anorexia nervosa     pt denies history at time of admission 7/21/18    Anxiety     Chronic kidney disease     CPAP (continuous positive airway pressure) dependence     waiting for a new mask    Depression     Disease of thyroid gland     hypo    Gall bladder polyp     Gastroesophageal reflux disease 9/18/2018    Hallucination     Memory difficulty 7/12/2018    Plantar wart of left foot     Psychiatric illness     Renal atrophy     Schizoa[facility] disorder [facility] [address] [facility]     Self-injurious behavior     Sleep apnea     Sleep difficulties     [facility]     history of attempt at age 22 by overdose     Urinary retention        Past Social History  Past Surgical History:   Procedure Laterality Date    EYE SURGERY Left     tear duct    [place] ESOPHAGOGASTRODUODENOSCOPY TRANSORAL DIAGNOSTIC N/A 2/9/2018    Procedure: ESOPHAGOGASTRODUODENOSCOPY (EGD); Surgeon: Rika Beltran MD;  Location: [facility];  Service: Gastroenterology    [place] ESOPHAGOGASTRODUODENOSCOPY TRANSORAL DIAGNOSTIC N/A 11/6/2018    Procedure: ESOPHAGOGASTRODUODENOSCOPY (EGD); Surgeon: Margy Quezada MD;  Location: [facility];  Service: Gastroenterology     Social History     Tobacco Use   Smoking Status Never Smoker   Smokeless Tobacco Never Used       Past Family History  Family History   Problem Relation Age of Onset    Hypertension Father         benign essential    Hypertension Brother         benign essential     OCD Brother     Depression Brother     Other Family         nasolacrimal duct obstruction, acquired       Past Social history  Social History     Socioeconomic History    Marital status: Single     Spouse name: Not on file    Number of children: Not on file    Years of education: Not on file    Highest education level: Not on file   Occupational History    Occupation: UNEMPLOYED   Social Needs    Financial resource strain: Not on file    Food insecurity     Worry: Not on file     Inability: Not on file   [employer] needs     Medical: Not on file     Non-medical: Not on file   Tobacco Use    Smoking status: Never Smoker    Smokeless tobacco: Never Used   Substance and Sexual Activity    Alcohol use:  No  Drug use: No    Sexual activity: Not Currently   Lifestyle    Physical activity     Days per week: Not on file     Minutes per session: Not on file    Stress: Not on file   Relationships    Social connections     Talks on phone: Not on file     Gets together: Not on file     Attends Faith service: Not on file     Active member of club or organization: Not on file     Attends meetings of clubs or organizations: Not on file     Relationship status: Not on file    Intimate partner violence     Fear of current or ex partner: Not on file     Emotionally abused: Not on file     Physically abused: Not on file     Forced sexual activity: Not on file   Other Topics Concern    Not on file   Social History Narrative    UNEMPLOYED       Current Medications  Current Outpatient Medications   Medication Sig Dispense Refill    benztropine (COGENTIN) 1 mg tablet Take 1 mg by mouth TAKE 1 TABLET IN AM AND 2 TABLETS IN PM      Brexpiprazole (REXULTI PO) Take 0 75 mg by mouth daily      busPIRone (BUSPAR) 10 mg tablet Take 10 mg by mouth 2 (two) times a day      clonazePAM (KlonoPIN) 0 5 mg tablet Take 1 mg by mouth daily at bedtime      gabapentin (NEURONTIN) 400 mg capsule 400 mg daily at bedtime       liothyronine (CYTOMEL) 5 mcg tablet Take 1 tablet by mouth once daily 30 tablet 5    LITHIUM PO Take 150 mg by mouth 2 (two) times a day      pantoprazole (PROTONIX) 40 mg tablet Take 1 tablet by mouth once daily 30 tablet 5    triamcinolone (KENALOG) 0 1 % cream APPLY TO RASH ON HANDS TWICE DAILY FOR 2 WEEKS THEN TWICE DAILY FOR 2 3 DAYS A WEEK AS NEEDED FOR MILD FLARE UPS      Dermatological Products, Misc  (EpiCeram) lotion        No current facility-administered medications for this visit          Allergies  Allergies   Allergen Reactions    Gluten Meal     Latex Swelling         The following portions of the patient's history were reviewed and updated as appropriate: allergies, current medications, past medical history, past social history, past surgical history and problem list       Vitals  Vitals:    08/28/20 1405   BP: 112/60   Pulse: 61   Temp: 98 2 °F (36 8 °C)   Weight: 52 6 kg (116 lb)   Height: 5' 3" (1 6 m)           Physical Exam  Physical Exam  Vitals signs reviewed  Constitutional:       General: She is not in acute distress  Appearance: Normal appearance  Eyes:      Pupils: Pupils are equal, round, and reactive to light  Cardiovascular:      Rate and Rhythm: Normal rate and regular rhythm  Heart sounds: Normal heart sounds  Pulmonary:      Effort: Pulmonary effort is normal  No respiratory distress  Breath sounds: Normal breath sounds  Musculoskeletal: Normal range of motion  Skin:     General: Skin is warm and dry  Neurological:      General: No focal deficit present  Mental Status: She is alert     Psychiatric:         Mood and Affect: Mood normal          Behavior: Behavior normal            Results  Recent Results (from the past 1 hour(s))   POCT Measure PVR    Collection Time: 08/28/20  2:19 PM   Result Value Ref Range    POST-VOID RESIDUAL VOLUME, ML POC 21 mL   POCT urine dip auto non-scope    Collection Time: 08/28/20  2:20 PM   Result Value Ref Range     COLOR,UA yellow     CLARITY,UA clear     SPECIFIC GRAVITY,UA 1 010      PH,UA 6 0     LEUKOCYTE ESTERASE,UA neg     NITRITE,UA neg     GLUCOSE, UA neg     KETONES,UA neg     BILIRUBIN,UA neg     BLOOD,UA trace-lysed     POCT URINE PROTEIN neg     SL AMB POCT UROBILINOGEN 0 2    ]  No results found for: PSA  Lab Results   Component Value Date    CALCIUM 9 3 05/19/2020    K 4 4 05/19/2020    CO2 28 05/19/2020     05/19/2020    BUN 15 05/19/2020    CREATININE 1 16 05/19/2020     Lab Results   Component Value Date    WBC 7 33 10/30/2019    HGB 13 3 10/30/2019    HCT 42 8 10/30/2019    MCV 91 10/30/2019     10/30/2019     Orders  Orders Placed This Encounter   Procedures    Urine culture    Urinalysis with microscopic    POCT Measure PVR    POCT urine dip auto non-scope       DEISY Mitchell

## 2020-08-28 NOTE — PATIENT INSTRUCTIONS
Myrbetriq as directed  Continue to maintain hydration upwards to 40-60 oz per day  Continue with pelvic floor therapy  Call the office for concerns or questions

## 2020-08-29 LAB — BACTERIA UR CULT: ABNORMAL

## 2020-08-30 DIAGNOSIS — E03.9 HYPOTHYROIDISM, UNSPECIFIED TYPE: ICD-10-CM

## 2020-08-31 ENCOUNTER — HOSPITAL ENCOUNTER (OUTPATIENT)
Dept: MRI IMAGING | Facility: HOSPITAL | Age: 30
Discharge: HOME | End: 2020-08-31
Payer: COMMERCIAL

## 2020-08-31 ENCOUNTER — TELEPHONE (OUTPATIENT)
Dept: UROLOGY | Facility: MEDICAL CENTER | Age: 30
End: 2020-08-31

## 2020-08-31 ENCOUNTER — TELEPHONE (OUTPATIENT)
Dept: UROLOGY | Facility: AMBULATORY SURGERY CENTER | Age: 30
End: 2020-08-31

## 2020-08-31 DIAGNOSIS — N39.0 URINARY TRACT INFECTION WITHOUT HEMATURIA, SITE UNSPECIFIED: Primary | ICD-10-CM

## 2020-08-31 DIAGNOSIS — G93.0 ARACHNOID CYST: ICD-10-CM

## 2020-08-31 DIAGNOSIS — N39.8 VOIDING DYSFUNCTION: Primary | ICD-10-CM

## 2020-08-31 PROCEDURE — 70553 MRI BRAIN STEM W/O & W/DYE: CPT

## 2020-08-31 PROCEDURE — G1004 CDSM NDSC: HCPCS

## 2020-08-31 PROCEDURE — A9585 GADOBUTROL INJECTION: HCPCS | Performed by: PHYSICIAN ASSISTANT

## 2020-08-31 RX ORDER — AMOXICILLIN 500 MG/1
500 CAPSULE ORAL EVERY 8 HOURS SCHEDULED
Qty: 21 CAPSULE | Refills: 0 | Status: SHIPPED | OUTPATIENT
Start: 2020-08-31 | End: 2020-09-07

## 2020-08-31 RX ORDER — LIOTHYRONINE SODIUM 5 UG/1
TABLET ORAL
Qty: 30 TABLET | Refills: 5 | Status: SHIPPED | OUTPATIENT
Start: 2020-08-31 | End: 2021-03-08

## 2020-08-31 RX ADMIN — GADOBUTROL 5 ML: 604.72 INJECTION INTRAVENOUS at 13:08

## 2020-08-31 NOTE — TELEPHONE ENCOUNTER
Call placed to patient and spoke with father as per signed communication consent form  Informed him that prescription has been sent to pharmacy on file for UTI  He is aware and will notify patient of these recommendations

## 2020-08-31 NOTE — TELEPHONE ENCOUNTER
Prior Authorization for Myrbetriq 25mg was requested by [employer]  The drug is non-covered by [employer] ([employer] [facility] [place])  Prior authorization request was initiated via CoverMyMeds  com - Key: [id number]  The following medications were suggested (Tier 1, 2, 3): Oxybutynin ER 10mg,  Oxybutynin Chloride IR and Tolterodine ER 4mg  Tier 4, 5 and 6 are: Toviaz 8mg, Solifenacin 10mg and Trospium  Message forwarded back to the Advanced Practitioner for further recommendation

## 2020-09-02 ENCOUNTER — APPOINTMENT (OUTPATIENT)
Dept: PHYSICAL THERAPY | Age: 30
End: 2020-09-02
Payer: COMMERCIAL

## 2020-09-04 ENCOUNTER — OFFICE VISIT (OUTPATIENT)
Dept: PHYSICAL THERAPY | Age: 30
End: 2020-09-04
Payer: COMMERCIAL

## 2020-09-04 DIAGNOSIS — N39.8 VOIDING DYSFUNCTION: Primary | ICD-10-CM

## 2020-09-04 PROCEDURE — 97530 THERAPEUTIC ACTIVITIES: CPT | Performed by: PHYSICAL THERAPIST

## 2020-09-04 PROCEDURE — 97110 THERAPEUTIC EXERCISES: CPT | Performed by: PHYSICAL THERAPIST

## 2020-09-04 PROCEDURE — 97140 MANUAL THERAPY 1/> REGIONS: CPT | Performed by: PHYSICAL THERAPIST

## 2020-09-04 NOTE — PROGRESS NOTES
Daily Note     Today's date: 2020  Patient name: Clara Fritz  : 1990  MRN: [id number]  Referring provider: PITER Leyva*  Dx:   Encounter Diagnosis     ICD-10-CM    1  Voiding dysfunction  N39 8        Start Time: 1400  Stop Time:   Total time in clinic (min): 55 minutes    Subjective: Patient notes she was seen by the urologist last week who recommended a new medication and to continue physical therapy  Patient notes she doesn't know if she is feeling any better  Notes at times she thinks she can relax her muscles better and then void more than a few drops and other times she "isn't sure "  Reports doing HEP with good tolerance  Using urge suppression to extend the interval between voids  Objective: See treatment diary below      Assessment: Tolerated treatment well  Extensive time spent wit reviewing relaxation and voiding function  Encouragement to continue with breathing and relaxation techniques at home      Plan: Continue per plan of care        Precautions: GERD, depression, anxiety, schizoaffective disorder, anorexia, constipation      Manuals       LE eval  10 10 10 10 10 10 10      Pelvic transverse  10 15 15 15 15 15                                Neuro Re-Ed                                                                              HEP  Below  LE Child pose                       Ther Ex             Diaphragmatic breathing  5'           LE stretch  10 10 10 10 10 10      Contract relax Kegel  3"/10"   10x 10 10x 10x 10      Ball contract relax   5"/10x  5"/10x 10x 10x      Child pose   3'          Ball heel slides      10x 10x                                Ther Activity             Voiding ed  10  15 15 8 10                   Gait Training                                       Modalities

## 2020-09-08 DIAGNOSIS — N39.8 VOIDING DYSFUNCTION: Primary | ICD-10-CM

## 2020-09-08 RX ORDER — OXYBUTYNIN CHLORIDE 10 MG/1
10 TABLET, EXTENDED RELEASE ORAL
Qty: 30 TABLET | Refills: 4 | Status: SHIPPED | OUTPATIENT
Start: 2020-09-08 | End: 2020-09-15 | Stop reason: DRUGHIGH

## 2020-09-08 NOTE — TELEPHONE ENCOUNTER
Call placed to patient and spoke with her  Informed her of the recommendations of NICK at this time  She is aware of new medication being sent to pharmacy and of potential side effects

## 2020-09-08 NOTE — TELEPHONE ENCOUNTER
Oxybutynin 10 mg release pharmacy  Please advise patient about the effect profile including dry mouth, dry eyes, and constipation    Thank you

## 2020-09-09 ENCOUNTER — OFFICE VISIT (OUTPATIENT)
Dept: PHYSICAL THERAPY | Age: 30
End: 2020-09-09
Payer: COMMERCIAL

## 2020-09-09 ENCOUNTER — APPOINTMENT (OUTPATIENT)
Dept: PHYSICAL THERAPY | Age: 30
End: 2020-09-09
Payer: COMMERCIAL

## 2020-09-09 DIAGNOSIS — N39.8 VOIDING DYSFUNCTION: Primary | ICD-10-CM

## 2020-09-09 PROCEDURE — 97530 THERAPEUTIC ACTIVITIES: CPT | Performed by: PHYSICAL THERAPIST

## 2020-09-09 PROCEDURE — 97140 MANUAL THERAPY 1/> REGIONS: CPT | Performed by: PHYSICAL THERAPIST

## 2020-09-09 PROCEDURE — 97110 THERAPEUTIC EXERCISES: CPT | Performed by: PHYSICAL THERAPIST

## 2020-09-09 NOTE — PROGRESS NOTES
Daily Note     Today's date: 2020  Patient name: Naz Delvalle  : 1990  MRN: [id number]  Referring provider: PITER Daniel*  Dx:   Encounter Diagnosis     ICD-10-CM    1  Voiding dysfunction  N39 8        Start Time: 1603  Stop Time: 97  Total time in clinic (min): 56 minutes    Subjective: Patient notes varying difficulty voiding daily  Notes when constipation is most severe that voiding becomes more difficult  Notes since start of PT noticing greater volumes at each voiding with less straining and increased relaxation awareness or at least the need to relax to allow for better voiding  Has not yet received new medication  Objective: See treatment diary below      Assessment: Tolerated treatment well  Patient would benefit from continued PT Discussed preparing for discharge as patient now has tools and techniques necessary for PFM relaxation to promote more complete voiding  Patient verbalized agreement  Plan: Continue per plan of care  Prepare for discharge to self care over the next few visits        Precautions: GERD, depression, anxiety, schizoaffective disorder, anorexia, constipation      Manuals      LE eval  10 10 10 10 10 10 10 10     Pelvic transverse  10 15 15 15 15 15 10     Ext PFM/LA        5                  Neuro Re-Ed                                                                              HEP  Below  LE Child pose                       Ther Ex             Diaphragmatic breathing  5'           LE stretch  10 10 10 10 10 10 10     Contract relax Kegel  3"/10"   10x 10 10x 10x 10 10     Ball contract relax   5"/10x  5"/10x 10x 10x      Child pose   3'          Ball heel slides      10x 10x 30x                               Ther Activity             Voiding ed  10  15 15 8 10 8'                  Gait Training                                       Modalities

## 2020-09-10 DIAGNOSIS — R90.89 ABNORMAL FINDING ON MRI OF BRAIN: Primary | ICD-10-CM

## 2020-09-11 ENCOUNTER — APPOINTMENT (OUTPATIENT)
Dept: PHYSICAL THERAPY | Age: 30
End: 2020-09-11
Payer: COMMERCIAL

## 2020-09-11 NOTE — PROGRESS NOTES
Colon and Rectal Surgery   Ric Mathur 31 y o  female MRN [id number]  Encounter: [id number]  09/14/20 11:39 AM            Assessment: Ric Mathur is a 31 y o  female who has navjot rectal prolapse  Plan:   Rectal prolapse  She describes lifelong constipation since her childhood  She uses stool softeners but also takes magnesium citrate every 10-14 days or so  She does not self digitate to have a bowel movement but has some straining at stool  Rectal prolapse occurs and is quite lengthy, being shown to me (using her hands to measure) as being several inches long  This has occurred for over a year, on half of the days  She has not had any significant workup for this in past     I recommend a colonic transit study to evaluate the colon proper  I do not think defecography is necessary, given the navjot prolapse that has occurred  She is to initiate a course of Metamucil and take MiraLax daily  A high-fiber diet is recommended  Unless she specifically improves in the very near future, surgery can be indicated for navjot rectal prolapse  After discussion of the options with her and her father, they prefer a more expeditious course of therapy as a prelude to surgery  If the medical management does not work to improve her prolapse, surgery can be scheduled in 6-8 weeks  I prefer the robotic technique and will try to schedule as soft  Her father was present for consent  Risks of surgery, including but not limited to bleeding, pain, infection, hernia formation, injury to the ureter or other internal organs requiring surgery specific to these injuries, anastomotic leak, deep vein thrombosis, pulmonary embolism, myocardial infarction or heart failure, stroke, death, need for and enterostomy, recurrence of prolapse, failure to correct the prolapse, or other unspecified complications were discussed  Benefits and alternatives were discussed  Questions were answered         Subjective     HPI    Ric Mathur is a 31 y o  female is here today for evaluation of rectal prolapse  She states she has felt a bugle at the anus, she has struggled with chronic constipation  She is able to gently push the prolapse back in when it occurs  She takes laxative such as magnesium citrate but is open to trying a fiber supplement such as metamucil but feels this causes her constipation  She partakes in pelvic floor therapy for voiding dysfunction  She has never had a colonoscopy     She denies family history of colorectal cancer  Historical Information   Past Medical History:   Diagnosis Date    Anorexia nervosa     pt denies history at time of admission 7/21/18    Anxiety     Chronic kidney disease     CPAP (continuous positive airway pressure) dependence     waiting for a new mask    Depression     Disease of thyroid gland     hypo    Gall bladder polyp     Gastroesophageal reflux disease 9/18/2018    Hallucination     Memory difficulty 7/12/2018    Plantar wart of left foot     Psychiatric illness     Renal atrophy     [facility]izoaffective disorder ([facility] [address] )     Self-injurious behavior     Sleep apnea     Sleep difficulties     Suicide attempt ([facility] [address] )     history of attempt at age 22 by overdose     Urinary retention      Past Surgical History:   Procedure Laterality Date    EYE SURGERY Left     tear duct    IL ESOPHAGOGASTRODUODENOSCOPY TRANSORAL DIAGNOSTIC N/A 2/9/2018    Procedure: ESOPHAGOGASTRODUODENOSCOPY (EGD); Surgeon: Jose Regalado MD;  Location: [facility];  Service: Gastroenterology    IL ESOPHAGOGASTRODUODENOSCOPY TRANSORAL DIAGNOSTIC N/A 11/6/2018    Procedure: ESOPHAGOGASTRODUODENOSCOPY (EGD);   Surgeon: Jak Fofana MD;  Location: [facility];  Service: Gastroenterology       Meds/Allergies       Current Outpatient Medications:     benztropine (COGENTIN) 1 mg tablet, Take 1 mg by mouth TAKE 1 TABLET IN AM AND 2 TABLETS IN PM, Disp: , Rfl:     Brexpiprazole (REXULTI PO), Take 0 75 mg by mouth daily, Disp: , Rfl:     busPIRone (BUSPAR) 10 mg tablet, Take 10 mg by mouth 2 (two) times a day, Disp: , Rfl:     clonazePAM (KlonoPIN) 0 5 mg tablet, Take 1 mg by mouth daily at bedtime, Disp: , Rfl:     Dermatological Products, Misc  (EpiCeram) lotion, , Disp: , Rfl:     gabapentin (NEURONTIN) 100 mg capsule, 2 tablets in am, Disp: , Rfl:     gabapentin (NEURONTIN) 400 mg capsule, 400 mg daily at bedtime , Disp: , Rfl:     liothyronine (CYTOMEL) 5 mcg tablet, Take 1 tablet by mouth once daily, Disp: 30 tablet, Rfl: 5    LITHIUM PO, Take 150 mg by mouth 2 (two) times a day, Disp: , Rfl:     oxybutynin (DITROPAN-XL) 10 MG 24 hr tablet, Take 1 tablet (10 mg total) by mouth daily at bedtime, Disp: 30 tablet, Rfl: 4    pantoprazole (PROTONIX) 40 mg tablet, Take 1 tablet by mouth once daily, Disp: 30 tablet, Rfl: 5    triamcinolone (KENALOG) 0 1 % cream, APPLY TO RASH ON HANDS TWICE DAILY FOR 2 WEEKS THEN TWICE DAILY FOR 2 3 DAYS A WEEK AS NEEDED FOR MILD FLARE UPS, Disp: , Rfl:   Allergies   Allergen Reactions    Gluten Meal     Latex Swelling       Social History   Social History     Substance and Sexual Activity   Drug Use No     Social History     Tobacco Use   Smoking Status Never Smoker   Smokeless Tobacco Never Used         Family History   Problem Relation Age of Onset    Hypertension Father         benign essential    Hypertension Brother         benign essential     OCD Brother     Depression Brother     Other Family         nasolacrimal duct obstruction, acquired    Colon cancer Neg Hx          Review of Systems   Constitutional: Negative  Respiratory: Negative  Cardiovascular: Negative  Gastrointestinal: Positive for abdominal distention and constipation  Rectal prolapse   Genitourinary:        CKD       Objective   Current Vitals:  Vitals:    09/14/20 1054   Height: 5' 3" (1 6 m)         Physical Exam  Constitutional:       Appearance: Normal appearance     Cardiovascular: Rate and Rhythm: Normal rate and regular rhythm  Pulmonary:      Effort: Pulmonary effort is normal       Breath sounds: Normal breath sounds  Abdominal:      General: Abdomen is flat  There is no distension  Palpations: Abdomen is soft  There is no mass  Tenderness: There is no abdominal tenderness  There is no guarding  Hernia: No hernia is present  Genitourinary:     Comments: Intussusceptum evident  Bulging to the anus occurs with Valsalva  Skin:     General: Skin is warm and dry  Neurological:      General: No focal deficit present  Mental Status: She is alert and oriented to person, place, and time  Psychiatric:      Comments: Altered affect but conversant  She states memory is poor, short-term

## 2020-09-12 ENCOUNTER — OFFICE VISIT (OUTPATIENT)
Dept: INTERNAL MEDICINE CLINIC | Facility: CLINIC | Age: 30
End: 2020-09-12
Payer: COMMERCIAL

## 2020-09-12 VITALS
WEIGHT: 114.6 LBS | RESPIRATION RATE: 14 BRPM | SYSTOLIC BLOOD PRESSURE: 110 MMHG | HEIGHT: 63 IN | BODY MASS INDEX: 20.3 KG/M2 | TEMPERATURE: 99.9 F | OXYGEN SATURATION: 93 % | DIASTOLIC BLOOD PRESSURE: 80 MMHG | HEART RATE: 64 BPM

## 2020-09-12 DIAGNOSIS — I73.00 RAYNAUD'S PHENOMENON WITHOUT GANGRENE: Primary | ICD-10-CM

## 2020-09-12 DIAGNOSIS — R20.2 NUMBNESS AND TINGLING: ICD-10-CM

## 2020-09-12 DIAGNOSIS — Z13.0 SCREENING FOR DEFICIENCY ANEMIA: ICD-10-CM

## 2020-09-12 DIAGNOSIS — R20.0 NUMBNESS AND TINGLING: ICD-10-CM

## 2020-09-12 DIAGNOSIS — Z13.1 SCREENING FOR DIABETES MELLITUS: ICD-10-CM

## 2020-09-12 DIAGNOSIS — E03.9 ACQUIRED HYPOTHYROIDISM: ICD-10-CM

## 2020-09-12 DIAGNOSIS — E55.9 VITAMIN D DEFICIENCY: ICD-10-CM

## 2020-09-12 PROCEDURE — 99214 OFFICE O/P EST MOD 30 MIN: CPT | Performed by: PHYSICIAN ASSISTANT

## 2020-09-12 RX ORDER — GABAPENTIN 100 MG/1
CAPSULE ORAL
COMMUNITY

## 2020-09-12 NOTE — ASSESSMENT & PLAN NOTE
Will check complete labs to rule out autoimmune condition  We discussed symptoms being consistent with Raynauds as evidenced by pictures father took with his camera phone  Discussed CCB use but so far pt only had one episode and her BP is low normal and risk does not outweigh benefit at this time

## 2020-09-12 NOTE — PROGRESS NOTES
Assessment/Plan:  Problem List Items Addressed This Visit        Endocrine    Hypothyroidism    Relevant Orders    TSH, 3rd generation with Free T4 reflex       Other    Raynaud's phenomenon without gangrene - Primary     Will check complete labs to rule out autoimmune condition  We discussed symptoms being consistent with Raynauds as evidenced by pictures father took with his camera phone  Discussed CCB use but so far pt only had one episode and her BP is low normal and risk does not outweigh benefit at this time  Relevant Orders    THUAN Screen w/ Reflex to Titer/Pattern    Lyme Antibody Profile with reflex to WB    RF Screen w/ Reflex to Titer    Sjogren's Antibodies    Sedimentation rate, automated    Numbness and tingling    Relevant Orders    Vitamin B12    Iron Panel (Includes Ferritin, Iron Sat%, Iron, and TIBC)      Other Visit Diagnoses     Screening for deficiency anemia        Relevant Orders    CBC and differential    Screening for diabetes mellitus        Relevant Orders    Comprehensive metabolic panel    Vitamin D deficiency        Relevant Orders    Vitamin D 25 hydroxy           Diagnoses and all orders for this visit:    Raynaud's phenomenon without gangrene  -     THUAN Screen w/ Reflex to Titer/Pattern; Future  -     Lyme Antibody Profile with reflex to WB; Future  -     RF Screen w/ Reflex to Titer; Future  -     Sjogren's Antibodies; Future  -     Sedimentation rate, automated; Future    Numbness and tingling  -     Vitamin B12; Future  -     Iron Panel (Includes Ferritin, Iron Sat%, Iron, and TIBC); Future    Acquired hypothyroidism  -     TSH, 3rd generation with Free T4 reflex; Future    Screening for deficiency anemia  -     CBC and differential; Future    Screening for diabetes mellitus  -     Comprehensive metabolic panel; Future    Vitamin D deficiency  -     Vitamin D 25 hydroxy;  Future    Other orders  -     gabapentin (NEURONTIN) 100 mg capsule; 2 tablets in am        Raynaud's phenomenon without gangrene  Will check complete labs to rule out autoimmune condition  We discussed symptoms being consistent with Raynauds as evidenced by pictures father took with his camera phone  Discussed CCB use but so far pt only had one episode and her BP is low normal and risk does not outweigh benefit at this time  Subjective:      Patient ID: Tracy Moy is a 31 y o  female  Pt presents for evaluation of an episode that occurred yesterday  She notes that her fingers became white, red, and purple, painful and cold  She notes she was in the freezer section of the store when this happened  The episode resolved on its own after about an hour and extremities appear normal today  She also notes that she has been having numbness and tingling in her legs  She notes a positive family hx of RA in her paternal uncle  The following portions of the patient's history were reviewed and updated as appropriate:   She has a past medical history of Anorexia nervosa, Anxiety, Chronic kidney disease, CPAP (continuous positive airway pressure) dependence, Depression, Disease of thyroid gland, Gall bladder polyp, Gastroesophageal reflux disease (9/18/2018), Hallucination, Memory difficulty (7/12/2018), Plantar wart of left foot, Psychiatric illness, Renal atrophy, Schizoaffective disorder ([address] ), Self-injurious behavior, Sleep apnea, Sleep difficulties, Suicide attempt ([facility] [address] ), and Urinary retention  ,  does not have any pertinent problems on file  ,   has a past surgical history that includes Eye surgery (Left); pr esophagogastroduodenoscopy transoral diagnostic (N/A, 2/9/2018); and pr esophagogastroduodenoscopy transoral diagnostic (N/A, 11/6/2018)  ,  family history includes Depression in her brother; Hypertension in her brother and father; OCD in her brother; Other in her family  ,   reports that she has never smoked   She has never used smokeless tobacco  She reports that she does not drink alcohol or use drugs ,  is allergic to gluten meal and latex     Current Outpatient Medications   Medication Sig Dispense Refill    benztropine (COGENTIN) 1 mg tablet Take 1 mg by mouth TAKE 1 TABLET IN AM AND 2 TABLETS IN PM      Brexpiprazole (REXULTI PO) Take 0 75 mg by mouth daily      busPIRone (BUSPAR) 10 mg tablet Take 10 mg by mouth 2 (two) times a day      clonazePAM (KlonoPIN) 0 5 mg tablet Take 1 mg by mouth daily at bedtime      Dermatological Products, Misc  (EpiCeram) lotion       gabapentin (NEURONTIN) 100 mg capsule 2 tablets in am      gabapentin (NEURONTIN) 400 mg capsule 400 mg daily at bedtime       liothyronine (CYTOMEL) 5 mcg tablet Take 1 tablet by mouth once daily 30 tablet 5    LITHIUM PO Take 150 mg by mouth 2 (two) times a day      pantoprazole (PROTONIX) 40 mg tablet Take 1 tablet by mouth once daily 30 tablet 5    triamcinolone (KENALOG) 0 1 % cream APPLY TO RASH ON HANDS TWICE DAILY FOR 2 WEEKS THEN TWICE DAILY FOR 2 3 DAYS A WEEK AS NEEDED FOR MILD FLARE UPS      oxybutynin (DITROPAN-XL) 10 MG 24 hr tablet Take 1 tablet (10 mg total) by mouth daily at bedtime (Patient not taking: Reported on 9/12/2020) 30 tablet 4     No current facility-administered medications for this visit  Review of Systems   Constitutional: Negative for chills and fever  HENT: Negative for congestion, ear pain, hearing loss, postnasal drip, rhinorrhea, sinus pressure, sinus pain, sore throat and trouble swallowing  Eyes: Negative for pain and visual disturbance  Respiratory: Negative for cough, chest tightness, shortness of breath and wheezing  Cardiovascular: Negative  Negative for chest pain, palpitations and leg swelling  Gastrointestinal: Negative for abdominal pain, blood in stool, constipation, diarrhea, nausea and vomiting  Endocrine: Negative for cold intolerance, heat intolerance, polydipsia, polyphagia and polyuria     Genitourinary: Negative for difficulty urinating, dysuria, flank pain and urgency  Musculoskeletal: Negative for arthralgias, back pain, gait problem and myalgias  Skin: Positive for color change  Negative for rash  Allergic/Immunologic: Negative  Neurological: Negative for dizziness, weakness, light-headedness and headaches  Hematological: Negative  Psychiatric/Behavioral: Negative for behavioral problems, dysphoric mood and sleep disturbance  The patient is not nervous/anxious  PHQ-9 Depression Screening    PHQ-9:    Frequency of the following problems over the past two weeks:               Objective:  Vitals:    09/12/20 1121   BP: 110/80   BP Location: Right arm   Patient Position: Sitting   Cuff Size: Child   Pulse: 64   Resp: 14   Temp: 99 9 °F (37 7 °C)   SpO2: 93%   Weight: 52 kg (114 lb 9 6 oz)   Height: 5' 3" (1 6 m)     Body mass index is 20 3 kg/m²  Physical Exam  Constitutional:       General: She is not in acute distress  Appearance: She is well-developed  She is not diaphoretic  HENT:      Head: Normocephalic and atraumatic  Right Ear: External ear normal       Left Ear: External ear normal       Nose: Nose normal       Mouth/Throat:      Pharynx: No oropharyngeal exudate  Eyes:      General: No scleral icterus  Right eye: No discharge  Left eye: No discharge  Conjunctiva/sclera: Conjunctivae normal       Pupils: Pupils are equal, round, and reactive to light  Neck:      Musculoskeletal: Normal range of motion and neck supple  Thyroid: No thyromegaly  Cardiovascular:      Rate and Rhythm: Normal rate and regular rhythm  Heart sounds: Normal heart sounds  No murmur  No friction rub  No gallop  Pulmonary:      Effort: Pulmonary effort is normal  No respiratory distress  Breath sounds: Normal breath sounds  No wheezing or rales  Abdominal:      General: Bowel sounds are normal  There is no distension  Palpations: Abdomen is soft  Tenderness: There is no abdominal tenderness  Musculoskeletal: Normal range of motion  General: No tenderness or deformity  Skin:     General: Skin is warm and dry  Neurological:      Mental Status: She is alert and oriented to person, place, and time  Cranial Nerves: No cranial nerve deficit  Psychiatric:         Behavior: Behavior normal          Thought Content:  Thought content normal          Judgment: Judgment normal

## 2020-09-14 ENCOUNTER — OFFICE VISIT (OUTPATIENT)
Dept: SURGERY | Facility: CLINIC | Age: 30
End: 2020-09-14
Payer: COMMERCIAL

## 2020-09-14 VITALS — HEIGHT: 63 IN | BODY MASS INDEX: 20.3 KG/M2

## 2020-09-14 DIAGNOSIS — K62.3 RECTAL PROLAPSE: ICD-10-CM

## 2020-09-14 PROCEDURE — 99245 OFF/OP CONSLTJ NEW/EST HI 55: CPT | Performed by: COLON & RECTAL SURGERY

## 2020-09-14 PROCEDURE — 1036F TOBACCO NON-USER: CPT | Performed by: COLON & RECTAL SURGERY

## 2020-09-14 PROCEDURE — 46600 DIAGNOSTIC ANOSCOPY SPX: CPT | Performed by: COLON & RECTAL SURGERY

## 2020-09-14 RX ORDER — NEOMYCIN SULFATE 500 MG/1
1000 TABLET ORAL 3 TIMES DAILY
Status: CANCELLED | OUTPATIENT
Start: 2020-09-14 | End: 2020-09-15

## 2020-09-14 RX ORDER — METRONIDAZOLE 250 MG/1
1000 TABLET ORAL 3 TIMES DAILY
Status: CANCELLED | OUTPATIENT
Start: 2020-09-14 | End: 2020-09-15

## 2020-09-14 NOTE — TELEPHONE ENCOUNTER
Office visit notes available from [facility] 8/31/20  Patient is NOT to be taking any anticholinergic due to severe constipation issue  That office visit note supports same  Again, Drug Prior Authorization for Myrbetriq 25mg was requested and initiated initiated via CoverMyMeds  com - Key: [id number]  Response questions answered and submitted for consideration  Final determination is expected within 24-72 hours

## 2020-09-14 NOTE — ASSESSMENT & PLAN NOTE
She describes lifelong constipation since her childhood  She uses stool softeners but also takes magnesium citrate every 10-14 days or so  She does not self digitate to have a bowel movement but has some straining at stool  Rectal prolapse occurs and is quite lengthy, being shown to me (using her hands to measure) as being several inches long  This has occurred for over a year, on half of the days  She has not had any significant workup for this in past     I recommend a colonic transit study to evaluate the colon proper  I do not think defecography is necessary, given the navjot prolapse that has occurred  She is to initiate a course of Metamucil and take MiraLax daily  A high-fiber diet is recommended  Unless she specifically improves in the very near future, surgery can be indicated for navjot rectal prolapse  After discussion of the options with her and her father, they prefer a more expeditious course of therapy as a prelude to surgery  If the medical management does not work to improve her prolapse, surgery can be scheduled in 6-8 weeks  I prefer the robotic technique and will try to schedule as soft  Her father was present for consent  Risks of surgery, including but not limited to bleeding, pain, infection, hernia formation, injury to the ureter or other internal organs requiring surgery specific to these injuries, anastomotic leak, deep vein thrombosis, pulmonary embolism, myocardial infarction or heart failure, stroke, death, need for and enterostomy, recurrence of prolapse, failure to correct the prolapse, or other unspecified complications were discussed  Benefits and alternatives were discussed  Questions were answered

## 2020-09-14 NOTE — PATIENT INSTRUCTIONS
Marija Moraes  9/14/2020      Recommended Total Fiber Intake**   AGE MEN WOMAN   19-50 38 grams/day 25 grams/day   Over 50 30 grams/day 21 grams/day     Fiber Sources in Common Foods  Use this guide to find out if you have enough fiber in you diet     Food Size of Serving Alvaro's of Serving [employer]  Serving   Fruits:  (raw unless otherwise noted Vegetables:  (cooked, unless otherwise noted)   Apple (w/peel) 1 medium 3 7 81 Artichoke 1 globe 6 5 60   Apricots 1 cup 3 7 74 Asparagus ½ cup 1 8 25   Banana 1 medium 2 7 105 Beans:      Blackberries 1 cup 7 2 75 Green  (canned)                       ½ cup 1 3 14   Blueberries 1 cup 3 9 81 Kidney ½ cup 5 7 114   Cantaloupe 1 cup 1 3 56 Peña ½ cup 6 1 85   Grapefruit 1 medium 2 8 82 Alexis ½ cup 7 4 118   Grapes 1 cup 1 6 114 White ½ cup 5 5 122   Orange 1 medium 3 1 62 Beets ½ cup 1 6 37   Pear (with peel) 1 medium 4 0 98 Broccoli ½ cup 2 8 26   Pineapple 1 cup 1 9 76 Cabbage, green ½ cup 2 1 16   Plums 1 medium 1 0 36 Cabbage, green (raw) ½ cup 0 8 9   Prunes (dried) 1 cup 11 4 386 Carrots ½ cup 2 6 35   Raspberries 1 cup 8 4 60 Cauliflower ½ cup 2 0 17   Strawberries 1 cup 3 4 45 Cauliflower (raw) ½ cup 1 3 13   Watermelon 1 slice 0 8 51 Celery (raw) ½ cup 1 0 10   GRAIN PRODUCTS AND OTHERS: Corn ½ cup 2 0 66   Bread:    Cucumber (raw) ½ cup 0 4 7   Czech 1 slice 0 8 68 Eggplant ½ cup 1 2 13   Rye 1 slice 1 6 67 Green Peas ½ cup 4 4 62   White 1 slice 0 6 67 Lettuce, iceberg (raw) ½ cup 0 4 4   Whole      Wheat 1 slice 2 0 70 Onions (raw) ½ cup 1 4 30   Cereal:    Potato (baked with skin) ½ cup 1 5 66   Bran 1 ounce 9 7 70 Spinach ½ cup 2 7 25   Corn Flakes 1 ounce 1 0 110 Tomato ½ cup 1 0 19   Oat Bran 1 ounce 4 3 69 Zucchini ½ cup 1 3 14   Oatmeal 1 ounce 3 0 109 METAMUCIL:   Shredded Wheat 1 ounce 2 8 102 Capsules 6 capsules 3 0 10   Crackers:    Smooth Texture Orange (sugar free) 1 tsp 3 0 20   Beltran 1 square 0 1 27 Smooth Texture Orange (with sugar) 1 tbsp 3 0 45   Saltine 1 regular 0 1 13 Wafers 2 wafers 3 0 120   Rice:          Brown ½ cup 1 8 108       White ½ cup 0 3 103       Spaghetti 2 ounces 2 1 225       Almonds (roasted) ½ cup 6 4 351       Peanuts (roasted) ½ cup 6 1 388         ** [place] [employer], [facility], 2002    Track your fiber intake for five days  Use the Fiber Source Guide to find out how much fiber is in common food  If youre not getting your recommended amount of fiber each day, talk to your doctor about how you can increase the fiber in your diet  Example Monday Tuesday Wednesday Thursday Friday   Food Oatmeal        Fiber Grams 2 8        Food Blueberries        Fiber Grams 3 9        Food W W  Bread        Fiber Grams 1 9        Food W W  Bread        Fiber Grams 1 9        Food Apple        Fiber Grams 3 7        Food Spaghetti        Fiber Grams   14        Food Corn        Fiber Grams 2 0        Food White Bread        Fiber Grams   6        Add numbers in each column to find your daily fiber intake  Total Daily Fiber Intake 18 2            Too Low  Like most Americans, this example is not enough fiber  Talk to your doctor about how to add fiber to your diet  Quick Fiber Facts  · Most Americans consume only about half of the recommended fiber they need each day  · Fiber helps maintain normal bowel function, and helps prevent constipation and its potential complications  Straining and pressure from constipation may lead to diverticular disease and hemorrhoids  · Stool softeners or stimulant laxatives only offer short-term relief of constipation, while dietary changes or fiber therapies help break the cycle of irregularity  · Diets low in saturated fat and cholesterol that include 7 grams of soluble fiber per day from psyllium husk, as in Metamucil, may reduce the risk of heart disease by lowering cholesterol    One adult dose of Metamucil has 2 4 grams of this soluble fiber    · Increase fiber intake gradually, giving the body time to adjust

## 2020-09-14 NOTE — PROGRESS NOTES
Lower Endoscopy    Date/Time: 9/14/2020 11:31 AM  Performed by: Natalia Melendez MD  Authorized by: Natalia Melendez MD     Verbal consent obtained?: Yes    Consent given by:  Patient  Scope type: Anoscope   Bulging intussusceptum evident   Otherwise, normal exam

## 2020-09-15 NOTE — TELEPHONE ENCOUNTER
Patient states she still feels pain from uti and would like to know how to proceed  Patient finished antibiotic but has not felt better  Please advise

## 2020-09-15 NOTE — TELEPHONE ENCOUNTER
Call placed to patient  LMOM for her to contact the office in regards to symptoms she is experiencing  Office number provided for call back at this time

## 2020-09-15 NOTE — TELEPHONE ENCOUNTER
The patient has an upcoming office visit scheduled for 12/3/20 with Alivia Angelo in the [facility] location  We got APPROVAL on the Myrbetriq 25mg; both the pharmacy and patient informed of same  Patient did NOT start the Oxybutynin ER 10mg  When I spoke with the pharmacy, they were discontinuing the prescription entirely  New script for Myrbetriq 25mg, 30 day supply with 4 refills was queued and forwarded to the Advanced Practitioner covering the [facility] location for approval     Prior Authorization for Myrbetriq 25mg was APPROVED and valid from 9/14/20 until 9/14/21 (30 tablets for 30 days)

## 2020-09-16 ENCOUNTER — APPOINTMENT (OUTPATIENT)
Dept: PHYSICAL THERAPY | Age: 30
End: 2020-09-16
Payer: COMMERCIAL

## 2020-09-16 NOTE — TELEPHONE ENCOUNTER
Spoke to pt experiencing UTI symptoms (urinary urgency, frequency)  Pt had UTI 8/31 for which she was prescribed Amoxicillin  Pt is also experiencing pain in lower groin area  Advised pt to go for UC and to hydrate well and that we would call with results

## 2020-09-17 ENCOUNTER — APPOINTMENT (OUTPATIENT)
Dept: LAB | Facility: CLINIC | Age: 30
End: 2020-09-17
Payer: COMMERCIAL

## 2020-09-17 DIAGNOSIS — N39.0 URINARY TRACT INFECTION WITHOUT HEMATURIA, SITE UNSPECIFIED: ICD-10-CM

## 2020-09-17 DIAGNOSIS — E55.9 VITAMIN D DEFICIENCY: ICD-10-CM

## 2020-09-17 DIAGNOSIS — N25.81 SECONDARY HYPERPARATHYROIDISM OF RENAL ORIGIN (HCC): ICD-10-CM

## 2020-09-17 DIAGNOSIS — Z13.1 SCREENING FOR DIABETES MELLITUS: ICD-10-CM

## 2020-09-17 DIAGNOSIS — R20.2 NUMBNESS AND TINGLING: ICD-10-CM

## 2020-09-17 DIAGNOSIS — E03.9 ACQUIRED HYPOTHYROIDISM: ICD-10-CM

## 2020-09-17 DIAGNOSIS — R20.0 NUMBNESS AND TINGLING: ICD-10-CM

## 2020-09-17 DIAGNOSIS — I73.00 RAYNAUD'S PHENOMENON WITHOUT GANGRENE: ICD-10-CM

## 2020-09-17 DIAGNOSIS — N18.2 CHRONIC KIDNEY DISEASE (CKD) STAGE G2/A2, MILDLY DECREASED GLOMERULAR FILTRATION RATE (GFR) BETWEEN 60-89 ML/MIN/1.73 SQUARE METER AND ALBUMINURIA CREATININE RATIO BETWEEN 30-299 MG/G: ICD-10-CM

## 2020-09-17 DIAGNOSIS — Z13.0 SCREENING FOR DEFICIENCY ANEMIA: ICD-10-CM

## 2020-09-17 LAB
25(OH)D3 SERPL-MCNC: 38.9 NG/ML (ref 30–100)
ALBUMIN SERPL BCP-MCNC: 4.2 G/DL (ref 3.5–5)
ALP SERPL-CCNC: 79 U/L (ref 46–116)
ALT SERPL W P-5'-P-CCNC: 36 U/L (ref 12–78)
ANION GAP SERPL CALCULATED.3IONS-SCNC: 7 MMOL/L (ref 4–13)
AST SERPL W P-5'-P-CCNC: 26 U/L (ref 5–45)
BACTERIA UR QL AUTO: NORMAL /HPF
BASOPHILS # BLD AUTO: 0.06 THOUSANDS/ΜL (ref 0–0.1)
BASOPHILS NFR BLD AUTO: 1 % (ref 0–1)
BILIRUB SERPL-MCNC: 1.18 MG/DL (ref 0.2–1)
BILIRUB UR QL STRIP: NEGATIVE
BUN SERPL-MCNC: 15 MG/DL (ref 5–25)
CALCIUM SERPL-MCNC: 9.6 MG/DL (ref 8.3–10.1)
CHLORIDE SERPL-SCNC: 106 MMOL/L (ref 100–108)
CLARITY UR: CLEAR
CO2 SERPL-SCNC: 25 MMOL/L (ref 21–32)
COLOR UR: YELLOW
CREAT SERPL-MCNC: 1.12 MG/DL (ref 0.6–1.3)
CREAT UR-MCNC: 20.1 MG/DL
EOSINOPHIL # BLD AUTO: 0.08 THOUSAND/ΜL (ref 0–0.61)
EOSINOPHIL NFR BLD AUTO: 1 % (ref 0–6)
ERYTHROCYTE [DISTWIDTH] IN BLOOD BY AUTOMATED COUNT: 13.9 % (ref 11.6–15.1)
ERYTHROCYTE [SEDIMENTATION RATE] IN BLOOD: 5 MM/HOUR (ref 0–19)
FERRITIN SERPL-MCNC: 9 NG/ML (ref 8–388)
GFR SERPL CREATININE-BSD FRML MDRD: 66 ML/MIN/1.73SQ M
GLUCOSE P FAST SERPL-MCNC: 93 MG/DL (ref 65–99)
GLUCOSE UR STRIP-MCNC: NEGATIVE MG/DL
HCT VFR BLD AUTO: 43.6 % (ref 34.8–46.1)
HGB BLD-MCNC: 13.6 G/DL (ref 11.5–15.4)
HGB UR QL STRIP.AUTO: NEGATIVE
HYALINE CASTS #/AREA URNS LPF: NORMAL /LPF
IMM GRANULOCYTES # BLD AUTO: 0.02 THOUSAND/UL (ref 0–0.2)
IMM GRANULOCYTES NFR BLD AUTO: 0 % (ref 0–2)
IRON SATN MFR SERPL: 29 %
IRON SERPL-MCNC: 145 UG/DL (ref 50–170)
KETONES UR STRIP-MCNC: NEGATIVE MG/DL
LEUKOCYTE ESTERASE UR QL STRIP: NEGATIVE
LYMPHOCYTES # BLD AUTO: 1.95 THOUSANDS/ΜL (ref 0.6–4.47)
LYMPHOCYTES NFR BLD AUTO: 28 % (ref 14–44)
MCH RBC QN AUTO: 27 PG (ref 26.8–34.3)
MCHC RBC AUTO-ENTMCNC: 31.2 G/DL (ref 31.4–37.4)
MCV RBC AUTO: 87 FL (ref 82–98)
MICROALBUMIN UR-MCNC: <5 MG/L (ref 0–20)
MICROALBUMIN/CREAT 24H UR: <25 MG/G CREATININE (ref 0–30)
MONOCYTES # BLD AUTO: 0.57 THOUSAND/ΜL (ref 0.17–1.22)
MONOCYTES NFR BLD AUTO: 8 % (ref 4–12)
NEUTROPHILS # BLD AUTO: 4.35 THOUSANDS/ΜL (ref 1.85–7.62)
NEUTS SEG NFR BLD AUTO: 62 % (ref 43–75)
NITRITE UR QL STRIP: NEGATIVE
NON-SQ EPI CELLS URNS QL MICRO: NORMAL /HPF
NRBC BLD AUTO-RTO: 0 /100 WBCS
PH UR STRIP.AUTO: 6.5 [PH]
PLATELET # BLD AUTO: 233 THOUSANDS/UL (ref 149–390)
PMV BLD AUTO: 11.2 FL (ref 8.9–12.7)
POTASSIUM SERPL-SCNC: 4.5 MMOL/L (ref 3.5–5.3)
PROT SERPL-MCNC: 7.7 G/DL (ref 6.4–8.2)
PROT UR STRIP-MCNC: NEGATIVE MG/DL
PTH-INTACT SERPL-MCNC: 50.9 PG/ML (ref 18.4–80.1)
RBC # BLD AUTO: 5.04 MILLION/UL (ref 3.81–5.12)
RBC #/AREA URNS AUTO: NORMAL /HPF
SODIUM SERPL-SCNC: 138 MMOL/L (ref 136–145)
SP GR UR STRIP.AUTO: 1.01 (ref 1–1.03)
TIBC SERPL-MCNC: 493 UG/DL (ref 250–450)
TSH SERPL DL<=0.05 MIU/L-ACNC: 0.82 UIU/ML (ref 0.36–3.74)
UROBILINOGEN UR QL STRIP.AUTO: 0.2 E.U./DL
VIT B12 SERPL-MCNC: 939 PG/ML (ref 100–900)
WBC # BLD AUTO: 7.03 THOUSAND/UL (ref 4.31–10.16)
WBC #/AREA URNS AUTO: NORMAL /HPF

## 2020-09-17 PROCEDURE — 83540 ASSAY OF IRON: CPT

## 2020-09-17 PROCEDURE — 83550 IRON BINDING TEST: CPT

## 2020-09-17 PROCEDURE — 80053 COMPREHEN METABOLIC PANEL: CPT

## 2020-09-17 PROCEDURE — 82043 UR ALBUMIN QUANTITATIVE: CPT | Performed by: INTERNAL MEDICINE

## 2020-09-17 PROCEDURE — 86235 NUCLEAR ANTIGEN ANTIBODY: CPT

## 2020-09-17 PROCEDURE — 86038 ANTINUCLEAR ANTIBODIES: CPT

## 2020-09-17 PROCEDURE — 86618 LYME DISEASE ANTIBODY: CPT

## 2020-09-17 PROCEDURE — 81001 URINALYSIS AUTO W/SCOPE: CPT | Performed by: INTERNAL MEDICINE

## 2020-09-17 PROCEDURE — 85025 COMPLETE CBC W/AUTO DIFF WBC: CPT

## 2020-09-17 PROCEDURE — 82570 ASSAY OF URINE CREATININE: CPT | Performed by: INTERNAL MEDICINE

## 2020-09-17 PROCEDURE — 87147 CULTURE TYPE IMMUNOLOGIC: CPT

## 2020-09-17 PROCEDURE — 85652 RBC SED RATE AUTOMATED: CPT

## 2020-09-17 PROCEDURE — 82306 VITAMIN D 25 HYDROXY: CPT

## 2020-09-17 PROCEDURE — 87086 URINE CULTURE/COLONY COUNT: CPT

## 2020-09-17 PROCEDURE — 36415 COLL VENOUS BLD VENIPUNCTURE: CPT

## 2020-09-17 PROCEDURE — 84443 ASSAY THYROID STIM HORMONE: CPT

## 2020-09-17 PROCEDURE — 82607 VITAMIN B-12: CPT

## 2020-09-17 PROCEDURE — 82728 ASSAY OF FERRITIN: CPT

## 2020-09-17 PROCEDURE — 86430 RHEUMATOID FACTOR TEST QUAL: CPT

## 2020-09-17 PROCEDURE — 83970 ASSAY OF PARATHORMONE: CPT

## 2020-09-18 ENCOUNTER — APPOINTMENT (OUTPATIENT)
Dept: PHYSICAL THERAPY | Age: 30
End: 2020-09-18
Payer: COMMERCIAL

## 2020-09-18 LAB
B BURGDOR IGG+IGM SER-ACNC: <0.91 ISR (ref 0–0.9)
BACTERIA UR CULT: ABNORMAL
ENA SS-A AB SER-ACNC: <0.2 AI (ref 0–0.9)
ENA SS-B AB SER-ACNC: <0.2 AI (ref 0–0.9)
RHEUMATOID FACT SER QL LA: NEGATIVE
RYE IGE QN: NEGATIVE

## 2020-09-22 ENCOUNTER — TELEPHONE (OUTPATIENT)
Dept: NEUROLOGY | Facility: CLINIC | Age: 30
End: 2020-09-22

## 2020-09-22 ENCOUNTER — TELEPHONE (OUTPATIENT)
Dept: UROLOGY | Facility: AMBULATORY SURGERY CENTER | Age: 30
End: 2020-09-22

## 2020-09-22 DIAGNOSIS — N39.0 URINARY TRACT INFECTION WITHOUT HEMATURIA, SITE UNSPECIFIED: Primary | ICD-10-CM

## 2020-09-22 RX ORDER — AMOXICILLIN AND CLAVULANATE POTASSIUM 875; 125 MG/1; MG/1
1 TABLET, FILM COATED ORAL EVERY 12 HOURS SCHEDULED
Qty: 14 TABLET | Refills: 0 | Status: SHIPPED | OUTPATIENT
Start: 2020-09-22 | End: 2020-09-29

## 2020-09-22 NOTE — TELEPHONE ENCOUNTER
Call placed to patient and spoke with her  Informed her of prescription that has been sent to pharmacy  Pt is aware of medication and will take as directed

## 2020-09-22 NOTE — TELEPHONE ENCOUNTER
Best contact number for patient:  [phone]  Emergency Contact name and number:  David Bartlett [phone]  Referring provider and telephone number:  Ericmushtaq Supriya [phone]  Primary Care Provider Name and if affiliated with [facility]:   Mitch Yes  Reason for Appointment/Dx:  Abnormal MRI of brain  Neurology Location patient would like to be seen:  Any  Order received? Yes                                                 Records Received? Yes    Have you ever seen another Neurologist?       No    Insurance Information    Insurance Name: Maryse Gomez     ID/Policy #:[id number]    Secondary Insurance:    ID/Policy#: Workman's Comp/ Accident/ School  Information      Workman's Comp/Accident/School related?        No    If yes name of Insurance company:    Date of Injury:    Type of Injury:    [address] Name and Telephone Number:    Notes:                   Appointment date:   11/5/2020

## 2020-09-24 NOTE — PROGRESS NOTES
Daily Note     Today's date: 2020  Patient name: Maira Hughes  : 1990  MRN: [id number]  Referring provider: PITER Ni*  Dx:   Encounter Diagnosis     ICD-10-CM    1  Voiding dysfunction  N39 8        Start Time: 1400  Stop Time: 0595  Total time in clinic (min): 54 minutes    Subjective: Patient notes some improvement in urinary symptoms  Notes at times using relaxation techniques and contract relax techniques that she is able to more fully void and better initiate a urine stream  Reports feeling that constipation may have an effect on bladder emptying  Objective: See treatment diary below      Assessment: Tolerated treatment well  Patient exhibited good technique with therapeutic exercises  Maximum benefit with current PT  Patient independent is HEP and self care techniques  Notes improved voiding at times  Continues to struggle with chronic constipation  Will have rectal surgery end of the year due to rectal prolapse  Reviewed exercises and relaxation techniques for home with fair understanding  Plan: Discharge PT to self care        Precautions: GERD, depression, anxiety, schizoaffective disorder, anorexia, constipation      Manuals     LE eval  10 10 10 10 10 10 10 10 10    Pelvic transverse  10 15 15 15 15 15 10 10    Ext PFM/LA        5 5                 Neuro Re-Ed                                                                              HEP  Below  LE Child pose                       Ther Ex             Diaphragmatic breathing  5'           LE stretch  10 10 10 10 10 10 10 10    Contract relax Kegel  3"/10"   10x 10 10x 10x 10 10 10x    Ball contract relax   5"/10x  5"/10x 10x 10x  5"  10x    Child pose   3'          Ball heel slides      10x 10x 30x 30x                              Ther Activity             Voiding ed  10  15 15 8 10 8' 8'                 Gait Training                                       Modalities

## 2020-09-25 ENCOUNTER — OFFICE VISIT (OUTPATIENT)
Dept: PHYSICAL THERAPY | Age: 30
End: 2020-09-25
Payer: COMMERCIAL

## 2020-09-25 DIAGNOSIS — N39.8 VOIDING DYSFUNCTION: Primary | ICD-10-CM

## 2020-09-25 PROCEDURE — 97140 MANUAL THERAPY 1/> REGIONS: CPT | Performed by: PHYSICAL THERAPIST

## 2020-09-25 PROCEDURE — 97530 THERAPEUTIC ACTIVITIES: CPT | Performed by: PHYSICAL THERAPIST

## 2020-09-25 PROCEDURE — 97110 THERAPEUTIC EXERCISES: CPT | Performed by: PHYSICAL THERAPIST

## 2020-10-05 ENCOUNTER — HOSPITAL ENCOUNTER (OUTPATIENT)
Dept: RADIOLOGY | Facility: HOSPITAL | Age: 30
Discharge: HOME | End: 2020-10-05

## 2020-10-05 DIAGNOSIS — K62.3 RECTAL PROLAPSE: ICD-10-CM

## 2020-10-12 ENCOUNTER — HOSPITAL ENCOUNTER (OUTPATIENT)
Dept: RADIOLOGY | Facility: HOSPITAL | Age: 30
Discharge: HOME | End: 2020-10-12
Payer: COMMERCIAL

## 2020-10-12 PROCEDURE — 74018 RADEX ABDOMEN 1 VIEW: CPT

## 2020-10-12 NOTE — TELEPHONE ENCOUNTER
Advised pt to go for UA and UC (orders placed in chart) per AP to check if UTI from 9/17  resolved  Pt states she is hydrating and taking Tylenol for pain  She stated she cannot take ibuprofen  Suggested trying AZO for symptom relief but to wait until after urine provided for culture

## 2020-10-12 NOTE — TELEPHONE ENCOUNTER
Patient states she stopped medication on 09/29/20  Patient is still having pain  Patient states symptoms have not improved  Patient states pain is always there and sometimes when she urinates its worse  Patient states pain level 5 and when she gets full bladder she starts to get more pain like a ache  Please advise

## 2020-10-14 ENCOUNTER — TRANSCRIBE ORDERS (OUTPATIENT)
Dept: ADMINISTRATIVE | Facility: HOSPITAL | Age: 30
End: 2020-10-14

## 2020-10-14 ENCOUNTER — HOSPITAL ENCOUNTER (OUTPATIENT)
Dept: RADIOLOGY | Facility: HOSPITAL | Age: 30
Discharge: HOME | End: 2020-10-14
Payer: COMMERCIAL

## 2020-10-14 DIAGNOSIS — K62.3 RECTAL PROLAPSE: ICD-10-CM

## 2020-10-14 DIAGNOSIS — K62.3 RECTAL PROLAPSE: Primary | ICD-10-CM

## 2020-10-14 PROCEDURE — 74018 RADEX ABDOMEN 1 VIEW: CPT

## 2020-10-16 ENCOUNTER — TRANSCRIBE ORDERS (OUTPATIENT)
Dept: ADMINISTRATIVE | Facility: HOSPITAL | Age: 30
End: 2020-10-16

## 2020-10-16 ENCOUNTER — HOSPITAL ENCOUNTER (OUTPATIENT)
Dept: RADIOLOGY | Facility: HOSPITAL | Age: 30
Discharge: HOME | End: 2020-10-16
Payer: COMMERCIAL

## 2020-10-16 ENCOUNTER — APPOINTMENT (OUTPATIENT)
Dept: LAB | Facility: CLINIC | Age: 30
End: 2020-10-16
Payer: COMMERCIAL

## 2020-10-16 DIAGNOSIS — N39.0 URINARY TRACT INFECTION WITHOUT HEMATURIA, SITE UNSPECIFIED: ICD-10-CM

## 2020-10-16 DIAGNOSIS — K62.3 RECTAL PROLAPSE: Primary | ICD-10-CM

## 2020-10-16 DIAGNOSIS — K62.3 RECTAL PROLAPSE: ICD-10-CM

## 2020-10-16 LAB
BACTERIA UR QL AUTO: ABNORMAL /HPF
BILIRUB UR QL STRIP: NEGATIVE
CLARITY UR: CLEAR
COLOR UR: YELLOW
GLUCOSE UR STRIP-MCNC: NEGATIVE MG/DL
HGB UR QL STRIP.AUTO: NEGATIVE
HYALINE CASTS #/AREA URNS LPF: ABNORMAL /LPF
KETONES UR STRIP-MCNC: NEGATIVE MG/DL
LEUKOCYTE ESTERASE UR QL STRIP: NEGATIVE
NITRITE UR QL STRIP: NEGATIVE
NON-SQ EPI CELLS URNS QL MICRO: ABNORMAL /HPF
PH UR STRIP.AUTO: 6.5 [PH]
PROT UR STRIP-MCNC: NEGATIVE MG/DL
RBC #/AREA URNS AUTO: ABNORMAL /HPF
SP GR UR STRIP.AUTO: 1.01 (ref 1–1.03)
UROBILINOGEN UR QL STRIP.AUTO: 0.2 E.U./DL
WBC #/AREA URNS AUTO: ABNORMAL /HPF

## 2020-10-16 PROCEDURE — 87086 URINE CULTURE/COLONY COUNT: CPT

## 2020-10-16 PROCEDURE — 74018 RADEX ABDOMEN 1 VIEW: CPT

## 2020-10-16 PROCEDURE — 81001 URINALYSIS AUTO W/SCOPE: CPT | Performed by: NURSE PRACTITIONER

## 2020-10-18 LAB — BACTERIA UR CULT: NORMAL

## 2020-10-19 NOTE — TELEPHONE ENCOUNTER
Please inform patient results of urine culture was negative for infection, only mixed contaminants noted

## 2020-11-02 ENCOUNTER — LAB (OUTPATIENT)
Dept: LAB | Facility: CLINIC | Age: 30
End: 2020-11-02
Payer: COMMERCIAL

## 2020-11-02 ENCOUNTER — TRANSCRIBE ORDERS (OUTPATIENT)
Dept: LAB | Facility: CLINIC | Age: 30
End: 2020-11-02

## 2020-11-02 ENCOUNTER — OFFICE VISIT (OUTPATIENT)
Dept: INTERNAL MEDICINE CLINIC | Facility: CLINIC | Age: 30
End: 2020-11-02
Payer: COMMERCIAL

## 2020-11-02 VITALS
OXYGEN SATURATION: 99 % | DIASTOLIC BLOOD PRESSURE: 78 MMHG | BODY MASS INDEX: 20.2 KG/M2 | RESPIRATION RATE: 14 BRPM | HEART RATE: 76 BPM | WEIGHT: 114 LBS | SYSTOLIC BLOOD PRESSURE: 116 MMHG | TEMPERATURE: 97 F | HEIGHT: 63 IN

## 2020-11-02 DIAGNOSIS — Z79.899 ENCOUNTER FOR LONG-TERM (CURRENT) USE OF OTHER MEDICATIONS: Primary | ICD-10-CM

## 2020-11-02 DIAGNOSIS — G47.9 SLEEP DISORDER: ICD-10-CM

## 2020-11-02 DIAGNOSIS — I73.00 RAYNAUD'S PHENOMENON WITHOUT GANGRENE: Primary | ICD-10-CM

## 2020-11-02 DIAGNOSIS — Z79.899 ENCOUNTER FOR LONG-TERM (CURRENT) USE OF OTHER MEDICATIONS: ICD-10-CM

## 2020-11-02 PROBLEM — K62.3 RECTAL PROLAPSE: Status: RESOLVED | Noted: 2020-08-07 | Resolved: 2020-11-02

## 2020-11-02 LAB
CHOLEST SERPL-MCNC: 127 MG/DL (ref 50–200)
GLUCOSE P FAST SERPL-MCNC: 89 MG/DL (ref 65–99)
HDLC SERPL-MCNC: 79 MG/DL
LDLC SERPL CALC-MCNC: 40 MG/DL (ref 0–100)
NONHDLC SERPL-MCNC: 48 MG/DL
TRIGL SERPL-MCNC: 40 MG/DL

## 2020-11-02 PROCEDURE — 36415 COLL VENOUS BLD VENIPUNCTURE: CPT

## 2020-11-02 PROCEDURE — 82947 ASSAY GLUCOSE BLOOD QUANT: CPT

## 2020-11-02 PROCEDURE — 80061 LIPID PANEL: CPT

## 2020-11-02 PROCEDURE — 99214 OFFICE O/P EST MOD 30 MIN: CPT | Performed by: PHYSICIAN ASSISTANT

## 2020-11-24 ENCOUNTER — OFFICE VISIT (OUTPATIENT)
Dept: SLEEP CENTER | Facility: CLINIC | Age: 30
End: 2020-11-24
Payer: COMMERCIAL

## 2020-11-24 VITALS
HEART RATE: 87 BPM | DIASTOLIC BLOOD PRESSURE: 62 MMHG | WEIGHT: 115.6 LBS | BODY MASS INDEX: 20.48 KG/M2 | SYSTOLIC BLOOD PRESSURE: 100 MMHG | HEIGHT: 63 IN

## 2020-11-24 DIAGNOSIS — R41.3 MEMORY DIFFICULTY: ICD-10-CM

## 2020-11-24 DIAGNOSIS — F51.05 INSOMNIA DUE TO OTHER MENTAL DISORDER: ICD-10-CM

## 2020-11-24 DIAGNOSIS — K21.9 GASTROESOPHAGEAL REFLUX DISEASE, UNSPECIFIED WHETHER ESOPHAGITIS PRESENT: ICD-10-CM

## 2020-11-24 DIAGNOSIS — F99 INSOMNIA DUE TO OTHER MENTAL DISORDER: ICD-10-CM

## 2020-11-24 DIAGNOSIS — F25.9 SCHIZOAFFECTIVE DISORDER, UNSPECIFIED TYPE (HCC): ICD-10-CM

## 2020-11-24 DIAGNOSIS — G47.33 MILD OBSTRUCTIVE SLEEP APNEA: Primary | ICD-10-CM

## 2020-11-24 PROCEDURE — 99244 OFF/OP CNSLTJ NEW/EST MOD 40: CPT | Performed by: INTERNAL MEDICINE

## 2020-11-24 PROCEDURE — 1036F TOBACCO NON-USER: CPT | Performed by: INTERNAL MEDICINE

## 2020-11-24 PROCEDURE — 3008F BODY MASS INDEX DOCD: CPT | Performed by: INTERNAL MEDICINE

## 2020-11-30 RX ORDER — CLOBETASOL PROPIONATE 0.5 MG/G
CREAM TOPICAL
COMMUNITY
End: 2021-03-03

## 2020-12-03 ENCOUNTER — LAB (OUTPATIENT)
Dept: LAB | Facility: HOSPITAL | Age: 30
End: 2020-12-03
Payer: COMMERCIAL

## 2020-12-03 ENCOUNTER — TRANSCRIBE ORDERS (OUTPATIENT)
Dept: LAB | Facility: HOSPITAL | Age: 30
End: 2020-12-03
Payer: COMMERCIAL

## 2020-12-03 ENCOUNTER — OFFICE VISIT (OUTPATIENT)
Dept: LAB | Facility: HOSPITAL | Age: 30
End: 2020-12-03
Payer: COMMERCIAL

## 2020-12-03 ENCOUNTER — LAB REQUISITION (OUTPATIENT)
Dept: LAB | Facility: HOSPITAL | Age: 30
End: 2020-12-03
Payer: COMMERCIAL

## 2020-12-03 DIAGNOSIS — Z01.818 PRE-OP TESTING: Primary | ICD-10-CM

## 2020-12-03 DIAGNOSIS — Z01.818 ENCOUNTER FOR OTHER PREPROCEDURAL EXAMINATION: ICD-10-CM

## 2020-12-03 DIAGNOSIS — K62.3 RECTAL PROLAPSE: ICD-10-CM

## 2020-12-03 LAB
ABO GROUP BLD: NORMAL
ANION GAP SERPL CALCULATED.3IONS-SCNC: 5 MMOL/L (ref 4–13)
BASOPHILS # BLD AUTO: 0 THOUSANDS/ΜL (ref 0–0.1)
BASOPHILS NFR BLD AUTO: 1 % (ref 0–2)
BLD GP AB SCN SERPL QL: NEGATIVE
BUN SERPL-MCNC: 19 MG/DL (ref 7–25)
CALCIUM SERPL-MCNC: 9.6 MG/DL (ref 8.6–10.5)
CHLORIDE SERPL-SCNC: 104 MMOL/L (ref 98–107)
CO2 SERPL-SCNC: 30 MMOL/L (ref 21–31)
CREAT SERPL-MCNC: 1.01 MG/DL (ref 0.6–1.2)
EOSINOPHIL # BLD AUTO: 0.1 THOUSAND/ΜL (ref 0–0.61)
EOSINOPHIL NFR BLD AUTO: 3 % (ref 0–5)
ERYTHROCYTE [DISTWIDTH] IN BLOOD BY AUTOMATED COUNT: 15.1 % (ref 11.5–14.5)
GFR SERPL CREATININE-BSD FRML MDRD: 74 ML/MIN/1.73SQ M
GLUCOSE P FAST SERPL-MCNC: 93 MG/DL (ref 65–99)
HCT VFR BLD AUTO: 40.4 % (ref 42–47)
HGB BLD-MCNC: 12.9 G/DL (ref 12–16)
LYMPHOCYTES # BLD AUTO: 1.6 THOUSANDS/ΜL (ref 0.6–4.47)
LYMPHOCYTES NFR BLD AUTO: 38 % (ref 21–51)
MCH RBC QN AUTO: 25.7 PG (ref 26–34)
MCHC RBC AUTO-ENTMCNC: 31.9 G/DL (ref 31–37)
MCV RBC AUTO: 81 FL (ref 81–99)
MONOCYTES # BLD AUTO: 0.4 THOUSAND/ΜL (ref 0.17–1.22)
MONOCYTES NFR BLD AUTO: 9 % (ref 2–12)
NEUTROPHILS # BLD AUTO: 2.1 THOUSANDS/ΜL (ref 1.4–6.5)
NEUTS SEG NFR BLD AUTO: 50 % (ref 42–75)
PLATELET # BLD AUTO: 229 THOUSANDS/UL (ref 149–390)
PMV BLD AUTO: 8.7 FL (ref 8.6–11.7)
POTASSIUM SERPL-SCNC: 4.2 MMOL/L (ref 3.5–5.5)
RBC # BLD AUTO: 5.01 MILLION/UL (ref 3.9–5.2)
RH BLD: POSITIVE
SODIUM SERPL-SCNC: 139 MMOL/L (ref 134–143)
SPECIMEN EXPIRATION DATE: NORMAL
WBC # BLD AUTO: 4.2 THOUSAND/UL (ref 4.8–10.8)

## 2020-12-03 PROCEDURE — 85025 COMPLETE CBC W/AUTO DIFF WBC: CPT

## 2020-12-03 PROCEDURE — 36415 COLL VENOUS BLD VENIPUNCTURE: CPT

## 2020-12-03 PROCEDURE — 93005 ELECTROCARDIOGRAM TRACING: CPT

## 2020-12-03 PROCEDURE — 86900 BLOOD TYPING SEROLOGIC ABO: CPT | Performed by: COLON & RECTAL SURGERY

## 2020-12-03 PROCEDURE — 86901 BLOOD TYPING SEROLOGIC RH(D): CPT | Performed by: COLON & RECTAL SURGERY

## 2020-12-03 PROCEDURE — 86850 RBC ANTIBODY SCREEN: CPT | Performed by: COLON & RECTAL SURGERY

## 2020-12-03 PROCEDURE — 80048 BASIC METABOLIC PNL TOTAL CA: CPT

## 2020-12-04 LAB
ATRIAL RATE: 59 BPM
P AXIS: 47 DEGREES
PR INTERVAL: 152 MS
QRS AXIS: 72 DEGREES
QRSD INTERVAL: 64 MS
QT INTERVAL: 424 MS
QTC INTERVAL: 419 MS
T WAVE AXIS: 34 DEGREES
VENTRICULAR RATE: 59 BPM

## 2020-12-04 PROCEDURE — 93010 ELECTROCARDIOGRAM REPORT: CPT | Performed by: INTERNAL MEDICINE

## 2020-12-10 ENCOUNTER — TELEMEDICINE (OUTPATIENT)
Dept: NEUROLOGY | Facility: CLINIC | Age: 30
End: 2020-12-10
Payer: COMMERCIAL

## 2020-12-10 VITALS — HEIGHT: 63 IN | WEIGHT: 113 LBS | BODY MASS INDEX: 20.02 KG/M2

## 2020-12-10 DIAGNOSIS — R90.82 WHITE MATTER DISEASE, UNSPECIFIED: Primary | ICD-10-CM

## 2020-12-10 DIAGNOSIS — R90.89 ABNORMAL FINDING ON MRI OF BRAIN: ICD-10-CM

## 2020-12-10 DIAGNOSIS — N39.8 VOIDING DYSFUNCTION: ICD-10-CM

## 2020-12-10 DIAGNOSIS — R20.2 NUMBNESS AND TINGLING OF BOTH FEET: ICD-10-CM

## 2020-12-10 DIAGNOSIS — R20.0 NUMBNESS AND TINGLING OF BOTH FEET: ICD-10-CM

## 2020-12-10 DIAGNOSIS — N26.1 RENAL ATROPHY: ICD-10-CM

## 2020-12-10 PROCEDURE — 99204 OFFICE O/P NEW MOD 45 MIN: CPT | Performed by: PSYCHIATRY & NEUROLOGY

## 2020-12-11 ENCOUNTER — OFFICE VISIT (OUTPATIENT)
Dept: NEPHROLOGY | Facility: CLINIC | Age: 30
End: 2020-12-11
Payer: COMMERCIAL

## 2020-12-11 VITALS
OXYGEN SATURATION: 98 % | HEIGHT: 63 IN | SYSTOLIC BLOOD PRESSURE: 132 MMHG | WEIGHT: 121 LBS | DIASTOLIC BLOOD PRESSURE: 74 MMHG | HEART RATE: 110 BPM | BODY MASS INDEX: 21.44 KG/M2

## 2020-12-11 DIAGNOSIS — N18.2 CHRONIC KIDNEY DISEASE (CKD) STAGE G2/A2, MILDLY DECREASED GLOMERULAR FILTRATION RATE (GFR) BETWEEN 60-89 ML/MIN/1.73 SQUARE METER AND ALBUMINURIA CREATININE RATIO BETWEEN 30-299 MG/G: Primary | ICD-10-CM

## 2020-12-11 DIAGNOSIS — F25.9 SCHIZOAFFECTIVE DISORDER, UNSPECIFIED TYPE (HCC): Chronic | ICD-10-CM

## 2020-12-11 PROCEDURE — 1036F TOBACCO NON-USER: CPT | Performed by: INTERNAL MEDICINE

## 2020-12-11 PROCEDURE — 99213 OFFICE O/P EST LOW 20 MIN: CPT | Performed by: INTERNAL MEDICINE

## 2020-12-11 PROCEDURE — 3008F BODY MASS INDEX DOCD: CPT | Performed by: INTERNAL MEDICINE

## 2020-12-28 NOTE — PRE-PROCEDURE INSTRUCTIONS
Pre-Surgery Instructions:   Medication Instructions    benztropine (COGENTIN) 1 mg tablet take am DOS    Brexpiprazole (REXULTI PO) take am DOS    busPIRone (BUSPAR) 10 mg tablet take am DOS    Clobetasol Prop Emollient Base 0 05 % emollient cream continue as prescribed    clonazePAM (KlonoPIN) 0 5 mg tablet takes @ hs   [address], Misc  (EpiCeram) lotion continue as prescribed    gabapentin (NEURONTIN) 100 mg capsule take am DOS    gabapentin (NEURONTIN) 400 mg capsule takes @ hs    liothyronine (CYTOMEL) 5 mcg tablet take am DOS    pantoprazole (PROTONIX) 40 mg tablet take am DOS    triamcinolone (KENALOG) 0 1 % cream continue as prescribed      Spoke to pt  Med list reviewed & instructed  Pt instructed on tylenol only 7 days prior   Am DOS pt ok to take am meds with small sip of water   Showering instructions provided @ time of call   Negative COVID screening, 1/5/21  Reviewed visitation policy   Pt stated she has bowel prep instructions from surgeon office  Pt states she did not receive 2 abx from pharmacy, will confirm with surgeon office that it was sent in   All instructions verbally understood by patient  All questions answered  Callback number provided     12/30: spoke to pt and informed pre op abx sent to pharmacy again by office  Questions answered     Antiepileptic Med Class    Continue to take this medication on your normal schedule  If this is an oral medication and you take it in the morning, then you may take this medicine with a sip of water  Antiparkinsons Med Class    Continue to take this medication on your normal schedule  If this is an oral medication and you take it in the morning, then you may take this medicine with a sip of water  Antipsychotic Med Class    Continue to take this medication on your normal schedule  If this is an oral medication and you take it in the morning, then you may take this medicine with a sip of water    Benzodiazepine antagonist Med Class    If this medication is needed please continue to take on your normal schedule  If you take it in the morning, then you may take this medicine with a sip of water  Buspirone Med Class    Continue to take this medication on your normal schedule  If this is an oral medication and you take it in the morning, then you may take this medicine with a sip of water  Thyroxine Med Class    Continue to take this medication on your normal schedule  If this is an oral medication and you take it in the morning, then you may take this medicine with a sip of water

## 2021-01-05 DIAGNOSIS — K62.3 RECTAL PROLAPSE: ICD-10-CM

## 2021-01-05 PROCEDURE — U0003 INFECTIOUS AGENT DETECTION BY NUCLEIC ACID (DNA OR RNA); SEVERE ACUTE RESPIRATORY SYNDROME CORONAVIRUS 2 (SARS-COV-2) (CORONAVIRUS DISEASE [COVID-19]), AMPLIFIED PROBE TECHNIQUE, MAKING USE OF HIGH THROUGHPUT TECHNOLOGIES AS DESCRIBED BY CMS-2020-01-R: HCPCS | Performed by: COLON & RECTAL SURGERY

## 2021-01-06 LAB — SARS-COV-2 RNA SPEC QL NAA+PROBE: NOT DETECTED

## 2021-01-11 ENCOUNTER — ANESTHESIA EVENT (OUTPATIENT)
Dept: PERIOP | Facility: HOSPITAL | Age: 31
DRG: 231 | End: 2021-01-11
Payer: COMMERCIAL

## 2021-01-12 ENCOUNTER — HOSPITAL ENCOUNTER (INPATIENT)
Facility: HOSPITAL | Age: 31
LOS: 2 days | Discharge: HOME | DRG: 231 | End: 2021-01-14
Attending: COLON & RECTAL SURGERY | Admitting: COLON & RECTAL SURGERY
Payer: COMMERCIAL

## 2021-01-12 ENCOUNTER — ANESTHESIA (OUTPATIENT)
Dept: PERIOP | Facility: HOSPITAL | Age: 31
DRG: 231 | End: 2021-01-12
Payer: COMMERCIAL

## 2021-01-12 VITALS — HEART RATE: 76 BPM

## 2021-01-12 DIAGNOSIS — K62.3 RECTAL PROLAPSE: ICD-10-CM

## 2021-01-12 LAB
ABO GROUP BLD: NORMAL
BLD GP AB SCN SERPL QL: NEGATIVE
EXT PREGNANCY TEST URINE: NEGATIVE
EXT. CONTROL: NORMAL
GLUCOSE SERPL-MCNC: 92 MG/DL (ref 65–140)
RH BLD: POSITIVE
SPECIMEN EXPIRATION DATE: NORMAL

## 2021-01-12 PROCEDURE — 45330 DIAGNOSTIC SIGMOIDOSCOPY: CPT | Performed by: COLON & RECTAL SURGERY

## 2021-01-12 PROCEDURE — 88307 TISSUE EXAM BY PATHOLOGIST: CPT | Performed by: PATHOLOGY

## 2021-01-12 PROCEDURE — 86900 BLOOD TYPING SEROLOGIC ABO: CPT | Performed by: COLON & RECTAL SURGERY

## 2021-01-12 PROCEDURE — 15860 IV NJX TST VASC FLO FLAP/GRF: CPT | Performed by: COLON & RECTAL SURGERY

## 2021-01-12 PROCEDURE — 0DTN4ZZ RESECTION OF SIGMOID COLON, PERCUTANEOUS ENDOSCOPIC APPROACH: ICD-10-PCS | Performed by: COLON & RECTAL SURGERY

## 2021-01-12 PROCEDURE — 0DJD8ZZ INSPECTION OF LOWER INTESTINAL TRACT, VIA NATURAL OR ARTIFICIAL OPENING ENDOSCOPIC: ICD-10-PCS | Performed by: COLON & RECTAL SURGERY

## 2021-01-12 PROCEDURE — NC001 PR NO CHARGE: Performed by: PHYSICIAN ASSISTANT

## 2021-01-12 PROCEDURE — 81025 URINE PREGNANCY TEST: CPT | Performed by: COLON & RECTAL SURGERY

## 2021-01-12 PROCEDURE — 82948 REAGENT STRIP/BLOOD GLUCOSE: CPT

## 2021-01-12 PROCEDURE — 86850 RBC ANTIBODY SCREEN: CPT | Performed by: COLON & RECTAL SURGERY

## 2021-01-12 PROCEDURE — 8E0W4CZ ROBOTIC ASSISTED PROCEDURE OF TRUNK REGION, PERCUTANEOUS ENDOSCOPIC APPROACH: ICD-10-PCS | Performed by: COLON & RECTAL SURGERY

## 2021-01-12 PROCEDURE — 4A1BXSH MONITORING OF GASTROINTESTINAL VASCULAR PERFUSION USING INDOCYANINE GREEN DYE, EXTERNAL APPROACH: ICD-10-PCS | Performed by: COLON & RECTAL SURGERY

## 2021-01-12 PROCEDURE — 45402 LAP PROCTOPEXY W/SIG RESECT: CPT | Performed by: COLON & RECTAL SURGERY

## 2021-01-12 PROCEDURE — 86901 BLOOD TYPING SEROLOGIC RH(D): CPT | Performed by: COLON & RECTAL SURGERY

## 2021-01-12 PROCEDURE — 0DQP4ZZ REPAIR RECTUM, PERCUTANEOUS ENDOSCOPIC APPROACH: ICD-10-PCS | Performed by: COLON & RECTAL SURGERY

## 2021-01-12 RX ORDER — BENZTROPINE MESYLATE 1 MG/1
1 TABLET ORAL DAILY
Status: DISCONTINUED | OUTPATIENT
Start: 2021-01-12 | End: 2021-01-14 | Stop reason: HOSPADM

## 2021-01-12 RX ORDER — GLYCOPYRROLATE 0.2 MG/ML
INJECTION INTRAMUSCULAR; INTRAVENOUS AS NEEDED
Status: DISCONTINUED | OUTPATIENT
Start: 2021-01-12 | End: 2021-01-12

## 2021-01-12 RX ORDER — ONDANSETRON 2 MG/ML
INJECTION INTRAMUSCULAR; INTRAVENOUS AS NEEDED
Status: DISCONTINUED | OUTPATIENT
Start: 2021-01-12 | End: 2021-01-12

## 2021-01-12 RX ORDER — SODIUM CHLORIDE 9 MG/ML
INJECTION, SOLUTION INTRAVENOUS CONTINUOUS PRN
Status: DISCONTINUED | OUTPATIENT
Start: 2021-01-12 | End: 2021-01-12

## 2021-01-12 RX ORDER — INDOCYANINE GREEN AND WATER 25 MG
KIT INJECTION AS NEEDED
Status: DISCONTINUED | OUTPATIENT
Start: 2021-01-12 | End: 2021-01-12

## 2021-01-12 RX ORDER — MAGNESIUM HYDROXIDE 1200 MG/15ML
LIQUID ORAL AS NEEDED
Status: DISCONTINUED | OUTPATIENT
Start: 2021-01-12 | End: 2021-01-12 | Stop reason: HOSPADM

## 2021-01-12 RX ORDER — CEFAZOLIN SODIUM 1 G/50ML
1000 SOLUTION INTRAVENOUS ONCE
Status: COMPLETED | OUTPATIENT
Start: 2021-01-12 | End: 2021-01-12

## 2021-01-12 RX ORDER — DIPHENHYDRAMINE HYDROCHLORIDE 50 MG/ML
25 INJECTION INTRAMUSCULAR; INTRAVENOUS EVERY 6 HOURS PRN
Status: DISCONTINUED | OUTPATIENT
Start: 2021-01-12 | End: 2021-01-14 | Stop reason: HOSPADM

## 2021-01-12 RX ORDER — NEOMYCIN SULFATE 500 MG/1
1000 TABLET ORAL 3 TIMES DAILY
Status: DISCONTINUED | OUTPATIENT
Start: 2021-01-12 | End: 2021-01-12

## 2021-01-12 RX ORDER — MIDAZOLAM HYDROCHLORIDE 2 MG/2ML
INJECTION, SOLUTION INTRAMUSCULAR; INTRAVENOUS AS NEEDED
Status: DISCONTINUED | OUTPATIENT
Start: 2021-01-12 | End: 2021-01-12

## 2021-01-12 RX ORDER — ONDANSETRON 2 MG/ML
4 INJECTION INTRAMUSCULAR; INTRAVENOUS EVERY 6 HOURS PRN
Status: DISCONTINUED | OUTPATIENT
Start: 2021-01-12 | End: 2021-01-12

## 2021-01-12 RX ORDER — ONDANSETRON 2 MG/ML
4 INJECTION INTRAMUSCULAR; INTRAVENOUS EVERY 6 HOURS PRN
Status: DISCONTINUED | OUTPATIENT
Start: 2021-01-12 | End: 2021-01-14 | Stop reason: HOSPADM

## 2021-01-12 RX ORDER — GABAPENTIN 100 MG/1
100 CAPSULE ORAL DAILY
Status: DISCONTINUED | OUTPATIENT
Start: 2021-01-12 | End: 2021-01-12

## 2021-01-12 RX ORDER — DEXTROSE AND SODIUM CHLORIDE 5; .9 G/100ML; G/100ML
125 INJECTION, SOLUTION INTRAVENOUS CONTINUOUS
Status: DISCONTINUED | OUTPATIENT
Start: 2021-01-12 | End: 2021-01-13

## 2021-01-12 RX ORDER — ROCURONIUM BROMIDE 10 MG/ML
INJECTION, SOLUTION INTRAVENOUS AS NEEDED
Status: DISCONTINUED | OUTPATIENT
Start: 2021-01-12 | End: 2021-01-12

## 2021-01-12 RX ORDER — METRONIDAZOLE 500 MG/1
1000 TABLET ORAL 3 TIMES DAILY
Status: DISCONTINUED | OUTPATIENT
Start: 2021-01-12 | End: 2021-01-12

## 2021-01-12 RX ORDER — LIDOCAINE HYDROCHLORIDE 10 MG/ML
0.5 INJECTION, SOLUTION EPIDURAL; INFILTRATION; INTRACAUDAL; PERINEURAL ONCE AS NEEDED
Status: DISCONTINUED | OUTPATIENT
Start: 2021-01-12 | End: 2021-01-12 | Stop reason: HOSPADM

## 2021-01-12 RX ORDER — PROMETHAZINE HYDROCHLORIDE 25 MG/ML
12.5 INJECTION, SOLUTION INTRAMUSCULAR; INTRAVENOUS
Status: DISCONTINUED | OUTPATIENT
Start: 2021-01-12 | End: 2021-01-12 | Stop reason: HOSPADM

## 2021-01-12 RX ORDER — NEOSTIGMINE METHYLSULFATE 1 MG/ML
INJECTION INTRAVENOUS AS NEEDED
Status: DISCONTINUED | OUTPATIENT
Start: 2021-01-12 | End: 2021-01-12

## 2021-01-12 RX ORDER — PROMETHAZINE HYDROCHLORIDE 25 MG/ML
12.5 INJECTION, SOLUTION INTRAMUSCULAR; INTRAVENOUS
Status: DISCONTINUED | OUTPATIENT
Start: 2021-01-12 | End: 2021-01-12

## 2021-01-12 RX ORDER — PROPOFOL 10 MG/ML
INJECTION, EMULSION INTRAVENOUS AS NEEDED
Status: DISCONTINUED | OUTPATIENT
Start: 2021-01-12 | End: 2021-01-12

## 2021-01-12 RX ORDER — HYDROMORPHONE HCL/PF 1 MG/ML
0.5 SYRINGE (ML) INJECTION
Status: DISCONTINUED | OUTPATIENT
Start: 2021-01-12 | End: 2021-01-12 | Stop reason: HOSPADM

## 2021-01-12 RX ORDER — BUSPIRONE HYDROCHLORIDE 10 MG/1
10 TABLET ORAL 2 TIMES DAILY
Status: DISCONTINUED | OUTPATIENT
Start: 2021-01-12 | End: 2021-01-14 | Stop reason: HOSPADM

## 2021-01-12 RX ORDER — ONDANSETRON 2 MG/ML
4 INJECTION INTRAMUSCULAR; INTRAVENOUS ONCE
Status: COMPLETED | OUTPATIENT
Start: 2021-01-12 | End: 2021-01-12

## 2021-01-12 RX ORDER — FENTANYL CITRATE 50 UG/ML
INJECTION, SOLUTION INTRAMUSCULAR; INTRAVENOUS AS NEEDED
Status: DISCONTINUED | OUTPATIENT
Start: 2021-01-12 | End: 2021-01-12

## 2021-01-12 RX ORDER — GABAPENTIN 400 MG/1
400 CAPSULE ORAL
Status: DISCONTINUED | OUTPATIENT
Start: 2021-01-12 | End: 2021-01-14 | Stop reason: HOSPADM

## 2021-01-12 RX ORDER — SODIUM CHLORIDE, SODIUM LACTATE, POTASSIUM CHLORIDE, CALCIUM CHLORIDE 600; 310; 30; 20 MG/100ML; MG/100ML; MG/100ML; MG/100ML
125 INJECTION, SOLUTION INTRAVENOUS CONTINUOUS
Status: DISCONTINUED | OUTPATIENT
Start: 2021-01-12 | End: 2021-01-12

## 2021-01-12 RX ORDER — MEPERIDINE HYDROCHLORIDE 25 MG/ML
25 INJECTION INTRAMUSCULAR; INTRAVENOUS; SUBCUTANEOUS ONCE AS NEEDED
Status: DISCONTINUED | OUTPATIENT
Start: 2021-01-12 | End: 2021-01-12 | Stop reason: HOSPADM

## 2021-01-12 RX ORDER — BENZTROPINE MESYLATE 1 MG/1
1 TABLET ORAL ONCE
Status: COMPLETED | OUTPATIENT
Start: 2021-01-12 | End: 2021-01-12

## 2021-01-12 RX ORDER — LIDOCAINE HYDROCHLORIDE 10 MG/ML
INJECTION, SOLUTION EPIDURAL; INFILTRATION; INTRACAUDAL; PERINEURAL AS NEEDED
Status: DISCONTINUED | OUTPATIENT
Start: 2021-01-12 | End: 2021-01-12

## 2021-01-12 RX ORDER — HEPARIN SODIUM 5000 [USP'U]/ML
INJECTION, SOLUTION INTRAVENOUS; SUBCUTANEOUS AS NEEDED
Status: DISCONTINUED | OUTPATIENT
Start: 2021-01-12 | End: 2021-01-12

## 2021-01-12 RX ORDER — PANTOPRAZOLE SODIUM 40 MG/1
40 TABLET, DELAYED RELEASE ORAL
Status: DISCONTINUED | OUTPATIENT
Start: 2021-01-13 | End: 2021-01-14 | Stop reason: HOSPADM

## 2021-01-12 RX ORDER — CLONAZEPAM 1 MG/1
1 TABLET ORAL
Status: DISCONTINUED | OUTPATIENT
Start: 2021-01-12 | End: 2021-01-14 | Stop reason: HOSPADM

## 2021-01-12 RX ADMIN — ONDANSETRON 4 MG: 2 INJECTION INTRAMUSCULAR; INTRAVENOUS at 12:44

## 2021-01-12 RX ADMIN — CEFAZOLIN SODIUM 1000 MG: 1 SOLUTION INTRAVENOUS at 07:55

## 2021-01-12 RX ADMIN — BUSPIRONE HYDROCHLORIDE 10 MG: 10 TABLET ORAL at 17:21

## 2021-01-12 RX ADMIN — ONDANSETRON 4 MG: 2 INJECTION INTRAMUSCULAR; INTRAVENOUS at 16:50

## 2021-01-12 RX ADMIN — BENZTROPINE MESYLATE 1 MG: 1 TABLET ORAL at 22:55

## 2021-01-12 RX ADMIN — MIDAZOLAM 2 MG: 1 INJECTION INTRAMUSCULAR; INTRAVENOUS at 07:38

## 2021-01-12 RX ADMIN — FENTANYL CITRATE 100 MCG: 50 INJECTION INTRAMUSCULAR; INTRAVENOUS at 08:26

## 2021-01-12 RX ADMIN — ROCURONIUM BROMIDE 30 MG: 50 INJECTION, SOLUTION INTRAVENOUS at 08:44

## 2021-01-12 RX ADMIN — HEPARIN SODIUM 5000 UNITS: 5000 INJECTION INTRAVENOUS; SUBCUTANEOUS at 08:17

## 2021-01-12 RX ADMIN — CLONAZEPAM 1 MG: 1 TABLET ORAL at 21:45

## 2021-01-12 RX ADMIN — DEXTROSE AND SODIUM CHLORIDE 125 ML/HR: 5; .9 INJECTION, SOLUTION INTRAVENOUS at 21:45

## 2021-01-12 RX ADMIN — INDOCYANINE GREEN AND WATER 8 MG: KIT at 10:24

## 2021-01-12 RX ADMIN — SODIUM CHLORIDE, SODIUM LACTATE, POTASSIUM CHLORIDE, AND CALCIUM CHLORIDE: .6; .31; .03; .02 INJECTION, SOLUTION INTRAVENOUS at 07:38

## 2021-01-12 RX ADMIN — SODIUM CHLORIDE: 0.9 INJECTION, SOLUTION INTRAVENOUS at 07:51

## 2021-01-12 RX ADMIN — LIDOCAINE HYDROCHLORIDE 50 MG: 10 INJECTION, SOLUTION EPIDURAL; INFILTRATION; INTRACAUDAL; PERINEURAL at 07:43

## 2021-01-12 RX ADMIN — GABAPENTIN 400 MG: 400 CAPSULE ORAL at 21:45

## 2021-01-12 RX ADMIN — FENTANYL CITRATE 100 MCG: 50 INJECTION INTRAMUSCULAR; INTRAVENOUS at 10:39

## 2021-01-12 RX ADMIN — PROMETHAZINE HYDROCHLORIDE 12.5 MG: 25 INJECTION INTRAMUSCULAR; INTRAVENOUS at 12:53

## 2021-01-12 RX ADMIN — NEOSTIGMINE METHYLSULFATE 3 MG: 1 INJECTION INTRAVENOUS at 11:22

## 2021-01-12 RX ADMIN — ONDANSETRON 4 MG: 2 INJECTION INTRAMUSCULAR; INTRAVENOUS at 11:22

## 2021-01-12 RX ADMIN — PROPOFOL 150 MG: 10 INJECTION, EMULSION INTRAVENOUS at 07:43

## 2021-01-12 RX ADMIN — ROCURONIUM BROMIDE 40 MG: 50 INJECTION, SOLUTION INTRAVENOUS at 07:43

## 2021-01-12 RX ADMIN — Medication: at 12:30

## 2021-01-12 RX ADMIN — METRONIDAZOLE 1000 MG: 500 TABLET ORAL at 07:55

## 2021-01-12 RX ADMIN — GLYCOPYRROLATE 0.4 MG: 0.2 INJECTION, SOLUTION INTRAMUSCULAR; INTRAVENOUS at 11:22

## 2021-01-12 RX ADMIN — SODIUM CHLORIDE, SODIUM LACTATE, POTASSIUM CHLORIDE, AND CALCIUM CHLORIDE: .6; .31; .03; .02 INJECTION, SOLUTION INTRAVENOUS at 10:23

## 2021-01-12 RX ADMIN — DEXTROSE AND SODIUM CHLORIDE 125 ML/HR: 5; .9 INJECTION, SOLUTION INTRAVENOUS at 13:57

## 2021-01-12 NOTE — PROGRESS NOTES
Post op note - Porter Sharma 31 y o  female MRN: [id number]    Unit/Bed#: [facility] 823-01 Encounter: [id number]      Assessment:  Pt is a 32 y/o F w recurrent rectal prolapse s/p robotic rectopexy we sigmoid resection  Doing well  VSS  Afebrile  Abdomen is soft, appropriately tender, non distended  Incision sites are clean, dry, intact  Plan:  Continue clear liquids  Work with incentive spirometer  Prn pain and nausea control   Continue lees  Continue pca  dvt ppx, lovenox    Subjective:   Feels tired and complaints of sore abdomen  Denied any current nausea or vomiting  Denied chest pain or shorntess of breath  Objective:     Vitals: Blood pressure 120/83, pulse 99, temperature 98 5 °F (36 9 °C), resp  rate 18, height 5' 3" (1 6 m), weight 51 3 kg (113 lb), SpO2 100 %, not currently breastfeeding  ,Body mass index is 20 02 kg/m²  Intake/Output Summary (Last 24 hours) at 1/12/2021 1443  Last data filed at 1/12/2021 1357  Gross per 24 hour   Intake 1500 ml   Output 450 ml   Net 1050 ml     Physical Exam  General: NAD  HEENT: NC/AT  MMM  Cv: RRR  Lungs: normal effort  Ab: Soft, NT/ND  Ex: no CCE  Neuro: AAOx3    Scheduled Meds:  Current Facility-Administered Medications   Medication Dose Route Frequency Provider Last Rate    benztropine  1 mg Oral Daily W Romayne Stengel Hohenshilt, PA-C      busPIRone  10 mg Oral BID W  Nell Hillman PA-C      clonazePAM  1 mg Oral HS Timothy Palomo MD      dextrose 5 % and sodium chloride 0 9 %  125 mL/hr Intravenous Continuous W Romayne Stengel Hohenshilt, PA-C 125 mL/hr (01/12/21 1357)    diphenhydrAMINE  25 mg Intravenous Q6H PRN W Romayne Stengel Hohenshilt, PA-C      enoxaparin  40 mg Subcutaneous Daily W Romayne Stengel Hohenshilt, PA-C      gabapentin  400 mg Oral HS Timothy Palomo MD      HYDROmorphone   Intravenous Continuous W Romayne Stengel Hohenshilt, PA-C      ondansetron  4 mg Intravenous Q6H PRN W Romayne Stengel Hohenshilt, NICK      [START ON 1/13/2021] pantoprazole  40 mg Oral Early Morning W Romayne Stengel Hohenshilt, PA-C       Continuous Infusions:dextrose 5 % and sodium chloride 0 9 %, 125 mL/hr, Last Rate: 125 mL/hr (01/12/21 1357)  HYDROmorphone,       PRN Meds: diphenhydrAMINE    ondansetron      Invasive Devices     Peripheral Intravenous Line            Peripheral IV 01/12/21 Left Hand less than 1 day    Peripheral IV 01/12/21 Right Hand less than 1 day          Drain            Urethral Catheter Latex; Double-lumen 16 Fr  less than 1 day                Lab, Imaging and other studies: I have personally reviewed pertinent reports      VTE Pharmacologic Prophylaxis: Sequential compression device (Venodyne)   VTE Mechanical Prophylaxis: sequential compression device

## 2021-01-12 NOTE — PLAN OF CARE
Problem: Potential for Falls  Goal: Patient will remain free of falls  Description: INTERVENTIONS:  - Assess patient frequently for physical needs  -  Identify cognitive and physical deficits and behaviors that affect risk of falls    -  Elmore fall precautions as indicated by assessment   - Educate patient/family on patient safety including physical limitations  - Instruct patient to call for assistance with activity based on assessment  - Modify environment to reduce risk of injury  - Consider OT/PT consult to assist with strengthening/mobility  Outcome: Progressing     Problem: PAIN - ADULT  Goal: Verbalizes/displays adequate comfort level or baseline comfort level  Description: Interventions:  - Encourage patient to monitor pain and request assistance  - Assess pain using appropriate pain scale  - Administer analgesics based on type and severity of pain and evaluate response  - Implement non-pharmacological measures as appropriate and evaluate response  - Consider cultural and social influences on pain and pain management  - Notify physician/advanced practitioner if interventions unsuccessful or patient reports new pain  Outcome: Progressing     Problem: INFECTION - ADULT  Goal: Absence or prevention of progression during hospitalization  Description: INTERVENTIONS:  - Assess and monitor for signs and symptoms of infection  - Monitor lab/diagnostic results  - Monitor all insertion sites, i e  indwelling lines, tubes, and drains  - Monitor endotracheal if appropriate and nasal secretions for changes in amount and color  - Elmore appropriate cooling/warming therapies per order  - Administer medications as ordered  - Instruct and encourage patient and family to use good hand hygiene technique  - Identify and instruct in appropriate isolation precautions for identified infection/condition  Outcome: Progressing     Problem: DISCHARGE PLANNING  Goal: Discharge to home or other facility with appropriate resources  Description: INTERVENTIONS:  - Identify barriers to discharge w/patient and caregiver  - Arrange for needed discharge resources and transportation as appropriate  - Identify discharge learning needs (meds, wound care, etc )  - Arrange for interpretive services to assist at discharge as needed  - Refer to Case Management Department for coordinating discharge planning if the patient needs post-hospital services based on physician/advanced practitioner order or complex needs related to functional status, cognitive ability, or social support system  Outcome: Progressing

## 2021-01-12 NOTE — H&P
History and Physical   Colon and Rectal Surgery   Solomon Swenson 31 y o  female MRN: [id number]  Unit/Bed#: [facility] Encounter: [id number]  01/12/21   7:41 AM      CC:  Rectal prolapse  History of Present Illness   HPI:  Solomon Swenson is a 31 y o  female with recurrent rectal prolapse  Historical Information   Past Medical History:   Diagnosis Date    Anorexia nervosa     pt denies history at time of admission 7/21/18    Anxiety     Celiac disease     Chronic kidney disease     CPAP (continuous positive airway pressure) dependence     waiting for a new mask    Depression     Disease of thyroid gland     hypo    Gall bladder polyp     Gastroesophageal reflux disease 9/18/2018    Hallucination     Memory difficulty 7/12/2018    Plantar wart of left foot     Psychiatric illness     Raynaud's disease without gangrene     Renal atrophy     [facility]ive [facility] ([facility] [address] [facility]     Self-injurious behavior     Sleep apnea     Sleep difficulties     Suicide attempt ([facility] [address] )     history of attempt at age 22 by overdose     Urinary retention      Past Surgical History:   Procedure Laterality Date    EYE SURGERY Left     tear duct    ME ESOPHAGOGASTRODUODENOSCOPY TRANSORAL DIAGNOSTIC N/A 2/9/2018    Procedure: ESOPHAGOGASTRODUODENOSCOPY (EGD); Surgeon: Cindy Ashley MD;  Location: [facility];  Service: Gastroenterology    ME ESOPHAGOGASTRODUODENOSCOPY TRANSORAL DIAGNOSTIC N/A 11/6/2018    Procedure: ESOPHAGOGASTRODUODENOSCOPY (EGD);   Surgeon: Britt Tan MD;  Location: [facility];  Service: Gastroenterology       Meds/Allergies     Medications Prior to Admission   Medication    benztropine (COGENTIN) 1 mg tablet    Brexpiprazole (REXULTI PO)    busPIRone (BUSPAR) 10 mg tablet    Clobetasol Prop Emollient Base 0 05 % emollient cream    clonazePAM (KlonoPIN) 0 5 mg tablet    gabapentin (NEURONTIN) 100 mg capsule    gabapentin (NEURONTIN) 400 mg capsule    liothyronine (CYTOMEL) 5 mcg tablet    metroNIDAZOLE (FLAGYL) 500 mg tablet    neomycin (MYCIFRADIN) 500 mg tablet    pantoprazole (PROTONIX) 40 mg tablet    triamcinolone (KENALOG) 0 1 % cream         Current Facility-Administered Medications:     ceFAZolin (ANCEF) IVPB (premix in dextrose) 1,000 mg 50 mL, 1,000 mg, Intravenous, Once **AND** metroNIDAZOLE (FLAGYL) IVPB (premix) 500 mg 100 mL, 500 mg, Intravenous, Once, Anna Fernandez MD    lactated ringers infusion, 125 mL/hr, Intravenous, Continuous, Chong Chávez MD    lidocaine (PF) (XYLOCAINE-MPF) 1 % injection 0 5 mL, 0 5 mL, Infiltration, Once PRN, Chong Chávez MD    neomycin [facility]) tablet 1,000 mg, 1,000 mg, Oral, TID **AND** metroNIDAZOLE (FLAGYL) tablet 1,000 mg, 1,000 mg, Oral, TID, W Elmo Harden MD    Allergies   Allergen Reactions    Gluten Meal     Latex Swelling         Social History   Social History     Substance and Sexual Activity   Alcohol Use No     Social History     Substance and Sexual Activity   Drug Use No     Social History     Tobacco Use   Smoking Status Never Smoker   Smokeless Tobacco Never Used         Family History:   Family History   Problem Relation Age of Onset    Hypertension Father         benign essential    Hypertension Brother         benign essential     OCD Brother     Depression Brother     Other Family         nasolacrimal duct obstruction, acquired    Colon cancer Neg Hx          Objective     Current Vitals:   Blood Pressure: 106/71 (01/12/21 0641)  Pulse: 80 (01/12/21 0641)  Temperature: (!) 97 4 °F (36 3 °C) (01/12/21 0641)  Temp Source: Tympanic (01/12/21 0641)  Respirations: 18 (01/12/21 0641)  Height: 5' 3" (160 cm) (01/12/21 0641)  Weight - Scale: 51 3 kg (113 lb) (01/12/21 0641)  SpO2: 100 % (01/12/21 0641)  No intake or output data in the 24 hours ending 01/12/21 0741    Physical Exam:  General:  Well nourished, no distress  Neuro: Alert and oriented  Eyes:Sclera anicteric, conjunctiva pink    Pulm: Clear to auscultation bilaterally  No respiratory Distress  CV:  Regular rate and rhythm  No murmurs  Abdomen:  Soft, flat, non-tender, without masses or hepatosplenomegaly  Lab Results:       ASSESSMENT:  Mitchell Clarke is a 31 y o  female for rectopexy and possible sigmoid resection, robotically  I discussed this with her and her father  The colonic transit study was normal, suggesting that the pelvis is the source of her symptoms  But is reasonable reassuring to allow for us to treat the sigmoid and rectum area without the colon being treated  Rectopexy with or without sigmoid resection is planned  I reiterated the risks to her and specify the potential for anastomotic leak with her and her father  Risks of surgery, including but not limited to bleeding, pain, infection, hernia formation, injury to the ureter or other internal organs requiring surgery specific to these injuries, anastomotic leak, deep vein thrombosis, pulmonary embolism, myocardial infarction or heart failure, stroke, death, need for and enterostomy, or other unspecified complications were discussed  Benefits and alternatives were discussed  Questions were answered  PLAN:  Rectopexy with or without sigmoid resection  Robotic technique will be attempted  Laparoscopic or open surgery will follow as needed    Rick Vasquez MD

## 2021-01-12 NOTE — OP NOTE
OPERATIVE REPORT  PATIENT NAME: Kajal Thomas    :  1990  MRN: [id number]  Pt Location: BE OR ROOM 14    SURGERY DATE: 2021    Surgeon(s) and Role:     * Guerita Nichols MD - Primary     * Zack Thomas PA-C - [facility], [place] - Assisting    Preop Diagnosis:  Rectal prolapse [K62 3]    Post-Op Diagnosis Codes:     * Rectal prolapse [K62 3], redundant sigmoid colon    Procedure(s) (LRB):  Rectopexy with sigmoid resection w/ robotics  (N/A)   Laser fluorescence angiography  Flexible sigmoidoscopy    Specimen(s):  ID Type Source Tests Collected by Time Destination   1 :  Tissue Large Intestine, Sigmoid Colon TISSUE EXAM Guerita Nichols MD 2021 1043        Estimated Blood Loss:   Minimal    Drains:  Urethral Catheter Latex; Double-lumen 16 Fr  (Active)   Number of days: 0       Anesthesia Type:   General    Operative Indications:     * Rectal prolapse [K62 3], redundant sigmoid colon    Operative Findings:     * Rectal prolapse [K62 3], redundant sigmoid colon    Complications:   None    Procedure and Technique:  The patient was placed in a supine position with the legs in [employer]  Her arms were positioned at her sides, doubly wrapped with soft egg crate material, and tucked at her sides  Her shoulders were cushioned as well and she was wrapped into position using a bandolier taping to prevent slippage during the operation  The abdomen was shaved using an electric razor  The abdomen was prepped using ChloraPrep and allowed to dry completely  The area was draped in a sterile manner  A time-out was done  The procedure was initiated by placing a right umbilical level trocar at the lateral rectus border  This was a 5 mm trocar and was placed using Visiport technique without any difficulty  The abdomen was insufflated  The robotic trocars were then placed  12 mm trocar was placed after 1st passing a 9 mm trocar into the abdomen    The that was removed and the 12 mm trocar was placed behind it  In a line oriented to the left paramedian epigastrium, for robotic trocars were placed  After the 12 mm trocar, 3 9 mm trocars were placed  One of them was placed in the site where the original 5 mm trocar been placed  The 5 mm trocar was then placed laterally in the abdomen for an assistant port  All the trocars were placed under laparoscopic guidance after the initial trocar  The robot was then docked  Dissection was initiated using a vessel sealer, the tip of protractor, and a bipolar fenestrated grasper  Sigmoid was lifted anteriorly and was shown to be very redundant  There was an extraordinary amount of flapping of the sigmoid with dropping of loops of the sigmoid colon into the pelvis  The area posterior to the superior rectal artery, at the sacral promontory, was dissected into the pelvis and down into the retrorectal space  This dissection was carried laterally and posteriorly down to the level of the anus  The lateral attachments were divided widely to allow for use of remaining peritoneum on the bowel for rectopexy  Dissection was carried down to the lateral stalks which were left intact  The rectovaginal septum was not dissected  The cul-de-sac was opened laterally but not at the central aspect so that we would allow for pexing the vagina along with the rectum  At the sacral promontory, the superior rectal artery was obviously intact  I chose to leave that vessel intact and preserved during the operation  We selected a position anterior to the superior rectal artery and a sleeve resection of the sigmoid colon up to a point where the redundancy allowed for dropping of bowel to the superior pelvis for anastomosis but not to allow for a large amount of redundant bowel to remain  The superior rectal artery was removed from the mesentery down to the level of the chosen position for the pexy    At a point where the rectum could be drawn up to the sacral promontory, the rectum was fashion for transection  The mesentery was sequentially divided using the vessel sealer but the superior rectal artery was preserved posterior to it  The bowel was transected with a single fire of the green loaded robotic stapler  We then performed laser fluorescence angiography using the firefly feature on the robot  3 mL of indocyanine green was injected  1 minutes later, there was obvious vascular flow to the chosen position on the proximal side of the anastomosis  Evaluation of the remaining rectum also revealed very good blood supply  The superior rectal artery and the left colic artery were intact and findings were as expected  We then created the rectopexy using the redundant peritoneum on either side of the rectum  0 Prolene suture was used to create the rectopexy is, holding the rectum into position and lifting the vagina, as well  No redundancy at was apparent after this rectopexy  The robot was then undocked  I scrubbed into the case and created a small trans rectus incision in the left lower quadrant  The skin was incised using cautery and dissection was carried down to the fascia which was also opened using cautery  Small incision was created in the fascia and a muscle-splitting incision was created through the rectus muscle  A GelPort was positioned to act as a sheath and to allow for later insufflation  The bowel was delivered into the wound  The site that was previously marked for anastomosis was identified and could just be delivered into the very low wound  The bowel was previously prepared by mesenteric division except for the superior rectal artery  It was now prepared using a pursestring device followed by passage of 2 0 Prolene pursestring suture  The bowel was cut leaving a nice full-thickness collar of tissue for the anastomosis    The anvil of a 29 mm [facility] stapler device was then positioned in the proximal side of the anastomosis and tied into position with 2 wraps of the 2 0 Prolene leaving a good collar for anastomosis  The bowel was sent for pathologic evaluation  Using laparoscopic guidance, the anastomosis was created without any difficulty  The anastomosis was done between the 2 sides of the rectopexy  The intact superior rectal artery vessel was on the right side of the 2 pexy sutures  It was not entrapped and was freely mobile  The anastomosis was undertaken under direct visualization without any torsion of the bowel on either side  An air test revealed no anastomotic air leak under water  Sigmoidoscopy revealed a completely intact the anastomotic line, circumferentially  The bowel proximal and distal to the anastomosis was pink and the lumen was wide  The donuts were thick and circumferentially intact  There was no tension on the anastomosis, whatsoever  However, the positioning of the transection points in the avoidance of mobilization of the sigmoid colon allowed for the minimal redundancy in the anastomosis region that should help prevent recurrence of the rectal prolapse  It should be noted that the left ureter was identified and avoided during the operation  A sleeve resection allowed for this at an extra level  We then removed the trocars and the GelPort  The right lower quadrant trocar site was closed at the fascial level using of single 0 Vicryl suture  The left lower quadrant/paramedian, horizontal wound was closed at the fascial level using a running 1 PDS suture  The wounds were irrigated and dried  The skin was closed using running or interrupted subcuticular 4-0 Monocryl suture depending on the length of the wounds  The skin was cleansed and dried and Exofin was applied  Sponge needle and instrument counts were correct  Wand technique revealed no retained gauze       I was present for the entire procedure    Patient Disposition:  PACU     SIGNATURE: Alberto Salmeron MD  DATE: January 12, 2021  TIME: 11:31 AM

## 2021-01-12 NOTE — ANESTHESIA PREPROCEDURE EVALUATION
Procedure:  Rectopexy with sigmoid resection w/ robotics  (N/A Abdomen)    Relevant Problems   ANESTHESIA (within normal limits)      CARDIO (within normal limits)      ENDO   (+) Hypothyroidism      GI/HEPATIC   (+) Gastroesophageal reflux disease      /RENAL   (+) Chronic kidney disease (CKD) stage G2/A2, mildly decreased glomerular filtration rate (GFR) between 60-89 mL/min/1 73 square meter and albuminuria creatinine ratio between  mg/g   (+) Renal atrophy      NEURO/PSYCH   (+) Numbness and tingling   (+) Numbness and tingling of both feet      PULMONARY (within normal limits)      Other   (+) Rectal prolapse   (+) Schizoaffective disorder (HCC)        Physical Exam    Airway    Mallampati score: I  TM Distance: >3 FB  Neck ROM: full     Dental   No notable dental hx     Cardiovascular      Pulmonary      Other Findings        Anesthesia Plan  ASA Score- 2     Anesthesia Type- general with ASA Monitors  Additional Monitors:   Airway Plan: ETT  Plan Factors-Exercise tolerance (METS): >4 METS  Chart reviewed  EKG reviewed  Existing labs reviewed  Patient summary reviewed  Patient is not a current smoker  Induction- intravenous  Postoperative Plan- Plan for postoperative opioid use  Informed Consent- Anesthetic plan and risks discussed with patient  I personally reviewed this patient with the CRNA  Discussed and agreed on the Anesthesia Plan with the CRNA  Maria Luisa Dietrich

## 2021-01-13 LAB
ANION GAP SERPL CALCULATED.3IONS-SCNC: 4 MMOL/L (ref 4–13)
BASOPHILS # BLD AUTO: 0.01 THOUSANDS/ΜL (ref 0–0.1)
BASOPHILS NFR BLD AUTO: 0 % (ref 0–1)
BUN SERPL-MCNC: 7 MG/DL (ref 5–25)
CALCIUM SERPL-MCNC: 8.7 MG/DL (ref 8.3–10.1)
CHLORIDE SERPL-SCNC: 114 MMOL/L (ref 100–108)
CO2 SERPL-SCNC: 23 MMOL/L (ref 21–32)
CREAT SERPL-MCNC: 0.93 MG/DL (ref 0.6–1.3)
EOSINOPHIL # BLD AUTO: 0.05 THOUSAND/ΜL (ref 0–0.61)
EOSINOPHIL NFR BLD AUTO: 1 % (ref 0–6)
ERYTHROCYTE [DISTWIDTH] IN BLOOD BY AUTOMATED COUNT: 15.8 % (ref 11.6–15.1)
ERYTHROCYTE [DISTWIDTH] IN BLOOD BY AUTOMATED COUNT: 16.8 % (ref 11.6–15.1)
GFR SERPL CREATININE-BSD FRML MDRD: 82 ML/MIN/1.73SQ M
GLUCOSE SERPL-MCNC: 133 MG/DL (ref 65–140)
HCT VFR BLD AUTO: 27.7 % (ref 34.8–46.1)
HCT VFR BLD AUTO: 39.8 % (ref 34.8–46.1)
HGB BLD-MCNC: 12.2 G/DL (ref 11.5–15.4)
HGB BLD-MCNC: 7.7 G/DL (ref 11.5–15.4)
IMM GRANULOCYTES # BLD AUTO: 0.01 THOUSAND/UL (ref 0–0.2)
IMM GRANULOCYTES NFR BLD AUTO: 0 % (ref 0–2)
LYMPHOCYTES # BLD AUTO: 1.06 THOUSANDS/ΜL (ref 0.6–4.47)
LYMPHOCYTES NFR BLD AUTO: 20 % (ref 14–44)
MAGNESIUM SERPL-MCNC: 1.5 MG/DL (ref 1.6–2.6)
MAGNESIUM SERPL-MCNC: 1.7 MG/DL (ref 1.6–2.6)
MCH RBC QN AUTO: 25.8 PG (ref 26.8–34.3)
MCH RBC QN AUTO: 25.8 PG (ref 26.8–34.3)
MCHC RBC AUTO-ENTMCNC: 27.8 G/DL (ref 31.4–37.4)
MCHC RBC AUTO-ENTMCNC: 30.7 G/DL (ref 31.4–37.4)
MCV RBC AUTO: 84 FL (ref 82–98)
MCV RBC AUTO: 93 FL (ref 82–98)
MONOCYTES # BLD AUTO: 0.58 THOUSAND/ΜL (ref 0.17–1.22)
MONOCYTES NFR BLD AUTO: 11 % (ref 4–12)
NEUTROPHILS # BLD AUTO: 3.73 THOUSANDS/ΜL (ref 1.85–7.62)
NEUTS SEG NFR BLD AUTO: 68 % (ref 43–75)
NRBC BLD AUTO-RTO: 0 /100 WBCS
PHOSPHATE SERPL-MCNC: 1.4 MG/DL (ref 2.7–4.5)
PHOSPHATE SERPL-MCNC: 1.7 MG/DL (ref 2.7–4.5)
PLATELET # BLD AUTO: 104 THOUSANDS/UL (ref 149–390)
PLATELET # BLD AUTO: 183 THOUSANDS/UL (ref 149–390)
PMV BLD AUTO: 10.7 FL (ref 8.9–12.7)
PMV BLD AUTO: 11.6 FL (ref 8.9–12.7)
POTASSIUM SERPL-SCNC: 3.8 MMOL/L (ref 3.5–5.3)
RBC # BLD AUTO: 2.98 MILLION/UL (ref 3.81–5.12)
RBC # BLD AUTO: 4.72 MILLION/UL (ref 3.81–5.12)
SODIUM SERPL-SCNC: 141 MMOL/L (ref 136–145)
WBC # BLD AUTO: 5.44 THOUSAND/UL (ref 4.31–10.16)
WBC # BLD AUTO: 9.14 THOUSAND/UL (ref 4.31–10.16)

## 2021-01-13 PROCEDURE — 83735 ASSAY OF MAGNESIUM: CPT | Performed by: PHYSICIAN ASSISTANT

## 2021-01-13 PROCEDURE — 80048 BASIC METABOLIC PNL TOTAL CA: CPT | Performed by: PHYSICIAN ASSISTANT

## 2021-01-13 PROCEDURE — 85025 COMPLETE CBC W/AUTO DIFF WBC: CPT | Performed by: PHYSICIAN ASSISTANT

## 2021-01-13 PROCEDURE — 84100 ASSAY OF PHOSPHORUS: CPT | Performed by: PHYSICIAN ASSISTANT

## 2021-01-13 PROCEDURE — 85027 COMPLETE CBC AUTOMATED: CPT | Performed by: PHYSICIAN ASSISTANT

## 2021-01-13 RX ORDER — BENZTROPINE MESYLATE 1 MG/1
2 TABLET ORAL
Status: DISCONTINUED | OUTPATIENT
Start: 2021-01-13 | End: 2021-01-14 | Stop reason: HOSPADM

## 2021-01-13 RX ORDER — GABAPENTIN 100 MG/1
200 CAPSULE ORAL
Status: DISCONTINUED | OUTPATIENT
Start: 2021-01-14 | End: 2021-01-14 | Stop reason: HOSPADM

## 2021-01-13 RX ORDER — MAGNESIUM SULFATE HEPTAHYDRATE 40 MG/ML
2 INJECTION, SOLUTION INTRAVENOUS ONCE
Status: COMPLETED | OUTPATIENT
Start: 2021-01-13 | End: 2021-01-13

## 2021-01-13 RX ORDER — HEPARIN SODIUM 5000 [USP'U]/ML
5000 INJECTION, SOLUTION INTRAVENOUS; SUBCUTANEOUS EVERY 8 HOURS SCHEDULED
Status: DISCONTINUED | OUTPATIENT
Start: 2021-01-13 | End: 2021-01-14 | Stop reason: HOSPADM

## 2021-01-13 RX ORDER — ARIPIPRAZOLE 5 MG/1
5 TABLET ORAL ONCE
Status: DISCONTINUED | OUTPATIENT
Start: 2021-01-13 | End: 2021-01-13

## 2021-01-13 RX ORDER — ACETAMINOPHEN 325 MG/1
650 TABLET ORAL EVERY 6 HOURS SCHEDULED
Status: DISCONTINUED | OUTPATIENT
Start: 2021-01-13 | End: 2021-01-14 | Stop reason: HOSPADM

## 2021-01-13 RX ORDER — DEXTROSE, SODIUM CHLORIDE, AND POTASSIUM CHLORIDE 5; .45; .15 G/100ML; G/100ML; G/100ML
84 INJECTION INTRAVENOUS CONTINUOUS
Status: DISCONTINUED | OUTPATIENT
Start: 2021-01-13 | End: 2021-01-14

## 2021-01-13 RX ORDER — OXYCODONE HYDROCHLORIDE 5 MG/1
5 TABLET ORAL EVERY 4 HOURS PRN
Status: DISCONTINUED | OUTPATIENT
Start: 2021-01-13 | End: 2021-01-14 | Stop reason: HOSPADM

## 2021-01-13 RX ORDER — LIOTHYRONINE SODIUM 5 UG/1
5 TABLET ORAL DAILY
Status: DISCONTINUED | OUTPATIENT
Start: 2021-01-13 | End: 2021-01-14 | Stop reason: HOSPADM

## 2021-01-13 RX ORDER — MAGNESIUM SULFATE 1 G/100ML
1 INJECTION INTRAVENOUS ONCE
Status: COMPLETED | OUTPATIENT
Start: 2021-01-13 | End: 2021-01-13

## 2021-01-13 RX ADMIN — GABAPENTIN 400 MG: 400 CAPSULE ORAL at 21:22

## 2021-01-13 RX ADMIN — OXYCODONE HYDROCHLORIDE 5 MG: 5 TABLET ORAL at 06:07

## 2021-01-13 RX ADMIN — HEPARIN SODIUM 5000 UNITS: 5000 INJECTION INTRAVENOUS; SUBCUTANEOUS at 14:22

## 2021-01-13 RX ADMIN — DEXTROSE, SODIUM CHLORIDE, AND POTASSIUM CHLORIDE 84 ML/HR: 5; .45; .15 INJECTION INTRAVENOUS at 17:04

## 2021-01-13 RX ADMIN — BENZTROPINE MESYLATE 1 MG: 1 TABLET ORAL at 08:47

## 2021-01-13 RX ADMIN — PANTOPRAZOLE SODIUM 40 MG: 40 TABLET, DELAYED RELEASE ORAL at 06:08

## 2021-01-13 RX ADMIN — ACETAMINOPHEN 650 MG: 325 TABLET, FILM COATED ORAL at 12:11

## 2021-01-13 RX ADMIN — MAGNESIUM SULFATE HEPTAHYDRATE 2 G: 40 INJECTION, SOLUTION INTRAVENOUS at 10:41

## 2021-01-13 RX ADMIN — OXYCODONE HYDROCHLORIDE 5 MG: 5 TABLET ORAL at 21:30

## 2021-01-13 RX ADMIN — HEPARIN SODIUM 5000 UNITS: 5000 INJECTION INTRAVENOUS; SUBCUTANEOUS at 21:22

## 2021-01-13 RX ADMIN — ACETAMINOPHEN 650 MG: 325 TABLET, FILM COATED ORAL at 17:01

## 2021-01-13 RX ADMIN — BUSPIRONE HYDROCHLORIDE 10 MG: 10 TABLET ORAL at 17:01

## 2021-01-13 RX ADMIN — BREXPIPRAZOLE 0.75 MG: 0.25 TABLET ORAL at 10:33

## 2021-01-13 RX ADMIN — ACETAMINOPHEN 650 MG: 325 TABLET, FILM COATED ORAL at 06:08

## 2021-01-13 RX ADMIN — LIOTHYRONINE SODIUM 5 MCG: 5 TABLET ORAL at 12:11

## 2021-01-13 RX ADMIN — DEXTROSE, SODIUM CHLORIDE, AND POTASSIUM CHLORIDE 84 ML/HR: 5; .45; .15 INJECTION INTRAVENOUS at 06:04

## 2021-01-13 RX ADMIN — BUSPIRONE HYDROCHLORIDE 10 MG: 10 TABLET ORAL at 08:47

## 2021-01-13 RX ADMIN — POTASSIUM PHOSPHATE, MONOBASIC POTASSIUM PHOSPHATE, DIBASIC 21 MMOL: 224; 236 INJECTION, SOLUTION, CONCENTRATE INTRAVENOUS at 14:32

## 2021-01-13 RX ADMIN — BENZTROPINE MESYLATE 2 MG: 1 TABLET ORAL at 21:22

## 2021-01-13 RX ADMIN — MAGNESIUM SULFATE HEPTAHYDRATE 1 G: 1 INJECTION, SOLUTION INTRAVENOUS at 12:11

## 2021-01-13 RX ADMIN — CLONAZEPAM 1 MG: 1 TABLET ORAL at 21:22

## 2021-01-13 RX ADMIN — ACETAMINOPHEN 650 MG: 325 TABLET, FILM COATED ORAL at 23:03

## 2021-01-13 NOTE — PROGRESS NOTES
Progress Note - Colorectal   Shayy Sanabria 31 y o  female MRN: [id number]  [facility] Encounter: [id number]      Objective:  Doing well this morning  Did have some nausea during the night  No emesis  No flatus as of yet, no bowel movement  Patient was given Lovenox last evening for DVT prophylaxis  Jalloh catheter is draining  very light dustin urine  Patient has D5 normal saline running at 125 mL/hour  Patient has not been out of bed yet  Patient states pain is controlled with her Dilaudid PCA she has an hourly dose of 2 2 mg available  2000 Jalloh    Blood pressure 115/78, pulse 90, temperature 99 °F (37 2 °C), resp  rate 20, height 5' 3" (1 6 m), weight 51 3 kg (113 lb), SpO2 97 %, not currently breastfeeding  ,Body mass index is 20 02 kg/m²  Intake/Output Summary (Last 24 hours) at 1/13/2021 0531  Last data filed at 1/13/2021 0527  Gross per 24 hour   Intake 1500 ml   Output 2900 ml   Net -1400 ml       Invasive Devices     Peripheral Intravenous Line            Peripheral IV 01/12/21 Left Hand less than 1 day    Peripheral IV 01/12/21 Right Hand less than 1 day          Drain            Urethral Catheter Latex; Double-lumen 16 Fr  less than 1 day                Physical Exam:   Abdomen:  Soft, flat, nondistended, small Pfannenstiel incision is clean and dry, trocar site clean and dry, minimal incisional tenderness  Extremities:  No calf tenderness no pedal edema    Lab, Imaging and other studies:  Pending  VTE Pharmacologic Prophylaxis: Enoxaparin (Lovenox)  VTE Mechanical Prophylaxis: sequential compression device    Assessment:  POD # 1 robotic assisted sigmoid resection with rectopexy, spy angiography, flexible sigmoidoscopy    Plan:  1  Decrease IV fluids to 84 mL/hour and change fluids to maintenance  2  Add toast and crackers/pretzels  3  Discontinue Jalloh catheter  4  Out of bed ambulating the halls  5  Continue I&Os  6  Order incentive spirometry  7   And oxycodone 5 mg every 4-6 hours for moderate pain  8   Decrease the hourly amount of Dilaudid on her PCA

## 2021-01-13 NOTE — UTILIZATION REVIEW
Initial Clinical Review    Elective IP surgical procedure    Age/Sex: 31 y o  female     Surgery Date: 1/12/21    Procedure:   Preop Diagnosis:  Rectal prolapse [K62 3]     Post-Op Diagnosis Codes:     * Rectal prolapse [K62 3], redundant sigmoid colon     Procedure(s) (LRB):  Rectopexy with sigmoid resection w/ robotics  (N/A)   Laser fluorescence angiography  Flexible sigmoidoscopy     Anesthesia: General     Operative Findings:   Rectal prolapse [K62 3], redundant sigmoid colon     POD#1 Progress Note:     Good pain control on Dilaudid PCA, passing flatus, abd soft, flat, nondistended, incision clean and dry  Decrease IV fluids, toast and crackers with clear liquis, d/c foely, incentive spirometry  Admission Orders: Date/Time/Statement:   Admission Orders (From admission, onward)     Ordered        01/12/21 1117  Inpatient Admission  Once                   Orders Placed This Encounter   Procedures    Inpatient Admission     Standing Status:   Standing     Number of Occurrences:   1     Order Specific Question:   Level of Care     Answer:   Med Surg [16]     Order Specific Question:   Estimated length of stay     Answer:   More than 2 Midnights     Order Specific Question:   Certification     Answer:   I certify that inpatient services are medically necessary for this patient for a duration of greater than two midnights  See H&P and MD Progress Notes for additional information about the patient's course of treatment       Vital Signs: /78   Pulse 86   Temp 98 3 °F (36 8 °C)   Resp 16   Ht 5' 3" (1 6 m)   Wt 51 3 kg (113 lb)   SpO2 99%   BMI 20 02 kg/m²      Diet:     Mobility:     DVT Prophylaxis: SCD, Heparin Sq    Medications/Pain Control:   Scheduled Medications:  acetaminophen, 650 mg, Oral, Q6H [facility]  benztropine, 1 mg, Oral, Daily  benztropine, 2 mg, Oral, HS  busPIRone, 10 mg, Oral, BID  clonazePAM, 1 mg, Oral, HS  [START ON 1/14/2021] gabapentin, 200 mg, Oral, Daily With Breakfast  gabapentin, 400 mg, Oral, HS  heparin (porcine), 5,000 Units, Subcutaneous, Q8H CHRISTINA  liothyronine, 5 mcg, Oral, Daily  magnesium sulfate, 1 g, Intravenous, Once    And  magnesium sulfate, 2 g, Intravenous, Once  NON FORMULARY, 0 75 mg, Oral, Daily  pantoprazole, 40 mg, Oral, Early Morning  potassium phosphate, 21 mmol, Intravenous, Once      Continuous IV Infusions:  dextrose 5 % and sodium chloride 0 45 % with KCl 20 mEq/L, 84 mL/hr, Intravenous, Continuous  HYDROmorphone, , Intravenous, Continuous      PRN Meds:  diphenhydrAMINE, 25 mg, Intravenous, Q6H PRN  ondansetron, 4 mg, Intravenous, Q6H PRN -x 1 1/12  Phenergan 12 5 mg IV PRN - x 1 1/12 and d/c   oxyCODONE, 5 mg, Oral, Q4H PRN -x 1 1/13        Network Utilization Review Department  ATTENTION: Please call with any questions or concerns to [phone] and carefully listen to the prompts so that you are directed to the right person  All voicemails are confidential   Mony Polk all requests for admission clinical reviews, approved or denied determinations and any other requests to dedicated fax number below belonging to the campus where the patient is receiving treatment   List of dedicated fax numbers for the Facilities:  [address] DENIALS (Administrative/Medical Necessity) [phone]   [address] (Maternity/NICU/Pediatrics) [phone]   [address] Jarett Villafuerte 6354 (  [facility] P[facility]or [facility]) [address] [address] [address] Monica Farrell 1481 [address] [place]) [address] [place] [address] [address] [place] [address] [phone]

## 2021-01-13 NOTE — PLAN OF CARE
Problem: Potential for Falls  Goal: Patient will remain free of falls  Description: INTERVENTIONS:  - Assess patient frequently for physical needs  -  Identify cognitive and physical deficits and behaviors that affect risk of falls    -  [place] fall precautions as indicated by assessment   - Educate patient/family on patient safety including physical limitations  - Instruct patient to call for assistance with activity based on assessment  - Modify environment to reduce risk of injury  - Consider OT/PT consult to assist with strengthening/mobility  Outcome: Progressing     Problem: PAIN - ADULT  Goal: Verbalizes/displays adequate comfort level or baseline comfort level  Description: Interventions:  - Encourage patient to monitor pain and request assistance  - Assess pain using appropriate pain scale  - Administer analgesics based on type and severity of pain and evaluate response  - Implement non-pharmacological measures as appropriate and evaluate response  - Consider cultural and social influences on pain and pain management  - Notify physician/advanced practitioner if interventions unsuccessful or patient reports new pain  Outcome: Progressing     Problem: INFECTION - ADULT  Goal: Absence or prevention of progression during hospitalization  Description: INTERVENTIONS:  - Assess and monitor for signs and symptoms of infection  - Monitor lab/diagnostic results  - Monitor all insertion sites, i e  indwelling lines, tubes, and drains  - Monitor endotracheal if appropriate and nasal secretions for changes in amount and color  - [place] appropriate cooling/warming therapies per order  - Administer medications as ordered  - Instruct and encourage patient and family to use good hand hygiene technique  - Identify and instruct in appropriate isolation precautions for identified infection/condition  Outcome: Progressing     Problem: DISCHARGE PLANNING  Goal: Discharge to home or other facility with appropriate resources  Description: INTERVENTIONS:  - Identify barriers to discharge w/patient and caregiver  - Arrange for needed discharge resources and transportation as appropriate  - Identify discharge learning needs (meds, wound care, etc )  - Arrange for interpretive services to assist at discharge as needed  - Refer to Case Management Department for coordinating discharge planning if the patient needs post-hospital services based on physician/advanced practitioner order or complex needs related to functional status, cognitive ability, or social support system  Outcome: Progressing

## 2021-01-14 ENCOUNTER — TRANSITIONAL CARE MANAGEMENT (OUTPATIENT)
Dept: INTERNAL MEDICINE CLINIC | Facility: CLINIC | Age: 31
End: 2021-01-14

## 2021-01-14 LAB
ANION GAP SERPL CALCULATED.3IONS-SCNC: -1 MMOL/L (ref 4–13)
BASOPHILS # BLD AUTO: 0.03 THOUSANDS/ΜL (ref 0–0.1)
BASOPHILS NFR BLD AUTO: 0 % (ref 0–1)
BUN SERPL-MCNC: 3 MG/DL (ref 5–25)
CALCIUM SERPL-MCNC: 8.4 MG/DL (ref 8.3–10.1)
CHLORIDE SERPL-SCNC: 114 MMOL/L (ref 100–108)
CO2 SERPL-SCNC: 27 MMOL/L (ref 21–32)
CREAT SERPL-MCNC: 0.76 MG/DL (ref 0.6–1.3)
EOSINOPHIL # BLD AUTO: 0.16 THOUSAND/ΜL (ref 0–0.61)
EOSINOPHIL NFR BLD AUTO: 2 % (ref 0–6)
ERYTHROCYTE [DISTWIDTH] IN BLOOD BY AUTOMATED COUNT: 15.9 % (ref 11.6–15.1)
GFR SERPL CREATININE-BSD FRML MDRD: 105 ML/MIN/1.73SQ M
GLUCOSE SERPL-MCNC: 224 MG/DL (ref 65–140)
HCT VFR BLD AUTO: 34.7 % (ref 34.8–46.1)
HGB BLD-MCNC: 10.6 G/DL (ref 11.5–15.4)
IMM GRANULOCYTES # BLD AUTO: 0.02 THOUSAND/UL (ref 0–0.2)
IMM GRANULOCYTES NFR BLD AUTO: 0 % (ref 0–2)
LYMPHOCYTES # BLD AUTO: 1.67 THOUSANDS/ΜL (ref 0.6–4.47)
LYMPHOCYTES NFR BLD AUTO: 22 % (ref 14–44)
MAGNESIUM SERPL-MCNC: 1.9 MG/DL (ref 1.6–2.6)
MCH RBC QN AUTO: 26.1 PG (ref 26.8–34.3)
MCHC RBC AUTO-ENTMCNC: 30.5 G/DL (ref 31.4–37.4)
MCV RBC AUTO: 86 FL (ref 82–98)
MONOCYTES # BLD AUTO: 0.86 THOUSAND/ΜL (ref 0.17–1.22)
MONOCYTES NFR BLD AUTO: 11 % (ref 4–12)
NEUTROPHILS # BLD AUTO: 4.89 THOUSANDS/ΜL (ref 1.85–7.62)
NEUTS SEG NFR BLD AUTO: 65 % (ref 43–75)
NRBC BLD AUTO-RTO: 0 /100 WBCS
PHOSPHATE SERPL-MCNC: 2.5 MG/DL (ref 2.7–4.5)
PLATELET # BLD AUTO: 159 THOUSANDS/UL (ref 149–390)
PMV BLD AUTO: 11.3 FL (ref 8.9–12.7)
POTASSIUM SERPL-SCNC: 5 MMOL/L (ref 3.5–5.3)
RBC # BLD AUTO: 4.06 MILLION/UL (ref 3.81–5.12)
SODIUM SERPL-SCNC: 140 MMOL/L (ref 136–145)
WBC # BLD AUTO: 7.63 THOUSAND/UL (ref 4.31–10.16)

## 2021-01-14 PROCEDURE — 84100 ASSAY OF PHOSPHORUS: CPT | Performed by: PHYSICIAN ASSISTANT

## 2021-01-14 PROCEDURE — 83735 ASSAY OF MAGNESIUM: CPT | Performed by: PHYSICIAN ASSISTANT

## 2021-01-14 PROCEDURE — 85025 COMPLETE CBC W/AUTO DIFF WBC: CPT | Performed by: PHYSICIAN ASSISTANT

## 2021-01-14 PROCEDURE — 80048 BASIC METABOLIC PNL TOTAL CA: CPT | Performed by: PHYSICIAN ASSISTANT

## 2021-01-14 RX ORDER — OXYCODONE HYDROCHLORIDE 5 MG/1
5 TABLET ORAL EVERY 4 HOURS PRN
Qty: 20 TABLET | Refills: 0 | Status: SHIPPED | OUTPATIENT
Start: 2021-01-14 | End: 2021-01-19

## 2021-01-14 RX ORDER — ACETAMINOPHEN 325 MG/1
650 TABLET ORAL EVERY 6 HOURS SCHEDULED
Refills: 0 | COMMUNITY
Start: 2021-01-14 | End: 2022-04-11

## 2021-01-14 RX ADMIN — OXYCODONE HYDROCHLORIDE 5 MG: 5 TABLET ORAL at 06:22

## 2021-01-14 RX ADMIN — LIOTHYRONINE SODIUM 5 MCG: 5 TABLET ORAL at 08:27

## 2021-01-14 RX ADMIN — ACETAMINOPHEN 650 MG: 325 TABLET, FILM COATED ORAL at 11:21

## 2021-01-14 RX ADMIN — OXYCODONE HYDROCHLORIDE 5 MG: 5 TABLET ORAL at 11:21

## 2021-01-14 RX ADMIN — BUSPIRONE HYDROCHLORIDE 10 MG: 10 TABLET ORAL at 08:27

## 2021-01-14 RX ADMIN — DEXTROSE, SODIUM CHLORIDE, AND POTASSIUM CHLORIDE 84 ML/HR: 5; .45; .15 INJECTION INTRAVENOUS at 05:06

## 2021-01-14 RX ADMIN — HEPARIN SODIUM 5000 UNITS: 5000 INJECTION INTRAVENOUS; SUBCUTANEOUS at 06:20

## 2021-01-14 RX ADMIN — BENZTROPINE MESYLATE 1 MG: 1 TABLET ORAL at 08:27

## 2021-01-14 RX ADMIN — BREXPIPRAZOLE 0.75 MG: 0.25 TABLET ORAL at 08:27

## 2021-01-14 RX ADMIN — PANTOPRAZOLE SODIUM 40 MG: 40 TABLET, DELAYED RELEASE ORAL at 06:21

## 2021-01-14 RX ADMIN — ACETAMINOPHEN 650 MG: 325 TABLET, FILM COATED ORAL at 06:20

## 2021-01-14 RX ADMIN — GABAPENTIN 200 MG: 100 CAPSULE ORAL at 08:27

## 2021-01-14 NOTE — DISCHARGE INSTRUCTIONS
NO LAXATIVES for 10 days    NOTHING PER RECTUM; no suppositories, enemas, etc    May take Colace 100 mg morning and night (stool softener)

## 2021-01-14 NOTE — PROGRESS NOTES
Progress Note - Colorectal   Gisela Ferris 31 y o  female MRN: [id number]  Unit/Bed#: [facility] 823-01 Encounter: [id number]      Objective:  Patient is doing extremely well  There is no nausea, no emesis  She is tolerating clears toast and crackers  She has been out of bed, but was not in the halls ambulating yesterday  Patient is voiding well on her own since Jalloh catheter was removed  She has only used her PCA button x1, and she has only had 1 oxycodone for the incisional pain  No flatus as of yet no bowel movements as of yet, does not feel bloated  Using her incentive spirometer  1350 + 2 UA's    Blood pressure 109/69, pulse 91, temperature 98 9 °F (37 2 °C), resp  rate 18, height 5' 3" (1 6 m), weight 51 3 kg (113 lb), SpO2 100 %, not currently breastfeeding  ,Body mass index is 20 02 kg/m²  Intake/Output Summary (Last 24 hours) at 1/14/2021 0524  Last data filed at 1/13/2021 2303  Gross per 24 hour   Intake 4107 85 ml   Output 2585 ml   Net 1522  85 ml       Invasive Devices     Peripheral Intravenous Line            Peripheral IV 01/12/21 Left Hand 1 day                Physical Exam:   Abdomen:  Soft, flat, nondistended, small little Pfannenstiel incision is clean and dry, trocar sites clean and dry, bowel sounds  are present   Extremities:  No pedal edema, no calf tenderness, vena dynes are on and functioning    Lab, Imaging and other studies:    VTE Pharmacologic Prophylaxis: Heparin  VTE Mechanical Prophylaxis: sequential compression device    Assessment:  POD # 2 robotic assisted sigmoid resection, rectopexy    Plan:  1  Saline lock IV fluids  2  Discontinue PCA since not using  3  Low residue diet  4  To be ambulating in the halls this morning and today  5   Home soon

## 2021-01-14 NOTE — DISCHARGE SUMMARY
Discharge Summary - Colorectal Surgery   Loyda Humphrey 31 y o  female MRN: [id number]  [facility] Encounter: [id number]        Admitting Diagnosis: Rectal prolapse    Admit Date: 1/12/21    Discharge Diagnosis: Same    Discharge Date: 1/14/21    HPI: [facility] is a pleasant 30 yo woman admitted for repair of her rectal prolapse  Procedures Performed: 1/12/21 Robotic assisted sigmoid resection, rectopexy, SPY angiography, flexible sigmoidoscopy - Dr Meadows Lights: Patient has done extremely well post operatively  She was started on clear liquids and toast post day number one and was increased to a low residue diet by post op day two  Patient is ambulating the halls today   [facility] is now having bowel movements  Her pain is controlled with tylenol and Oxycodone  Patient is not using her dilaudid PCA often at all  Patient's small pfannenstiel incision along with the trochar sites are clean and dry  Patient will be discharged home today and followed with Dr Cinthya Garcia in 4 weeks  Patient was placed back on all her home medications post op day number one  A script was sent to [facility] DR ELADIA NAVARRETE in [facility] for Oxycodone 5 mg every 4-6 hours as needed for pain  #20 pills and no refills  Patient may take Colace 100 mg BID if needed as a stool softener  No laxatives to be taken post operatively until she sees Dr Cinthya Garcia in 4 weeks  Pathology pending    Complications: None      Condition at Discharge: good     Discharge instructions/Information to patient and family:   See after visit summary for information provided to patient and family  Provisions for Follow-Up Care:  See after visit summary for information related to follow-up care and any pertinent home health orders  Disposition: Home    Planned Readmission: No    Discharge Statement   I spent 30 minutes discharging the patient  This time was spent on the day of discharge  I had direct contact with the patient on the day of discharge  Additional documentation is required if more than 30 minutes were spent on discharge  Discharge Medications:  See after visit summary for reconciled discharge medications provided to patient and family

## 2021-01-15 NOTE — UTILIZATION REVIEW
Notification of Discharge  This is a Notification of Discharge from our facility [address]  Please be advised that this patient has been discharge from our facility  Below you will find the admission and discharge date and time including the patients disposition  PRESENTATION DATE: 1/12/2021  5:27 AM  OBS ADMISSION DATE:   IP ADMISSION DATE: 1/12/21 1117   DISCHARGE DATE: 1/14/2021  2:17 PM  DISPOSITION: Home/Self Care Home/Self Care   Admission Orders listed below:  Admission Orders (From admission, onward)     Ordered        01/12/21 1117  Inpatient Admission  Once                   Please contact the UR Department if additional information is required to close this patient's authorization/case  [address] [employer] Utilization Review Department  Main: [phone] x carefully listen to the prompts  All voicemails are confidential   [email or web address]  Send all requests for admission clinical reviews, approved or denied determinations and any other requests to dedicated fax number below belonging to the campus where the patient is receiving treatment   List of dedicated fax numbers:  [address] DENIALS (Administrative/Medical Necessity) [phone]   [address] (Maternity/NICU/Pediatrics) [phone]   Zack Moon [phone]   Oz Ruffin [phone]   Wilber Nones [phone]   Jose David Briseno [facility] 15[address] [facility] [phone]   [facility]  [phone]   [address] [facility] [address] [facility]  [address] [facility] [address] [facility] [phone]

## 2021-01-15 NOTE — PROGRESS NOTES
I called patient-she said she will just keep her appt in march-she is being followed by her surgeon right now for the rectal prolapse  She did not want to sched a KIM appt right now

## 2021-01-16 VITALS
SYSTOLIC BLOOD PRESSURE: 125 MMHG | DIASTOLIC BLOOD PRESSURE: 84 MMHG | WEIGHT: 113 LBS | BODY MASS INDEX: 20.02 KG/M2 | TEMPERATURE: 98.5 F | HEART RATE: 84 BPM | HEIGHT: 63 IN | RESPIRATION RATE: 16 BRPM | OXYGEN SATURATION: 96 %

## 2021-01-18 ENCOUNTER — TRANSITIONAL CARE MANAGEMENT (OUTPATIENT)
Dept: INTERNAL MEDICINE CLINIC | Facility: CLINIC | Age: 31
End: 2021-01-18

## 2021-01-25 ENCOUNTER — OFFICE VISIT (OUTPATIENT)
Dept: INTERNAL MEDICINE CLINIC | Facility: CLINIC | Age: 31
End: 2021-01-25
Payer: COMMERCIAL

## 2021-01-25 VITALS
SYSTOLIC BLOOD PRESSURE: 116 MMHG | HEIGHT: 63 IN | TEMPERATURE: 96.5 F | BODY MASS INDEX: 20.75 KG/M2 | DIASTOLIC BLOOD PRESSURE: 68 MMHG | WEIGHT: 117.13 LBS | HEART RATE: 74 BPM | OXYGEN SATURATION: 100 %

## 2021-01-25 DIAGNOSIS — K62.3 RECTAL PROLAPSE: Primary | ICD-10-CM

## 2021-01-25 PROCEDURE — 99214 OFFICE O/P EST MOD 30 MIN: CPT | Performed by: PHYSICIAN ASSISTANT

## 2021-01-25 PROCEDURE — 1111F DSCHRG MED/CURRENT MED MERGE: CPT | Performed by: PHYSICIAN ASSISTANT

## 2021-01-25 PROCEDURE — 3008F BODY MASS INDEX DOCD: CPT | Performed by: INTERNAL MEDICINE

## 2021-01-25 NOTE — PROGRESS NOTES
Transition of Care  Follow-up After Hospitalization    Babar Jewell 31 y o  female   Date:  1/25/2021    TCM Call (since 12/25/2020)     Date and time call was made  1/18/2021  1:25 PM    Hospital care reviewed  Records reviewed    Patient was hospitialized at  [facility]        Date of Admission  01/12/21    Date of discharge  01/14/21    Diagnosis  rectal prolapse     Disposition  Home    Were the patients medications reviewed and updated  Yes    Current Symptoms  None      TCM Call (since 12/25/2020)     Post hospital issues  None    Should patient be enrolled in anticoag monitoring? No    Scheduled for follow up? Yes    Did you obtain your prescribed medications  Yes    Do you need help managing your prescriptions or medications  No    Is transportation to your appointment needed  No    I have advised the patient to call PCP with any new or worsening symptoms  shandra kim        Admit Date: 1/12  Discharge Date:1/14  Diagnosis: rectal prolapse  Location: [facility] records were reviewed  Medications upon discharge reviewed/updated  Medication Changes: none  Imaging:  Consults:   Discharge Disposition:  home  Follow up visits with other specialists:       Assessment and Plan:    1  Rectal prolapse: pt underwent elective robotic correction of this and is doing very well  Pain is minimal  BMs are regular  No changes at this time  There are no diagnoses linked to this encounter  HPI:  Pt presents for TCM  She was admitted at [facility] on 1/12 for elective surgery to correct rectal prolapse  She underwent robotic sigmoid resection rectoplexy, SPY angiography and flexible sigmoidoscopy  She is feeling well  She is having regular BMs  She has not been using her pain medication and pain is adequately controlled for the most part  She has upcoming surgical followup in a few weeks  She denies any complaints or concerns         ROS: Review of Systems   Constitutional: Negative for chills and fever    HENT: Negative for congestion, ear pain, hearing loss, postnasal drip, rhinorrhea, sinus pressure, sinus pain, sore throat and trouble swallowing  Eyes: Negative for pain and visual disturbance  Respiratory: Negative for cough, chest tightness, shortness of breath and wheezing  Cardiovascular: Negative  Negative for chest pain, palpitations and leg swelling  Gastrointestinal: Negative for abdominal pain, blood in stool, constipation, diarrhea, nausea and vomiting  Endocrine: Negative for cold intolerance, heat intolerance, polydipsia, polyphagia and polyuria  Genitourinary: Negative for difficulty urinating, dysuria, flank pain and urgency  Musculoskeletal: Negative for arthralgias, back pain, gait problem and myalgias  Skin: Negative for rash  Allergic/Immunologic: Negative  Neurological: Negative for dizziness, weakness, light-headedness and headaches  Hematological: Negative  Psychiatric/Behavioral: Negative for behavioral problems, dysphoric mood and sleep disturbance  The patient is not nervous/anxious          Past Medical History:   Diagnosis Date    Anorexia nervosa     pt denies history at time of admission 7/21/18    Anxiety     Celiac disease     Chronic kidney disease     CPAP (continuous positive airway pressure) dependence     waiting for a new mask    Depression     Disease of thyroid gland     hypo    Gall bladder polyp     Gastroesophageal reflux disease 9/18/2018    Hallucination     Memory difficulty 7/12/2018    Plantar wart of left foot     Psychiatric illness     Raynaud's disease without gangrene     Renal atrophy     [facility] disorder [facility] [address] [facility]     Self-injurious behavior     Sleep apnea     Sleep difficulties     Suicide attempt ([facility] )     history of attempt at age 22 by overdose     Urinary retention        Past Surgical History:   Procedure Laterality Date    EYE SURGERY Left     tear duct    TX ESOPHAGOGASTRODUODENOSCOPY TRANSORAL DIAGNOSTIC N/A 2/9/2018    Procedure: ESOPHAGOGASTRODUODENOSCOPY (EGD); Surgeon: Yudith Alatorre MD;  Location: [facility];  Service: Gastroenterology    ME ESOPHAGOGASTRODUODENOSCOPY TRANSORAL DIAGNOSTIC N/A 11/6/2018    Procedure: ESOPHAGOGASTRODUODENOSCOPY (EGD); Surgeon: Rosa Isela Duran MD;  Location: [facility];  Service: Gastroenterology    ME LAP, SURG PROCTOPEXY N/A 1/12/2021    Procedure: Rectopexy with sigmoid resection w/ robotics  ;  Surgeon: Darek Ga MD;  Location:  MAIN OR;  Service: Colorectal       Social History     Socioeconomic History    Marital status: Single     Spouse name: None    Number of children: None    Years of education: None    Highest education level: None   Occupational History    Occupation: UNEMPLOYED   Social Needs    Financial resource strain: None    Food insecurity     Worry: None     Inability: None    Transportation needs     Medical: None     Non-medical: None   Tobacco Use    Smoking status: Never Smoker    Smokeless tobacco: Never Used   Substance and Sexual Activity    Alcohol use: Not Currently    Drug use: No    Sexual activity: Not Currently   Lifestyle    Physical activity     Days per week: None     Minutes per session: None    Stress: None   Relationships    Social connections     Talks on phone: None     Gets together: None     Attends Christianity service: None     Active member of club or organization: None     Attends meetings of clubs or organizations: None     Relationship status: None    Intimate partner violence     Fear of current or ex partner: None     Emotionally abused: None     Physically abused: None     Forced sexual activity: None   Other Topics Concern    None   Social History Narrative    UNEMPLOYED       Family History   Problem Relation Age of Onset    Hypertension Father         benign essential    Hypertension Brother         benign essential     OCD Brother     Depression Brother     Other Family nasolacrimal duct obstruction, acquired    Colon cancer Neg Hx        Allergies   Allergen Reactions    Gluten Meal     Latex Swelling         Current Outpatient Medications:     acetaminophen (TYLENOL) 325 mg tablet, Take 2 tablets (650 mg total) by mouth every 6 (six) hours, Disp:  , Rfl: 0    benztropine (COGENTIN) 1 mg tablet, Take 1 mg by mouth TAKE 1 TABLET IN AM AND 2 TABLETS IN PM, Disp: , Rfl:     Brexpiprazole (REXULTI PO), Take 0 75 mg by mouth daily, Disp: , Rfl:     busPIRone (BUSPAR) 10 mg tablet, Take 10 mg by mouth 2 (two) times a day, Disp: , Rfl:     Clobetasol Prop Emollient Base 0 05 % emollient cream, , Disp: , Rfl:     clonazePAM (KlonoPIN) 0 5 mg tablet, Take 1 mg by mouth daily at bedtime, Disp: , Rfl:     gabapentin (NEURONTIN) 100 mg capsule, 2 tablets in am, Disp: , Rfl:     gabapentin (NEURONTIN) 400 mg capsule, 400 mg daily at bedtime , Disp: , Rfl:     liothyronine (CYTOMEL) 5 mcg tablet, Take 1 tablet by mouth once daily, Disp: 30 tablet, Rfl: 5    pantoprazole (PROTONIX) 40 mg tablet, Take 1 tablet by mouth once daily, Disp: 30 tablet, Rfl: 5    triamcinolone (KENALOG) 0 1 % cream, APPLY TO RASH ON HANDS TWICE DAILY FOR 2 WEEKS THEN TWICE DAILY FOR 2 3 DAYS A WEEK AS NEEDED FOR MILD FLARE UPS, Disp: , Rfl:       Physical Exam:  /68 (BP Location: Left arm, Patient Position: Sitting, Cuff Size: Standard)   Pulse 74   Temp (!) 96 5 °F (35 8 °C) (Tympanic)   Ht 5' 3" (1 6 m)   Wt 53 1 kg (117 lb 2 oz)   SpO2 100%   BMI 20 75 kg/m²     Physical Exam  Nursing note reviewed  Constitutional:       General: She is not in acute distress  Appearance: She is well-developed  She is not diaphoretic  HENT:      Head: Normocephalic and atraumatic  Right Ear: External ear normal       Left Ear: External ear normal       Nose: Nose normal       Mouth/Throat:      Pharynx: No oropharyngeal exudate  Eyes:      General: No scleral icterus          Right eye: No discharge  Left eye: No discharge  Conjunctiva/sclera: Conjunctivae normal       Pupils: Pupils are equal, round, and reactive to light  Neck:      Musculoskeletal: Normal range of motion and neck supple  Thyroid: No thyromegaly  Cardiovascular:      Rate and Rhythm: Normal rate and regular rhythm  Heart sounds: Normal heart sounds  No murmur  No friction rub  No gallop  Pulmonary:      Effort: Pulmonary effort is normal  No respiratory distress  Breath sounds: Normal breath sounds  No wheezing or rales  Abdominal:      General: Bowel sounds are normal  There is no distension  Palpations: Abdomen is soft  Tenderness: There is no abdominal tenderness  Musculoskeletal: Normal range of motion  General: No tenderness or deformity  Skin:     General: Skin is warm and dry  Neurological:      Mental Status: She is alert and oriented to person, place, and time  Cranial Nerves: No cranial nerve deficit  Psychiatric:         Behavior: Behavior normal          Thought Content:  Thought content normal          Judgment: Judgment normal               Labs:  Lab Results   Component Value Date    WBC 7 63 01/14/2021    HGB 10 6 (L) 01/14/2021    HCT 34 7 (L) 01/14/2021    MCV 86 01/14/2021     01/14/2021     Lab Results   Component Value Date    K 5 0 01/14/2021     (H) 01/14/2021    CO2 27 01/14/2021    BUN 3 (L) 01/14/2021    CREATININE 0 76 01/14/2021    GLUF 93 12/03/2020    CALCIUM 8 4 01/14/2021    AST 26 09/17/2020    ALT 36 09/17/2020    ALKPHOS 79 09/17/2020    PROT 7 2 11/25/2014    BILITOT 1 1 (H) 11/25/2014    EGFR 105 01/14/2021

## 2021-02-20 DIAGNOSIS — K21.9 GASTROESOPHAGEAL REFLUX DISEASE: ICD-10-CM

## 2021-02-22 DIAGNOSIS — K21.9 GASTROESOPHAGEAL REFLUX DISEASE: ICD-10-CM

## 2021-02-22 RX ORDER — PANTOPRAZOLE SODIUM 40 MG/1
TABLET, DELAYED RELEASE ORAL
Qty: 30 TABLET | Refills: 5 | Status: SHIPPED | OUTPATIENT
Start: 2021-02-22 | End: 2021-02-22

## 2021-02-22 RX ORDER — PANTOPRAZOLE SODIUM 40 MG/1
TABLET, DELAYED RELEASE ORAL
Qty: 30 TABLET | Refills: 5 | Status: SHIPPED | OUTPATIENT
Start: 2021-02-22 | End: 2021-02-22 | Stop reason: SDUPTHER

## 2021-02-22 RX ORDER — PANTOPRAZOLE SODIUM 40 MG/1
40 TABLET, DELAYED RELEASE ORAL DAILY
Qty: 30 TABLET | Refills: 5 | Status: SHIPPED | OUTPATIENT
Start: 2021-02-22 | End: 2022-02-21

## 2021-02-23 ENCOUNTER — TELEPHONE (OUTPATIENT)
Dept: NEUROLOGY | Facility: CLINIC | Age: 31
End: 2021-02-23

## 2021-02-23 NOTE — TELEPHONE ENCOUNTER
Pt called asking if she needed to fast for bloodwork ordered in 12/2020   I advised her she did need to fast

## 2021-02-26 RX ORDER — CLOBETASOL PROPIONATE 0.5 MG/G
CREAM TOPICAL
COMMUNITY
Start: 2020-12-22 | End: 2021-03-03

## 2021-02-26 RX ORDER — EMOLLIENT COMBINATION NO.32
EMULSION, EXTENDED RELEASE TOPICAL
COMMUNITY

## 2021-02-26 RX ORDER — MIRTAZAPINE 7.5 MG/1
TABLET, FILM COATED ORAL
COMMUNITY
End: 2021-06-18

## 2021-02-26 RX ORDER — BREXPIPRAZOLE 0.25 MG/1
TABLET ORAL
COMMUNITY
Start: 2020-12-26 | End: 2021-06-18

## 2021-03-01 ENCOUNTER — OFFICE VISIT (OUTPATIENT)
Dept: INTERNAL MEDICINE CLINIC | Facility: CLINIC | Age: 31
End: 2021-03-01
Payer: COMMERCIAL

## 2021-03-01 ENCOUNTER — APPOINTMENT (OUTPATIENT)
Dept: LAB | Facility: CLINIC | Age: 31
End: 2021-03-01
Payer: COMMERCIAL

## 2021-03-01 VITALS
HEIGHT: 63 IN | RESPIRATION RATE: 14 BRPM | TEMPERATURE: 97.7 F | DIASTOLIC BLOOD PRESSURE: 70 MMHG | SYSTOLIC BLOOD PRESSURE: 118 MMHG | HEART RATE: 79 BPM | BODY MASS INDEX: 21.05 KG/M2 | OXYGEN SATURATION: 99 % | WEIGHT: 118.8 LBS

## 2021-03-01 DIAGNOSIS — N39.8 VOIDING DYSFUNCTION: ICD-10-CM

## 2021-03-01 DIAGNOSIS — K62.3 RECTAL PROLAPSE: Primary | ICD-10-CM

## 2021-03-01 DIAGNOSIS — R20.0 NUMBNESS AND TINGLING OF BOTH FEET: ICD-10-CM

## 2021-03-01 DIAGNOSIS — R20.2 NUMBNESS AND TINGLING OF BOTH FEET: ICD-10-CM

## 2021-03-01 DIAGNOSIS — R90.82 WHITE MATTER DISEASE, UNSPECIFIED: ICD-10-CM

## 2021-03-01 LAB
BACTERIA UR QL AUTO: NORMAL /HPF
BILIRUB UR QL STRIP: NEGATIVE
CLARITY UR: CLEAR
COLOR UR: YELLOW
CREAT UR-MCNC: 19 MG/DL
GLUCOSE UR STRIP-MCNC: NEGATIVE MG/DL
HGB UR QL STRIP.AUTO: NEGATIVE
HYALINE CASTS #/AREA URNS LPF: NORMAL /LPF
KETONES UR STRIP-MCNC: NEGATIVE MG/DL
LEUKOCYTE ESTERASE UR QL STRIP: NEGATIVE
MICROALBUMIN UR-MCNC: <5 MG/L (ref 0–20)
MICROALBUMIN/CREAT 24H UR: <26 MG/G CREATININE (ref 0–30)
NITRITE UR QL STRIP: NEGATIVE
NON-SQ EPI CELLS URNS QL MICRO: NORMAL /HPF
PH UR STRIP.AUTO: 6.5 [PH]
PROT UR STRIP-MCNC: NEGATIVE MG/DL
RBC #/AREA URNS AUTO: NORMAL /HPF
SL AMB  POCT GLUCOSE, UA: NORMAL
SL AMB LEUKOCYTE ESTERASE,UA: NORMAL
SL AMB POCT BILIRUBIN,UA: NORMAL
SL AMB POCT BLOOD,UA: NORMAL
SL AMB POCT CLARITY,UA: CLEAR
SL AMB POCT COLOR,UA: NORMAL
SL AMB POCT KETONES,UA: NORMAL
SL AMB POCT NITRITE,UA: NORMAL
SL AMB POCT PH,UA: 5
SL AMB POCT SPECIFIC GRAVITY,UA: 1.01
SL AMB POCT URINE PROTEIN: NORMAL
SL AMB POCT UROBILINOGEN: NORMAL
SP GR UR STRIP.AUTO: 1.01 (ref 1–1.03)
UROBILINOGEN UR QL STRIP.AUTO: 0.2 E.U./DL
WBC #/AREA URNS AUTO: NORMAL /HPF

## 2021-03-01 PROCEDURE — 82657 ENZYME CELL ACTIVITY: CPT

## 2021-03-01 PROCEDURE — 82726 LONG CHAIN FATTY ACIDS: CPT

## 2021-03-01 PROCEDURE — 87086 URINE CULTURE/COLONY COUNT: CPT | Performed by: NURSE PRACTITIONER

## 2021-03-01 PROCEDURE — 82570 ASSAY OF URINE CREATININE: CPT | Performed by: INTERNAL MEDICINE

## 2021-03-01 PROCEDURE — 81002 URINALYSIS NONAUTO W/O SCOPE: CPT | Performed by: NURSE PRACTITIONER

## 2021-03-01 PROCEDURE — 36415 COLL VENOUS BLD VENIPUNCTURE: CPT

## 2021-03-01 PROCEDURE — 84165 PROTEIN E-PHORESIS SERUM: CPT

## 2021-03-01 PROCEDURE — 86341 ISLET CELL ANTIBODY: CPT

## 2021-03-01 PROCEDURE — 81001 URINALYSIS AUTO W/SCOPE: CPT | Performed by: INTERNAL MEDICINE

## 2021-03-01 PROCEDURE — 99214 OFFICE O/P EST MOD 30 MIN: CPT | Performed by: NURSE PRACTITIONER

## 2021-03-01 PROCEDURE — 82043 UR ALBUMIN QUANTITATIVE: CPT | Performed by: INTERNAL MEDICINE

## 2021-03-01 PROCEDURE — 83519 RIA NONANTIBODY: CPT

## 2021-03-01 PROCEDURE — 86645 CMV ANTIBODY IGM: CPT

## 2021-03-01 PROCEDURE — 86255 FLUORESCENT ANTIBODY SCREEN: CPT

## 2021-03-01 PROCEDURE — 86644 CMV ANTIBODY: CPT

## 2021-03-01 PROCEDURE — 84165 PROTEIN E-PHORESIS SERUM: CPT | Performed by: PATHOLOGY

## 2021-03-01 RX ORDER — TAMSULOSIN HYDROCHLORIDE 0.4 MG/1
0.4 CAPSULE ORAL
Qty: 30 CAPSULE | Refills: 5 | Status: SHIPPED | OUTPATIENT
Start: 2021-03-01 | End: 2021-10-18

## 2021-03-01 NOTE — ASSESSMENT & PLAN NOTE
· frequency  · Pelvic pain  · Occasional incomplete bladder emptying  · Occasional spasms - will try flomax   · Urinalysis in the office is negative at this time  · Advised patient to call in 2-3 days to update us if flomax is working

## 2021-03-01 NOTE — PROGRESS NOTES
Assessment/Plan:    Problem List Items Addressed This Visit        Digestive    Rectal prolapse - Primary     · No issues since surgical repair            Genitourinary    Voiding dysfunction     · frequency  · Pelvic pain  · Occasional incomplete bladder emptying  · Occasional spasms - will try flomax   · Urinalysis in the office is negative at this time  · Advised patient to call in 2-3 days to update us if flomax is working  Diagnoses and all orders for this visit:    Rectal prolapse    Voiding dysfunction    Other orders  -     clobetasol (TEMOVATE) 0 05 % cream; APPLY EVERY OTHER NIGHT A THIN LAYER TO RASH ON BOTH HANDS AND TWICE DAILY A FEW DAYS AS NEEDED FOR FLARES  -     Dermatological Products, Misc  (EpiCeram) lotion  -     mirtazapine (REMERON) 7 5 MG tablet  -     Rexulti 0 25 MG tablet; TAKE 3 TABLETS BY MOUTH IN THE MORNING     Voiding dysfunction  · frequency  · Pelvic pain  · Occasional incomplete bladder emptying  · Occasional spasms - will try flomax   · Urinalysis in the office is negative at this time  · Advised patient to call in 2-3 days to update us if flomax is working  Rectal prolapse  · No issues since surgical repair      Subjective:      Patient ID: Prabhakar Jim is a 27 y o  female  Assessment:       Plan:  Plan:    1  Medications: no antibiotics at this time as urine dip is negative, will try Flomax for the spasms  2  Maintain adequate hydration  3  Follow up if symptoms not improving, and prn  Subjective:    Prabhakar Jim is a 27 y o  female who complains of dysuria, frequency, hesitancy, incomplete bladder emptying and suprapubic pressure for a few months - since December - prior to her rectal prolapse  Patient also complains of nothing  Patient denies back pain, fever and vaginal discharge  Patient does not have a history of recurrent UTI  Patient does not have a history of pyelonephritis    The following portions of the patient's history were reviewed and updated as appropriate: allergies, current medications, past family history, past medical history, past social history, past surgical history and problem list       Objective:    /70 (BP Location: Right arm, Patient Position: Sitting, Cuff Size: Standard)   Pulse 79   Temp 97 7 °F (36 5 °C)   Resp 14   Ht 5' 3" (1 6 m)   Wt 53 9 kg (118 lb 12 8 oz)   SpO2 99%   BMI 21 04 kg/m²     Laboratory:   Urine dipstick shows negative for all components  Micro exam: pending  The following portions of the patient's history were reviewed and updated as appropriate:   Past Medical History:  She has a past medical history of Anorexia nervosa, Anxiety, Celiac disease, Chronic kidney disease, CPAP (continuous positive airway pressure) dependence, Depression, Disease of thyroid gland, Gall bladder polyp, Gastroesophageal reflux disease (9/18/2018), Hallucination, Memory difficulty (7/12/2018), Plantar wart of left foot, Psychiatric illness, Raynaud's disease without gangrene, Renal atrophy, Schizoaffective disorder ([facility] [address] Self-injurious behavior, Sleep apnea, Sleep difficulties, Suicide attempt ([facility] [address] ), and Urinary retention  ,  _______________________________________________________________________  Medical Problems:  does not have any pertinent problems on file ,  _______________________________________________________________________  Past Surgical History:   has a past surgical history that includes Eye surgery (Left); pr esophagogastroduodenoscopy transoral diagnostic (N/A, 2/9/2018); pr esophagogastroduodenoscopy transoral diagnostic (N/A, 11/6/2018); and pr lap, surg proctopexy (N/A, 1/12/2021)  ,  _______________________________________________________________________  Family History:  family history includes Depression in her brother; Hypertension in her brother and father; OCD in her brother; Other in her family  ,  _______________________________________________________________________  Social History:   reports that she has never smoked  She has never used smokeless tobacco  She reports previous alcohol use  She reports that she does not use drugs  ,  _______________________________________________________________________  Allergies:  is allergic to gluten meal and latex     _______________________________________________________________________  Current Outpatient Medications   Medication Sig Dispense Refill    acetaminophen (TYLENOL) 325 mg tablet Take 2 tablets (650 mg total) by mouth every 6 (six) hours  0    benztropine (COGENTIN) 1 mg tablet Take 1 mg by mouth TAKE 1 TABLET IN AM AND 2 TABLETS IN PM      busPIRone (BUSPAR) 10 mg tablet Take 10 mg by mouth 2 (two) times a day      clonazePAM (KlonoPIN) 0 5 mg tablet Take 1 mg by mouth daily at bedtime      Dermatological Products, Misc  (EpiCeram) lotion       gabapentin (NEURONTIN) 100 mg capsule 2 tablets in am      gabapentin (NEURONTIN) 400 mg capsule 400 mg daily at bedtime       liothyronine (CYTOMEL) 5 mcg tablet Take 1 tablet by mouth once daily 30 tablet 5    mirtazapine (REMERON) 7 5 MG tablet       pantoprazole (PROTONIX) 40 mg tablet Take 1 tablet (40 mg total) by mouth daily 30 tablet 5    Rexulti 0 25 MG tablet TAKE 3 TABLETS BY MOUTH IN THE MORNING      Brexpiprazole (REXULTI PO) Take 0 75 mg by mouth daily      clobetasol (TEMOVATE) 0 05 % cream APPLY EVERY OTHER NIGHT A THIN LAYER TO RASH ON BOTH HANDS AND TWICE DAILY A FEW DAYS AS NEEDED FOR FLARES      Clobetasol Prop Emollient Base 0 05 % emollient cream       triamcinolone (KENALOG) 0 1 % cream APPLY TO RASH ON HANDS TWICE DAILY FOR 2 WEEKS THEN TWICE DAILY FOR 2 3 DAYS A WEEK AS NEEDED FOR MILD FLARE UPS       No current facility-administered medications for this visit       _______________________________________________________________________  Review of Systems   Constitutional: Negative for activity change, chills, fatigue and fever     HENT: Negative for rhinorrhea and sore throat  Eyes: Negative for pain  Respiratory: Negative for cough and shortness of breath  Cardiovascular: Negative for chest pain, palpitations and leg swelling  Gastrointestinal: Negative for abdominal pain, constipation, diarrhea, nausea and vomiting  Genitourinary: Positive for difficulty urinating and dysuria  Negative for flank pain, frequency and urgency  Musculoskeletal: Negative for gait problem, joint swelling and myalgias  Skin: Negative for color change  Neurological: Negative for dizziness, weakness, light-headedness and headaches  Psychiatric/Behavioral: Negative for sleep disturbance  The patient is not nervous/anxious  All other systems reviewed and are negative  Objective:  Vitals:    03/01/21 0840   BP: 118/70   BP Location: Right arm   Patient Position: Sitting   Cuff Size: Standard   Pulse: 79   Resp: 14   Temp: 97 7 °F (36 5 °C)   SpO2: 99%   Weight: 53 9 kg (118 lb 12 8 oz)   Height: 5' 3" (1 6 m)     Body mass index is 21 04 kg/m²  Physical Exam  Vitals signs reviewed  Constitutional:       General: She is awake  Appearance: Normal appearance  She is well-developed  HENT:      Head: Normocephalic and atraumatic  Nose: Nose normal       Mouth/Throat:      Mouth: Mucous membranes are moist    Eyes:      Extraocular Movements: Extraocular movements intact  Neck:      Musculoskeletal: Normal range of motion  Cardiovascular:      Rate and Rhythm: Normal rate and regular rhythm  Pulses: Normal pulses  Heart sounds: Normal heart sounds  Pulmonary:      Effort: Pulmonary effort is normal       Breath sounds: Normal breath sounds  Abdominal:      General: Bowel sounds are normal       Palpations: Abdomen is soft  Genitourinary:     Comments: Dysuria, frequency, spasms  Musculoskeletal: Normal range of motion  Right lower leg: No edema  Left lower leg: No edema  Skin:     General: Skin is warm and dry  Neurological:      Mental Status: She is alert and oriented to person, place, and time  Psychiatric:         Attention and Perception: Attention normal          Mood and Affect: Mood normal          Speech: Speech normal          Behavior: Behavior normal  Behavior is cooperative

## 2021-03-01 NOTE — PATIENT INSTRUCTIONS
Dysuria   WHAT YOU NEED TO KNOW:   Dysuria is difficulty urinating, or pain, burning, or discomfort with urination  Dysuria is usually a symptom of another problem  DISCHARGE INSTRUCTIONS:   Return to the emergency department if:   · You have severe back, side, or abdominal pain  · You have fever and shaking chills  · You vomit several times in a row  Contact your healthcare provider if:   · Your symptoms do not go away, even after treatment  · You have questions or concerns about your condition or care  Medicines:   · Medicines  may be given to help treat a bacterial infection or help decrease bladder spasms  · Take your medicine as directed  Contact your healthcare provider if you think your medicine is not helping or if you have side effects  Tell him of her if you are allergic to any medicine  Keep a list of the medicines, vitamins, and herbs you take  Include the amounts, and when and why you take them  Bring the list or the pill bottles to follow-up visits  Carry your medicine list with you in case of an emergency  Follow up with your healthcare provider as directed: Your healthcare provider may also refer you to a urologist or nephrologist to have additional testing  Write down your questions so you remember to ask them during your visits  Manage your dysuria:   · Drink more liquids  Liquids help flush out bacteria that may be causing an infection  Ask your healthcare provider how much liquid to drink each day and which liquids are best for you  · Take sitz baths as directed  Fill a bathtub with 4 to 6 inches of warm water  You may also use a sitz bath pan that fits over a toilet  Sit in the sitz bath for 20 minutes  Do this 2 to 3 times a day, or as directed  The warm water can help decrease pain and swelling  © Copyright [address] Information is for End User's use only and may not be sold, redistributed or otherwise used for commercial purposes   All illustrations and images included in CareNotes® are the copyrighted property of [employer]  or [employer]isha Dye   The above information is an  only  It is not intended as medical advice for individual conditions or treatments  Talk to your doctor, nurse or pharmacist before following any medical regimen to see if it is safe and effective for you  Dysuria   AMBULATORY CARE:   Dysuria  is trouble urinating, or pain, burning, or discomfort when you urinate  Dysuria is usually a symptom of another problem, such as a blockage or urinary tract infection  Common symptoms include the following:   · Fever     · Cloudy, bad smelling urine     · Urge to urinate often but urinating little     · Back, side, or abdominal pain     · Blood in your urine     · Discharge that smells bad     · Itching    Seek care immediately if:   · You have severe back, side, or abdominal pain  · You have fever and shaking chills  · You vomit several times in a row  Contact your healthcare provider if:   · Your symptoms do not go away, even after treatment  · You have questions or concerns about your condition or care  Treatment for dysuria  may include medicines to treat a bacterial infection or help decrease bladder spasms  Manage your dysuria:   · Drink more liquids  Liquids help flush out bacteria that may be causing an infection  Ask your healthcare provider how much liquid to drink each day and which liquids are best for you  · Take sitz baths as directed  Fill a bathtub with 4 to 6 inches of warm water  You may also use a sitz bath pan that fits over a toilet  Sit in the sitz bath for 20 minutes  Do this 2 to 3 times a day, or as directed  The warm water can help decrease pain and swelling  Follow up with your healthcare provider as directed:  Write down your questions so you remember to ask them during your visits     © Copyright [employer] 2020 Information is for End User's use only and may not be sold, redistributed or otherwise used for commercial purposes  All illustrations and images included in CareNotes® are the copyrighted property of [employer]  or Laisha Simms  The above information is an  only  It is not intended as medical advice for individual conditions or treatments  Talk to your doctor, nurse or pharmacist before following any medical regimen to see if it is safe and effective for you

## 2021-03-02 ENCOUNTER — TELEPHONE (OUTPATIENT)
Dept: NEPHROLOGY | Facility: CLINIC | Age: 31
End: 2021-03-02

## 2021-03-02 DIAGNOSIS — N18.2 CHRONIC KIDNEY DISEASE (CKD) STAGE G2/A2, MILDLY DECREASED GLOMERULAR FILTRATION RATE (GFR) BETWEEN 60-89 ML/MIN/1.73 SQUARE METER AND ALBUMINURIA CREATININE RATIO BETWEEN 30-299 MG/G: Primary | ICD-10-CM

## 2021-03-02 DIAGNOSIS — N30.00 ACUTE CYSTITIS WITHOUT HEMATURIA: Primary | ICD-10-CM

## 2021-03-02 LAB
ALBUMIN SERPL ELPH-MCNC: 4.17 G/DL (ref 3.5–5)
ALBUMIN SERPL ELPH-MCNC: 60.4 % (ref 52–65)
ALPHA1 GLOB SERPL ELPH-MCNC: 0.28 G/DL (ref 0.1–0.4)
ALPHA1 GLOB SERPL ELPH-MCNC: 4 % (ref 2.5–5)
ALPHA2 GLOB SERPL ELPH-MCNC: 0.74 G/DL (ref 0.4–1.2)
ALPHA2 GLOB SERPL ELPH-MCNC: 10.7 % (ref 7–13)
BACTERIA UR CULT: ABNORMAL
BETA GLOB ABNORMAL SERPL ELPH-MCNC: 0.53 G/DL (ref 0.4–0.8)
BETA1 GLOB SERPL ELPH-MCNC: 7.7 % (ref 5–13)
BETA2 GLOB SERPL ELPH-MCNC: 4 % (ref 2–8)
BETA2+GAMMA GLOB SERPL ELPH-MCNC: 0.28 G/DL (ref 0.2–0.5)
CMV IGG SERPL IA-ACNC: <0.6 U/ML (ref 0–0.59)
CMV IGM SERPL IA-ACNC: <30 AU/ML (ref 0–29.9)
GAMMA GLOB ABNORMAL SERPL ELPH-MCNC: 0.91 G/DL (ref 0.5–1.6)
GAMMA GLOB SERPL ELPH-MCNC: 13.2 % (ref 12–22)
IGG/ALB SER: 1.53 {RATIO} (ref 1.1–1.8)
PROT PATTERN SERPL ELPH-IMP: NORMAL
PROT SERPL-MCNC: 6.9 G/DL (ref 6.4–8.2)

## 2021-03-02 RX ORDER — AMOXICILLIN 875 MG/1
875 TABLET, COATED ORAL 2 TIMES DAILY
Qty: 20 TABLET | Refills: 0 | Status: SHIPPED | OUTPATIENT
Start: 2021-03-02 | End: 2021-03-12

## 2021-03-02 NOTE — PROGRESS NOTES
Assessment and Plan:   Ms Michelle Manriquez is a 34-year-old female with history significant for celiac disease, who presents for further evaluation of Raynaud's symptoms  She is referred by Fish Thomas PA-C for a rheumatology consult  Gee Keenan presents today for further evaluation of Raynaud's symptoms affecting her bilateral hands in a symmetric distribution, without any other concerning autoimmune related symptoms on her review of systems  I discussed with her that the Raynaud's appears to be primary in nature and to manage this I would recommend she keep her extremities and core body temperature warm  If her symptoms progress over time she can also follow up with her primary care physician to discuss starting a calcium channel blocker  Plan:  Diagnoses and all orders for this visit:    Raynaud's phenomenon without gangrene  -     Centromere Antibody; Future  -     Anti-scleroderma antibody; Future  -     Nuclear antigen antibody; Future  -     CK; Future  -     Ambulatory referral to Rheumatology    History of celiac disease      Activities as tolerated  Continue other medications as prescribed by PCP and other specialists  RTC PRN  HPI  Ms Michelle Manriquez is a 34-year-old female with history significant for celiac disease, who presents for further evaluation of Raynaud's symptoms  She is referred by Fish Thomas PA-C for a rheumatology consult  Patient reports a few months ago she started to notice color changes affecting her bilateral hands occurring symmetrically, which changes to white on cold exposure  This does not happen every time she is exposed to the cold  She does try to keep her extremities warm but the color changes may still occur  She reports the color changes will affect the entire digit  She is unsure if it affects her feet      She denies fevers, unintentional weight loss, dry eyes, dry mouth, inflammatory eye disease, skin rash, psoriasis, photosensitivity, skin thickening/tightening, mouth/nose ulcers, swollen glands, dysphagia, pleuritic chest pain, shortness of breath, inflammatory bowel disease, blood clots, miscarriages, muscle weakness or joint pain/swelling/stiffness  She does have acid reflux symptoms which is well controlled with daily Protonix  She reports her brother has celiac disease and she has an uncle with rheumatoid arthritis  She did have testing done through her primary care physician which showed a negative THUAN, Sjogren's antibodies, ESR, rheumatoid factor and Lyme antibody profile  The following portions of the patient's history were reviewed and updated as appropriate: allergies, current medications, past family history, past medical history, past social history, past surgical history and problem list       Review of Systems  Constitutional: Negative for weight change, fevers, chills, night sweats, fatigue  ENT/Mouth: Negative for hearing changes, ear pain, nasal congestion, sinus pain, hoarseness, sore throat, rhinorrhea, swallowing difficulty  Eyes: Negative for pain, redness, discharge, vision changes  Cardiovascular: Negative for chest pain, SOB, palpitations  Respiratory: Negative for cough, sputum, wheezing, dyspnea  Gastrointestinal: Negative for nausea, vomiting, diarrhea, constipation, pain, heartburn  Genitourinary: Negative for dysuria, urinary frequency, hematuria  Musculoskeletal: As per HPI  Skin: Negative for skin rash  Positive for color changes  Neuro: Negative for weakness, numbness, tingling, loss of consciousness  Psych: Negative for anxiety, depression  Heme/Lymph: Negative for easy bruising, bleeding, lymphadenopathy          Past Medical History:   Diagnosis Date    Anorexia nervosa     pt denies history at time of admission 7/21/18    Anxiety     Celiac disease     Chronic kidney disease     CPAP (continuous positive airway pressure) dependence     waiting for a new mask    Depression     Disease of thyroid gland     hypo    Gall bladder polyp     Gastroesophageal reflux disease 9/18/2018    Hallucination     Memory difficulty 7/12/2018    Plantar wart of left foot     Psychiatric illness     Raynaud's disease without gangrene     Renal atrophy     [facility] [address] )     Self-injurious behavior     Sleep apnea     Sleep difficulties     [facility]     history of attempt at age 22 by overdose     Urinary retention        Past Surgical History:   Procedure Laterality Date    EYE SURGERY Left     tear duct    [place] ESOPHAGOGASTRODUODENOSCOPY TRANSORAL DIAGNOSTIC N/A 2/9/2018    Procedure: ESOPHAGOGASTRODUODENOSCOPY (EGD); Surgeon: Usman Tobar MD;  Location: [facility];  Service: Gastroenterology    [place] ESOPHAGOGASTRODUODENOSCOPY TRANSORAL DIAGNOSTIC N/A 11/6/2018    Procedure: ESOPHAGOGASTRODUODENOSCOPY (EGD); Surgeon: Fannie Hurley MD;  Location: [facility];  Service: Gastroenterology    [place] LAP, SURG PROCTOPEXY N/A 1/12/2021    Procedure: Rectopexy with sigmoid resection w/ robotics  ;  Surgeon: Mady Baxter MD;  Location:  MAIN OR;  Service: Colorectal       Social History     Socioeconomic History    Marital status: Single     Spouse name: Not on file    Number of children: Not on file    Years of education: Not on file    Highest education level: Not on file   Occupational History    Occupation: UNEMPLOYED   Social Needs    Financial resource strain: Not on file    Food insecurity     Worry: Not on file     Inability: Not on file   [employer] needs     Medical: Not on file     Non-medical: Not on file   Tobacco Use    Smoking status: Never Smoker    Smokeless tobacco: Never Used   Substance and Sexual Activity    Alcohol use: Not Currently    Drug use: No    Sexual activity: Not Currently   Lifestyle    Physical activity     Days per week: Not on file     Minutes per session: Not on file    Stress: Not on file   Relationships    Social connections     Talks on phone: Not on file     Gets together: Not on file     Attends [employer] service: Not on file     Active member of club or organization: Not on file     Attends meetings of clubs or organizations: Not on file     Relationship status: Not on file    Intimate partner violence     Fear of current or ex partner: Not on file     Emotionally abused: Not on file     Physically abused: Not on file     Forced sexual activity: Not on file   Other Topics Concern    Not on file   Social History Narrative    UNEMPLOYED       Family History   Problem Relation Age of Onset    Hypertension Father         benign essential    Hypertension Brother         benign essential     OCD Brother     Depression Brother     Other Family         nasolacrimal duct obstruction, acquired    Colon cancer Neg Hx        Allergies   Allergen Reactions    Gluten Meal     Latex Swelling       Current Outpatient Medications:     benztropine (COGENTIN) 1 mg tablet, Take 1 mg by mouth TAKE 1 TABLET IN AM AND 2 TABLETS IN PM, Disp: , Rfl:     busPIRone (BUSPAR) 10 mg tablet, Take 10 mg by mouth 2 (two) times a day, Disp: , Rfl:     clonazePAM (KlonoPIN) 0 5 mg tablet, Take 1 mg by mouth daily at bedtime, Disp: , Rfl:     Dermatological Products, Misc   (EpiCeram) lotion, , Disp: , Rfl:     gabapentin (NEURONTIN) 100 mg capsule, 2 tablets in am, Disp: , Rfl:     gabapentin (NEURONTIN) 400 mg capsule, 400 mg daily at bedtime , Disp: , Rfl:     liothyronine (CYTOMEL) 5 mcg tablet, Take 1 tablet by mouth once daily, Disp: 30 tablet, Rfl: 5    mirtazapine (REMERON) 7 5 MG tablet, , Disp: , Rfl:     pantoprazole (PROTONIX) 40 mg tablet, Take 1 tablet (40 mg total) by mouth daily, Disp: 30 tablet, Rfl: 5    Rexulti 0 25 MG tablet, TAKE 3 TABLETS BY MOUTH IN THE MORNING, Disp: , Rfl:     acetaminophen (TYLENOL) 325 mg tablet, Take 2 tablets (650 mg total) by mouth every 6 (six) hours, Disp:  , Rfl: 0    amoxicillin (AMOXIL) 875 mg tablet, Take 1 tablet (875 mg total) by mouth 2 (two) times a day for 10 days, Disp: 20 tablet, Rfl: 0    tamsulosin (FLOMAX) 0 4 mg, Take 1 capsule (0 4 mg total) by mouth daily with dinner (Patient not taking: Reported on 3/3/2021), Disp: 30 capsule, Rfl: 5      Objective:    Vitals:    03/03/21 1149   BP: 118/82   Pulse: 74       Physical Exam  General: Well appearing, well nourished, in no distress  Oriented x 3, normal mood and affect  Ambulating without difficulty  Skin: Good turgor, no rash, unusual bruising or prominent lesions  Hair: Normal texture and distribution  Nails: Normal color, no deformities  No dilated nail fold capillaries on direct visualization  HEENT:  Head: Normocephalic, atraumatic  Eyes: Conjunctiva clear, sclera non-icteric, EOM intact  Extremities: No amputations or deformities, cyanosis, edema  Musculoskeletal:   No joint soft tissue swelling noted  Neurologic: Alert and oriented  No focal neurological deficits appreciated  Psychiatric: Normal mood and affect  DYLAN Miranda    Rheumatology

## 2021-03-02 NOTE — TELEPHONE ENCOUNTER
Can you please place order for urine so patient can have done prior to apt in Aug   She states that the lab messed up and wasn't supposed to send the results to you because you didn't order these labs

## 2021-03-03 ENCOUNTER — CONSULT (OUTPATIENT)
Dept: RHEUMATOLOGY | Facility: CLINIC | Age: 31
End: 2021-03-03
Payer: COMMERCIAL

## 2021-03-03 VITALS — SYSTOLIC BLOOD PRESSURE: 118 MMHG | HEART RATE: 74 BPM | DIASTOLIC BLOOD PRESSURE: 82 MMHG

## 2021-03-03 DIAGNOSIS — Z87.19 HISTORY OF CELIAC DISEASE: ICD-10-CM

## 2021-03-03 DIAGNOSIS — I73.00 RAYNAUD'S PHENOMENON WITHOUT GANGRENE: Primary | ICD-10-CM

## 2021-03-03 PROCEDURE — 99244 OFF/OP CNSLTJ NEW/EST MOD 40: CPT | Performed by: INTERNAL MEDICINE

## 2021-03-03 NOTE — PATIENT INSTRUCTIONS
Raynaud Disease   WHAT YOU NEED TO KNOW:   What is Raynaud disease? Raynaud disease is a disorder that affects blood circulation, usually in the hands and feet  The arteries (blood vessels) that carry blood to your fingers, toes, ears, or nose tighten  This is often triggered by cold or emotional stress  The decrease in blood flow causes a lack of oxygen and changes in skin color  Over time, ulcers or gangrene (tissue death) may develop if frequent or severe attacks are not prevented  What causes Raynaud disease? · Primary Raynaud: The cause of primary Raynaud disease is unknown  This form usually affects both hands and feet  It is more common and is often milder than secondary Raynaud  It often affects women and first appears before the age of 27  · Secondary Raynaud: This form is also known as Raynaud phenomenon  Nicotine, alcohol, caffeine, and exposure to certain chemicals, such as vinyl chloride, can increase your risk for secondary Raynaud  The following are some common causes:     ? Inflammatory and autoimmune diseases:  Secondary Raynaud may be caused by certain diseases, such as scleroderma, lupus, Sjogren's syndrome, rheumatoid arthritis, and carpal tunnel syndrome  ? Medicines or illegal drugs:  Medicines used to treat high blood pressure, headaches, cancer, or colds may cause Raynaud disease  Use of illegal street drugs, such as amphetamines or cocaine, and some herbs may also cause Raynaud  ? Trauma or injuries:  Long-term use of vibrating tools, such as chain saws, grinders, or drills, may hurt nerves or blood vessels  Injuries to the hands or feet, such as a wrist fracture, surgery, or frostbite may also cause damage  What are the signs and symptoms of Raynaud disease? Your fingers or toes may first turn pale when you are exposed to cold or stressful situations  Due to the decrease in blood supply, your fingers or toes may then turn blue and may feel cold and numb   As blood supply returns to your fingers or toes, they become bright red  You may feel tingling, throbbing, or pain in your fingers or toes  Additional signs and symptoms may include the following:  · Primary Raynaud: The color changes usually affect both hands or feet in the same way and at the same time  You may develop thick or tight skin and brittle nails over time  Signs and symptoms are usually mild  · Secondary Raynaud: The color changes usually do not affect both hands or feet in the same way or at the same time  You may develop thick or tight skin and brittle nails over time  You may also develop skin ulcers  Your skin may develop gangrene if your fingers or toes do not get enough blood for a long period of time  Signs and symptoms are generally more severe  How is Raynaud disease diagnosed? · Nail fold capillary test:  Your healthcare provider may put a drop of oil on your nail folds (skin at the base of the fingernail)  The capillaries (tiny blood vessels) will then be checked under a microscope  · Blood tests: You may need blood taken to give healthcare providers information about how your body is working  The blood may be taken from your hand, arm, or IV  · Angiography: This test looks for problems with your arteries in your hands, arms, feet, and legs  Before the x-ray, a dye is put into a thin tube through a small cut in your groin  The dye helps the arteries show up better on these x-ray pictures  Tell the healthcare provider if you have ever had an allergic reaction to contrast dye  · Arterial doppler: An arterial doppler test is done to check blood flow through an artery  A small metal disc with gel on it is placed on your skin over the artery  You can hear a "whooshing" sound when the blood is flowing through the artery   An "X" may be marked on your skin where healthcare providers feel or hear the blood flowing best  Healthcare providers may need to check blood flow more than once     · X-rays:  Pictures of the bones, soft tissues, and other parts of your body may be taken  X-rays can show changes that will help healthcare providers learn if you have other diseases that may be causing Raynaud disease  How is Raynaud disease treated? Healthcare providers may tell you to avoid things or situations that could trigger an attack  If your daily activities are affected and symptoms are hard to control, you may need any of the following:  · Medicines:      ? Alpha blockers: These medicines work by stopping a hormone that tightens blood vessels  ? Antithrombotics: These are medicines that break apart clots and restore blood flow  ? Calcium channel blockers: These medicines relax and open up small blood vessels in your hands and feet  They may also help heal skin ulcers on your fingers or toes  ? Vasodilators: These medicines relax and widen the walls of the arteries  This may also help heal skin ulcers  · Surgery:  A surgery called sympathectomy may be done to cut sympathetic nerves  Sympathetic nerves in your hands and feet control the opening and narrowing of blood vessels in your skin  Surgery may also need to be done if affected parts have developed gangrene  What can I do to care for my skin if I have Raynaud disease? · Avoid putting too much pressure on your fingertips, such as typing or playing the piano  This kind of pressure may cause your blood vessels to narrow and trigger an attack  · Check your feet and hands daily for numb areas, thinning or thickening skin, black spots, cracks, brittle nails, or ulcers  · Keep your skin clean and dry to prevent an infection  Use lotion that contains lanolin on your hands and feet to keep the skin from drying or cracking  What can I do to prevent a Raynaud disease attack?    · Avoid cold temperatures when possible:  Wear gloves, scarves, or other winter garments during the winter months or before you go into cold rooms     · Limit alcohol and caffeine:  Men should limit alcohol to 2 drinks a day  Women should limit alcohol to 1 drink a day  A drink is 12 ounces of beer, 5 ounces of wine, or 1½ ounces of liquor  Try drinking decaffeinated coffee, tea, or soda  Ask your healthcare provider for more information about alcohol and caffeine  · Use caution with medicines:  Talk to your healthcare provider before you use medicines that may trigger an attack  These include certain medicines used for treating high blood pressure, headaches, cancer, or colds  · Exercise regularly: This prevents narrowing of the blood vessels and increases blood flow in your body  · Learn to manage stress:  Stress may trigger an attack  Try new ways to relax, such as deep breathing, meditation, or biofeedback  Biofeedback is a way to control how your body reacts to stress or pain  · Stop smoking:  If you smoke, it is never too late to quit  Smoking causes your blood vessels to narrow and may trigger an attack  Ask your healthcare provider for information if you need help quitting  What should I do during a Raynaud disease attack? · Get inside to warm yourself  · Wiggle your fingers or toes, or swing your arms around to increase circulation  Massage the affected parts  · Place your hands under your armpits or run warm water over the affected area  Do not  place the affected part in direct contact with hot water or a hot water bottle  The affected parts may be injured if they are not able to feel that the water is hot  · Get yourself out of stressful situations if possible  Deep breathing, meditation, or biofeedback may help decrease stress  Where can I find more information?    · [employer] and [address] , [address]  Phone: 2- 312 - 992-9647  Phone: 8- 726 - 792-7783  Web Address: [employer] [facility] gov    · [employer] [facility] and [address]  [id number]  [place] [address]  Phone: 5- 247 - 051-0388  Web Address: [employer] no  When should I contact my healthcare provider? · You have new symptoms since your last appointment  · Your symptoms prevent you from doing your daily activities  · You need help to quit smoking  · You have questions or concerns about your condition or care  When should I seek immediate care? · You have many attacks even if you prevent cold, stress, or other triggers  · You have pain in your fingers or toes that does not go away or gets worse  · You have sores or ulcers on the tips of your fingers or toes that do not heal     · You have black spots on your fingers or toes  · Your hands or feet remain cold or discolored even after you warm them  CARE AGREEMENT:   You have the right to help plan your care  Learn about your health condition and how it may be treated  Discuss treatment options with your healthcare providers to decide what care you want to receive  You always have the right to refuse treatment  The above information is an  only  It is not intended as medical advice for individual conditions or treatments  Talk to your doctor, nurse or pharmacist before following any medical regimen to see if it is safe and effective for you  © Copyright [address] Information is for End User's use only and may not be sold, redistributed or otherwise used for commercial purposes   All illustrations and images included in CareNotes® are the copyrighted property of [employer]  or [address] [place] [address]

## 2021-03-05 ENCOUNTER — LAB (OUTPATIENT)
Dept: LAB | Facility: CLINIC | Age: 31
End: 2021-03-05
Payer: COMMERCIAL

## 2021-03-05 ENCOUNTER — TRANSCRIBE ORDERS (OUTPATIENT)
Dept: LAB | Facility: CLINIC | Age: 31
End: 2021-03-05

## 2021-03-05 DIAGNOSIS — I73.00 RAYNAUD'S PHENOMENON WITHOUT GANGRENE: ICD-10-CM

## 2021-03-05 LAB — CK SERPL-CCNC: 93 U/L (ref 26–192)

## 2021-03-05 PROCEDURE — 36415 COLL VENOUS BLD VENIPUNCTURE: CPT

## 2021-03-05 PROCEDURE — 82550 ASSAY OF CK (CPK): CPT

## 2021-03-05 PROCEDURE — 86235 NUCLEAR ANTIGEN ANTIBODY: CPT

## 2021-03-06 LAB
CENTROMERE B AB SER-ACNC: <0.2 AI (ref 0–0.9)
ENA RNP AB SER-ACNC: <0.2 AI (ref 0–0.9)
ENA SCL70 AB SER-ACNC: <0.2 AI (ref 0–0.9)
ENA SM AB SER-ACNC: <0.2 AI (ref 0–0.9)

## 2021-03-07 DIAGNOSIS — E03.9 HYPOTHYROIDISM, UNSPECIFIED TYPE: ICD-10-CM

## 2021-03-08 RX ORDER — LIOTHYRONINE SODIUM 5 UG/1
TABLET ORAL
Qty: 30 TABLET | Refills: 5 | Status: SHIPPED | OUTPATIENT
Start: 2021-03-08 | End: 2021-03-15

## 2021-03-10 ENCOUNTER — HOSPITAL ENCOUNTER (OUTPATIENT)
Dept: MRI IMAGING | Facility: HOSPITAL | Age: 31
Discharge: HOME | End: 2021-03-10
Attending: PSYCHIATRY & NEUROLOGY
Payer: COMMERCIAL

## 2021-03-10 DIAGNOSIS — R90.82 WHITE MATTER DISEASE, UNSPECIFIED: ICD-10-CM

## 2021-03-10 PROCEDURE — G1004 CDSM NDSC: HCPCS

## 2021-03-10 PROCEDURE — 72157 MRI CHEST SPINE W/O & W/DYE: CPT

## 2021-03-10 PROCEDURE — 72156 MRI NECK SPINE W/O & W/DYE: CPT

## 2021-03-10 PROCEDURE — A9585 GADOBUTROL INJECTION: HCPCS | Performed by: PSYCHIATRY & NEUROLOGY

## 2021-03-10 RX ADMIN — GADOBUTROL 5.5 ML: 604.72 INJECTION INTRAVENOUS at 13:53

## 2021-03-12 LAB
C 26:1: 0.14
C22:0: 16.84
C22:1(N-9): 0.42
C24:0: 17.77
C24:22: 1.05
C26:0: 0.21
C26:22: 0.01
METHOD: NORMAL
PHYTANATE SERPL-MCNC: 0.16 UG/ML
PRISTANATE SERPL-MCNC: 0.03 UG/ML
REFERENCE: NORMAL
VLCFA INTERPRETATION: NORMAL

## 2021-03-14 DIAGNOSIS — E03.9 HYPOTHYROIDISM, UNSPECIFIED TYPE: ICD-10-CM

## 2021-03-15 RX ORDER — LIOTHYRONINE SODIUM 5 UG/1
TABLET ORAL
Qty: 30 TABLET | Refills: 0 | Status: SHIPPED | OUTPATIENT
Start: 2021-03-15 | End: 2021-03-17 | Stop reason: SDUPTHER

## 2021-03-17 DIAGNOSIS — E03.9 HYPOTHYROIDISM, UNSPECIFIED TYPE: ICD-10-CM

## 2021-03-17 RX ORDER — LIOTHYRONINE SODIUM 5 UG/1
5 TABLET ORAL DAILY
Qty: 30 TABLET | Refills: 5 | Status: SHIPPED | OUTPATIENT
Start: 2021-03-17 | End: 2021-09-07

## 2021-03-19 LAB — MISCELLANEOUS LAB TEST RESULT: NORMAL

## 2021-03-25 ENCOUNTER — OFFICE VISIT (OUTPATIENT)
Dept: NEUROLOGY | Facility: CLINIC | Age: 31
End: 2021-03-25
Payer: COMMERCIAL

## 2021-03-25 VITALS
HEART RATE: 86 BPM | DIASTOLIC BLOOD PRESSURE: 75 MMHG | HEIGHT: 63 IN | BODY MASS INDEX: 21.51 KG/M2 | SYSTOLIC BLOOD PRESSURE: 124 MMHG | WEIGHT: 121.4 LBS

## 2021-03-25 DIAGNOSIS — R90.89 ABNORMAL BRAIN MRI: ICD-10-CM

## 2021-03-25 DIAGNOSIS — R20.2 NUMBNESS AND TINGLING OF BOTH FEET: ICD-10-CM

## 2021-03-25 DIAGNOSIS — G95.0 SYRINGOHYDROMYELIA (HCC): ICD-10-CM

## 2021-03-25 DIAGNOSIS — R20.0 NUMBNESS AND TINGLING OF BOTH FEET: ICD-10-CM

## 2021-03-25 DIAGNOSIS — G93.0 ARACHNOID CYST: Primary | ICD-10-CM

## 2021-03-25 PROCEDURE — 1036F TOBACCO NON-USER: CPT | Performed by: PHYSICIAN ASSISTANT

## 2021-03-25 PROCEDURE — 99214 OFFICE O/P EST MOD 30 MIN: CPT | Performed by: PHYSICIAN ASSISTANT

## 2021-03-25 RX ORDER — ASENAPINE MALEATE 2.5 MG/1
TABLET SUBLINGUAL
COMMUNITY
Start: 2021-03-09 | End: 2021-06-18

## 2021-03-25 NOTE — PROGRESS NOTES
Patient ID: Yuliya Rodriguez is a 27 y o  female  Assessment/Plan:  27year old female with history of hypothyroidism, Schizoaffective disorder, arachnoid cyst, presents for follow up regarding abnormal brain imaging  PCP has been following brain imaging yearly for surveillance of arachnoid cyst, which has been stable  She was referred for abnormal brain imaging due to white matter changes  On review, patient has stable white matter changes in the periventricular region, mild expansion of lateral ventricles in the occipital region  No evidence of stroke/hemorrhage, no classic signs of MS or other CNS demyelination  I reviewed imaging report from MRI brain in 2016 from [facility] that mentions the same, and that this was also stable from a MRI in 2000  Patient was born 3 months premature, could be developmental     Recent MRI c-spine and t-spine with no signs of demyelination  She did have a syrinx in the thoracic cord from T2-T10  Patient denies any trauma, so 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  Extensive labs unrevealing  Her neurologic exam is unremarkable including strength, reflexes and sensation  She c/o some numbness in her feet, but no loss of sensation appreciated  She does have celiac disease and hypothyroidism, possibility of a mild small fiber neuropathy not ruled out  We will see the patient back in 6 months to check back in with her, but I do not anticipate long term follow up through our office will be needed  Diagnoses and all orders for this visit:    Arachnoid cyst    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[address] )    Numbness and tingling of both feet    Abnormal brain MRI    Other orders             Subjective:    HPI    Patient is a 59-year-old female who was referred to the [facility] for evaluation of abnormal imaging studies  Patient was seen by Dr Breezy Moses for initial consult on 12/10/2020      Patient has known history of arachnoid cyst, which PCP has been following serially for stability  She had previously been evaluated by [address] [place] [address] Neurology in 2016 for cognitive issues in the setting of schizoaffective disorder and it was felt there were no primary neurologic problems  She was also seen by a neurologist at [place] [address] in 2018 for the same and there was no concern for neurologic disorder, felt this was more psychiatric in nature  Consult to our clinic was for [facility], and patient reported her current symptoms were numbness on bottom of feet, fatigue, visual disturbance  MRI brain 8/31/2020: No acute intracranial abnormality  Nonspecific periventricular and deep cerebral white matter hypoattenuation may represent periventricular leukomalacia  Other considerations include demyelinating disease or precocious microangiopathy  Stable right retrocerebellar arachnoid cyst  Stable asymmetry in lateral ventricular size with poor visualization of the previously noted left lateral intraventricular arachnoid cyst   An alternative consideration for the mild expansion includes periventricular leukomalacia  Further history indicates patient was born 3 months premature and was in an incubator for about a month  Unsure of developmental milestones  Good student in   Went to college and significant psychiatric issues started then  She has had several prolonged inpatient behavioral health stays  Able to review MRI brain report in 2016 from [facility]  Report states "There appears be volume loss involving the left greater than right parietal white matter with abnormal T2-weighted signal in the periventricular white matter  Findings were described on prior study and could represent old gliotic changes  Demyelinization considered less likely but cannot be excluded   Other disorders causing increased T2-weighted signal including Lyme disease, sarcoidosis or vascular disorders are considered less likely given the apparent chronicity of the findings which were described on prior study "  According to the MRI report, patient had an MRI in 2000 which showed similar findings  Since last visit she reports she is the same  Denies any new symptoms at this time  She had cord imaging performed since her last visit  MRI c-spine 03/10/21 with normal cord signal   Minor, noncompressive degenerative discogenic disease  MRI T-spine 03/10/2021 demonstrated 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  At the T7 level, the syrinx cavity measures 2 mm in greatest dimension  No associated pathologic enhancement  Several serum labs were ordered and completed on 03/01/2021  Normal long chain fatty acids, normal CMV, normal SPEP  Autoimmune encephalopathy panel to [facility] was remarkable for glutamic acid decarboxylase  Per the note, this is consistent with predisposition for thyrogastric disorders including thyroiditis, pernicious anemia, type 1 diabetes, but has low specificity for autoimmune encephalopathy  [id number]  Antibody levels less than 2 00nM have a lower positive predictive value for neurologic autoimmunity then values of 20  0nM or higher  Patient's value was 0 03 (normal is <0 02)  She does have hypothyroidism  The following portions of the patient's history were reviewed and updated as appropriate: current medications, past family history, past medical history, past social history, past surgical history and problem list          Objective:    Blood pressure 124/75, pulse 86, height 5' 3" (1 6 m), weight 55 1 kg (121 lb 6 4 oz)    Physical Exam  Constitutional:       Appearance: Normal appearance  HENT:      Head: Normocephalic and atraumatic  Eyes:      Extraocular Movements: EOM normal       Pupils: Pupils are equal, round, and reactive to light  Neurological:      Mental Status: She is alert  Coordination: Coordination is intact  Deep Tendon Reflexes: Strength normal and reflexes are normal and symmetric     Psychiatric:         Speech: Speech normal       Comments: Flat affect, poor eye contact, delayed responses         Neurological Exam  Mental Status  Alert  Oriented to person, place, time and situation  Speech is normal  Language is fluent with no aphasia  Attention and concentration are normal     Cranial Nerves  CN II: Visual fields full to confrontation  CN III, IV, VI: Extraocular movements intact bilaterally  Pupils equal round and reactive to light bilaterally  CN V: Facial sensation is normal   CN VII: Full and symmetric facial movement  CN VIII: Hearing is normal   CN IX, X: Palate elevates symmetrically  CN XI: Shoulder shrug strength is normal   CN XII: Tongue midline without atrophy or fasciculations  Motor   Normal muscle tone  Strength is 5/5 throughout all four extremities  Sensory  Sensation is intact to light touch, pinprick, vibration and proprioception in all four extremities  Reflexes  Deep tendon reflexes are 2+ and symmetric in all four extremities with downgoing toes bilaterally  Coordination  Finger-to-nose, rapid alternating movements and heel-to-shin normal bilaterally without dysmetria  Gait  Casual gait is normal including stance, stride, and arm swing  ROS:    Review of Systems   Constitutional: Negative  Negative for appetite change and fever  HENT: Negative  Negative for hearing loss, tinnitus, trouble swallowing and voice change  Eyes: Negative  Negative for photophobia and pain  Respiratory: Negative  Negative for shortness of breath  Cardiovascular: Negative  Negative for palpitations  Gastrointestinal: Negative  Negative for nausea and vomiting  Endocrine: Negative  Negative for cold intolerance  Genitourinary: Negative  Negative for dysuria, frequency and urgency  Musculoskeletal: Negative  Negative for myalgias and neck pain  Skin: Negative  Negative for rash  Neurological: Positive for numbness (feet)   Negative for dizziness, tremors, seizures, syncope, facial asymmetry, speech difficulty, weakness, light-headedness and headaches  Hematological: Negative  Does not bruise/bleed easily  Psychiatric/Behavioral: Negative  Negative for confusion, hallucinations and sleep disturbance       I personally reviewed and updated the ROS as appropriate

## 2021-03-26 LAB — MISCELLANEOUS LAB TEST RESULT: NORMAL

## 2021-05-28 ENCOUNTER — CLINICAL SUPPORT (OUTPATIENT)
Dept: INTERNAL MEDICINE CLINIC | Facility: CLINIC | Age: 31
End: 2021-05-28
Payer: COMMERCIAL

## 2021-05-28 ENCOUNTER — TELEPHONE (OUTPATIENT)
Dept: INTERNAL MEDICINE CLINIC | Facility: CLINIC | Age: 31
End: 2021-05-28

## 2021-05-28 DIAGNOSIS — N39.8 VOIDING DYSFUNCTION: Primary | ICD-10-CM

## 2021-05-28 LAB
SL AMB  POCT GLUCOSE, UA: NEGATIVE
SL AMB LEUKOCYTE ESTERASE,UA: NEGATIVE
SL AMB POCT BILIRUBIN,UA: NEGATIVE
SL AMB POCT BLOOD,UA: NEGATIVE
SL AMB POCT CLARITY,UA: CLEAR
SL AMB POCT COLOR,UA: YELLOW
SL AMB POCT KETONES,UA: NEGATIVE
SL AMB POCT NITRITE,UA: NEGATIVE
SL AMB POCT PH,UA: 5
SL AMB POCT SPECIFIC GRAVITY,UA: 1.02
SL AMB POCT URINE PROTEIN: NEGATIVE
SL AMB POCT UROBILINOGEN: 0.2

## 2021-05-28 PROCEDURE — 81002 URINALYSIS NONAUTO W/O SCOPE: CPT | Performed by: PHYSICIAN ASSISTANT

## 2021-05-28 NOTE — TELEPHONE ENCOUNTER
----- Message from Zoran Keller PA-C sent at 5/28/2021  8:33 AM EDT -----  Regarding: FW: Non-Urgent Medical Question  Contact: [phone]  Would she like to come in for the uti today  ----- Message -----  From: Eloise Santos  Sent: 5/28/2021   8:18 AM EDT  To: Zoran Keller PA-C  Subject: FW: Non-Urgent Medical Question                    ----- Message -----  From: Paz Fragoso  Sent: 5/28/2021   7:09 AM EDT  To: [facility]  Subject: Non-Urgent Medical Question                      I have an appointment with you on June 18th  The appointment topics are updating my Tetanus and Diptheria Toxoid shots and neuropsychiatry  I think I have had either a bladder or urinary tract infection for a couple of weeks now  I was wondering if I had to make another appointment to be able to discuss what might be an infection or if I could address it at my appointment on June 18th?

## 2021-06-03 ENCOUNTER — APPOINTMENT (OUTPATIENT)
Dept: LAB | Facility: CLINIC | Age: 31
End: 2021-06-03
Payer: COMMERCIAL

## 2021-06-03 ENCOUNTER — TRANSCRIBE ORDERS (OUTPATIENT)
Dept: LAB | Facility: CLINIC | Age: 31
End: 2021-06-03

## 2021-06-03 DIAGNOSIS — Z79.899 ENCOUNTER FOR LONG-TERM (CURRENT) USE OF OTHER MEDICATIONS: ICD-10-CM

## 2021-06-03 DIAGNOSIS — Z79.899 ENCOUNTER FOR LONG-TERM (CURRENT) USE OF OTHER MEDICATIONS: Primary | ICD-10-CM

## 2021-06-03 LAB
BASOPHILS # BLD AUTO: 0.04 THOUSANDS/ΜL (ref 0–0.1)
BASOPHILS NFR BLD AUTO: 1 % (ref 0–1)
EOSINOPHIL # BLD AUTO: 0.09 THOUSAND/ΜL (ref 0–0.61)
EOSINOPHIL NFR BLD AUTO: 2 % (ref 0–6)
ERYTHROCYTE [DISTWIDTH] IN BLOOD BY AUTOMATED COUNT: 15.1 % (ref 11.6–15.1)
FOLATE SERPL-MCNC: >20 NG/ML (ref 3.1–17.5)
HCT VFR BLD AUTO: 41.7 % (ref 34.8–46.1)
HGB BLD-MCNC: 12.7 G/DL (ref 11.5–15.4)
IMM GRANULOCYTES # BLD AUTO: 0 THOUSAND/UL (ref 0–0.2)
IMM GRANULOCYTES NFR BLD AUTO: 0 % (ref 0–2)
LYMPHOCYTES # BLD AUTO: 1.68 THOUSANDS/ΜL (ref 0.6–4.47)
LYMPHOCYTES NFR BLD AUTO: 44 % (ref 14–44)
MCH RBC QN AUTO: 25.8 PG (ref 26.8–34.3)
MCHC RBC AUTO-ENTMCNC: 30.5 G/DL (ref 31.4–37.4)
MCV RBC AUTO: 85 FL (ref 82–98)
MONOCYTES # BLD AUTO: 0.51 THOUSAND/ΜL (ref 0.17–1.22)
MONOCYTES NFR BLD AUTO: 13 % (ref 4–12)
NEUTROPHILS # BLD AUTO: 1.51 THOUSANDS/ΜL (ref 1.85–7.62)
NEUTS SEG NFR BLD AUTO: 40 % (ref 43–75)
NRBC BLD AUTO-RTO: 0 /100 WBCS
PLATELET # BLD AUTO: 193 THOUSANDS/UL (ref 149–390)
PMV BLD AUTO: 11.2 FL (ref 8.9–12.7)
RBC # BLD AUTO: 4.92 MILLION/UL (ref 3.81–5.12)
T4 FREE SERPL-MCNC: 0.92 NG/DL (ref 0.76–1.46)
TSH SERPL DL<=0.05 MIU/L-ACNC: 0.74 UIU/ML (ref 0.36–3.74)
VIT B12 SERPL-MCNC: 801 PG/ML (ref 100–900)
WBC # BLD AUTO: 3.83 THOUSAND/UL (ref 4.31–10.16)

## 2021-06-03 PROCEDURE — 82746 ASSAY OF FOLIC ACID SERUM: CPT

## 2021-06-03 PROCEDURE — 85025 COMPLETE CBC W/AUTO DIFF WBC: CPT

## 2021-06-03 PROCEDURE — 84439 ASSAY OF FREE THYROXINE: CPT

## 2021-06-03 PROCEDURE — 82607 VITAMIN B-12: CPT

## 2021-06-03 PROCEDURE — 36415 COLL VENOUS BLD VENIPUNCTURE: CPT

## 2021-06-03 PROCEDURE — 84443 ASSAY THYROID STIM HORMONE: CPT

## 2021-06-18 ENCOUNTER — OFFICE VISIT (OUTPATIENT)
Dept: INTERNAL MEDICINE CLINIC | Facility: CLINIC | Age: 31
End: 2021-06-18
Payer: COMMERCIAL

## 2021-06-18 VITALS
TEMPERATURE: 97.1 F | WEIGHT: 116 LBS | RESPIRATION RATE: 14 BRPM | OXYGEN SATURATION: 100 % | BODY MASS INDEX: 20.55 KG/M2 | SYSTOLIC BLOOD PRESSURE: 110 MMHG | DIASTOLIC BLOOD PRESSURE: 72 MMHG | HEIGHT: 63 IN | HEART RATE: 84 BPM

## 2021-06-18 DIAGNOSIS — D70.9 NEUTROPENIA, UNSPECIFIED TYPE (HCC): ICD-10-CM

## 2021-06-18 DIAGNOSIS — K21.9 GASTROESOPHAGEAL REFLUX DISEASE, UNSPECIFIED WHETHER ESOPHAGITIS PRESENT: ICD-10-CM

## 2021-06-18 DIAGNOSIS — F25.9 SCHIZOAFFECTIVE DISORDER, UNSPECIFIED TYPE (HCC): Primary | ICD-10-CM

## 2021-06-18 PROCEDURE — 99214 OFFICE O/P EST MOD 30 MIN: CPT | Performed by: PHYSICIAN ASSISTANT

## 2021-06-18 RX ORDER — FAMOTIDINE 40 MG/1
20 TABLET, FILM COATED ORAL DAILY
Qty: 30 TABLET | Refills: 2 | Status: SHIPPED | OUTPATIENT
Start: 2021-06-18 | End: 2021-12-13

## 2021-06-18 NOTE — PROGRESS NOTES
Assessment/Plan:  Problem List Items Addressed This Visit        Digestive    Gastroesophageal reflux disease     Continue protonix and add pepcid  Keep upcoming GI appt  Relevant Medications    famotidine (PEPCID) 40 MG tablet       Other    Schizoaffective disorder ([facility] ) - Primary (Chronic)     neuropsych referral provided  Relevant Orders    Ambulatory Referral to Neuropsychiatry      Other Visit Diagnoses     Neutropenia, unspecified type ([place] [address] )        Relevant Orders    CBC and differential           Diagnoses and all orders for this visit:    Schizoaffective disorder, unspecified type [facility])  -     Ambulatory Referral to Neuropsychiatry; Future    Gastroesophageal reflux disease, unspecified whether esophagitis present  -     famotidine (PEPCID) 40 MG tablet; Take 0 5 tablets (20 mg total) by mouth daily    Neutropenia, unspecified type ([facility])  -     CBC and differential; Future        Schizoaffective disorder ([place] [address] )  neuropsych referral provided  Gastroesophageal reflux disease  Continue protonix and add pepcid  Keep upcoming GI appt  Subjective:      Patient ID: Jalen Lowery is a 27 y o  female  Pt presents for routine visit  She notes ongoing GERD symptoms with epigastric discomfort despite Protonix use  She also notes she had labs done which revealed slightly low wbc and low neutrophil count  Her pscyhiatrist took her off an antipsychotic as a result  Will repeat CBC 2 weeks  She desires a neuropsych referral  She is up to date with labs and covid vaccination         The following portions of the patient's history were reviewed and updated as appropriate:   She has a past medical history of Anorexia nervosa, Anxiety, Celiac disease, Chronic kidney disease, CPAP (continuous positive airway pressure) dependence, Depression, Disease of thyroid gland, Gall bladder polyp, Gastroesophageal reflux disease (9/18/2018), Hallucination, Memory difficulty (7/12/2018), Plantar wart of left foot, Psychiatric illness, Raynaud's disease without gangrene, Renal atrophy, Schizoaffective disorder ([facility] [address] ), Self-injurious behavior, Sleep apnea, Sleep difficulties, Suicide attempt ([facility] [address] ), and Urinary retention  ,  does not have any pertinent problems on file  ,   has a past surgical history that includes Eye surgery (Left); pr esophagogastroduodenoscopy transoral diagnostic (N/A, 2/9/2018); pr esophagogastroduodenoscopy transoral diagnostic (N/A, 11/6/2018); and pr lap, surg proctopexy (N/A, 1/12/2021)  ,  family history includes Depression in her brother; Hypertension in her brother and father; OCD in her brother; Other in her family  ,   reports that she has never smoked  She has never used smokeless tobacco  She reports previous alcohol use  She reports that she does not use drugs  ,  is allergic to gluten meal - food allergy and latex     Current Outpatient Medications   Medication Sig Dispense Refill    acetaminophen (TYLENOL) 325 mg tablet Take 2 tablets (650 mg total) by mouth every 6 (six) hours  0    benztropine (COGENTIN) 1 mg tablet Take 1 mg by mouth TAKE 1 TABLET IN AM AND 2 TABLETS IN PM      busPIRone (BUSPAR) 10 mg tablet Take 10 mg by mouth 2 (two) times a day      clonazePAM (KlonoPIN) 0 5 mg tablet Take 1 mg by mouth daily at bedtime      Dermatological Products, Misc  (EpiCeram) lotion       gabapentin (NEURONTIN) 100 mg capsule 2 tablets in am      gabapentin (NEURONTIN) 400 mg capsule 400 mg daily at bedtime       liothyronine (CYTOMEL) 5 mcg tablet Take 1 tablet (5 mcg total) by mouth daily 30 tablet 5    pantoprazole (PROTONIX) 40 mg tablet Take 1 tablet (40 mg total) by mouth daily 30 tablet 5    tamsulosin (FLOMAX) 0 4 mg Take 1 capsule (0 4 mg total) by mouth daily with dinner 30 capsule 5    famotidine (PEPCID) 40 MG tablet Take 0 5 tablets (20 mg total) by mouth daily 30 tablet 2     No current facility-administered medications for this visit         Review of Systems Constitutional: Negative for chills and fever  HENT: Negative for congestion, ear pain, hearing loss, postnasal drip, rhinorrhea, sinus pressure, sinus pain, sore throat and trouble swallowing  Eyes: Negative for pain and visual disturbance  Respiratory: Negative for cough, chest tightness, shortness of breath and wheezing  Cardiovascular: Negative  Negative for chest pain, palpitations and leg swelling  Gastrointestinal: Negative for abdominal pain, blood in stool, constipation, diarrhea, nausea and vomiting  Endocrine: Negative for cold intolerance, heat intolerance, polydipsia, polyphagia and polyuria  Genitourinary: Negative for difficulty urinating, dysuria, flank pain and urgency  Musculoskeletal: Negative for arthralgias, back pain, gait problem and myalgias  Skin: Negative for rash  Allergic/Immunologic: Negative  Neurological: Negative for dizziness, weakness, light-headedness and headaches  Hematological: Negative  Psychiatric/Behavioral: Negative for behavioral problems, dysphoric mood and sleep disturbance  The patient is not nervous/anxious  PHQ-9 Depression Screening    PHQ-9:   Frequency of the following problems over the past two weeks:              Objective:  Vitals:    06/18/21 0903   BP: 110/72   Pulse: 84   Resp: 14   Temp: (!) 97 1 °F (36 2 °C)   TempSrc: Tympanic   SpO2: 100%   Weight: 52 6 kg (116 lb)   Height: 5' 3" (1 6 m)     Body mass index is 20 55 kg/m²  Physical Exam  Constitutional:       General: She is not in acute distress  Appearance: She is well-developed  She is not diaphoretic  HENT:      Head: Normocephalic and atraumatic  Right Ear: External ear normal       Left Ear: External ear normal       Nose: Nose normal       Mouth/Throat:      Pharynx: No oropharyngeal exudate  Eyes:      General: No scleral icterus  Right eye: No discharge  Left eye: No discharge        Conjunctiva/sclera: Conjunctivae normal  Pupils: Pupils are equal, round, and reactive to light  Neck:      Thyroid: No thyromegaly  Cardiovascular:      Rate and Rhythm: Normal rate and regular rhythm  Heart sounds: Normal heart sounds  No murmur heard  No friction rub  No gallop  Pulmonary:      Effort: Pulmonary effort is normal  No respiratory distress  Breath sounds: Normal breath sounds  No wheezing or rales  Abdominal:      General: Bowel sounds are normal  There is no distension  Palpations: Abdomen is soft  Tenderness: There is no abdominal tenderness  Musculoskeletal:         General: No tenderness or deformity  Normal range of motion  Cervical back: Normal range of motion and neck supple  Skin:     General: Skin is warm and dry  Neurological:      Mental Status: She is alert and oriented to person, place, and time  Cranial Nerves: No cranial nerve deficit  Psychiatric:         Behavior: Behavior normal          Thought Content:  Thought content normal          Judgment: Judgment normal

## 2021-08-10 ENCOUNTER — APPOINTMENT (OUTPATIENT)
Dept: LAB | Facility: CLINIC | Age: 31
End: 2021-08-10
Payer: COMMERCIAL

## 2021-08-10 DIAGNOSIS — D70.9 NEUTROPENIA, UNSPECIFIED TYPE (HCC): ICD-10-CM

## 2021-08-10 DIAGNOSIS — N18.2 CHRONIC KIDNEY DISEASE (CKD) STAGE G2/A2, MILDLY DECREASED GLOMERULAR FILTRATION RATE (GFR) BETWEEN 60-89 ML/MIN/1.73 SQUARE METER AND ALBUMINURIA CREATININE RATIO BETWEEN 30-299 MG/G: ICD-10-CM

## 2021-08-10 LAB
ALBUMIN SERPL BCP-MCNC: 4 G/DL (ref 3.5–5)
ALP SERPL-CCNC: 73 U/L (ref 46–116)
ALT SERPL W P-5'-P-CCNC: 35 U/L (ref 12–78)
ANION GAP SERPL CALCULATED.3IONS-SCNC: 5 MMOL/L (ref 4–13)
AST SERPL W P-5'-P-CCNC: 24 U/L (ref 5–45)
BACTERIA UR QL AUTO: ABNORMAL /HPF
BASOPHILS # BLD AUTO: 0.06 THOUSANDS/ΜL (ref 0–0.1)
BASOPHILS NFR BLD AUTO: 1 % (ref 0–1)
BILIRUB SERPL-MCNC: 0.91 MG/DL (ref 0.2–1)
BILIRUB UR QL STRIP: NEGATIVE
BUN SERPL-MCNC: 14 MG/DL (ref 5–25)
CALCIUM SERPL-MCNC: 9.5 MG/DL (ref 8.3–10.1)
CHLORIDE SERPL-SCNC: 108 MMOL/L (ref 100–108)
CLARITY UR: CLEAR
CO2 SERPL-SCNC: 26 MMOL/L (ref 21–32)
COLOR UR: YELLOW
CREAT SERPL-MCNC: 0.96 MG/DL (ref 0.6–1.3)
CREAT UR-MCNC: <13 MG/DL
EOSINOPHIL # BLD AUTO: 0.1 THOUSAND/ΜL (ref 0–0.61)
EOSINOPHIL NFR BLD AUTO: 2 % (ref 0–6)
ERYTHROCYTE [DISTWIDTH] IN BLOOD BY AUTOMATED COUNT: 14.8 % (ref 11.6–15.1)
GFR SERPL CREATININE-BSD FRML MDRD: 79 ML/MIN/1.73SQ M
GLUCOSE P FAST SERPL-MCNC: 82 MG/DL (ref 65–99)
GLUCOSE UR STRIP-MCNC: NEGATIVE MG/DL
HCT VFR BLD AUTO: 40 % (ref 34.8–46.1)
HGB BLD-MCNC: 12.4 G/DL (ref 11.5–15.4)
HGB UR QL STRIP.AUTO: NEGATIVE
HYALINE CASTS #/AREA URNS LPF: ABNORMAL /LPF
IMM GRANULOCYTES # BLD AUTO: 0.01 THOUSAND/UL (ref 0–0.2)
IMM GRANULOCYTES NFR BLD AUTO: 0 % (ref 0–2)
KETONES UR STRIP-MCNC: NEGATIVE MG/DL
LEUKOCYTE ESTERASE UR QL STRIP: NEGATIVE
LYMPHOCYTES # BLD AUTO: 2.13 THOUSANDS/ΜL (ref 0.6–4.47)
LYMPHOCYTES NFR BLD AUTO: 39 % (ref 14–44)
MCH RBC QN AUTO: 26.5 PG (ref 26.8–34.3)
MCHC RBC AUTO-ENTMCNC: 31 G/DL (ref 31.4–37.4)
MCV RBC AUTO: 86 FL (ref 82–98)
MICROALBUMIN UR-MCNC: <5 MG/L (ref 0–20)
MONOCYTES # BLD AUTO: 0.51 THOUSAND/ΜL (ref 0.17–1.22)
MONOCYTES NFR BLD AUTO: 9 % (ref 4–12)
NEUTROPHILS # BLD AUTO: 2.62 THOUSANDS/ΜL (ref 1.85–7.62)
NEUTS SEG NFR BLD AUTO: 49 % (ref 43–75)
NITRITE UR QL STRIP: NEGATIVE
NON-SQ EPI CELLS URNS QL MICRO: ABNORMAL /HPF
NRBC BLD AUTO-RTO: 0 /100 WBCS
PH UR STRIP.AUTO: 7 [PH]
PLATELET # BLD AUTO: 240 THOUSANDS/UL (ref 149–390)
PMV BLD AUTO: 10.7 FL (ref 8.9–12.7)
POTASSIUM SERPL-SCNC: 4.2 MMOL/L (ref 3.5–5.3)
PROT SERPL-MCNC: 7.4 G/DL (ref 6.4–8.2)
PROT UR STRIP-MCNC: NEGATIVE MG/DL
RBC # BLD AUTO: 4.68 MILLION/UL (ref 3.81–5.12)
RBC #/AREA URNS AUTO: ABNORMAL /HPF
SODIUM SERPL-SCNC: 139 MMOL/L (ref 136–145)
SP GR UR STRIP.AUTO: 1.01 (ref 1–1.03)
UROBILINOGEN UR QL STRIP.AUTO: 0.2 E.U./DL
WBC # BLD AUTO: 5.43 THOUSAND/UL (ref 4.31–10.16)
WBC #/AREA URNS AUTO: ABNORMAL /HPF

## 2021-08-10 PROCEDURE — 81001 URINALYSIS AUTO W/SCOPE: CPT | Performed by: INTERNAL MEDICINE

## 2021-08-10 PROCEDURE — 80053 COMPREHEN METABOLIC PANEL: CPT

## 2021-08-10 PROCEDURE — 36415 COLL VENOUS BLD VENIPUNCTURE: CPT

## 2021-08-10 PROCEDURE — 82570 ASSAY OF URINE CREATININE: CPT | Performed by: INTERNAL MEDICINE

## 2021-08-10 PROCEDURE — 82043 UR ALBUMIN QUANTITATIVE: CPT | Performed by: INTERNAL MEDICINE

## 2021-08-10 PROCEDURE — 85025 COMPLETE CBC W/AUTO DIFF WBC: CPT

## 2021-08-11 ENCOUNTER — TELEPHONE (OUTPATIENT)
Dept: NEPHROLOGY | Facility: CLINIC | Age: 31
End: 2021-08-11

## 2021-08-11 NOTE — TELEPHONE ENCOUNTER
That is okay, I would prefer for hydration to be more in the form of water as opposed to coffee but she should be able to handle hydration through coffee if needed  Just make sure she is avoiding all NSAIDs if she is taking

## 2021-08-11 NOTE — TELEPHONE ENCOUNTER
Patient wants to know if drinking 3 cups of coffee and 1 bottle of water is ok for her to be drinking

## 2021-09-04 DIAGNOSIS — E03.9 HYPOTHYROIDISM, UNSPECIFIED TYPE: ICD-10-CM

## 2021-09-07 RX ORDER — LIOTHYRONINE SODIUM 5 UG/1
TABLET ORAL
Qty: 30 TABLET | Refills: 5 | Status: SHIPPED | OUTPATIENT
Start: 2021-09-07 | End: 2022-04-02

## 2021-09-24 ENCOUNTER — TELEPHONE (OUTPATIENT)
Dept: INTERNAL MEDICINE CLINIC | Facility: CLINIC | Age: 31
End: 2021-09-24

## 2021-09-24 DIAGNOSIS — R20.0 NUMBNESS: ICD-10-CM

## 2021-09-24 DIAGNOSIS — G93.0 CEREBRAL CYSTS: Primary | ICD-10-CM

## 2021-09-27 ENCOUNTER — PATIENT MESSAGE (OUTPATIENT)
Dept: NEPHROLOGY | Facility: CLINIC | Age: 31
End: 2021-09-27

## 2021-09-27 NOTE — TELEPHONE ENCOUNTER
From: Elder Michael  To: Waldemar Calloway DO  Sent: 9/27/2021 4:15 PM EDT  Subject: Non-Urgent Medical Question    I need to get lab work done soon  My lab slips are computerized  I was wondering if my tests, blood and urine, require me to fast, no food, drink, or medication  Thank you

## 2021-10-05 ENCOUNTER — APPOINTMENT (OUTPATIENT)
Dept: LAB | Facility: CLINIC | Age: 31
End: 2021-10-05
Payer: COMMERCIAL

## 2021-10-05 DIAGNOSIS — N18.2 CHRONIC KIDNEY DISEASE (CKD) STAGE G2/A2, MILDLY DECREASED GLOMERULAR FILTRATION RATE (GFR) BETWEEN 60-89 ML/MIN/1.73 SQUARE METER AND ALBUMINURIA CREATININE RATIO BETWEEN 30-299 MG/G: ICD-10-CM

## 2021-10-05 LAB
ANION GAP SERPL CALCULATED.3IONS-SCNC: 2 MMOL/L (ref 4–13)
BUN SERPL-MCNC: 16 MG/DL (ref 5–25)
CALCIUM SERPL-MCNC: 9.5 MG/DL (ref 8.3–10.1)
CHLORIDE SERPL-SCNC: 107 MMOL/L (ref 100–108)
CO2 SERPL-SCNC: 29 MMOL/L (ref 21–32)
CREAT SERPL-MCNC: 1.03 MG/DL (ref 0.6–1.3)
GFR SERPL CREATININE-BSD FRML MDRD: 72 ML/MIN/1.73SQ M
GLUCOSE P FAST SERPL-MCNC: 103 MG/DL (ref 65–99)
POTASSIUM SERPL-SCNC: 4.1 MMOL/L (ref 3.5–5.3)
SODIUM SERPL-SCNC: 138 MMOL/L (ref 136–145)

## 2021-10-05 PROCEDURE — 80048 BASIC METABOLIC PNL TOTAL CA: CPT

## 2021-10-05 PROCEDURE — 36415 COLL VENOUS BLD VENIPUNCTURE: CPT

## 2021-10-07 ENCOUNTER — OFFICE VISIT (OUTPATIENT)
Dept: NEPHROLOGY | Facility: CLINIC | Age: 31
End: 2021-10-07
Payer: COMMERCIAL

## 2021-10-07 VITALS
WEIGHT: 114.6 LBS | SYSTOLIC BLOOD PRESSURE: 118 MMHG | HEART RATE: 72 BPM | HEIGHT: 63 IN | BODY MASS INDEX: 20.3 KG/M2 | OXYGEN SATURATION: 99 % | DIASTOLIC BLOOD PRESSURE: 74 MMHG

## 2021-10-07 DIAGNOSIS — F25.9 SCHIZOAFFECTIVE DISORDER, UNSPECIFIED TYPE (HCC): Chronic | ICD-10-CM

## 2021-10-07 DIAGNOSIS — N18.2 CKD (CHRONIC KIDNEY DISEASE) STAGE 2, GFR 60-89 ML/MIN: Primary | ICD-10-CM

## 2021-10-07 DIAGNOSIS — R73.01 ELEVATED FASTING BLOOD SUGAR: ICD-10-CM

## 2021-10-07 LAB — SL AMB POCT HEMOGLOBIN AIC: 5.4 (ref ?–6.5)

## 2021-10-07 PROCEDURE — 99213 OFFICE O/P EST LOW 20 MIN: CPT | Performed by: NURSE PRACTITIONER

## 2021-10-07 PROCEDURE — 83036 HEMOGLOBIN GLYCOSYLATED A1C: CPT | Performed by: NURSE PRACTITIONER

## 2021-10-07 RX ORDER — BREXPIPRAZOLE 0.25 MG/1
0.75 TABLET ORAL DAILY
COMMUNITY
Start: 2021-09-26

## 2021-10-13 ENCOUNTER — OFFICE VISIT (OUTPATIENT)
Dept: INTERNAL MEDICINE CLINIC | Facility: CLINIC | Age: 31
End: 2021-10-13
Payer: COMMERCIAL

## 2021-10-13 VITALS
TEMPERATURE: 98.9 F | HEIGHT: 63 IN | OXYGEN SATURATION: 99 % | BODY MASS INDEX: 20.22 KG/M2 | HEART RATE: 64 BPM | SYSTOLIC BLOOD PRESSURE: 98 MMHG | WEIGHT: 114.13 LBS | DIASTOLIC BLOOD PRESSURE: 60 MMHG

## 2021-10-13 DIAGNOSIS — F51.01 PRIMARY INSOMNIA: Primary | ICD-10-CM

## 2021-10-13 PROCEDURE — 99213 OFFICE O/P EST LOW 20 MIN: CPT | Performed by: PHYSICIAN ASSISTANT

## 2021-10-14 RX ORDER — ESZOPICLONE 3 MG/1
3 TABLET, FILM COATED ORAL
Qty: 30 TABLET | Refills: 1 | Status: SHIPPED | OUTPATIENT
Start: 2021-10-14 | End: 2021-12-06

## 2021-10-17 DIAGNOSIS — N39.8 VOIDING DYSFUNCTION: ICD-10-CM

## 2021-10-18 RX ORDER — TAMSULOSIN HYDROCHLORIDE 0.4 MG/1
CAPSULE ORAL
Qty: 30 CAPSULE | Refills: 5 | Status: SHIPPED | OUTPATIENT
Start: 2021-10-18 | End: 2021-11-16 | Stop reason: SDUPTHER

## 2021-11-16 DIAGNOSIS — N39.8 VOIDING DYSFUNCTION: ICD-10-CM

## 2021-11-16 RX ORDER — TAMSULOSIN HYDROCHLORIDE 0.4 MG/1
0.4 CAPSULE ORAL
Qty: 90 CAPSULE | Refills: 1 | Status: SHIPPED | OUTPATIENT
Start: 2021-11-16

## 2021-11-18 ENCOUNTER — OFFICE VISIT (OUTPATIENT)
Dept: INTERNAL MEDICINE CLINIC | Facility: CLINIC | Age: 31
End: 2021-11-18
Payer: COMMERCIAL

## 2021-11-18 VITALS
HEART RATE: 82 BPM | RESPIRATION RATE: 14 BRPM | DIASTOLIC BLOOD PRESSURE: 80 MMHG | BODY MASS INDEX: 20.48 KG/M2 | SYSTOLIC BLOOD PRESSURE: 100 MMHG | TEMPERATURE: 99.9 F | OXYGEN SATURATION: 97 % | HEIGHT: 63 IN | WEIGHT: 115.6 LBS

## 2021-11-18 DIAGNOSIS — L24.9 IRRITANT CONTACT DERMATITIS, UNSPECIFIED TRIGGER: Primary | ICD-10-CM

## 2021-11-18 PROCEDURE — 99213 OFFICE O/P EST LOW 20 MIN: CPT | Performed by: PHYSICIAN ASSISTANT

## 2021-11-18 RX ORDER — METHYLPREDNISOLONE 4 MG/1
TABLET ORAL
Qty: 21 EACH | Refills: 0 | Status: SHIPPED | OUTPATIENT
Start: 2021-11-18 | End: 2022-02-02

## 2021-11-19 PROBLEM — L24.9 IRRITANT CONTACT DERMATITIS: Status: ACTIVE | Noted: 2021-11-19

## 2021-12-05 DIAGNOSIS — F51.01 PRIMARY INSOMNIA: ICD-10-CM

## 2021-12-06 RX ORDER — ESZOPICLONE 3 MG/1
TABLET, FILM COATED ORAL
Qty: 30 TABLET | Refills: 0 | Status: SHIPPED | OUTPATIENT
Start: 2021-12-06 | End: 2022-01-04

## 2021-12-12 DIAGNOSIS — K21.9 GASTROESOPHAGEAL REFLUX DISEASE, UNSPECIFIED WHETHER ESOPHAGITIS PRESENT: ICD-10-CM

## 2021-12-13 RX ORDER — FAMOTIDINE 40 MG/1
TABLET, FILM COATED ORAL
Qty: 15 TABLET | Refills: 0 | Status: SHIPPED | OUTPATIENT
Start: 2021-12-13 | End: 2022-01-10

## 2022-01-02 DIAGNOSIS — F51.01 PRIMARY INSOMNIA: ICD-10-CM

## 2022-01-04 RX ORDER — ESZOPICLONE 3 MG/1
TABLET, FILM COATED ORAL
Qty: 30 TABLET | Refills: 0 | Status: SHIPPED | OUTPATIENT
Start: 2022-01-04 | End: 2022-02-01

## 2022-01-08 DIAGNOSIS — K21.9 GASTROESOPHAGEAL REFLUX DISEASE, UNSPECIFIED WHETHER ESOPHAGITIS PRESENT: ICD-10-CM

## 2022-01-10 ENCOUNTER — HOSPITAL ENCOUNTER (EMERGENCY)
Facility: HOSPITAL | Age: 32
Discharge: HOME | End: 2022-01-11
Attending: EMERGENCY MEDICINE
Payer: COMMERCIAL

## 2022-01-10 VITALS
BODY MASS INDEX: 20.02 KG/M2 | HEIGHT: 63 IN | DIASTOLIC BLOOD PRESSURE: 81 MMHG | TEMPERATURE: 98.4 F | RESPIRATION RATE: 20 BRPM | OXYGEN SATURATION: 97 % | HEART RATE: 82 BPM | WEIGHT: 113 LBS | SYSTOLIC BLOOD PRESSURE: 131 MMHG

## 2022-01-10 DIAGNOSIS — J30.9 ALLERGIC RHINITIS: Primary | ICD-10-CM

## 2022-01-10 PROCEDURE — 99284 EMERGENCY DEPT VISIT MOD MDM: CPT | Performed by: EMERGENCY MEDICINE

## 2022-01-10 PROCEDURE — 99283 EMERGENCY DEPT VISIT LOW MDM: CPT

## 2022-01-10 RX ORDER — FAMOTIDINE 40 MG/1
TABLET, FILM COATED ORAL
Qty: 15 TABLET | Refills: 0 | Status: SHIPPED | OUTPATIENT
Start: 2022-01-10 | End: 2022-02-07

## 2022-01-10 RX ORDER — FLUTICASONE PROPIONATE 50 MCG
2 SPRAY, SUSPENSION (ML) NASAL DAILY
Status: DISCONTINUED | OUTPATIENT
Start: 2022-01-11 | End: 2022-01-11 | Stop reason: HOSPADM

## 2022-01-10 RX ORDER — FLUTICASONE PROPIONATE 50 MCG
SPRAY, SUSPENSION (ML) NASAL
Status: COMPLETED
Start: 2022-01-10 | End: 2022-01-10

## 2022-01-10 RX ORDER — OXYMETAZOLINE HYDROCHLORIDE 0.05 G/100ML
2 SPRAY NASAL ONCE
Status: COMPLETED | OUTPATIENT
Start: 2022-01-10 | End: 2022-01-10

## 2022-01-10 RX ADMIN — OXYMETAZOLINE HYDROCHLORIDE 2 SPRAY: 0.05 SPRAY NASAL at 23:17

## 2022-01-10 RX ADMIN — FLUTICASONE PROPIONATE 2 SPRAY: 50 SPRAY, METERED NASAL at 23:18

## 2022-01-10 RX ADMIN — Medication 2 SPRAY: at 23:18

## 2022-01-11 NOTE — ED PROVIDER NOTES
History  Chief Complaint   Patient presents with    Medical Problem     nasal passages swollen shut, has been ongoing since 11/18/2021       30YO M    PMH:  NOne  Social: non smoker, no drugs, social ETOH    Pt states that she has had nasal swelling for months      Pt has occasional cough related to the Post nasal drip    No fever, chills  No headache    Cough was NP    Tmax:   Normal     Interventions:  None    Denies Abdominal discomfort  Denies Nausea, No vomiting  No Diarrhea       No urinary complaints:  No dysuria/hematuria/frequency       No cp or sob  No respiratory distress, no stridor, no wheezing      No oral sores   No swollen lymph nodes      Rash:    None noted      Pt has been eating and drinking well    Denies Headache   No neck pain or  stiffness      No recent travel      Sick contacts: None  No recent hospitalization           History provided by:  Patient  Medical Problem  Location:  Nasal congestion   Severity:  Moderate  Onset quality:  Gradual  Progression:  Worsening  Chronicity:  Recurrent  Associated symptoms: congestion and rhinorrhea    Associated symptoms: no abdominal pain, no chest pain, no cough, no diarrhea, no ear pain, no fatigue, no fever, no headaches, no myalgias, no nausea, no rash, no shortness of breath, no sore throat, no vomiting and no wheezing        Prior to Admission Medications   Prescriptions Last Dose Informant Patient Reported? Taking? Dermatological Products, Misc   (EpiCeram) lotion   Yes No   Sig:     Rexulti 0 25 MG tablet   Yes No   Sig: Take 0 75 mg by mouth daily   acetaminophen (TYLENOL) 325 mg tablet  Self No No   Sig: Take 2 tablets (650 mg total) by mouth every 6 (six) hours   benztropine (COGENTIN) 1 mg tablet  Self Yes No   Sig: Take 1 mg by mouth TAKE 1 TABLET IN AM AND 2 TABLETS IN PM   busPIRone (BUSPAR) 10 mg tablet  Self Yes No   Sig: Take 10 mg by mouth 2 (two) times a day   clonazePAM (KlonoPIN) 0 5 mg tablet  Self Yes No   Sig: Take 1 mg by mouth daily at bedtime   eszopiclone (LUNESTA) 3 MG tablet   No No   Sig: TAKE 1 TABLET BY MOUTH ONCE DAILY AT BEDTIME AS NEEDED FOR SLEEP TAKE  IMMEDIATELY  BEFORE  BEDTIME   famotidine (PEPCID) 40 MG tablet   No No   Sig: Take 1/2 (one-half) tablet by mouth once daily   gabapentin (NEURONTIN) 100 mg capsule  Self Yes No   Si tablets in am   gabapentin (NEURONTIN) 400 mg capsule  Self Yes No   Si mg daily at bedtime    liothyronine (CYTOMEL) 5 mcg tablet   No No   Sig: Take 1 tablet by mouth once daily   methylPREDNISolone 4 MG tablet therapy pack   No No   Sig: Use as directed on package   pantoprazole (PROTONIX) 40 mg tablet   No No   Sig: Take 1 tablet (40 mg total) by mouth daily   tamsulosin (FLOMAX) 0 4 mg   No No   Sig: Take 1 capsule (0 4 mg total) by mouth daily with dinner      Facility-Administered Medications: None       Past Medical History:   Diagnosis Date    Anorexia nervosa     pt denies history at time of admission 18    Anxiety     Celiac disease     Chronic kidney disease     CPAP (continuous positive airway pressure) dependence     waiting for a new mask    Depression     Disease of thyroid gland     hypo    Gall bladder polyp     Gastroesophageal reflux disease 2018    Hallucination     Memory difficulty 2018    Plantar wart of left foot     Psychiatric illness     Raynaud's disease without gangrene     Renal atrophy     Schizoaffective disorder (HCC)     Self-injurious behavior     Sleep apnea     Sleep difficulties     Suicide attempt ([facility] [address] )     history of attempt at age 22 by overdose     Urinary retention        Past Surgical History:   Procedure Laterality Date    EYE SURGERY Left     tear duct    MI ESOPHAGOGASTRODUODENOSCOPY TRANSORAL DIAGNOSTIC N/A 2018    Procedure: ESOPHAGOGASTRODUODENOSCOPY (EGD);   Surgeon: Marda Dubin, MD;  [facility];  Service: Gastroenterology    MI ESOPHAGOGASTRODUODENOSCOPY TRANSORAL DIAGNOSTIC N/A 11/6/2018    Procedure: ESOPHAGOGASTRODUODENOSCOPY (EGD); Surgeon: Shyanne Morales MD;  [facility]: [facility];  Service: Gastroenterology    AL LAP, SURG PROCTOPEXY N/A 1/12/2021    Procedure: Rectopexy with sigmoid resection w/ robotics  ;  Surgeon: Hanny Bourgeois MD;  Location:  MAIN OR;  Service: Colorectal       Family History   Problem Relation Age of Onset    Hypertension Father         benign essential    Hypertension Brother         benign essential     OCD Brother     Depression Brother     Other Family         nasolacrimal duct obstruction, acquired    Colon cancer Neg Hx      I have reviewed and agree with the history as documented  E-Cigarette/Vaping    E-Cigarette Use Never User      E-Cigarette/Vaping Substances    Nicotine No     THC No     CBD No     Flavoring No     Other No     Unknown No      Social History     Tobacco Use    Smoking status: Never Smoker    Smokeless tobacco: Never Used   Vaping Use    Vaping Use: Never used   Substance Use Topics    Alcohol use: Not Currently    Drug use: No       Review of Systems   Constitutional: Negative for chills, diaphoresis, fatigue and fever  HENT: Positive for congestion, postnasal drip and rhinorrhea  Negative for dental problem, drooling, ear discharge, ear pain, sinus pressure, sinus pain, sore throat, trouble swallowing and voice change  Respiratory: Negative for cough, shortness of breath, wheezing and stridor  Cardiovascular: Negative for chest pain, palpitations and leg swelling  Gastrointestinal: Negative for abdominal pain, blood in stool, diarrhea, nausea and vomiting  Genitourinary: Negative for difficulty urinating, dysuria, flank pain and frequency  Musculoskeletal: Negative for arthralgias, back pain, gait problem, joint swelling, myalgias, neck pain and neck stiffness  Skin: Negative for rash and wound  Neurological: Negative for dizziness, light-headedness and headaches     All other systems reviewed and are negative  Physical Exam  Physical Exam  Constitutional:       General: She is not in acute distress  Appearance: She is well-developed  She is not ill-appearing, toxic-appearing or diaphoretic  HENT:      Head: Normocephalic and atraumatic  Right Ear: External ear normal       Left Ear: External ear normal       Nose: Nose normal    Eyes:      General: No scleral icterus  Right eye: No discharge  Left eye: No discharge  Extraocular Movements: Extraocular movements intact  Conjunctiva/sclera: Conjunctivae normal       Pupils: Pupils are equal, round, and reactive to light  Neck:      Vascular: No JVD  Trachea: No tracheal deviation  Cardiovascular:      Rate and Rhythm: Normal rate and regular rhythm  Pulses: Normal pulses  Heart sounds: Normal heart sounds  No murmur heard  No friction rub  No gallop  Pulmonary:      Effort: Pulmonary effort is normal  No respiratory distress  Breath sounds: Normal breath sounds  No stridor  No wheezing, rhonchi or rales  Chest:      Chest wall: No tenderness  Abdominal:      General: Bowel sounds are normal  There is no distension  Palpations: Abdomen is soft  There is no mass  Tenderness: There is no abdominal tenderness  There is no right CVA tenderness, left CVA tenderness, guarding or rebound  Hernia: No hernia is present  Musculoskeletal:         General: No swelling, tenderness, deformity or signs of injury  Normal range of motion  Cervical back: Normal range of motion and neck supple  No rigidity or tenderness  Right lower leg: No edema  Left lower leg: No edema  Lymphadenopathy:      Cervical: No cervical adenopathy  Skin:     General: Skin is warm  Capillary Refill: Capillary refill takes less than 2 seconds  Coloration: Skin is not jaundiced or pale  Findings: No bruising, erythema, lesion or rash     Neurological:      General: No focal deficit present  Mental Status: She is alert and oriented to person, place, and time  Cranial Nerves: No cranial nerve deficit  Sensory: No sensory deficit  Motor: No weakness or abnormal muscle tone  Coordination: Coordination normal    Psychiatric:         Behavior: Behavior normal          Thought Content: Thought content normal          Judgment: Judgment normal       Comments: Flat, anxious affect          Vital Signs  ED Triage Vitals [01/10/22 2233]   Temperature Pulse Respirations Blood Pressure SpO2   98 4 °F (36 9 °C) 82 20 131/81 97 %      Temp Source Heart Rate Source Patient Position - Orthostatic VS BP Location FiO2 (%)   Temporal Monitor Sitting Right arm --      Pain Score       --           Vitals:    01/10/22 2233   BP: 131/81   Pulse: 82   Patient Position - Orthostatic VS: Sitting         Visual Acuity      ED Medications  Medications   oxymetazoline (AFRIN) 0 05 % nasal spray 2 spray (2 sprays Each Nare Given 1/10/22 2317)       Diagnostic Studies  Results Reviewed     None                 No orders to display              Procedures  Procedures         ED Course  ED Course as of 01/11/22 2217 Tue Jan 11, 2022   0000 Pt has innocent exam, mild to moderate allergic rhinitis on exam  Pt's hx and exam does not reveal any concerning findings  I am certain that today's visit was related to pt's anxiety, which appears to be at baseline  Pt and father were very encouraged and happy after my reassurance  They were happy w/ referral to Dr Kristin Haas, ENT  They understand return and f/u instructions                                SBIRT 20yo+      Most Recent Value   SBIRT (25 yo +)    In order to provide better care to our patients, we are screening all of our patients for alcohol and drug use  Would it be okay to ask you these screening questions? Yes Filed at: 01/10/2022 4115   Initial Alcohol Screen: US AUDIT-C     1   How often do you have a drink containing alcohol? 0 Filed at: 01/10/2022 9099   2  How many drinks containing alcohol do you have on a typical day you are drinking? 0 Filed at: 01/10/2022 2329   3b  FEMALE Any Age, or MALE 65+: How often do you have 4 or more drinks on one occassion? 0 Filed at: 01/10/2022 2359   Audit-C Score 0 Filed at: 01/10/2022 2359   JEFFERY: How many times in the past year have you    Used an illegal drug or used a prescription medication for non-medical reasons? Never Filed at: 01/10/2022 2359                    MDM    Disposition  Final diagnoses: Allergic rhinitis     Time reflects when diagnosis was documented in both MDM as applicable and the Disposition within this note     Time User Action Codes Description Comment    1/10/2022 11:03 PM Jeannie Russo [J30 9] Allergic rhinitis       ED Disposition     ED Disposition Condition Date/Time Comment    Discharge Stable Mon Dani 10, 2022 11:02 PM Ric Hardeman discharge to home/self care  Follow-up Information     Follow up With Specialties Details Why Contact Info    Julieth Rowell PA-C Family Medicine, Internal Medicine, Physician Assistant Call today  [id number] [place] [address],  Otolaryngology Call today  [address] [place] [address]  [phone]            Discharge Medication List as of 1/10/2022 11:59 PM      CONTINUE these medications which have NOT CHANGED    Details   acetaminophen (TYLENOL) 325 mg tablet Take 2 tablets (650 mg total) by mouth every 6 (six) hours, Starting Thu 1/14/2021, OTC      benztropine (COGENTIN) 1 mg tablet Take 1 mg by mouth TAKE 1 TABLET IN AM AND 2 TABLETS IN PM, Historical Med      busPIRone (BUSPAR) 10 mg tablet Take 10 mg by mouth 2 (two) times a day, Historical Med      clonazePAM (KlonoPIN) 0 5 mg tablet Take 1 mg by mouth daily at bedtime, Historical Med      Dermatological Products, Misc   (EpiCeram) lotion  , Historical Med      eszopiclone (LUNESTA) 3 MG tablet TAKE 1 TABLET BY MOUTH ONCE DAILY AT BEDTIME AS NEEDED FOR SLEEP TAKE  IMMEDIATELY  BEFORE  BEDTIME, Normal      famotidine (PEPCID) 40 MG tablet Take 1/2 (one-half) tablet by mouth once daily, Normal      !! gabapentin (NEURONTIN) 100 mg capsule 2 tablets in am, Historical Med      !! gabapentin (NEURONTIN) 400 mg capsule 400 mg daily at bedtime , Historical Med      liothyronine (CYTOMEL) 5 mcg tablet Take 1 tablet by mouth once daily, Normal      methylPREDNISolone 4 MG tablet therapy pack Use as directed on package, Normal      pantoprazole (PROTONIX) 40 mg tablet Take 1 tablet (40 mg total) by mouth daily, Starting Mon 2/22/2021, Normal      Rexulti 0 25 MG tablet Take 0 75 mg by mouth daily, Starting Sun 9/26/2021, Historical Med      tamsulosin (FLOMAX) 0 4 mg Take 1 capsule (0 4 mg total) by mouth daily with dinner, Starting Tue 11/16/2021, Normal       !! - Potential duplicate medications found  Please discuss with provider  No discharge procedures on file      PDMP Review     None          ED Provider  Electronically Signed by           Susi Fam MD  01/11/22 7664

## 2022-01-11 NOTE — DISCHARGE INSTRUCTIONS
RETURN IF WORSE IN ANY WAY:   WORSENING CHEST PAIN,   SHORTNESS OF BREATH,   FEVER OR FLU LIKE SYMPTOMS,   OR NEW AND CONCERNING SYMPTOMS SIGNS OR SYMPTOMS      PLEASE CALL YOUR PRIMARY DOCTOR and ENT specialist IN THE MORNING TO SET UP FOLLOW UP   PLEASE REVIEW THE WORK UP RESULTS WITH YOUR DOCTORs        PATIENT SURVEY:   Thank you for your visit today  Your satisfaction is very very important to us  Kirt Brandon for choosing [facility] for your ER care  If you get a survey in the mail please rate your service as VERY GOOD (other ratings lower than this are actually detrimental) - This helps our team to continue providing excellent care - Kirt Brandon

## 2022-01-31 DIAGNOSIS — F51.01 PRIMARY INSOMNIA: ICD-10-CM

## 2022-02-01 ENCOUNTER — OFFICE VISIT (OUTPATIENT)
Dept: INTERNAL MEDICINE CLINIC | Facility: CLINIC | Age: 32
End: 2022-02-01
Payer: COMMERCIAL

## 2022-02-01 ENCOUNTER — APPOINTMENT (OUTPATIENT)
Dept: LAB | Facility: CLINIC | Age: 32
End: 2022-02-01
Payer: COMMERCIAL

## 2022-02-01 VITALS
OXYGEN SATURATION: 99 % | SYSTOLIC BLOOD PRESSURE: 108 MMHG | DIASTOLIC BLOOD PRESSURE: 62 MMHG | BODY MASS INDEX: 20.75 KG/M2 | TEMPERATURE: 98.2 F | WEIGHT: 117.13 LBS | HEIGHT: 63 IN | HEART RATE: 62 BPM

## 2022-02-01 DIAGNOSIS — J30.9 ALLERGIC RHINITIS, UNSPECIFIED SEASONALITY, UNSPECIFIED TRIGGER: ICD-10-CM

## 2022-02-01 DIAGNOSIS — N63.0 LUMP, BREAST: ICD-10-CM

## 2022-02-01 DIAGNOSIS — J30.9 ALLERGIC RHINITIS, UNSPECIFIED SEASONALITY, UNSPECIFIED TRIGGER: Primary | ICD-10-CM

## 2022-02-01 PROCEDURE — 99214 OFFICE O/P EST MOD 30 MIN: CPT | Performed by: PHYSICIAN ASSISTANT

## 2022-02-01 PROCEDURE — 82785 ASSAY OF IGE: CPT

## 2022-02-01 PROCEDURE — 36415 COLL VENOUS BLD VENIPUNCTURE: CPT

## 2022-02-01 PROCEDURE — 86003 ALLG SPEC IGE CRUDE XTRC EA: CPT

## 2022-02-01 RX ORDER — ESZOPICLONE 3 MG/1
TABLET, FILM COATED ORAL
Qty: 30 TABLET | Refills: 0 | Status: SHIPPED | OUTPATIENT
Start: 2022-02-01 | End: 2022-02-28

## 2022-02-02 PROBLEM — R20.0 NUMBNESS AND TINGLING: Status: RESOLVED | Noted: 2020-09-12 | Resolved: 2022-02-02

## 2022-02-02 PROBLEM — J30.9 ALLERGIC RHINITIS: Status: ACTIVE | Noted: 2022-02-02

## 2022-02-02 PROBLEM — R20.2 NUMBNESS AND TINGLING: Status: RESOLVED | Noted: 2020-09-12 | Resolved: 2022-02-02

## 2022-02-02 PROBLEM — R11.10 REGURGITATION OF FOOD: Status: RESOLVED | Noted: 2018-09-18 | Resolved: 2022-02-02

## 2022-02-02 PROBLEM — N63.0 LUMP, BREAST: Status: ACTIVE | Noted: 2022-02-02

## 2022-02-02 LAB
A ALTERNATA IGE QN: <0.1 KUA/I
A FUMIGATUS IGE QN: <0.1 KUA/I
ALLERGEN COMMENT: NORMAL
BERMUDA GRASS IGE QN: <0.1 KUA/I
BOXELDER IGE QN: <0.1 KUA/I
C HERBARUM IGE QN: <0.1 KUA/I
CAT DANDER IGE QN: <0.1 KUA/I
CMN PIGWEED IGE QN: <0.1 KUA/I
COMMON RAGWEED IGE QN: <0.1 KUA/I
COTTONWOOD IGE QN: <0.1 KUA/I
D FARINAE IGE QN: <0.1 KUA/I
D PTERONYSS IGE QN: <0.1 KUA/I
DOG DANDER IGE QN: <0.1 KUA/I
LONDON PLANE IGE QN: <0.1 KUA/I
MOUSE URINE PROT IGE QN: <0.1 KUA/I
MT JUNIPER IGE QN: <0.1 KUA/I
MUGWORT IGE QN: <0.1 KUA/I
P NOTATUM IGE QN: <0.1 KUA/I
ROACH IGE QN: <0.1 KUA/I
SHEEP SORREL IGE QN: <0.1 KUA/I
SILVER BIRCH IGE QN: <0.1 KUA/I
TIMOTHY IGE QN: <0.1 KUA/I
TOTAL IGE SMQN RAST: 5.92 KU/L (ref 0–113)
WALNUT IGE QN: <0.1 KUA/I
WHITE ASH IGE QN: <0.1 KUA/I
WHITE ELM IGE QN: <0.1 KUA/I
WHITE MULBERRY IGE QN: <0.1 KUA/I
WHITE OAK IGE QN: <0.1 KUA/I

## 2022-02-02 NOTE — PROGRESS NOTES
Assessment/Plan:  Problem List Items Addressed This Visit        Respiratory    Allergic rhinitis - Primary     Will check [facility] panel and offer treatment based off of resutls         Relevant Orders    [facility] Allergy Panel, Adult       Other    Lump, breast     Will get ultrasound to better evaluate and treat according to results  Relevant Orders    US breast left complete (abus)           Diagnoses and all orders for this visit:    Allergic rhinitis, unspecified seasonality, unspecified trigger  -     [facility] Allergy Panel, Adult; Future    Lump, breast  -     US breast left complete (abus); Future        Lump, breast  Will get ultrasound to better evaluate and treat according to results  Allergic rhinitis  Will check [facility] panel and offer treatment based off of resutls      Subjective:      Patient ID: Beau Brown is a 34 y o  female  Pt presents complaining of allergies as outlined below  She also notes the presence of two small painless lumps along her left breast  She is a bit anxious and timid about these and is hesitant to let me fully examine without clothing  Allergic Reaction  This is a chronic problem  The problem occurs constantly  The problem is unchanged  The problem is moderate  It is unknown what she was exposed to  Pertinent negatives include no abdominal pain, chest pain, coughing, diarrhea, rash, trouble swallowing, vomiting or wheezing  (Nasal congestion, rhinorrhea, post nasal drip) There is no swelling present  Treatments tried: afrin for 3 days  The treatment provided mild relief  Her past medical history is significant for seasonal allergies         The following portions of the patient's history were reviewed and updated as appropriate:   She has a past medical history of Anorexia nervosa, Anxiety, Celiac disease, Chronic kidney disease, CPAP (continuous positive airway pressure) dependence, Depression, Disease of thyroid gland, Gall bladder polyp, Gastroesophageal reflux disease (9/18/2018), Hallucination, Memory difficulty (7/12/2018), Plantar wart of left foot, Psychiatric illness, Raynaud's disease without gangrene, Renal atrophy, Schizoaffective disorder ([facility] [address] ), Self-injurious behavior, Sleep apnea, Sleep difficulties, Suicide attempt ([facility] [address] ), and Urinary retention  ,  does not have any pertinent problems on file  ,   has a past surgical history that includes Eye surgery (Left); pr esophagogastroduodenoscopy transoral diagnostic (N/A, 2/9/2018); pr esophagogastroduodenoscopy transoral diagnostic (N/A, 11/6/2018); and pr lap, surg proctopexy (N/A, 1/12/2021)  ,  family history includes Depression in her brother; Hypertension in her brother and father; OCD in her brother; Other in her family  ,   reports that she has never smoked  She has never used smokeless tobacco  She reports previous alcohol use  She reports that she does not use drugs  ,  is allergic to gluten meal - food allergy and latex     Current Outpatient Medications   Medication Sig Dispense Refill    acetaminophen (TYLENOL) 325 mg tablet Take 2 tablets (650 mg total) by mouth every 6 (six) hours  0    benztropine (COGENTIN) 1 mg tablet Take 1 mg by mouth TAKE 1 TABLET IN AM AND 2 TABLETS IN PM      busPIRone (BUSPAR) 10 mg tablet Take 10 mg by mouth 2 (two) times a day      clonazePAM (KlonoPIN) 0 5 mg tablet Take 1 mg by mouth daily at bedtime      Dermatological Products, Misc   (EpiCeram) lotion        eszopiclone (LUNESTA) 3 MG tablet TAKE 1 TABLET BY MOUTH ONCE DAILY AT BEDTIME AS NEEDED FOR SLEEP TAKE  IMMEDIATELY  BEFORE  BEDTIME 30 tablet 0    famotidine (PEPCID) 40 MG tablet Take 1/2 (one-half) tablet by mouth once daily 15 tablet 0    gabapentin (NEURONTIN) 100 mg capsule 2 tablets in am      gabapentin (NEURONTIN) 400 mg capsule 400 mg daily at bedtime       liothyronine (CYTOMEL) 5 mcg tablet Take 1 tablet by mouth once daily 30 tablet 5    pantoprazole (PROTONIX) 40 mg tablet Take 1 tablet (40 mg total) by mouth daily 30 tablet 5    Rexulti 0 25 MG tablet Take 0 75 mg by mouth daily      tamsulosin (FLOMAX) 0 4 mg Take 1 capsule (0 4 mg total) by mouth daily with dinner 90 capsule 1     No current facility-administered medications for this visit  Review of Systems   Constitutional: Negative for chills and fever  HENT: Positive for congestion, postnasal drip, rhinorrhea and sinus pressure  Negative for ear pain, hearing loss, sinus pain, sore throat and trouble swallowing  Eyes: Negative for pain and visual disturbance  Respiratory: Negative for cough, chest tightness, shortness of breath and wheezing  Cardiovascular: Negative  Negative for chest pain, palpitations and leg swelling  Gastrointestinal: Negative for abdominal pain, blood in stool, constipation, diarrhea, nausea and vomiting  Endocrine: Negative for cold intolerance, heat intolerance, polydipsia, polyphagia and polyuria  Genitourinary: Negative for difficulty urinating, dysuria, flank pain and urgency  Musculoskeletal: Negative for arthralgias, back pain, gait problem and myalgias  Skin: Negative for rash  Allergic/Immunologic: Negative  Neurological: Positive for headaches  Negative for dizziness, weakness and light-headedness  Hematological: Negative  Psychiatric/Behavioral: Negative for behavioral problems, dysphoric mood and sleep disturbance  The patient is not nervous/anxious  PHQ-2/9 Depression Screening    Little interest or pleasure in doing things: 0 - not at all  Feeling down, depressed, or hopeless: 0 - not at all  PHQ-2 Score: 0  PHQ-2 Interpretation: Negative depression screen          Objective:  Vitals:    02/01/22 1300   BP: 108/62   BP Location: Left arm   Patient Position: Sitting   Pulse: 62   Temp: 98 2 °F (36 8 °C)   SpO2: 99%   Weight: 53 1 kg (117 lb 2 oz)   Height: 5' 3" (1 6 m)     Body mass index is 20 75 kg/m²       Physical Exam  Constitutional: General: She is not in acute distress  Appearance: She is well-developed  She is not diaphoretic  HENT:      Head: Normocephalic and atraumatic  Right Ear: External ear normal       Left Ear: External ear normal       Nose: Nose normal       Right Turbinates: Enlarged, swollen and pale  Left Turbinates: Enlarged, swollen and pale  Mouth/Throat:      Pharynx: No oropharyngeal exudate  Eyes:      General: No scleral icterus  Right eye: No discharge  Left eye: No discharge  Conjunctiva/sclera: Conjunctivae normal       Pupils: Pupils are equal, round, and reactive to light  Neck:      Thyroid: No thyromegaly  Cardiovascular:      Rate and Rhythm: Normal rate and regular rhythm  Heart sounds: Normal heart sounds  No murmur heard  No friction rub  No gallop  Pulmonary:      Effort: Pulmonary effort is normal  No respiratory distress  Breath sounds: Normal breath sounds  No wheezing or rales  Abdominal:      General: Bowel sounds are normal  There is no distension  Palpations: Abdomen is soft  Tenderness: There is no abdominal tenderness  Musculoskeletal:         General: No tenderness or deformity  Normal range of motion  Cervical back: Normal range of motion and neck supple  Skin:     General: Skin is warm and dry  Neurological:      Mental Status: She is alert and oriented to person, place, and time  Cranial Nerves: No cranial nerve deficit  Psychiatric:         Behavior: Behavior normal          Thought Content: Thought content normal          Judgment: Judgment normal          Depression Screening and Follow-up Plan: Patient was screened for depression during today's encounter  They screened negative with a PHQ-2 score of 0

## 2022-02-07 DIAGNOSIS — J30.9 ALLERGIC RHINITIS, UNSPECIFIED SEASONALITY, UNSPECIFIED TRIGGER: Primary | ICD-10-CM

## 2022-02-07 DIAGNOSIS — K21.9 GASTROESOPHAGEAL REFLUX DISEASE, UNSPECIFIED WHETHER ESOPHAGITIS PRESENT: ICD-10-CM

## 2022-02-07 RX ORDER — FAMOTIDINE 40 MG/1
TABLET, FILM COATED ORAL
Qty: 15 TABLET | Refills: 5 | Status: SHIPPED | OUTPATIENT
Start: 2022-02-07 | End: 2022-08-06

## 2022-02-16 DIAGNOSIS — Z12.31 BREAST CANCER SCREENING BY MAMMOGRAM: Primary | ICD-10-CM

## 2022-02-21 DIAGNOSIS — K21.9 GASTROESOPHAGEAL REFLUX DISEASE: ICD-10-CM

## 2022-02-21 RX ORDER — PANTOPRAZOLE SODIUM 40 MG/1
TABLET, DELAYED RELEASE ORAL
Qty: 30 TABLET | Refills: 0 | Status: SHIPPED | OUTPATIENT
Start: 2022-02-21 | End: 2022-03-28

## 2022-02-26 DIAGNOSIS — F51.01 PRIMARY INSOMNIA: ICD-10-CM

## 2022-02-28 RX ORDER — ESZOPICLONE 3 MG/1
TABLET, FILM COATED ORAL
Qty: 30 TABLET | Refills: 0 | Status: SHIPPED | OUTPATIENT
Start: 2022-02-28 | End: 2022-04-02

## 2022-03-03 ENCOUNTER — APPOINTMENT (OUTPATIENT)
Dept: LAB | Facility: CLINIC | Age: 32
End: 2022-03-03
Payer: COMMERCIAL

## 2022-03-03 DIAGNOSIS — F84.9 PDD (PERVASIVE DEVELOPMENTAL DISORDER): ICD-10-CM

## 2022-03-03 DIAGNOSIS — F25.0 SCHIZOAFFECTIVE DISORDER, BIPOLAR TYPE (HCC): ICD-10-CM

## 2022-03-03 LAB
CHOLEST SERPL-MCNC: 114 MG/DL
GLUCOSE P FAST SERPL-MCNC: 90 MG/DL (ref 65–99)
HDLC SERPL-MCNC: 62 MG/DL
LDLC SERPL CALC-MCNC: 44 MG/DL (ref 0–100)
NONHDLC SERPL-MCNC: 52 MG/DL
TRIGL SERPL-MCNC: 40 MG/DL

## 2022-03-03 PROCEDURE — 82947 ASSAY GLUCOSE BLOOD QUANT: CPT

## 2022-03-03 PROCEDURE — 80061 LIPID PANEL: CPT

## 2022-03-03 PROCEDURE — 36415 COLL VENOUS BLD VENIPUNCTURE: CPT

## 2022-03-10 ENCOUNTER — HOSPITAL ENCOUNTER (OUTPATIENT)
Dept: ULTRASOUND IMAGING | Facility: HOSPITAL | Age: 32
Discharge: HOME | End: 2022-03-10
Payer: COMMERCIAL

## 2022-03-10 ENCOUNTER — TELEPHONE (OUTPATIENT)
Dept: NEUROLOGY | Facility: CLINIC | Age: 32
End: 2022-03-10

## 2022-03-10 ENCOUNTER — HOSPITAL ENCOUNTER (OUTPATIENT)
Dept: MAMMOGRAPHY | Facility: HOSPITAL | Age: 32
Discharge: HOME | End: 2022-03-10
Payer: COMMERCIAL

## 2022-03-10 VITALS — HEIGHT: 63 IN | WEIGHT: 117 LBS | BODY MASS INDEX: 20.73 KG/M2

## 2022-03-10 DIAGNOSIS — Z12.31 BREAST CANCER SCREENING BY MAMMOGRAM: ICD-10-CM

## 2022-03-10 DIAGNOSIS — N63.20 LEFT BREAST LUMP: ICD-10-CM

## 2022-03-10 PROCEDURE — 76642 ULTRASOUND BREAST LIMITED: CPT

## 2022-03-10 PROCEDURE — G0279 TOMOSYNTHESIS, MAMMO: HCPCS

## 2022-03-10 PROCEDURE — 77066 DX MAMMO INCL CAD BI: CPT

## 2022-03-10 NOTE — TELEPHONE ENCOUNTER
Reminder appt call     Patient is scheduled on 3/15/2022 @ 1130 am with an arrival time  1115a m in the [facility] location with Dr Kaitlyn Chua  Patient's mother confirmed appt

## 2022-03-15 ENCOUNTER — OFFICE VISIT (OUTPATIENT)
Dept: NEUROLOGY | Facility: CLINIC | Age: 32
End: 2022-03-15
Payer: COMMERCIAL

## 2022-03-15 VITALS
HEART RATE: 60 BPM | SYSTOLIC BLOOD PRESSURE: 98 MMHG | HEIGHT: 63 IN | WEIGHT: 117 LBS | TEMPERATURE: 97.9 F | BODY MASS INDEX: 20.73 KG/M2 | DIASTOLIC BLOOD PRESSURE: 60 MMHG

## 2022-03-15 DIAGNOSIS — R20.0 NUMBNESS: ICD-10-CM

## 2022-03-15 DIAGNOSIS — G63 NEUROPATHY DUE TO CELIAC DISEASE (HCC): Primary | ICD-10-CM

## 2022-03-15 DIAGNOSIS — G93.0 CEREBRAL CYSTS: ICD-10-CM

## 2022-03-15 DIAGNOSIS — K90.0 NEUROPATHY DUE TO CELIAC DISEASE (HCC): Primary | ICD-10-CM

## 2022-03-15 PROCEDURE — 99215 OFFICE O/P EST HI 40 MIN: CPT | Performed by: PSYCHIATRY & NEUROLOGY

## 2022-03-15 NOTE — PROGRESS NOTES
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 ([facility] [address] ) - Primary       Nervous and Auditory    Cerebral cysts    Relevant Orders    MRI brain MS wo and w contrast    BUN    Creatinine, serum       Other    Numbness         Ms Liliane Dasilva has presented to [facility] for follow up on abnormal imaging  Patient was found to have 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, no consideration for CNS demyelination at this point  MRI in  was consistent with "asymmetry in lateral ventricular size with poor visualization of the previously noted left lateral intraventricular arachnoid cyst   An alternative consideration for the mild expansion includes periventricular leukomalacia "  Patient described no new focal neurological dysfunction, patient has been experiencing sinus headaches recently, non concerning  Neurological manifestation of celiac disease such as Celiac neuropathy might be explaining intermittent sensory dysfunction in her lower extremities the patient did not describe any sensory loss in her upper or lower extremities  We also reviewed other neurologic manifestation of celiac disorder, printed material was provided  Patient is taking gabapentin for her mood disorder  Patient ambulates without assistive devices, no focal neurological dysfunction appreciated on today's exam     Patient is to continue follow with [facility] on annual basis  Subjective: light headedness, numbness in feet and headaches       HPI  Ms Liliane Dasilva is a 26-year-old female who was referred to [facility] for follow up on abnormal imaging studies    Patient has known history of arachnoid cyst, with leukomalacia noted around ventricle in occipital area; follow up imaging also found syrinx in thoracic region  Patient described no new focal neurological deficit with a no vision loss, no double vision, no facial asymmetry, no hearing loss, no focal weakness or sensory loss  Patient has been following with psychiatry team on scheduled bases for schizoaffective disorder vs bipolar disorder, and gabapentin provides excellent benefits  Patient was initially evaluated for rectal prolapse with known history of celiac disease      MRI brain 8/31/2020: No acute intracranial abnormality  Nonspecific periventricular and deep cerebral white matter hypoattenuation may represent periventricular leukomalacia   Other considerations include demyelinating disease or precocious microangiopathy    Stable right retrocerebellar arachnoid cyst    Stable asymmetry in lateral ventricular size with poor visualization of the previously noted left lateral intraventricular arachnoid cyst   An alternative consideration for the mild expansion includes periventricular leukomalacia  MRI T-spine: 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  At the T7 level, the syrinx cavity measures 2 mm in greatest dimension  No associated pathologic enhancement           Ms  Abigail Moreno has presented to [facility] [address] for evaluation of abnormal brain imaging  Patient has imaging on annual basis for arachnoid cyst, with stable radiographic findings has been noted  Patient also was described having mild expansion of lateral ventricles in occipital region with T2 hyperintensity in periventricular region  No classic signs of stroke/hemmorhage, MS or other CNS demyelination has been appreciated  Patient described she was born prematurely, with slight psychomotor delay in her development has been reported up until she started 3rd grade  Patient has recognize psychiatric disorder as well as she has been working with psychologist and psychiatrist on scheduled basis    Patient described experiencing several autoimmune disorders such as celiac disease as well as Raynaud's phenomenon, she has her management provided by GI team, and she is planning to have establish her care with Rheumatology team    Patient described no significant vision loss, or double vision but she reports having flashes or sports in her eyes, with purple dogs in clusters, versus dipping shiny spots in her visual fields  Patient was advised to return back to Ophthalmology team for evaluation of retinal arteries integrity as well as optic nerve function      Considering neurological evaluation, patient reports having numbness in her feet for last 12 months, which she described comes intermittently with no weakness or balance dysfunction has been reported  Celiac neuropathy might be one of my consideration, considering patient has normal vitamin B12 level and other workup will be completed with serological test provided  No progression of sensory dysfunction in her feet has been reported, she may require EMG studies after rheumatology team evaluation        We may consider imaging studies of the cervical and thoracic spine on her follow-up visit, after multiple other subspecialties complete her evaluation, including Ophthalmology  Patient is to continue gabapentin on prescribed dose for sleep related issue, as it also beneficial for sensory dysfunction in her feet        The following portions of the patient's history were reviewed and updated as appropriate:   She  has a past medical history of Anorexia nervosa, Anxiety, Celiac disease, Chronic kidney disease, CPAP (continuous positive airway pressure) dependence, Depression, Disease of thyroid gland, Gall bladder polyp, Gastroesophageal reflux disease (9/18/2018), Hallucination, Memory difficulty (7/12/2018), Plantar wart of left foot, Psychiatric illness, Raynaud's disease without gangrene, Renal atrophy, Schizoaffective disorder ([facility] [address] ), Self-injurious behavior, Sleep apnea, Sleep difficulties, Suicide attempt [facility]), and Urinary retention  She   Patient Active Problem List    Diagnosis Date Noted    Cerebral cysts 03/15/2022    Numbness 03/15/2022    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 ([facility] ) 03/15/2022    Allergic rhinitis 02/02/2022    Lump, breast 02/02/2022    Irritant contact dermatitis 11/19/2021    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[facility] ) 03/25/2021    Abnormal brain MRI 03/25/2021    Raynaud's phenomenon without gangrene 09/12/2020    Rectal prolapse 08/07/2020    Voiding dysfunction 02/12/2020    Plantar wart of left foot 11/15/2019    Gallbladder polyp 09/11/2019    Numbness and tingling of both feet 09/11/2019    Chronic kidney disease (CKD) stage G2/A2, mildly decreased glomerular filtration rate (GFR) between 60-89 mL/min/1 73 square meter and albuminuria creatinine ratio between  mg/g 09/03/2019    Retention, urine 09/03/2019    Renal atrophy 07/26/2019    Arachnoid cyst 07/26/2019    Celiac disease 09/18/2018    Gastroesophageal reflux disease 09/18/2018    Memory difficulty 07/12/2018    Primary insomnia 05/15/2018    Sleep disorder 04/11/2018    Schizoaffective disorder ([place] [id number] ) 07/07/2016    Hypothyroidism 07/25/2014     She  has a past surgical history that includes Eye surgery (Left); pr esophagogastroduodenoscopy transoral diagnostic (N/A, 2/9/2018); pr esophagogastroduodenoscopy transoral diagnostic (N/A, 11/6/2018); and pr lap, surg proctopexy (N/A, 1/12/2021)  Her family history includes Breast cancer in her paternal grandmother; Breast cancer (age of onset: 61) in her mother; Depression in her brother; Hypertension in her brother and father; No Known Problems in her maternal aunt, maternal aunt, maternal grandfather, and maternal grandmother; OCD in her brother; Other in her family; Prostate cancer in her paternal grandfather  She  reports that she has never smoked  She has never used smokeless tobacco  She reports previous alcohol use   She reports that she does not use drugs  Current Outpatient Medications   Medication Sig Dispense Refill    acetaminophen (TYLENOL) 325 mg tablet Take 2 tablets (650 mg total) by mouth every 6 (six) hours  0    benztropine (COGENTIN) 1 mg tablet Take 1 mg by mouth TAKE 1 TABLET IN AM AND 2 TABLETS IN PM      busPIRone (BUSPAR) 10 mg tablet Take 10 mg by mouth 2 (two) times a day      clonazePAM (KlonoPIN) 0 5 mg tablet Take 1 mg by mouth daily at bedtime      Dermatological Products, Misc  (EpiCeram) lotion        eszopiclone (LUNESTA) 3 MG tablet TAKE 1 TABLET BY MOUTH ONCE DAILY AT BEDTIME AS NEEDED FOR SLEEP TAKE  IMMEDIATELY  BEFORE  BEDTIME 30 tablet 0    famotidine (PEPCID) 40 MG tablet Take 1/2 (one-half) tablet by mouth once daily 15 tablet 5    gabapentin (NEURONTIN) 100 mg capsule 2 tablets in am      gabapentin (NEURONTIN) 400 mg capsule 400 mg daily at bedtime       liothyronine (CYTOMEL) 5 mcg tablet Take 1 tablet by mouth once daily 30 tablet 5    pantoprazole (PROTONIX) 40 mg tablet Take 1 tablet by mouth once daily 30 tablet 0    Rexulti 0 25 MG tablet Take 0 75 mg by mouth daily      tamsulosin (FLOMAX) 0 4 mg Take 1 capsule (0 4 mg total) by mouth daily with dinner 90 capsule 1     No current facility-administered medications for this visit  Current Outpatient Medications on File Prior to Visit   Medication Sig    acetaminophen (TYLENOL) 325 mg tablet Take 2 tablets (650 mg total) by mouth every 6 (six) hours    benztropine (COGENTIN) 1 mg tablet Take 1 mg by mouth TAKE 1 TABLET IN AM AND 2 TABLETS IN PM    busPIRone (BUSPAR) 10 mg tablet Take 10 mg by mouth 2 (two) times a day    clonazePAM (KlonoPIN) 0 5 mg tablet Take 1 mg by mouth daily at bedtime    Dermatological Products, Misc   (EpiCeram) lotion      eszopiclone (LUNESTA) 3 MG tablet TAKE 1 TABLET BY MOUTH ONCE DAILY AT BEDTIME AS NEEDED FOR SLEEP TAKE  IMMEDIATELY  BEFORE  BEDTIME    famotidine (PEPCID) 40 MG tablet Take 1/2 (one-half) tablet by mouth once daily    gabapentin (NEURONTIN) 100 mg capsule 2 tablets in am    gabapentin (NEURONTIN) 400 mg capsule 400 mg daily at bedtime     liothyronine (CYTOMEL) 5 mcg tablet Take 1 tablet by mouth once daily    pantoprazole (PROTONIX) 40 mg tablet Take 1 tablet by mouth once daily    Rexulti 0 25 MG tablet Take 0 75 mg by mouth daily    tamsulosin (FLOMAX) 0 4 mg Take 1 capsule (0 4 mg total) by mouth daily with dinner    [DISCONTINUED] oxybutynin (DITROPAN-XL) 10 MG 24 hr tablet Take 1 tablet (10 mg total) by mouth daily at bedtime     No current facility-administered medications on file prior to visit  She is allergic to gluten meal - food allergy and latex            Objective:    Blood pressure 98/60, pulse 60, temperature 97 9 °F (36 6 °C), temperature source Skin, height 5' 3" (1 6 m), weight 53 1 kg (117 lb), not currently breastfeeding  Physical Exam    Neurological Exam  CONSTITUTIONAL: NAD, pleasant  NECK: supple, no lymphadenopathy, no thyromegaly, no JVD  CARDIOVASCULAR: RRR, normal S1S2, no murmurs, no rubs  RESP: clear to auscultation bilaterally, no wheezes/rhonchi/rales  ABDOMEN: soft, non tender, non distended  SKIN: no rash or skin lesions  EXTREMITIES: no edema, pulses 2+bilaterally  PSYCH: appropriate mood and affect  NEUROLOGIC COMPREHENSIVE EXAM: Patient is oriented to person, place and time, NAD; appropriate affect  CN II, III, IV, V, VI, VII,VIII,IX,X,XI-XII intact with EOMI, PERRLA, OKN intact, VF grossly intact, fundi poorly visualized secondary to pupillary constriction; symmetric face noted  Motor: 5/5 UE/LE bilateral symmetric; Sensory: intact to light touch and pinprick bilaterally; normal vibration sensation feet bilaterally; Coordination abnormal limits on FTN and MADHU testing left worse than right; DTR: 2/4 through, no Babinski, no clonus  Tandem gait is intact  Romberg: absent        ROS:  12 points of review of system was reviewed with the patient and was unremarkable with exception: see HPI  Review of Systems   Constitutional: Negative  Negative for appetite change and fever  HENT: Negative  Negative for hearing loss, tinnitus, trouble swallowing and voice change  Eyes: Negative  Negative for photophobia and pain  Respiratory: Negative  Negative for shortness of breath  Cardiovascular: Negative  Negative for palpitations  Gastrointestinal: Negative  Negative for nausea and vomiting  Endocrine: Negative  Negative for cold intolerance  Genitourinary: Negative  Negative for dysuria, frequency and urgency  Musculoskeletal: Negative  Negative for myalgias and neck pain  Skin: Negative  Negative for rash  Neurological: Positive for light-headedness, numbness (feet) and headaches  Negative for dizziness, tremors, seizures, syncope, facial asymmetry, speech difficulty and weakness  Hematological: Negative  Does not bruise/bleed easily  Psychiatric/Behavioral: Negative  Negative for confusion, hallucinations and sleep disturbance

## 2022-03-23 ENCOUNTER — APPOINTMENT (OUTPATIENT)
Dept: LAB | Facility: CLINIC | Age: 32
End: 2022-03-23
Payer: COMMERCIAL

## 2022-03-23 DIAGNOSIS — G93.0 CEREBRAL CYSTS: ICD-10-CM

## 2022-03-23 LAB
BUN SERPL-MCNC: 15 MG/DL (ref 5–25)
CREAT SERPL-MCNC: 1.05 MG/DL (ref 0.6–1.3)
GFR SERPL CREATININE-BSD FRML MDRD: 69 ML/MIN/1.73SQ M

## 2022-03-23 PROCEDURE — 84520 ASSAY OF UREA NITROGEN: CPT

## 2022-03-23 PROCEDURE — 36415 COLL VENOUS BLD VENIPUNCTURE: CPT

## 2022-03-23 PROCEDURE — 82565 ASSAY OF CREATININE: CPT

## 2022-03-28 DIAGNOSIS — K21.9 GASTROESOPHAGEAL REFLUX DISEASE: ICD-10-CM

## 2022-03-28 RX ORDER — PANTOPRAZOLE SODIUM 40 MG/1
TABLET, DELAYED RELEASE ORAL
Qty: 30 TABLET | Refills: 0 | Status: SHIPPED | OUTPATIENT
Start: 2022-03-28 | End: 2022-04-26

## 2022-04-02 DIAGNOSIS — F51.01 PRIMARY INSOMNIA: ICD-10-CM

## 2022-04-02 DIAGNOSIS — E03.9 HYPOTHYROIDISM, UNSPECIFIED TYPE: ICD-10-CM

## 2022-04-02 RX ORDER — LIOTHYRONINE SODIUM 5 UG/1
TABLET ORAL
Qty: 30 TABLET | Refills: 5 | Status: SHIPPED | OUTPATIENT
Start: 2022-04-02

## 2022-04-04 RX ORDER — ESZOPICLONE 3 MG/1
TABLET, FILM COATED ORAL
Qty: 30 TABLET | Refills: 0 | Status: SHIPPED | OUTPATIENT
Start: 2022-04-04 | End: 2022-04-11

## 2022-04-07 ENCOUNTER — HOSPITAL ENCOUNTER (OUTPATIENT)
Dept: MRI IMAGING | Facility: HOSPITAL | Age: 32
Discharge: HOME | End: 2022-04-07
Attending: PSYCHIATRY & NEUROLOGY
Payer: COMMERCIAL

## 2022-04-07 DIAGNOSIS — G93.0 CEREBRAL CYSTS: ICD-10-CM

## 2022-04-07 PROCEDURE — A9585 GADOBUTROL INJECTION: HCPCS | Performed by: PSYCHIATRY & NEUROLOGY

## 2022-04-07 PROCEDURE — G1004 CDSM NDSC: HCPCS

## 2022-04-07 PROCEDURE — 70553 MRI BRAIN STEM W/O & W/DYE: CPT

## 2022-04-07 RX ADMIN — GADOBUTROL 5 ML: 604.72 INJECTION INTRAVENOUS at 13:03

## 2022-04-11 ENCOUNTER — OFFICE VISIT (OUTPATIENT)
Dept: INTERNAL MEDICINE CLINIC | Facility: CLINIC | Age: 32
End: 2022-04-11
Payer: COMMERCIAL

## 2022-04-11 VITALS
OXYGEN SATURATION: 98 % | TEMPERATURE: 98.1 F | HEART RATE: 90 BPM | WEIGHT: 118.13 LBS | SYSTOLIC BLOOD PRESSURE: 108 MMHG | DIASTOLIC BLOOD PRESSURE: 60 MMHG | HEIGHT: 63 IN | BODY MASS INDEX: 20.93 KG/M2

## 2022-04-11 DIAGNOSIS — K62.3 RECTAL PROLAPSE: Primary | ICD-10-CM

## 2022-04-11 DIAGNOSIS — F51.01 PRIMARY INSOMNIA: ICD-10-CM

## 2022-04-11 PROBLEM — L24.9 IRRITANT CONTACT DERMATITIS: Status: RESOLVED | Noted: 2021-11-19 | Resolved: 2022-04-11

## 2022-04-11 PROBLEM — N39.8 VOIDING DYSFUNCTION: Status: RESOLVED | Noted: 2020-02-12 | Resolved: 2022-04-11

## 2022-04-11 PROBLEM — R20.0 NUMBNESS: Status: RESOLVED | Noted: 2022-03-15 | Resolved: 2022-04-11

## 2022-04-11 PROBLEM — B07.0 PLANTAR WART OF LEFT FOOT: Status: RESOLVED | Noted: 2019-11-15 | Resolved: 2022-04-11

## 2022-04-11 PROBLEM — N63.0 LUMP, BREAST: Status: RESOLVED | Noted: 2022-02-02 | Resolved: 2022-04-11

## 2022-04-11 PROCEDURE — 99214 OFFICE O/P EST MOD 30 MIN: CPT | Performed by: PHYSICIAN ASSISTANT

## 2022-04-11 RX ORDER — ZALEPLON 10 MG/1
10 CAPSULE ORAL
Qty: 30 CAPSULE | Refills: 2 | Status: SHIPPED | OUTPATIENT
Start: 2022-04-11 | End: 2022-07-11

## 2022-04-11 NOTE — PROGRESS NOTES
Assessment/Plan:  Problem List Items Addressed This Visit        Digestive    Rectal prolapse - Primary     Colorectal surgery referral placed  Relevant Orders    Ambulatory Referral to Colorectal Surgery       Other    Primary insomnia     Will discontinue lunesta in favor of Sonata  Directions for use and possible side effects discussed and patient verbalized understanding of these  Patient was informed that this medicine has an abuse potential and may be habit forming  We discussed that this may also cause drowsiness  The medication should not be combined with alcohol  The patient was informed not to operate machinery while taking this medication and should not drive until they know how they respond to the medication  The patient verbalized understanding of this  Relevant Medications    zaleplon (SONATA) 10 MG capsule           Diagnoses and all orders for this visit:    Rectal prolapse  -     Ambulatory Referral to Colorectal Surgery; Future    Primary insomnia  -     zaleplon (SONATA) 10 MG capsule; Take 1 capsule (10 mg total) by mouth daily at bedtime        Primary insomnia  Will discontinue lunesta in favor of Sonata  Directions for use and possible side effects discussed and patient verbalized understanding of these  Patient was informed that this medicine has an abuse potential and may be habit forming  We discussed that this may also cause drowsiness  The medication should not be combined with alcohol  The patient was informed not to operate machinery while taking this medication and should not drive until they know how they respond to the medication  The patient verbalized understanding of this  Rectal prolapse  Colorectal surgery referral placed  Subjective:      Patient ID: Armando Malcolm is a 34 y o  female  Pt presents for evaluation of recurrence of rectal prolapse  She notes symptoms recurred about 3 months ago   She notes persistent rectal pressure when standing that worsens when sitting  It has not been to the point of having to manually reduce like previous  She did have surgical correction of this approx 1 year ago  She desires another opinion as she feels her results should have lasted longer  She also relates that her lunesta no longer seems helpful for her insomnia  She previously tried and failed trazodone and ambien  The following portions of the patient's history were reviewed and updated as appropriate:   She has a past medical history of Anorexia nervosa, Anxiety, Celiac disease, Chronic kidney disease, CPAP (continuous positive airway pressure) dependence, Depression, Disease of thyroid gland, Gall bladder polyp, Gastroesophageal reflux disease (9/18/2018), Hallucination, Memory difficulty (7/12/2018), Plantar wart of left foot, Psychiatric illness, Raynaud's disease without gangrene, Renal atrophy, Schizoaffective disorder ([address] ), Self-injurious behavior, Sleep apnea, Sleep difficulties, Suicide attempt ([facility] [address] ), and Urinary retention  ,  does not have any pertinent problems on file  ,   has a past surgical history that includes Eye surgery (Left); pr esophagogastroduodenoscopy transoral diagnostic (N/A, 2/9/2018); pr esophagogastroduodenoscopy transoral diagnostic (N/A, 11/6/2018); and pr lap, surg proctopexy (N/A, 1/12/2021)  ,  family history includes Breast cancer in her paternal grandmother; Breast cancer (age of onset: 61) in her mother; Depression in her brother; Hypertension in her brother and father; No Known Problems in her maternal aunt, maternal aunt, maternal grandfather, and maternal grandmother; OCD in her brother; Other in her family; Prostate cancer in her paternal grandfather  ,   reports that she has never smoked  She has never used smokeless tobacco  She reports previous alcohol use  She reports that she does not use drugs  ,  is allergic to gluten meal - food allergy and latex     Current Outpatient Medications   Medication Sig Dispense Refill    benztropine (COGENTIN) 1 mg tablet Take 1 mg by mouth TAKE 1 TABLET IN AM AND 2 TABLETS IN PM      busPIRone (BUSPAR) 10 mg tablet Take 10 mg by mouth 2 (two) times a day      clonazePAM (KlonoPIN) 0 5 mg tablet Take 1 mg by mouth daily at bedtime      Dermatological Products, Misc  (EpiCeram) lotion        famotidine (PEPCID) 40 MG tablet Take 1/2 (one-half) tablet by mouth once daily 15 tablet 5    gabapentin (NEURONTIN) 100 mg capsule 2 tablets in am      gabapentin (NEURONTIN) 400 mg capsule 400 mg daily at bedtime       liothyronine (CYTOMEL) 5 mcg tablet Take 1 tablet by mouth once daily 30 tablet 5    pantoprazole (PROTONIX) 40 mg tablet Take 1 tablet by mouth once daily 30 tablet 0    Rexulti 0 25 MG tablet Take 0 75 mg by mouth daily      tamsulosin (FLOMAX) 0 4 mg Take 1 capsule (0 4 mg total) by mouth daily with dinner 90 capsule 1    zaleplon (SONATA) 10 MG capsule Take 1 capsule (10 mg total) by mouth daily at bedtime 30 capsule 2     No current facility-administered medications for this visit  Review of Systems   Constitutional: Negative for chills and fever  HENT: Negative for congestion, ear pain, hearing loss, postnasal drip, rhinorrhea, sinus pressure, sinus pain, sore throat and trouble swallowing  Eyes: Negative for pain and visual disturbance  Respiratory: Negative for cough, chest tightness, shortness of breath and wheezing  Cardiovascular: Negative  Negative for chest pain, palpitations and leg swelling  Gastrointestinal: Positive for rectal pain  Negative for abdominal pain, blood in stool, constipation, diarrhea, nausea and vomiting  Endocrine: Negative for cold intolerance, heat intolerance, polydipsia, polyphagia and polyuria  Genitourinary: Negative for difficulty urinating, dysuria, flank pain and urgency  Musculoskeletal: Negative for arthralgias, back pain, gait problem and myalgias  Skin: Negative for rash     Allergic/Immunologic: Negative  Neurological: Negative for dizziness, weakness, light-headedness and headaches  Hematological: Negative  Psychiatric/Behavioral: Positive for sleep disturbance  Negative for behavioral problems and dysphoric mood  The patient is not nervous/anxious  PHQ-2/9 Depression Screening            Objective:  Vitals:    04/11/22 1433   BP: 108/60   BP Location: Left arm   Patient Position: Sitting   Pulse: 90   Temp: 98 1 °F (36 7 °C)   SpO2: 98%   Weight: 53 6 kg (118 lb 2 oz)   Height: 5' 3" (1 6 m)     Body mass index is 20 92 kg/m²  Physical Exam  Constitutional:       General: She is not in acute distress  Appearance: She is well-developed  She is not diaphoretic  HENT:      Head: Normocephalic and atraumatic  Right Ear: External ear normal       Left Ear: External ear normal       Nose: Nose normal       Mouth/Throat:      Pharynx: No oropharyngeal exudate  Eyes:      General: No scleral icterus  Right eye: No discharge  Left eye: No discharge  Conjunctiva/sclera: Conjunctivae normal       Pupils: Pupils are equal, round, and reactive to light  Neck:      Thyroid: No thyromegaly  Cardiovascular:      Rate and Rhythm: Normal rate and regular rhythm  Heart sounds: Normal heart sounds  No murmur heard  No friction rub  No gallop  Pulmonary:      Effort: Pulmonary effort is normal  No respiratory distress  Breath sounds: Normal breath sounds  No wheezing or rales  Abdominal:      General: Bowel sounds are normal  There is no distension  Palpations: Abdomen is soft  Tenderness: There is no abdominal tenderness  Musculoskeletal:         General: No tenderness or deformity  Normal range of motion  Cervical back: Normal range of motion and neck supple  Skin:     General: Skin is warm and dry  Neurological:      Mental Status: She is alert and oriented to person, place, and time        Cranial Nerves: No cranial nerve deficit  Psychiatric:         Mood and Affect: Affect is blunt  Behavior: Behavior normal          Thought Content:  Thought content normal          Judgment: Judgment normal

## 2022-04-11 NOTE — ASSESSMENT & PLAN NOTE
Will discontinue lunesta in favor of Sonata  Directions for use and possible side effects discussed and patient verbalized understanding of these  Patient was informed that this medicine has an abuse potential and may be habit forming  We discussed that this may also cause drowsiness  The medication should not be combined with alcohol  The patient was informed not to operate machinery while taking this medication and should not drive until they know how they respond to the medication  The patient verbalized understanding of this

## 2022-04-26 DIAGNOSIS — K21.9 GASTROESOPHAGEAL REFLUX DISEASE: ICD-10-CM

## 2022-04-26 RX ORDER — PANTOPRAZOLE SODIUM 40 MG/1
TABLET, DELAYED RELEASE ORAL
Qty: 30 TABLET | Refills: 0 | Status: SHIPPED | OUTPATIENT
Start: 2022-04-26 | End: 2022-05-29

## 2022-05-29 DIAGNOSIS — K21.9 GASTROESOPHAGEAL REFLUX DISEASE: ICD-10-CM

## 2022-05-29 RX ORDER — PANTOPRAZOLE SODIUM 40 MG/1
TABLET, DELAYED RELEASE ORAL
Qty: 30 TABLET | Refills: 0 | Status: SHIPPED | OUTPATIENT
Start: 2022-05-29 | End: 2022-06-26

## 2022-06-13 ENCOUNTER — TELEPHONE (OUTPATIENT)
Dept: INTERNAL MEDICINE CLINIC | Facility: CLINIC | Age: 32
End: 2022-06-13

## 2022-06-13 NOTE — TELEPHONE ENCOUNTER
PT ASKING FOR AN MELINDA, STATS HER PHYCIATRIST WANTS HER TO HAVE AN EKG WITH qtz DONE  THAN PT STATS SHE WILL CALL [facility] SCHEDULING ABOUT THIS  I EXPLAINS THATS OK IF THERE IS AN ORDER FOR THIS TEST

## 2022-06-26 DIAGNOSIS — K21.9 GASTROESOPHAGEAL REFLUX DISEASE: ICD-10-CM

## 2022-06-26 RX ORDER — PANTOPRAZOLE SODIUM 40 MG/1
TABLET, DELAYED RELEASE ORAL
Qty: 30 TABLET | Refills: 0 | Status: SHIPPED | OUTPATIENT
Start: 2022-06-26 | End: 2022-07-30

## 2022-07-01 ENCOUNTER — OFFICE VISIT (OUTPATIENT)
Dept: LAB | Facility: HOSPITAL | Age: 32
End: 2022-07-01
Payer: COMMERCIAL

## 2022-07-01 DIAGNOSIS — F25.0 SCHIZOAFFECTIVE DISORDER, BIPOLAR TYPE (HCC): ICD-10-CM

## 2022-07-01 LAB
ATRIAL RATE: 56 BPM
P AXIS: 44 DEGREES
PR INTERVAL: 156 MS
QRS AXIS: 62 DEGREES
QRSD INTERVAL: 60 MS
QT INTERVAL: 416 MS
QTC INTERVAL: 401 MS
T WAVE AXIS: 38 DEGREES
VENTRICULAR RATE: 56 BPM

## 2022-07-01 PROCEDURE — 93010 ELECTROCARDIOGRAM REPORT: CPT | Performed by: INTERNAL MEDICINE

## 2022-07-01 PROCEDURE — 93005 ELECTROCARDIOGRAM TRACING: CPT

## 2022-07-10 DIAGNOSIS — F51.01 PRIMARY INSOMNIA: ICD-10-CM

## 2022-07-11 RX ORDER — ZALEPLON 10 MG/1
CAPSULE ORAL
Qty: 30 CAPSULE | Refills: 0 | Status: SHIPPED | OUTPATIENT
Start: 2022-07-11 | End: 2022-08-09

## 2022-07-19 ENCOUNTER — VBI (OUTPATIENT)
Dept: ADMINISTRATIVE | Facility: OTHER | Age: 32
End: 2022-07-19

## 2022-07-30 DIAGNOSIS — K21.9 GASTROESOPHAGEAL REFLUX DISEASE: ICD-10-CM

## 2022-07-30 RX ORDER — PANTOPRAZOLE SODIUM 40 MG/1
TABLET, DELAYED RELEASE ORAL
Qty: 30 TABLET | Refills: 0 | Status: SHIPPED | OUTPATIENT
Start: 2022-07-30 | End: 2022-08-28

## 2022-08-06 DIAGNOSIS — K21.9 GASTROESOPHAGEAL REFLUX DISEASE, UNSPECIFIED WHETHER ESOPHAGITIS PRESENT: ICD-10-CM

## 2022-08-06 RX ORDER — FAMOTIDINE 40 MG/1
TABLET, FILM COATED ORAL
Qty: 15 TABLET | Refills: 0 | Status: SHIPPED | OUTPATIENT
Start: 2022-08-06 | End: 2022-09-12

## 2022-08-08 DIAGNOSIS — F51.01 PRIMARY INSOMNIA: ICD-10-CM

## 2022-08-09 RX ORDER — ZALEPLON 10 MG/1
CAPSULE ORAL
Qty: 30 CAPSULE | Refills: 0 | Status: SHIPPED | OUTPATIENT
Start: 2022-08-09 | End: 2022-09-08

## 2022-08-28 DIAGNOSIS — K21.9 GASTROESOPHAGEAL REFLUX DISEASE: ICD-10-CM

## 2022-08-28 RX ORDER — PANTOPRAZOLE SODIUM 40 MG/1
TABLET, DELAYED RELEASE ORAL
Qty: 30 TABLET | Refills: 0 | Status: SHIPPED | OUTPATIENT
Start: 2022-08-28 | End: 2022-09-26

## 2022-09-07 DIAGNOSIS — F51.01 PRIMARY INSOMNIA: ICD-10-CM

## 2022-09-08 RX ORDER — ZALEPLON 10 MG/1
CAPSULE ORAL
Qty: 30 CAPSULE | Refills: 0 | Status: SHIPPED | OUTPATIENT
Start: 2022-09-08

## 2022-09-12 DIAGNOSIS — K21.9 GASTROESOPHAGEAL REFLUX DISEASE, UNSPECIFIED WHETHER ESOPHAGITIS PRESENT: ICD-10-CM

## 2022-09-12 RX ORDER — FAMOTIDINE 40 MG/1
TABLET, FILM COATED ORAL
Qty: 15 TABLET | Refills: 0 | Status: SHIPPED | OUTPATIENT
Start: 2022-09-12 | End: 2022-10-10

## 2022-09-14 ENCOUNTER — EVALUATION (OUTPATIENT)
Dept: PHYSICAL THERAPY | Age: 32
End: 2022-09-14
Payer: COMMERCIAL

## 2022-09-14 DIAGNOSIS — M62.89 PELVIC FLOOR DYSFUNCTION: Primary | ICD-10-CM

## 2022-09-14 PROCEDURE — 97110 THERAPEUTIC EXERCISES: CPT | Performed by: PHYSICAL THERAPIST

## 2022-09-14 PROCEDURE — 97162 PT EVAL MOD COMPLEX 30 MIN: CPT | Performed by: PHYSICAL THERAPIST

## 2022-09-14 PROCEDURE — 97530 THERAPEUTIC ACTIVITIES: CPT | Performed by: PHYSICAL THERAPIST

## 2022-09-14 NOTE — LETTER
2022    Nghia Castaneda, [address]    Patient: Beau Brown   YOB: 1990   Date of Visit: 2022     Encounter Diagnosis     ICD-10-CM    1  Pelvic floor dysfunction  M62 89        Dear Dr Khalida Moses: Thank you for your recent referral of Beau Brown  Please review the attached evaluation summary from Allie's recent visit  Please verify that you agree with the plan of care by signing the attached order  If you have any questions or concerns, please do not hesitate to call  I sincerely appreciate the opportunity to share in the care of one of your patients and hope to have another opportunity to work with you in the near future  Sincerely,    Nabila Rasmussen, PT      Referring Provider:      I certify that I have read the below Plan of Care and certify the need for these services furnished under this plan of treatment while under my care  Nghia Castaneda MD  [address] [place] [address]  Via Fax: [phone]          PT Evaluation     Today's date: 2022  Patient name: Beau Brown  : 1990  MRN: [id number]  Referring provider: Lisandra Smallwood MD  Dx:   Encounter Diagnosis     ICD-10-CM    1  Pelvic floor dysfunction  M62 89        Start Time:   Stop Time:   Total time in clinic (min): 55 minutes    Assessment  Assessment details: Corine Joseph is a 34year old female who presents with bowel symptoms of straining for defecation and feeling of incomplete bowel and bladder emptying as well as FI and fecal smearing  She may benefit from a trial of pelvic floor PT to address these impairments and improve overall quality of life  Direct PFM examination deferred until next treatment session  Time spent today in patient education of PFm anatomy, defecation and voiding mechanisms    Impairments: abnormal coordination, abnormal muscle firing, abnormal or restricted ROM, impaired physical strength, lacks appropriate home exercise program and pain with function  Understanding of Dx/Px/POC: good   Prognosis: good    Goals  STG 2-6 weeks  1  [place] in ongoing HEP throughout POC  2  Demonstrate proper posture for most effective toileting  3  Direct PFM examination    LTG 7-12 weeks  1  Decreased episodes of FI/fecal smearing  2  Positive use of relaxation techniques to maximize PFm function  3  Full relaxation of PFM post activation  4  3/5 PFM with full relaxation post activation  5  Maximize bladder/bowel emptying   6  Discharge planning    Plan  Patient would benefit from: skilled physical therapy  Planned modality interventions: biofeedback  Planned therapy interventions: manual therapy, motor coordination training, neuromuscular re-education, patient education, behavior modification, therapeutic exercise, therapeutic activities and home exercise program  Frequency: 1x week  Duration in weeks: 12  Plan of Care beginning date: 9/14/2022  Plan of Care expiration date: 12/13/2022  Treatment plan discussed with: caregiver and patient        PT Pelvic Floor Subjective:   History of Present Illness:   Underwent surgery (rectopexy) for rectal prolapse in January 2021  Notes bowel problems improved after initial healing phase then began to experience fecal smearing and fecal incontinence that varied  Notes she would wipe herself raw because smearing would not stop  She was recently see by colorectal specialist   Saw MD and assured that there was no prolapse or hemorrhoids per patient  Notes she continues to have to push and strains to have BM  Notes she can have BM 1-2 times per day and stool is generally a soft consistency  She does relate some urinary difficulties including difficulty initiating a urine stream at times and difficulty emptying bladder fully  Drinks mostly black coffee and water daily          Recurrent probem      Social Support:     Lives with:  Parents    Relationship status: never     Life stress severity: moderate  Diet and Exercise:      Exercise frequency: daily    TM jogging  walks  OB/ gyn History    Gestational History:     Delivery Complications:  Pain with GYN examination  Bladder Function:     Voiding Difficulties positive for: hesitancy and incomplete emptying      Voiding Difficulties comments:     Urinary leakage: no urine leakage    Urinary leakage aggravated by: post-void dribble    Nocturia (episodes per night): 0    Fluid Intake Type: Water and coffee    Intake (ounces): Water: 40, Coffee: 32,   Bowel Function:     Bowel Function comments:  Taking Miralax and Metamucil    Bowel frequency: daily    Uses "squatty potty": Squatty Potty  Sexual Function:     Sexually Active:  Non-contributory  Pain:     No pain reported by patient  Patient Goals:     Patient goals for therapy:  Improved bladder or bowel function    Other patient goals:  Strengthen muscles to avoid needing surgery      Objective     Static Posture     Head  Forward  Shoulders  Rounded  Lumbar Spine   Flattened  Active Range of Motion     Lumbar   Normal active range of motion    Strength/Myotome Testing     Left Hip   Planes of Motion   Flexion: 4+  Abduction: 4  Adduction: 4-    Right Hip   Planes of Motion   Flexion: 4+  Abduction: 4  Adduction: 4-  Pelvic Floor Exam   Position: supine exam  Abdominal assessment: No tenderness to palpation abdominal area  Deferred direct PFm examination until next treatment session    Diastatis   Diastasis recti present? no    Skin inspection:   no scars present                  Precautions: rectopexy with sigmoid resection      Manuals 9/14                                                                Neuro Re-Ed                                                                                                        Ther Ex             PFM activation ed 8                                                                                                       Ther Activity             PFM ed 15                         Gait Training                                       Modalities

## 2022-09-14 NOTE — PROGRESS NOTES
PT Evaluation     Today's date: 2022  Patient name: Desi Carranza  : 1990  MRN: [id number]  Referring provider: Susan Stallworth MD  Dx:   Encounter Diagnosis     ICD-10-CM    1  Pelvic floor dysfunction  M62 89        Start Time:   Stop Time:   Total time in clinic (min): 55 minutes    Assessment  Assessment details: Salbador Salas is a 34year old female who presents with bowel symptoms of straining for defecation and feeling of incomplete bowel and bladder emptying as well as FI and fecal smearing  She may benefit from a trial of pelvic floor PT to address these impairments and improve overall quality of life  Direct PFM examination deferred until next treatment session  Time spent today in patient education of PFm anatomy, defecation and voiding mechanisms  Impairments: abnormal coordination, abnormal muscle firing, abnormal or restricted ROM, impaired physical strength, lacks appropriate home exercise program and pain with function  Understanding of Dx/Px/POC: good   Prognosis: good    Goals  STG 2-6 weeks  1  [place] in ongoing HEP throughout POC  2  Demonstrate proper posture for most effective toileting  3  Direct PFM examination    LTG 7-12 weeks  1  Decreased episodes of FI/fecal smearing  2  Positive use of relaxation techniques to maximize PFm function  3  Full relaxation of PFM post activation  4  3/5 PFM with full relaxation post activation  5  Maximize bladder/bowel emptying   6   Discharge planning    Plan  Patient would benefit from: skilled physical therapy  Planned modality interventions: biofeedback  Planned therapy interventions: manual therapy, motor coordination training, neuromuscular re-education, patient education, behavior modification, therapeutic exercise, therapeutic activities and home exercise program  Frequency: 1x week  Duration in weeks: 12  Plan of Care beginning date: 2022  Plan of Care expiration date: 2022  Treatment plan discussed with: caregiver and patient        PT Pelvic Floor Subjective:   History of Present Illness:   Underwent surgery (rectopexy) for rectal prolapse in January 2021  Notes bowel problems improved after initial healing phase then began to experience fecal smearing and fecal incontinence that varied  Notes she would wipe herself raw because smearing would not stop  She was recently see by colorectal specialist   Saw MD and assured that there was no prolapse or hemorrhoids per patient  Notes she continues to have to push and strains to have BM  Notes she can have BM 1-2 times per day and stool is generally a soft consistency  She does relate some urinary difficulties including difficulty initiating a urine stream at times and difficulty emptying bladder fully  Drinks mostly black coffee and water daily  Recurrent probem      Social Support:     Lives with:  Parents    Relationship status: never     Life stress severity: moderate  Diet and Exercise:      Exercise frequency: daily    TM jogging  walks  OB/ gyn History    Gestational History:     Delivery Complications:  Pain with GYN examination  Bladder Function:     Voiding Difficulties positive for: hesitancy and incomplete emptying      Voiding Difficulties comments:     Urinary leakage: no urine leakage    Urinary leakage aggravated by: post-void dribble    Nocturia (episodes per night): 0    Fluid Intake Type: Water and coffee    Intake (ounces): Water: 40, Coffee: 32,   Bowel Function:     Bowel Function comments:  Taking Miralax and Metamucil    Bowel frequency: daily    Uses "squatty potty": Squatty Potty  Sexual Function:     Sexually Active:  Non-contributory  Pain:     No pain reported by patient  Patient Goals:     Patient goals for therapy:  Improved bladder or bowel function    Other patient goals:  Strengthen muscles to avoid needing surgery      Objective     Static Posture     Head  Forward  Shoulders  Rounded  Lumbar Spine   Flattened       Active Range of Motion     Lumbar   Normal active range of motion    Strength/Myotome Testing     Left Hip   Planes of Motion   Flexion: 4+  Abduction: 4  Adduction: 4-    Right Hip   Planes of Motion   Flexion: 4+  Abduction: 4  Adduction: 4-  Pelvic Floor Exam   Position: supine exam  Abdominal assessment: No tenderness to palpation abdominal area  Deferred direct PFm examination until next treatment session    Diastatis   Diastasis recti present? no    Skin inspection:   no scars present                  Precautions: rectopexy with sigmoid resection      Manuals 9/14                                                                Neuro Re-Ed                                                                                                        Ther Ex             PFM activation ed 8                                                                                                       Ther Activity             PFM ed 15                         Gait Training                                       Modalities

## 2022-09-21 ENCOUNTER — OFFICE VISIT (OUTPATIENT)
Dept: INTERNAL MEDICINE CLINIC | Facility: CLINIC | Age: 32
End: 2022-09-21
Payer: COMMERCIAL

## 2022-09-21 VITALS
BODY MASS INDEX: 20.4 KG/M2 | WEIGHT: 115.13 LBS | HEIGHT: 63 IN | HEART RATE: 66 BPM | TEMPERATURE: 98.2 F | OXYGEN SATURATION: 100 %

## 2022-09-21 DIAGNOSIS — Z13.0 SCREENING FOR DEFICIENCY ANEMIA: ICD-10-CM

## 2022-09-21 DIAGNOSIS — E55.9 VITAMIN D DEFICIENCY: ICD-10-CM

## 2022-09-21 DIAGNOSIS — Z13.1 SCREENING FOR DIABETES MELLITUS: ICD-10-CM

## 2022-09-21 DIAGNOSIS — F51.01 PRIMARY INSOMNIA: Primary | ICD-10-CM

## 2022-09-21 DIAGNOSIS — Z13.220 SCREENING, LIPID: ICD-10-CM

## 2022-09-21 DIAGNOSIS — E03.9 HYPOTHYROIDISM, UNSPECIFIED TYPE: ICD-10-CM

## 2022-09-21 PROCEDURE — 99214 OFFICE O/P EST MOD 30 MIN: CPT | Performed by: PHYSICIAN ASSISTANT

## 2022-09-21 RX ORDER — DEUTETRABENAZINE 6 MG/1
TABLET, COATED ORAL
COMMUNITY
Start: 2022-08-02 | End: 2022-09-21

## 2022-09-21 RX ORDER — ZOLPIDEM TARTRATE 12.5 MG/1
12.5 TABLET, FILM COATED, EXTENDED RELEASE ORAL
Qty: 30 TABLET | Refills: 0 | Status: SHIPPED | OUTPATIENT
Start: 2022-09-21 | End: 2022-09-22 | Stop reason: SDUPTHER

## 2022-09-21 RX ORDER — CLOBETASOL PROPIONATE 0.5 MG/G
CREAM TOPICAL
COMMUNITY
Start: 2022-08-30

## 2022-09-21 NOTE — ASSESSMENT & PLAN NOTE
Start [employer]  Directions for use and possible side effects discussed and patient verbalized understanding of these

## 2022-09-21 NOTE — PROGRESS NOTES
Name: Clara Fritz      : 1990      MRN: [id number]  Encounter Provider: Hoang Philip PA-C  Encounter Date: 2022   Encounter department: [address] [place] [address]  Primary insomnia  Assessment & Plan:  Start ambien cr  Directions for use and possible side effects discussed and patient verbalized understanding of these  Orders:  -     zolpidem (AMBIEN CR) 12 5 MG CR tablet; Take 1 tablet (12 5 mg total) by mouth daily at bedtime as needed for sleep    2  Hypothyroidism, unspecified type  -     TSH, 3rd generation; Future    3  Screening for deficiency anemia  -     CBC and differential; Future    4  Screening for diabetes mellitus  -     Comprehensive metabolic panel; Future    5  Vitamin D deficiency  -     Vitamin D 25 hydroxy; Future    6  Screening, lipid  -     Lipid panel; Future           Subjective      Pt presents to discuss issues with insomnia  She has trouble with both falling asleep and staying asleep  She was most recently on sonata which worked initially but has since become ineffective  She has previously tried and failed trazodone, lunesta, seroquel, and ambien  She is interested in revisiting [place]  She also notes issues with a rash that occurs on her cheeks and nose near daily but it is not present now  She would chanda to see dermatology for an opinion  Review of Systems   Constitutional: Negative for chills and fever  HENT: Negative for congestion, ear pain, hearing loss, postnasal drip, rhinorrhea, sinus pressure, sinus pain, sore throat and trouble swallowing  Eyes: Negative for pain and visual disturbance  Respiratory: Negative for cough, chest tightness, shortness of breath and wheezing  Cardiovascular: Negative  Negative for chest pain, palpitations and leg swelling  Gastrointestinal: Negative for abdominal pain, blood in stool, constipation, diarrhea, nausea and vomiting     Endocrine: Negative for cold intolerance, heat intolerance, polydipsia, polyphagia and polyuria  Genitourinary: Negative for difficulty urinating, dysuria, flank pain and urgency  Musculoskeletal: Negative for arthralgias, back pain, gait problem and myalgias  Skin: Positive for rash  Allergic/Immunologic: Negative  Neurological: Negative for dizziness, weakness, light-headedness and headaches  Hematological: Negative  Psychiatric/Behavioral: Positive for sleep disturbance  Negative for behavioral problems and dysphoric mood  The patient is not nervous/anxious  Current Outpatient Medications on File Prior to Visit   Medication Sig    benztropine (COGENTIN) 1 mg tablet Take 1 mg by mouth TAKE 1 TABLET IN AM AND 2 TABLETS IN PM    brexpiprazole (REXULTI) 0 25 MG tablet Take 0 5 mg by mouth daily    busPIRone (BUSPAR) 10 mg tablet Take 10 mg by mouth 2 (two) times a day    clobetasol (TEMOVATE) 0 05 % cream APPLY THIN LAYER TWICE DAILY TO RASH ON HANDS FOR 2 WEEKS, THEN DAILY FOR 2 WEEKS AS NEEDED FOR PERSISTENT RASH    clonazePAM (KlonoPIN) 0 5 mg tablet Take 1 mg by mouth daily at bedtime    Dermatological Products, Misc   (EpiCeram) lotion      famotidine (PEPCID) 40 MG tablet Take 1/2 (one-half) tablet by mouth once daily    gabapentin (NEURONTIN) 100 mg capsule 2 tablets in am    gabapentin (NEURONTIN) 400 mg capsule 400 mg daily at bedtime     liothyronine (CYTOMEL) 5 mcg tablet Take 1 tablet by mouth once daily    pantoprazole (PROTONIX) 40 mg tablet Take 1 tablet by mouth once daily    tamsulosin (FLOMAX) 0 4 mg Take 1 capsule (0 4 mg total) by mouth daily with dinner    zaleplon (SONATA) 10 MG capsule Take 1 capsule by mouth once daily at bedtime    [DISCONTINUED] Austedo 6 MG TABS  (Patient not taking: Reported on 9/21/2022)    [DISCONTINUED] oxybutynin (DITROPAN-XL) 10 MG 24 hr tablet Take 1 tablet (10 mg total) by mouth daily at bedtime    [DISCONTINUED] Rexulti 0 25 MG tablet Take 0 75 mg by mouth daily Objective     Pulse 66   Temp 98 2 °F (36 8 °C)   Ht 5' 3" (1 6 m)   Wt 52 2 kg (115 lb 2 oz)   SpO2 100%   BMI 20 39 kg/m²     Physical Exam  Constitutional:       General: She is not in acute distress  Appearance: She is well-developed  She is not diaphoretic  HENT:      Head: Normocephalic and atraumatic  Right Ear: External ear normal       Left Ear: External ear normal       Nose: Nose normal       Mouth/Throat:      Pharynx: No oropharyngeal exudate  Eyes:      General: No scleral icterus  Right eye: No discharge  Left eye: No discharge  Conjunctiva/sclera: Conjunctivae normal       Pupils: Pupils are equal, round, and reactive to light  Neck:      Thyroid: No thyromegaly  Cardiovascular:      Rate and Rhythm: Normal rate and regular rhythm  Heart sounds: Normal heart sounds  No murmur heard  No friction rub  No gallop  Pulmonary:      Effort: Pulmonary effort is normal  No respiratory distress  Breath sounds: Normal breath sounds  No wheezing or rales  Abdominal:      General: Bowel sounds are normal  There is no distension  Palpations: Abdomen is soft  Tenderness: There is no abdominal tenderness  Musculoskeletal:         General: No tenderness or deformity  Normal range of motion  Cervical back: Normal range of motion and neck supple  Skin:     General: Skin is warm and dry  Neurological:      Mental Status: She is alert and oriented to person, place, and time  Cranial Nerves: No cranial nerve deficit  Psychiatric:         Mood and Affect: Mood is anxious  Affect is flat  Behavior: Behavior normal          Thought Content:  Thought content normal          Judgment: Judgment normal        Anita Meyer PA-C

## 2022-09-22 DIAGNOSIS — F51.01 PRIMARY INSOMNIA: ICD-10-CM

## 2022-09-22 RX ORDER — ZOLPIDEM TARTRATE 12.5 MG/1
12.5 TABLET, FILM COATED, EXTENDED RELEASE ORAL
Qty: 30 TABLET | Refills: 0 | Status: SHIPPED | OUTPATIENT
Start: 2022-09-22 | End: 2022-10-24

## 2022-09-25 DIAGNOSIS — K21.9 GASTROESOPHAGEAL REFLUX DISEASE: ICD-10-CM

## 2022-09-26 RX ORDER — PANTOPRAZOLE SODIUM 40 MG/1
TABLET, DELAYED RELEASE ORAL
Qty: 30 TABLET | Refills: 5 | Status: SHIPPED | OUTPATIENT
Start: 2022-09-26 | End: 2022-10-07 | Stop reason: SDUPTHER

## 2022-09-28 ENCOUNTER — APPOINTMENT (OUTPATIENT)
Dept: PHYSICAL THERAPY | Age: 32
End: 2022-09-28
Payer: COMMERCIAL

## 2022-10-03 ENCOUNTER — APPOINTMENT (OUTPATIENT)
Dept: LAB | Facility: HOSPITAL | Age: 32
End: 2022-10-03
Payer: COMMERCIAL

## 2022-10-03 DIAGNOSIS — Z13.1 SCREENING FOR DIABETES MELLITUS: ICD-10-CM

## 2022-10-03 DIAGNOSIS — E03.9 HYPOTHYROIDISM, UNSPECIFIED TYPE: ICD-10-CM

## 2022-10-03 DIAGNOSIS — N18.2 CKD (CHRONIC KIDNEY DISEASE) STAGE 2, GFR 60-89 ML/MIN: ICD-10-CM

## 2022-10-03 DIAGNOSIS — Z13.220 SCREENING, LIPID: ICD-10-CM

## 2022-10-03 DIAGNOSIS — Z13.0 SCREENING FOR DEFICIENCY ANEMIA: ICD-10-CM

## 2022-10-03 DIAGNOSIS — E55.9 VITAMIN D DEFICIENCY: ICD-10-CM

## 2022-10-03 LAB
25(OH)D3 SERPL-MCNC: 32.9 NG/ML (ref 30–100)
ALBUMIN SERPL BCP-MCNC: 4.5 G/DL (ref 3.5–5)
ALP SERPL-CCNC: 65 U/L (ref 34–104)
ALT SERPL W P-5'-P-CCNC: 26 U/L (ref 7–52)
ANION GAP SERPL CALCULATED.3IONS-SCNC: 6 MMOL/L (ref 4–13)
AST SERPL W P-5'-P-CCNC: 24 U/L (ref 13–39)
BACTERIA UR QL AUTO: ABNORMAL /HPF
BASOPHILS # BLD AUTO: 0.04 THOUSANDS/ΜL (ref 0–0.1)
BASOPHILS NFR BLD AUTO: 1 % (ref 0–1)
BILIRUB SERPL-MCNC: 1.33 MG/DL (ref 0.2–1)
BILIRUB UR QL STRIP: NEGATIVE
BUN SERPL-MCNC: 12 MG/DL (ref 5–25)
CALCIUM SERPL-MCNC: 9.7 MG/DL (ref 8.4–10.2)
CHLORIDE SERPL-SCNC: 100 MMOL/L (ref 96–108)
CHOLEST SERPL-MCNC: 113 MG/DL
CLARITY UR: CLEAR
CO2 SERPL-SCNC: 30 MMOL/L (ref 21–32)
COLOR UR: YELLOW
CREAT SERPL-MCNC: 1.09 MG/DL (ref 0.6–1.3)
CREAT UR-MCNC: <13 MG/DL
CREAT UR-MCNC: <13 MG/DL
EOSINOPHIL # BLD AUTO: 0.12 THOUSAND/ΜL (ref 0–0.61)
EOSINOPHIL NFR BLD AUTO: 2 % (ref 0–6)
ERYTHROCYTE [DISTWIDTH] IN BLOOD BY AUTOMATED COUNT: 12.7 % (ref 11.6–15.1)
GFR SERPL CREATININE-BSD FRML MDRD: 66 ML/MIN/1.73SQ M
GLUCOSE P FAST SERPL-MCNC: 96 MG/DL (ref 65–99)
GLUCOSE UR STRIP-MCNC: NEGATIVE MG/DL
HCT VFR BLD AUTO: 45.1 % (ref 34.8–46.1)
HDLC SERPL-MCNC: 58 MG/DL
HGB BLD-MCNC: 14.4 G/DL (ref 11.5–15.4)
HGB UR QL STRIP.AUTO: NEGATIVE
IMM GRANULOCYTES # BLD AUTO: 0.01 THOUSAND/UL (ref 0–0.2)
IMM GRANULOCYTES NFR BLD AUTO: 0 % (ref 0–2)
KETONES UR STRIP-MCNC: NEGATIVE MG/DL
LDLC SERPL CALC-MCNC: 49 MG/DL (ref 0–100)
LEUKOCYTE ESTERASE UR QL STRIP: NEGATIVE
LYMPHOCYTES # BLD AUTO: 2.06 THOUSANDS/ΜL (ref 0.6–4.47)
LYMPHOCYTES NFR BLD AUTO: 33 % (ref 14–44)
MAGNESIUM SERPL-MCNC: 2.1 MG/DL (ref 1.9–2.7)
MCH RBC QN AUTO: 29.3 PG (ref 26.8–34.3)
MCHC RBC AUTO-ENTMCNC: 31.9 G/DL (ref 31.4–37.4)
MCV RBC AUTO: 92 FL (ref 82–98)
MICROALBUMIN UR-MCNC: <5 MG/L (ref 0–20)
MONOCYTES # BLD AUTO: 0.49 THOUSAND/ΜL (ref 0.17–1.22)
MONOCYTES NFR BLD AUTO: 8 % (ref 4–12)
NEUTROPHILS # BLD AUTO: 3.47 THOUSANDS/ΜL (ref 1.85–7.62)
NEUTS SEG NFR BLD AUTO: 56 % (ref 43–75)
NITRITE UR QL STRIP: NEGATIVE
NON-SQ EPI CELLS URNS QL MICRO: ABNORMAL /HPF
NONHDLC SERPL-MCNC: 55 MG/DL
NRBC BLD AUTO-RTO: 0 /100 WBCS
PH UR STRIP.AUTO: 6.5 [PH]
PLATELET # BLD AUTO: 212 THOUSANDS/UL (ref 149–390)
PMV BLD AUTO: 10.2 FL (ref 8.9–12.7)
POTASSIUM SERPL-SCNC: 4.3 MMOL/L (ref 3.5–5.3)
PROT SERPL-MCNC: 7.1 G/DL (ref 6.4–8.4)
PROT UR STRIP-MCNC: NEGATIVE MG/DL
PROT UR-MCNC: <6 MG/DL
RBC # BLD AUTO: 4.91 MILLION/UL (ref 3.81–5.12)
RBC #/AREA URNS AUTO: ABNORMAL /HPF
SODIUM SERPL-SCNC: 136 MMOL/L (ref 135–147)
SP GR UR STRIP.AUTO: <=1.005 (ref 1–1.03)
TRIGL SERPL-MCNC: 32 MG/DL
TSH SERPL DL<=0.05 MIU/L-ACNC: 0.64 UIU/ML (ref 0.45–4.5)
UROBILINOGEN UR QL STRIP.AUTO: 0.2 E.U./DL
WBC # BLD AUTO: 6.19 THOUSAND/UL (ref 4.31–10.16)
WBC #/AREA URNS AUTO: ABNORMAL /HPF

## 2022-10-03 PROCEDURE — 85025 COMPLETE CBC W/AUTO DIFF WBC: CPT

## 2022-10-03 PROCEDURE — 80053 COMPREHEN METABOLIC PANEL: CPT

## 2022-10-03 PROCEDURE — 82043 UR ALBUMIN QUANTITATIVE: CPT

## 2022-10-03 PROCEDURE — 83735 ASSAY OF MAGNESIUM: CPT

## 2022-10-03 PROCEDURE — 82570 ASSAY OF URINE CREATININE: CPT

## 2022-10-03 PROCEDURE — 36415 COLL VENOUS BLD VENIPUNCTURE: CPT

## 2022-10-03 PROCEDURE — 81001 URINALYSIS AUTO W/SCOPE: CPT

## 2022-10-03 PROCEDURE — 80061 LIPID PANEL: CPT

## 2022-10-03 PROCEDURE — 84443 ASSAY THYROID STIM HORMONE: CPT

## 2022-10-03 PROCEDURE — 84156 ASSAY OF PROTEIN URINE: CPT

## 2022-10-03 PROCEDURE — 82306 VITAMIN D 25 HYDROXY: CPT

## 2022-10-04 NOTE — PROGRESS NOTES
Daily Note     Today's date: 10/5/2022  Patient name: Desi Carranza  : 1990  MRN: [id number]  Referring provider: Susan Stallworth MD  Dx:   Encounter Diagnosis     ICD-10-CM    1  Pelvic floor dysfunction  M62 89                   Subjective: Patient notes good and bad times  Notes less accidents but seems to depend on stool consistency  Objective: See treatment diary below  3-/5 PFM for 3 repetitions before fatigue; 4 quick in 10 second; 1/10 or less tenderness to palpation  3-/5 AS 5 seconds with full relaxation  Increased tone PFM left greater than right  Assessment: Tolerated treatment well  Patient would benefit from continued PT Patient education on direct PFM examination with written and verbal consent provided  HEP instruction this date  Verbalized and demonstrated understanding  Written handouts provided  Encouraged PFM relaxation post activation  HEP 3 times per day to tolerance  Plan: Continue per plan of care        Precautions: rectopexy with sigmoid resection    HEP Access Code: [email or web address]       Manuals 9/14 10/5                                                               Neuro Re-Ed                                                                                                        Ther Ex             PFM activation ed 8                         AS CR  3"/10"/5x  HEP           Sub max AS  10"/2x  HEP           Quick AS  1"/10x  HEP           Child pose  20"/1x  HEP           Happy Baby  20"/1x  HEP           Standing deep squat  1x  HEP           Ther Activity             PFM ed 15 Exam  15                        Gait Training                                       Modalities

## 2022-10-05 ENCOUNTER — OFFICE VISIT (OUTPATIENT)
Dept: PHYSICAL THERAPY | Age: 32
End: 2022-10-05
Payer: COMMERCIAL

## 2022-10-05 DIAGNOSIS — M62.89 PELVIC FLOOR DYSFUNCTION: Primary | ICD-10-CM

## 2022-10-05 PROCEDURE — 97110 THERAPEUTIC EXERCISES: CPT | Performed by: PHYSICAL THERAPIST

## 2022-10-05 PROCEDURE — 97140 MANUAL THERAPY 1/> REGIONS: CPT | Performed by: PHYSICAL THERAPIST

## 2022-10-06 ENCOUNTER — OFFICE VISIT (OUTPATIENT)
Dept: GASTROENTEROLOGY | Facility: MEDICAL CENTER | Age: 32
End: 2022-10-06
Payer: COMMERCIAL

## 2022-10-06 VITALS
DIASTOLIC BLOOD PRESSURE: 74 MMHG | SYSTOLIC BLOOD PRESSURE: 113 MMHG | TEMPERATURE: 97.7 F | HEART RATE: 81 BPM | BODY MASS INDEX: 19.98 KG/M2 | WEIGHT: 112.8 LBS

## 2022-10-06 DIAGNOSIS — K90.0 CELIAC DISEASE: Primary | ICD-10-CM

## 2022-10-06 DIAGNOSIS — K21.9 GASTROESOPHAGEAL REFLUX DISEASE, UNSPECIFIED WHETHER ESOPHAGITIS PRESENT: ICD-10-CM

## 2022-10-06 DIAGNOSIS — R14.0 BLOATING: ICD-10-CM

## 2022-10-06 PROBLEM — R10.10 PAIN OF UPPER ABDOMEN: Status: ACTIVE | Noted: 2022-10-06

## 2022-10-06 PROBLEM — R10.9 ABDOMINAL PAIN: Status: ACTIVE | Noted: 2022-10-06

## 2022-10-06 PROCEDURE — 99243 OFF/OP CNSLTJ NEW/EST LOW 30: CPT | Performed by: PHYSICIAN ASSISTANT

## 2022-10-06 NOTE — PROGRESS NOTES
Assessment/Plan:     Diagnoses and all orders for this visit:    Celiac disease  Patient has a history of celiac disease  She follows a strict gluten free diet  Her most recent blood count was within normal limits  No recent iron studies for the last 2 years at which time her ferritin was low, would recommend repeating   -     Iron Panel (Includes Ferritin, Iron Sat%, Iron, and TIBC); Future    Bloating  Her biggest complaint today is bloating and abdominal distention  She states she is moving her bowels regularly  No significant distention noted on exam   Would recommend SIBO testing as well as a complete ultrasound as she has had no abdominal imaging   -     Small intestinal bacterial overgrowth  -     US abdomen complete; Future    Gastroesophageal reflux disease, unspecified whether esophagitis present  She does have a history of GERD, symptoms are well controlled on pantoprazole 40 mg daily and Pepcid 20 mg daily  Will see her back in 3 months or sooner if necessary  Subjective:      Patient ID: Ric Mathur is a 34 y o  female  HPI     Pt is here for bloating  She was previously seen in 2019 for GERD & celiac  She is currently taking pantoprazole 40mg daily & pepcid 20mg daily with good control of her symptoms  She does have a diagnosis of celiac & follows a strict gluten free diet  She was previously having constipation but now states she moves her bowels regularly  Her biggest complaint today is bloating  She states it has been for approximately 1 yr  She denies pain  She had an EGD in Nov 2018 which was [facility]'s  Colonoscopy perform in 2020 [facility]'s         Patient Active Problem List   Diagnosis    Hypothyroidism    Schizoaffective disorder ([facility] [address] )    Sleep disorder    Primary insomnia    Memory difficulty    Celiac disease    Gastroesophageal reflux disease    Renal atrophy    Arachnoid cyst    Chronic kidney disease (CKD) stage G2/A2, mildly decreased glomerular filtration rate (GFR) between 60-89 mL/min/1 73 square meter and albuminuria creatinine ratio between  mg/g    Retention, urine    Gallbladder polyp    Numbness and tingling of both feet    Rectal prolapse    Raynaud's phenomenon without gangrene    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HCC)    Abnormal brain MRI    Allergic rhinitis    Cerebral cysts    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)    Abdominal pain     Allergies   Allergen Reactions    Gluten Meal - Food Allergy     Latex Swelling     Current Outpatient Medications on File Prior to Visit   Medication Sig    benztropine (COGENTIN) 1 mg tablet Take 1 mg by mouth TAKE 1 TABLET IN AM AND 2 TABLETS IN PM    brexpiprazole (REXULTI) 0 25 MG tablet Take 0 5 mg by mouth daily    busPIRone (BUSPAR) 10 mg tablet Take 10 mg by mouth 2 (two) times a day    clobetasol (TEMOVATE) 0 05 % cream APPLY THIN LAYER TWICE DAILY TO RASH ON HANDS FOR 2 WEEKS, THEN DAILY FOR 2 WEEKS AS NEEDED FOR PERSISTENT RASH    clonazePAM (KlonoPIN) 0 5 mg tablet Take 1 mg by mouth daily at bedtime    Dermatological Products, Misc  (EpiCeram) lotion      famotidine (PEPCID) 40 MG tablet Take 1/2 (one-half) tablet by mouth once daily    gabapentin (NEURONTIN) 100 mg capsule 2 tablets in am    gabapentin (NEURONTIN) 400 mg capsule 400 mg daily at bedtime     liothyronine (CYTOMEL) 5 mcg tablet Take 1 tablet by mouth once daily    pantoprazole (PROTONIX) 40 mg tablet Take 1 tablet by mouth once daily    tamsulosin (FLOMAX) 0 4 mg Take 1 capsule (0 4 mg total) by mouth daily with dinner    zolpidem (AMBIEN CR) 12 5 MG CR tablet Take 1 tablet (12 5 mg total) by mouth daily at bedtime as needed for sleep    zaleplon (SONATA) 10 MG capsule Take 1 capsule by mouth once daily at bedtime    [DISCONTINUED] oxybutynin (DITROPAN-XL) 10 MG 24 hr tablet Take 1 tablet (10 mg total) by mouth daily at bedtime     No current facility-administered medications on file prior to visit       Family History   Problem Relation Age of Onset    Hypertension Father         benign essential    Hypertension Brother         benign essential     OCD Brother     Depression Brother     Other Family         nasolacrimal duct obstruction, acquired    Breast cancer Mother 61    No Known Problems Maternal Grandmother     No Known Problems Maternal Grandfather     Breast cancer Paternal Grandmother     Prostate cancer Paternal Grandfather     No Known Problems Maternal Aunt     No Known Problems Maternal Aunt     Colon cancer Neg Hx      Past Medical History:   Diagnosis Date    Anorexia nervosa     pt denies history at time of admission 7/21/18    Anxiety     Celiac disease     Chronic kidney disease     CPAP (continuous positive airway pressure) dependence     waiting for a new mask    Depression     Disease of thyroid gland     hypo    Gall bladder polyp     Gastroesophageal reflux disease 9/18/2018    Hallucination     Memory difficulty 7/12/2018    Plantar wart of left foot     Psychiatric illness     Raynaud's disease without gangrene     Renal atrophy     [facility]oaffective disorder ([facility] [address]     Self-injurious behavior     Sleep apnea     Sleep difficulties     Suicide attempt ([facility] [address] )     history of attempt at age 22 by overdose     Urinary retention      Social History     Socioeconomic History    Marital status: Single     Spouse name: None    Number of children: None    Years of education: None    Highest education level: None   Occupational History    Occupation: UNEMPLOYED   Tobacco Use    Smoking status: Never Smoker    Smokeless tobacco: Never Used   Vaping Use    Vaping Use: Never used   Substance and Sexual Activity    Alcohol use: Not Currently    Drug use: No    Sexual activity: Not Currently   Other Topics Concern    None   Social History Narrative    UNEMPLOYED     Social Determinants of Health     Financial Resource Strain: Not on file   Food Insecurity: Not on file   Transportation Needs: Not on file   Physical Activity: Not on file   Stress: Not on file   Social Connections: Not on file   Intimate Partner Violence: Not on file   Housing Stability: Not on file     Past Surgical History:   Procedure Laterality Date    EYE SURGERY Left     tear duct    FL ESOPHAGOGASTRODUODENOSCOPY TRANSORAL DIAGNOSTIC N/A 2/9/2018    Procedure: ESOPHAGOGASTRODUODENOSCOPY (EGD); Surgeon: Suzette Millard MD;  Location: [facility];  Service: Gastroenterology    FL ESOPHAGOGASTRODUODENOSCOPY TRANSORAL DIAGNOSTIC N/A 11/6/2018    Procedure: ESOPHAGOGASTRODUODENOSCOPY (EGD); Surgeon: Awais Miranda MD;  Location: [facility];  Service: Gastroenterology    FL LAP, SURG PROCTOPEXY N/A 1/12/2021    Procedure: Rectopexy with sigmoid resection w/ robotics  ;  Surgeon: Medina Morales MD;  Location:  MAIN OR;  Service: Colorectal         Review of Systems   Constitutional: Negative for fever  Gastrointestinal: Positive for abdominal pain, constipation, diarrhea and nausea  Negative for vomiting  Genitourinary: Positive for dysuria and frequency  Negative for hematuria  Musculoskeletal: Negative for arthralgias and myalgias  Neurological: Positive for headaches  All other systems reviewed and are negative  Objective:      /74   Pulse 81   Temp 97 7 °F (36 5 °C) (Tympanic)   Wt 51 2 kg (112 lb 12 8 oz)   BMI 19 98 kg/m²          Physical Exam  Constitutional:       Appearance: Normal appearance  She is well-developed  HENT:      Head: Normocephalic and atraumatic  Eyes:      Conjunctiva/sclera: Conjunctivae normal    Cardiovascular:      Rate and Rhythm: Normal rate and regular rhythm  Pulmonary:      Effort: Pulmonary effort is normal       Breath sounds: Normal breath sounds  Abdominal:      General: Bowel sounds are normal  There is no distension  Palpations: Abdomen is soft  Tenderness: There is no abdominal tenderness     Musculoskeletal:         General: Normal range of motion  Cervical back: Normal range of motion  Skin:     General: Skin is warm and dry  Neurological:      Mental Status: She is alert and oriented to person, place, and time     Psychiatric:         Mood and Affect: Mood normal          Behavior: Behavior normal

## 2022-10-07 ENCOUNTER — TELEPHONE (OUTPATIENT)
Dept: GASTROENTEROLOGY | Facility: MEDICAL CENTER | Age: 32
End: 2022-10-07

## 2022-10-07 DIAGNOSIS — K21.9 GASTROESOPHAGEAL REFLUX DISEASE: ICD-10-CM

## 2022-10-07 RX ORDER — PANTOPRAZOLE SODIUM 40 MG/1
40 TABLET, DELAYED RELEASE ORAL 2 TIMES DAILY
Qty: 60 TABLET | Refills: 5 | Status: SHIPPED | OUTPATIENT
Start: 2022-10-07

## 2022-10-07 NOTE — TELEPHONE ENCOUNTER
Patients GI provider: Juanita DUMAS    Number to return call: [phone]    Reason for call: Pt calling because she would like to know if her pantoprazole does can be increased  She forgot to ask when she was in the office yesterday       Scheduled procedure/appointment date if applicable: Appt 4/5/99

## 2022-10-10 DIAGNOSIS — E03.9 HYPOTHYROIDISM, UNSPECIFIED TYPE: ICD-10-CM

## 2022-10-10 DIAGNOSIS — K21.9 GASTROESOPHAGEAL REFLUX DISEASE, UNSPECIFIED WHETHER ESOPHAGITIS PRESENT: ICD-10-CM

## 2022-10-10 RX ORDER — FAMOTIDINE 40 MG/1
TABLET, FILM COATED ORAL
Qty: 15 TABLET | Refills: 0 | Status: SHIPPED | OUTPATIENT
Start: 2022-10-10

## 2022-10-10 RX ORDER — LIOTHYRONINE SODIUM 5 UG/1
TABLET ORAL
Qty: 30 TABLET | Refills: 0 | Status: SHIPPED | OUTPATIENT
Start: 2022-10-10

## 2022-10-13 ENCOUNTER — OFFICE VISIT (OUTPATIENT)
Dept: PHYSICAL THERAPY | Age: 32
End: 2022-10-13
Payer: COMMERCIAL

## 2022-10-13 DIAGNOSIS — M62.89 PELVIC FLOOR DYSFUNCTION: Primary | ICD-10-CM

## 2022-10-13 PROCEDURE — 97110 THERAPEUTIC EXERCISES: CPT | Performed by: PHYSICAL THERAPIST

## 2022-10-13 PROCEDURE — 97140 MANUAL THERAPY 1/> REGIONS: CPT | Performed by: PHYSICAL THERAPIST

## 2022-10-13 NOTE — PROGRESS NOTES
Daily Note     Today's date: 10/13/2022  Patient name: Colt Denney  : 1990  MRN: [id number]  Referring provider: Alberto Rascon MD  Dx:   Encounter Diagnosis     ICD-10-CM    1  Pelvic floor dysfunction  M62 89        Start Time: 1003  Stop Time: 1056  Total time in clinic (min): 53 minutes    Subjective: Patient notes continues to require repetitive wiping post defecation with fecal smearing  Notes occasional UI  Drinking several coffees per day and water  Objective: See treatment diary below      Assessment: Tolerated treatment well  Patient would benefit from continued PTReviewed HEP technique  Verbalized understanding  Some left groin tightness with passive left hip flexion  Discussed using contract relax post defecation as potential to reduce fecal smearing  Reviewed technique for standing squat stretch for home program  Improved breath awareness and breath sequencing with practice  Plan: Continue per plan of care        Precautions: rectopexy with sigmoid resection    HEP Access Code: [email or web address]       Manuals 9/14 10/5 10/13                                                              Neuro Re-Ed                                                                                                        Ther Ex             PFM activation ed 8            LE stretch   10'  MT          AS CR  3"/10"/5x  HEP 3"/10"  10x          Sub max AS  10"/2x  HEP 10"/7x          Quick AS  1"/10x  HEP 10x          Child pose  20"/1x  HEP 30"/3x          Happy Baby  20"/1x  HEP 30"/3x          Standing deep squat  1x  HEP 3x          Ther Activity             PFM ed 15 Exam  15 10                       Gait Training                                       Modalities

## 2022-10-19 ENCOUNTER — OFFICE VISIT (OUTPATIENT)
Dept: NEPHROLOGY | Facility: CLINIC | Age: 32
End: 2022-10-19
Payer: COMMERCIAL

## 2022-10-19 VITALS
DIASTOLIC BLOOD PRESSURE: 70 MMHG | WEIGHT: 115.6 LBS | OXYGEN SATURATION: 99 % | HEIGHT: 63 IN | BODY MASS INDEX: 20.48 KG/M2 | SYSTOLIC BLOOD PRESSURE: 112 MMHG | HEART RATE: 86 BPM

## 2022-10-19 DIAGNOSIS — N18.2 CKD (CHRONIC KIDNEY DISEASE) STAGE 2, GFR 60-89 ML/MIN: Primary | ICD-10-CM

## 2022-10-19 PROCEDURE — 99212 OFFICE O/P EST SF 10 MIN: CPT | Performed by: NURSE PRACTITIONER

## 2022-10-19 NOTE — PROGRESS NOTES
Nephrology   Office Follow-Up  Colt Denney 34 y o  female MRN: [id number]    Encounter: [id number]        [address] Ángel Abbott was seen in the [place] office today  All diagnoses and orders for visit:     1  CKD (chronic kidney disease) stage 2, GFR 60-89 ml/min  · Baseline creatinine around 1 0 mg/dL  Had elevated creatinine in past due to NSAIDS- does not take anymore  Continue to avoid NSAIDS and other potential nephrotoxins  Does complain of bloating which is being worked-up by GI  Continue low sodium diet  Return to office in 1 year  -     Comprehensive metabolic panel; Future; Expected date: 10/02/2023   -     CBC and differential; Future; Expected date: 10/02/2023   -     Microalbumin / creatinine urine ratio; Future; Expected date: 10/02/2023   -     Urinalysis with microscopic; Future; Expected date: 10/02/2023   -     Protein / creatinine ratio, urine; Future; Expected date: 10/02/2023      HPI: Colt Denney is a 34 y o  female who is here for scheduled follow-up regarding CKD 2  Has history of decline in kidney function attributed to NSAID use  She no longer uses NSAIDs  Her kidney function is stable  Return to office in 1 year with Dr Freeman Prom:   Review of Systems   Constitutional: Negative for chills and fever  HENT: Negative for ear pain and sore throat  Eyes: Negative for pain and visual disturbance  Respiratory: Negative for cough and shortness of breath  Cardiovascular: Negative for chest pain and palpitations  Gastrointestinal: Negative for abdominal pain and vomiting  Bloating   Genitourinary: Negative for dysuria and hematuria  Musculoskeletal: Negative for arthralgias and back pain  Skin: Negative for color change and rash  Neurological: Negative for seizures and syncope  All other systems reviewed and are negative        Allergies: Gluten meal - food allergy and Latex    Medications:   Current Outpatient Medications:   •  benztropine (COGENTIN) 1 mg tablet, Take 1 mg by mouth TAKE 1 TABLET IN AM AND 2 TABLETS IN PM, Disp: , Rfl:   •  brexpiprazole (REXULTI) 0 25 MG tablet, Take 0 5 mg by mouth daily, Disp: , Rfl:   •  busPIRone (BUSPAR) 10 mg tablet, Take 10 mg by mouth 2 (two) times a day, Disp: , Rfl:   •  clobetasol (TEMOVATE) 0 05 % cream, APPLY THIN LAYER TWICE DAILY TO RASH ON HANDS FOR 2 WEEKS, THEN DAILY FOR 2 WEEKS AS NEEDED FOR PERSISTENT RASH, Disp: , Rfl:   •  clonazePAM (KlonoPIN) 0 5 mg tablet, Take 1 mg by mouth daily at bedtime, Disp: , Rfl:   •  Dermatological Products, Misc   (EpiCeram) lotion,  , Disp: , Rfl:   •  famotidine (PEPCID) 40 MG tablet, Take 1/2 (one-half) tablet by mouth once daily, Disp: 15 tablet, Rfl: 0  •  gabapentin (NEURONTIN) 100 mg capsule, 2 tablets in am, Disp: , Rfl:   •  gabapentin (NEURONTIN) 400 mg capsule, 400 mg daily at bedtime , Disp: , Rfl:   •  liothyronine (CYTOMEL) 5 mcg tablet, Take 1 tablet by mouth once daily, Disp: 30 tablet, Rfl: 0  •  pantoprazole (PROTONIX) 40 mg tablet, Take 1 tablet (40 mg total) by mouth 2 (two) times a day, Disp: 60 tablet, Rfl: 5  •  tamsulosin (FLOMAX) 0 4 mg, Take 1 capsule (0 4 mg total) by mouth daily with dinner, Disp: 90 capsule, Rfl: 1  •  zolpidem (AMBIEN CR) 12 5 MG CR tablet, Take 1 tablet (12 5 mg total) by mouth daily at bedtime as needed for sleep, Disp: 30 tablet, Rfl: 0  •  zaleplon (SONATA) 10 MG capsule, Take 1 capsule by mouth once daily at bedtime, Disp: 30 capsule, Rfl: 0    Past Medical History:   Diagnosis Date   • Anorexia nervosa     pt denies history at time of admission 7/21/18   • Anxiety    • Celiac disease    • Chronic kidney disease    • CPAP (continuous positive airway pressure) dependence     waiting for a new mask   • Depression    • Disease of thyroid gland     hypo   • Gall bladder polyp    • Gastroesophageal reflux disease 9/18/2018   • Hallucination    • Memory difficulty 7/12/2018   • Plantar wart of left foot    • Psychiatric illness    • Raynaud's disease without gangrene    • Renal atrophy    • Schizoaffective disorder ([facility] [address] )    • Self-injurious behavior    • Sleep apnea    • Sleep difficulties    • Suicide attempt [facility])     history of attempt at age 22 by overdose    • Urinary retention      Past Surgical History:   Procedure Laterality Date   • EYE SURGERY Left     tear duct   • PA ESOPHAGOGASTRODUODENOSCOPY TRANSORAL DIAGNOSTIC N/A 2/9/2018    Procedure: ESOPHAGOGASTRODUODENOSCOPY (EGD); Surgeon: Ahmed Galeazzi, MD;  Location: [facility];  Service: Gastroenterology   • PA ESOPHAGOGASTRODUODENOSCOPY TRANSORAL DIAGNOSTIC N/A 11/6/2018    Procedure: ESOPHAGOGASTRODUODENOSCOPY (EGD); Surgeon: Paul Suazo MD;  Location: [facility];  Service: Gastroenterology   • PA LAP, SURG PROCTOPEXY N/A 1/12/2021    Procedure: Rectopexy with sigmoid resection w/ robotics  ;  Surgeon: Araceli Larson MD;  Location:  MAIN OR;  Service: Colorectal     Family History   Problem Relation Age of Onset   • Hypertension Father         benign essential   • Hypertension Brother         benign essential    • OCD Brother    • Depression Brother    • Other Family         nasolacrimal duct obstruction, acquired   • Breast cancer Mother 61   • No Known Problems Maternal Grandmother    • No Known Problems Maternal Grandfather    • Breast cancer Paternal Grandmother    • Prostate cancer Paternal Grandfather    • No Known Problems Maternal Aunt    • No Known Problems Maternal Aunt    • Colon cancer Neg Hx       reports that she has never smoked  She has never used smokeless tobacco  She reports previous alcohol use  She reports that she does not use drugs  Physical Exam:   Vitals:    10/19/22 1129   BP: 112/70   Pulse: 86   SpO2: 99%   Weight: 52 4 kg (115 lb 9 6 oz)   Height: 5' 3" (1 6 m)     Body mass index is 20 48 kg/m²      General: conscious, cooperative, in no acute distress, appears stated age  Eyes: conjunctivae pale, anicteric sclerae  ENT: lips and mucous membranes moist  Neck: supple, no JVD, no masses  Chest:  essentially clear breath sounds bilaterally, no crackles, ronchus or wheezings  CVS: S1 & S2, normal rate, regular rhythm  Abdomen: soft, non-tender, non-distended, normoactive bowel sounds, rounded  Extremities: no edema of both legs  Skin: no rash   Neuro: awake, alert, oriented       Diagnostic Data:  Lab: I have personally reviewed pertinent lab results  ,   CBC:       CMP: No results found for: SODIUM, K, CL, CO2, ANIONGAP, BUN, CREATININE, GLUCOSE, CALCIUM, AST, ALT, ALKPHOS, PROT, BILITOT, EGFR,   PT/INR: No results found for: PT, INR,   Magnesium: No components found for: MAG,  Phosphorous: No results found for: PHOS    Patient Instructions   Keep up the fantastic  work! Lab work 1 year      Portions of the record may have been created with voice recognition software  Occasional wrong word or "sound a like" substitutions may have occurred due to the inherent limitations of voice recognition software  Read the chart carefully and recognize, using context, where substitutions have occurred  If you have any questions, please contact the dictating provider

## 2022-10-19 NOTE — PROGRESS NOTES
Daily Note     Today's date: 10/20/2022  Patient name: Tao Denton  : 1990  MRN: [id number]  Referring provider: Erick Gutierrez MD  Dx:   Encounter Diagnosis     ICD-10-CM    1  Pelvic floor dysfunction  M62 89        Start Time: 1400  Stop Time: 3958  Total time in clinic (min): 53 minutes    Subjective: Patient no accidents since last visit  Smearing still happening  Notes doing exercises daily  Offers no complaints of pain at time of treatment  Did note some right thigh discomfort awareness while stretching left hamstring today  Patient relates that some of her thigh disomfort may be from running  Objective: See treatment diary below      Assessment: Tolerated treatment well  Patient would benefit from continued PT Verbal cues for exercises technique  Encouraged spreading out AS activation exercises throughout the day 3x versus performing all at once  Some bilateral thigh tightness with IR/ER long leg  Verbal cues for relaxation  Patient aware of muscle tension holding  Plan: Continue per plan of care        Precautions: rectopexy with sigmoid resection    HEP Access Code: [email or web address]       Manuals 9/14 10/5 10/13 10/20                                                             Neuro Re-Ed                                                                                                        Ther Ex             PFM activation ed 8            LE stretch   10'  MT 10         AS CR  3"/10"/5x  HEP 3"/10"  10x 10x         Sub max AS  10"/2x  HEP 10"/7x 10"/10         Quick AS  1"/10x  HEP 10x 2x10         Child pose  20"/1x  HEP 30"/3x          Happy Baby  20"/1x  HEP 30"/3x 30"/3x         Standing deep squat  1x  HEP 3x          Long leg IR/ER    1x10  1x6  HEP                                                Ther Activity             PFM ed 15 Exam  15 10 10                      Gait Training                                       Modalities

## 2022-10-20 ENCOUNTER — OFFICE VISIT (OUTPATIENT)
Dept: PHYSICAL THERAPY | Age: 32
End: 2022-10-20
Payer: COMMERCIAL

## 2022-10-20 DIAGNOSIS — M62.89 PELVIC FLOOR DYSFUNCTION: Primary | ICD-10-CM

## 2022-10-20 PROCEDURE — 97110 THERAPEUTIC EXERCISES: CPT | Performed by: PHYSICAL THERAPIST

## 2022-10-20 PROCEDURE — 97140 MANUAL THERAPY 1/> REGIONS: CPT | Performed by: PHYSICAL THERAPIST

## 2022-10-23 DIAGNOSIS — F51.01 PRIMARY INSOMNIA: ICD-10-CM

## 2022-10-24 RX ORDER — ZOLPIDEM TARTRATE 12.5 MG/1
TABLET, FILM COATED, EXTENDED RELEASE ORAL
Qty: 30 TABLET | Refills: 0 | Status: SHIPPED | OUTPATIENT
Start: 2022-10-24

## 2022-10-24 NOTE — PROGRESS NOTES
Daily Note     Today's date: 10/26/2022  Patient name: Cierra Madden  : 1990  MRN: [id number]  Referring provider: Wellington Mackey MD  Dx:   Encounter Diagnosis     ICD-10-CM    1  Pelvic floor dysfunction  M62 89        Start Time:   Stop Time:   Total time in clinic (min): 54 minutes    Subjective:  Notes no accidents since last PT session  Patient notes variable smearing present  Reports compliance with exercises  Objective: See treatment diary below      Assessment: Tolerated treatment well  Patient would benefit from continued PT Exercise additions as outlined that improved with practice  HEP handouts and BTB issued  Reviewed [place] stool scale with patient  Plan: Continue per plan of care        Precautions: rectopexy with sigmoid resection    HEP Access Code: [email or web address]       Manuals 9/14 10/5 10/13 10/20 10/26                                                            Neuro Re-Ed                                                                                                        Ther Ex             PFM activation ed 8            LE stretch   10'  MT 10 10        AS CR  3"/10"/5x  HEP 3"/10"  10x 10x 4"/10"  5"/10"/10x each        Sub max AS  10"/2x  HEP 10"/7x 10"/10 10"/10"/10x        Quick AS  1"/10x  HEP 10x 2x10 2x10        Child pose  20"/1x  HEP 30"/3x          Happy Baby  20"/1x  HEP 30"/3x 30"/3x AH        Standing deep squat  1x  HEP 3x          Long leg IR/ER    1x10  1x6  HEP 10x        Ball with  PFM activation     3"/5x  HEP        TB ER with exhale     10x  BTB  HEP                     Ther Activity             PFM ed 15 Exam  15 10 10 [place]  10                     Gait Training                                       Modalities

## 2022-10-25 ENCOUNTER — HOSPITAL ENCOUNTER (OUTPATIENT)
Dept: ULTRASOUND IMAGING | Facility: HOSPITAL | Age: 32
Discharge: HOME | End: 2022-10-25
Payer: COMMERCIAL

## 2022-10-25 ENCOUNTER — LAB (OUTPATIENT)
Dept: LAB | Facility: HOSPITAL | Age: 32
End: 2022-10-25
Payer: COMMERCIAL

## 2022-10-25 DIAGNOSIS — K90.0 CELIAC DISEASE: ICD-10-CM

## 2022-10-25 DIAGNOSIS — R14.0 BLOATING: ICD-10-CM

## 2022-10-25 LAB
FERRITIN SERPL-MCNC: 11 NG/ML (ref 8–388)
IRON SATN MFR SERPL: 18 % (ref 15–50)
IRON SERPL-MCNC: 74 UG/DL (ref 50–170)
TIBC SERPL-MCNC: 407 UG/DL (ref 250–450)

## 2022-10-25 PROCEDURE — 76700 US EXAM ABDOM COMPLETE: CPT

## 2022-10-25 PROCEDURE — 83540 ASSAY OF IRON: CPT

## 2022-10-25 PROCEDURE — 36415 COLL VENOUS BLD VENIPUNCTURE: CPT

## 2022-10-25 PROCEDURE — 82728 ASSAY OF FERRITIN: CPT

## 2022-10-25 PROCEDURE — 83550 IRON BINDING TEST: CPT

## 2022-10-26 ENCOUNTER — OFFICE VISIT (OUTPATIENT)
Dept: PHYSICAL THERAPY | Age: 32
End: 2022-10-26
Payer: COMMERCIAL

## 2022-10-26 DIAGNOSIS — M62.89 PELVIC FLOOR DYSFUNCTION: Primary | ICD-10-CM

## 2022-10-26 PROCEDURE — 97110 THERAPEUTIC EXERCISES: CPT | Performed by: PHYSICAL THERAPIST

## 2022-10-26 PROCEDURE — 97530 THERAPEUTIC ACTIVITIES: CPT | Performed by: PHYSICAL THERAPIST

## 2022-10-26 NOTE — RESULT ENCOUNTER NOTE
Your iron level is on the low end of normal  I would recommend over the counter iron supplement daily    Ananda Lopez

## 2022-11-06 DIAGNOSIS — K21.9 GASTROESOPHAGEAL REFLUX DISEASE, UNSPECIFIED WHETHER ESOPHAGITIS PRESENT: ICD-10-CM

## 2022-11-07 RX ORDER — FAMOTIDINE 40 MG/1
TABLET, FILM COATED ORAL
Qty: 15 TABLET | Refills: 0 | Status: SHIPPED | OUTPATIENT
Start: 2022-11-07

## 2022-11-09 ENCOUNTER — OFFICE VISIT (OUTPATIENT)
Dept: PHYSICAL THERAPY | Age: 32
End: 2022-11-09

## 2022-11-09 DIAGNOSIS — M62.89 PELVIC FLOOR DYSFUNCTION: Primary | ICD-10-CM

## 2022-11-09 NOTE — PROGRESS NOTES
Daily Note     Today's date: 2022  Patient name: Adelaide Roca  : 1990  MRN: [id number]  Referring provider: Domenica Reynolds MD  Dx:   Encounter Diagnosis     ICD-10-CM    1  Pelvic floor dysfunction  M62 89        Start Time: 785  Stop Time: 205  Total time in clinic (min): 53 minutes    Subjective:  Patient notes starting new exercises since last visit  Patient notes having one small accident since last session  Notes continues to have multiple BM per day  Objective: See treatment diary below      Assessment: Tolerated treatment well  Patient would benefit from continued PT  Using squatty potty at home with greater ease in defecation  Notes fatigue with new exercises but with increased awareness of muscle activation and relaxation  Notes some success with using exercises for bowel control following urgency  Added transverse plane for improved torso tissue mobility with good tolerance  Plan: Continue per plan of care        Precautions: rectopexy with sigmoid resection    HEP Access Code: [email or web address]       Manuals 9/14 10/5 10/13 10/20 10/26 11/9       Pelvic transverse plane      PP                                              Neuro Re-Ed                                                                                                        Ther Ex             PFM activation ed 8            LE stretch   10'  MT 10 10 10       AS CR  3"/10"/5x  HEP 3"/10"  10x 10x 4"/10"  5"/10"/10x each 5"/10"/10x  stand       Sub max AS  10"/2x  HEP 10"/7x 10"/10 10"/10"/10x        Quick AS  1"/10x  HEP 10x 2x10 2x10 10x       Child pose  20"/1x  HEP 30"/3x          Happy Baby  20"/1x  HEP 30"/3x 30"/3x AH        Standing deep squat  1x  HEP 3x          Long leg IR/ER    1x10  1x6  HEP 10x 10x       Ball with  PFM activation     3"/5x  HEP 10x       TB ER with exhale     10x  BTB  HEP 10x                    Ther Activity             PFM ed 15 Exam  15 10 10 [place]  10 review                    Gait Training                                       Modalities

## 2022-11-14 ENCOUNTER — APPOINTMENT (OUTPATIENT)
Dept: LAB | Facility: HOSPITAL | Age: 32
End: 2022-11-14

## 2022-11-14 DIAGNOSIS — F25.9 SCHIZOAFFECTIVE DISORDER, UNSPECIFIED TYPE (HCC): ICD-10-CM

## 2022-11-14 DIAGNOSIS — E03.9 HYPOTHYROIDISM, UNSPECIFIED TYPE: ICD-10-CM

## 2022-11-14 LAB
CHOLEST SERPL-MCNC: 118 MG/DL
GLUCOSE P FAST SERPL-MCNC: 89 MG/DL (ref 65–99)
HDLC SERPL-MCNC: 55 MG/DL
LDLC SERPL CALC-MCNC: 55 MG/DL (ref 0–100)
NONHDLC SERPL-MCNC: 63 MG/DL
TRIGL SERPL-MCNC: 42 MG/DL

## 2022-11-14 RX ORDER — LIOTHYRONINE SODIUM 5 UG/1
TABLET ORAL
Qty: 30 TABLET | Refills: 0 | Status: SHIPPED | OUTPATIENT
Start: 2022-11-14

## 2022-11-17 ENCOUNTER — OFFICE VISIT (OUTPATIENT)
Dept: PHYSICAL THERAPY | Age: 32
End: 2022-11-17

## 2022-11-17 DIAGNOSIS — M62.89 PELVIC FLOOR DYSFUNCTION: Primary | ICD-10-CM

## 2022-11-17 NOTE — PROGRESS NOTES
Daily Note     Today's date: 2022  Patient name: Rody Epperson  : 1990  MRN: [id number]  Referring provider: Camden Silvestre MD  Dx:   Encounter Diagnosis     ICD-10-CM    1  Pelvic floor dysfunction  M62 89           Start Time:   Stop Time:   Total time in clinic (min): 53 minutes    Subjective: Notes was only able to do her stretches and exercises two times this week  Notes yesterday and the day before having diarrhea and prior to that having constipation  Notes last accident  which is improved from initially  Objective: See treatment diary below      Assessment: Tolerated treatment well  Patient would benefit from continued PT Some inconsistency with performing exercises  Stool remains of varying consistency  Better control this week with decreased leakage and smearing  Plan: Continue per plan of care        Precautions: rectopexy with sigmoid resection    HEP Access Code: [email or web address]       Manuals 9/14 10/5 10/13 10/20 10/26 11/9 11/17      Pelvic transverse plane      PP PP                                             Neuro Re-Ed                                                                                                        Ther Ex             PFM activation ed 8            LE stretch   10'  MT 10 10 10 10      AS CR  3"/10"/5x  HEP 3"/10"  10x 10x 4"/10"  5"/10"/10x each 5"/10"/10x  stand 10x      Sub max AS  10"/2x  HEP 10"/7x 10"/10 10"/10"/10x  10"/10"  10x        Quick AS  1"/10x  HEP 10x 2x10 2x10 10x 10x      Child pose  20"/1x  HEP 30"/3x          Happy Baby  20"/1x  HEP 30"/3x 30"/3x AH  30"x3      Standing deep squat  1x  HEP 3x          Long leg IR/ER    1x10  1x6  HEP 10x 10x 10x      Ball with  PFM activation     3"/5x  HEP 10x 10x      TB ER with exhale     10x  BTB  HEP 10x 10x                   Ther Activity             PFM ed 15 Exam  15 10 10 [place]  10 review PP                   Gait Training                                       Modalities

## 2022-11-23 ENCOUNTER — OFFICE VISIT (OUTPATIENT)
Dept: PHYSICAL THERAPY | Age: 32
End: 2022-11-23

## 2022-11-23 DIAGNOSIS — M62.89 PELVIC FLOOR DYSFUNCTION: Primary | ICD-10-CM

## 2022-11-23 NOTE — PROGRESS NOTES
Daily Note     Today's date: 2022  Patient name: Victoriano Cruz  : 1990  MRN: [id number]  Referring provider: Harmeet Stafford MD  Dx:   Encounter Diagnosis     ICD-10-CM    1  Pelvic floor dysfunction  M62 89           Start Time:   Stop Time:   Total time in clinic (min): 55 minutes    Subjective: Saw surgeon yesterday  Notes symptoms seem to be better when diet is better  Opting not to have Interstim placement at this time and would like to continue with exercises at home  Objective: See treatment diary below      Assessment: Tolerated treatment well  Patient exhibited good technique with therapeutic exercisesPatient notes she feels confident to continue with ongoing HEP  At this time, patient has achieved their maximum functional benefit from skilled physical therapy services and will be discharged to their HEP  Patient is in agreement with the plan of care  As a result, patient is discharged from physical therapy  PT goals partially met  Plan: Discharge PT to self care        Precautions: rectopexy with sigmoid resection    HEP Access Code: [email or web address]       Manuals 9/14 10/5 10/13 10/20 10/26 11/9 11/17 11/23     Pelvic transverse plane      PP PP PP                                            Neuro Re-Ed                                                                                                        Ther Ex             PFM activation ed 8            LE stretch   10'  MT 10 10 10 10      AS CR  3"/10"/5x  HEP 3"/10"  10x 10x 4"/10"  5"/10"/10x each 5"/10"/10x  stand 10x 10x     Sub max AS  10"/2x  HEP 10"/7x 10"/10 10"/10"/10x  10"/10"  10x   10"/  10  10x     Quick AS  1"/10x  HEP 10x 2x10 2x10 10x 10x 10x     Child pose  20"/1x  HEP 30"/3x          Happy Baby  20"/1x  HEP 30"/3x 30"/3x AH  30"x3      Standing deep squat  1x  HEP 3x          Long leg IR/ER    1x10  1x6  HEP 10x 10x 10x 10x     Ball with  PFM activation     3"/5x  HEP 10x 10x 10x     TB ER with exhale 10x  BTB  HEP 10x 10x 10x                  Ther Activity             PFM ed 15 Exam  15 10 10 [place]  10 review PP PP                  Gait Training                                       Modalities

## 2022-11-24 DIAGNOSIS — F51.01 PRIMARY INSOMNIA: ICD-10-CM

## 2022-11-25 DIAGNOSIS — F51.01 PRIMARY INSOMNIA: ICD-10-CM

## 2022-11-25 RX ORDER — ZOLPIDEM TARTRATE 12.5 MG/1
TABLET, FILM COATED, EXTENDED RELEASE ORAL
Qty: 30 TABLET | Refills: 0 | Status: SHIPPED | OUTPATIENT
Start: 2022-11-25 | End: 2022-11-25 | Stop reason: SDUPTHER

## 2022-11-25 RX ORDER — ZOLPIDEM TARTRATE 12.5 MG/1
12.5 TABLET, FILM COATED, EXTENDED RELEASE ORAL
Qty: 30 TABLET | Refills: 0 | Status: SHIPPED | OUTPATIENT
Start: 2022-11-25

## 2022-11-30 ENCOUNTER — APPOINTMENT (OUTPATIENT)
Dept: PHYSICAL THERAPY | Age: 32
End: 2022-11-30

## 2022-12-11 DIAGNOSIS — K21.9 GASTROESOPHAGEAL REFLUX DISEASE, UNSPECIFIED WHETHER ESOPHAGITIS PRESENT: ICD-10-CM

## 2022-12-11 DIAGNOSIS — E03.9 HYPOTHYROIDISM, UNSPECIFIED TYPE: ICD-10-CM

## 2022-12-11 RX ORDER — LIOTHYRONINE SODIUM 5 UG/1
TABLET ORAL
Qty: 30 TABLET | Refills: 0 | Status: SHIPPED | OUTPATIENT
Start: 2022-12-11

## 2022-12-11 RX ORDER — FAMOTIDINE 40 MG/1
TABLET, FILM COATED ORAL
Qty: 15 TABLET | Refills: 0 | Status: SHIPPED | OUTPATIENT
Start: 2022-12-11

## 2022-12-21 ENCOUNTER — TELEPHONE (OUTPATIENT)
Dept: GASTROENTEROLOGY | Facility: CLINIC | Age: 32
End: 2022-12-21

## 2022-12-21 NOTE — TELEPHONE ENCOUNTER
Patients GI provider:   Edis DUMAS    Number to return call: [phone]    Reason for call: Pt calling to confirm her drop off date for her breath test     Scheduled procedure/appointment date if applicable: Appt 6/3/51

## 2022-12-26 DIAGNOSIS — F51.01 PRIMARY INSOMNIA: ICD-10-CM

## 2022-12-26 DIAGNOSIS — N39.8 VOIDING DYSFUNCTION: ICD-10-CM

## 2022-12-26 RX ORDER — TAMSULOSIN HYDROCHLORIDE 0.4 MG/1
CAPSULE ORAL
Qty: 90 CAPSULE | Refills: 0 | Status: SHIPPED | OUTPATIENT
Start: 2022-12-26

## 2022-12-27 RX ORDER — ZOLPIDEM TARTRATE 12.5 MG/1
TABLET, FILM COATED, EXTENDED RELEASE ORAL
Qty: 30 TABLET | Refills: 0 | Status: SHIPPED | OUTPATIENT
Start: 2022-12-27

## 2023-01-08 DIAGNOSIS — K21.9 GASTROESOPHAGEAL REFLUX DISEASE, UNSPECIFIED WHETHER ESOPHAGITIS PRESENT: ICD-10-CM

## 2023-01-08 RX ORDER — FAMOTIDINE 40 MG/1
TABLET, FILM COATED ORAL
Qty: 15 TABLET | Refills: 0 | Status: SHIPPED | OUTPATIENT
Start: 2023-01-08

## 2023-01-17 DIAGNOSIS — E03.9 HYPOTHYROIDISM, UNSPECIFIED TYPE: ICD-10-CM

## 2023-01-17 RX ORDER — LIOTHYRONINE SODIUM 5 UG/1
TABLET ORAL
Qty: 30 TABLET | Refills: 0 | Status: SHIPPED | OUTPATIENT
Start: 2023-01-17

## 2023-01-26 NOTE — PROGRESS NOTES
Daily Note     Today's date: 2020  Patient name: Carroll Lemon  : 1990  MRN: [id number]  Referring provider: PITER Calhoun*  Dx:   Encounter Diagnosis     ICD-10-CM    1  Voiding dysfunction N39 8        Start Time: 1600  Stop Time: 8946  Total time in clinic (min): 55 minutes    Subjective: Patient notes not feeling any different  Does note at times being able to void more fully  Reports difficulty sensing relaxation of PFM  Objective: See treatment diary below      Assessment: Tolerated treatment fair  Patient would benefit from continued PT Extensive education regarding voiding and PFM component to functional voiding  Also instructed in urge suppression and double voiding to maximize complete voiding and decrease urinary frequency  Added ILU large intestine massage for constipation         Plan: Continue PT     Precautions: GERD, depression, anxiety, schizoaffective disorder, anorexia, constipation      Manuals          LE eval  10 10 10 10         Pelvic transverse  10 15 15                                   Neuro Re-Ed                                                                              HEP  Below  LE Child pose                       Ther Ex             Diaphragmatic breathing  5'           LE stretch  10 10 10         Contract relax Kegel  3"/10"   10x 10         Ball contract relax   5"/10x          Child pose   3'                                                 Ther Activity             Voiding ed  10  15                      Gait Training                                       Modalities
no
Yes

## 2023-01-27 DIAGNOSIS — F51.01 PRIMARY INSOMNIA: ICD-10-CM

## 2023-01-27 RX ORDER — ZOLPIDEM TARTRATE 12.5 MG/1
TABLET, FILM COATED, EXTENDED RELEASE ORAL
Qty: 30 TABLET | Refills: 0 | Status: SHIPPED | OUTPATIENT
Start: 2023-01-27

## 2023-02-02 ENCOUNTER — OFFICE VISIT (OUTPATIENT)
Dept: GASTROENTEROLOGY | Facility: MEDICAL CENTER | Age: 33
End: 2023-02-02

## 2023-02-02 VITALS
HEIGHT: 63 IN | HEART RATE: 67 BPM | DIASTOLIC BLOOD PRESSURE: 70 MMHG | BODY MASS INDEX: 20.7 KG/M2 | OXYGEN SATURATION: 99 % | SYSTOLIC BLOOD PRESSURE: 114 MMHG | WEIGHT: 116.8 LBS

## 2023-02-02 DIAGNOSIS — R14.0 BLOATING: ICD-10-CM

## 2023-02-02 DIAGNOSIS — K90.0 CELIAC DISEASE: Primary | ICD-10-CM

## 2023-02-02 DIAGNOSIS — K21.9 GASTROESOPHAGEAL REFLUX DISEASE, UNSPECIFIED WHETHER ESOPHAGITIS PRESENT: ICD-10-CM

## 2023-02-02 NOTE — PROGRESS NOTES
Assessment/Plan:     Diagnoses and all orders for this visit:    Celiac disease  She has a history of celiac disease and follows a strict gluten-free diet  Her most recent hemoglobin was within normal limits, iron/ferritin were low normal   Recommend an over-the-counter iron supplement however she stated this caused her nausea and she stopped taking it  Can consider referral to hematology  Gastroesophageal reflux disease, unspecified whether esophagitis present  She does have a history of GERD, currently on pantoprazole and Pepcid with fairly good control of her symptoms  Bloating  She continues with intermittent bloating  She was able to identify some foods following the low FODMAP diet but ultimately discontinued the diet due to constipation  She continues to use MiraLAX as needed  We will follow-up on the results of her SIBO test     We will see her back in 3 months or sooner needed  Subjective:      Patient ID: Elvis Guardado is a 28 y o  female  HPI     This is a follow-up for GERD, celiac and bloating  She is currently taking pantoprazole 40 mg daily and Pepcid 20 mg daily  She states for the most part this controls her heartburn symptoms but occasionally will have breakthrough symptoms  She continues to follow a strict gluten-free diet  At her last visit her biggest complaint was bloating, SIBO testing was ordered which she completed a few days ago however has not yet been read  She did attempt a low FODMAP diet and was able to identify some trigger foods  Unfortunately this caused her to become constipated she discontinued  She does use MiraLAX as needed for constipation  She had an endoscopy in 2018 which was within normal limits  Colonoscopy in 2020 also within with normal limits      Patient Active Problem List   Diagnosis   • Hypothyroidism   • Schizoaffective disorder [facility])   • Sleep disorder   • Primary insomnia   • Memory difficulty   • Celiac disease   • Gastroesophageal reflux disease   • Renal atrophy   • Arachnoid cyst   • Chronic kidney disease (CKD) stage G2/A2, mildly decreased glomerular filtration rate (GFR) between 60-89 mL/min/1 73 square meter and albuminuria creatinine ratio between  mg/g   • Retention, urine   • Gallbladder polyp   • Numbness and tingling of both feet   • Rectal prolapse   • Raynaud's phenomenon without gangrene   • 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HCC)   • Abnormal brain MRI   • Allergic rhinitis   • Cerebral cysts   • 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)   • Abdominal pain   • Bloating     Allergies   Allergen Reactions   • Gluten Meal - Food Allergy    • Latex Swelling     Current Outpatient Medications on File Prior to Visit   Medication Sig   • benztropine (COGENTIN) 1 mg tablet Take 1 mg by mouth TAKE 1 TABLET IN AM AND 2 TABLETS IN PM   • brexpiprazole (REXULTI) 0 25 MG tablet Take 0 5 mg by mouth daily   • busPIRone (BUSPAR) 10 mg tablet Take 10 mg by mouth 2 (two) times a day   • clobetasol (TEMOVATE) 0 05 % cream APPLY THIN LAYER TWICE DAILY TO RASH ON HANDS FOR 2 WEEKS, THEN DAILY FOR 2 WEEKS AS NEEDED FOR PERSISTENT RASH   • Dermatological Products, Misc   (EpiCeram) lotion     • famotidine (PEPCID) 40 MG tablet Take 1/2 (one-half) tablet by mouth once daily   • gabapentin (NEURONTIN) 100 mg capsule 2 tablets in am   • gabapentin (NEURONTIN) 400 mg capsule 400 mg daily at bedtime    • liothyronine (CYTOMEL) 5 mcg tablet Take 1 tablet by mouth once daily   • pantoprazole (PROTONIX) 40 mg tablet Take 1 tablet (40 mg total) by mouth 2 (two) times a day   • tamsulosin (FLOMAX) 0 4 mg TAKE 1 CAPSULE BY MOUTH ONCE DAILY WITH  DINNER   • zolpidem (AMBIEN CR) 12 5 MG CR tablet TAKE 1 TABLET BY MOUTH ONCE DAILY AT BEDTIME AS NEEDED FOR SLEEP   • clonazePAM (KlonoPIN) 0 5 mg tablet Take 1 mg by mouth daily at bedtime   • zaleplon (SONATA) 10 MG capsule Take 1 capsule by mouth once daily at bedtime   • [DISCONTINUED] oxybutynin (DITROPAN-XL) 10 MG 24 hr tablet Take 1 tablet (10 mg total) by mouth daily at bedtime     No current facility-administered medications on file prior to visit       Family History   Problem Relation Age of Onset   • Hypertension Father         benign essential   • Hypertension Brother         benign essential    • OCD Brother    • Depression Brother    • Other Family         nasolacrimal duct obstruction, acquired   • Breast cancer Mother 61   • No Known Problems Maternal Grandmother    • No Known Problems Maternal Grandfather    • Breast cancer Paternal Grandmother    • Prostate cancer Paternal Grandfather    • No Known Problems Maternal Aunt    • No Known Problems Maternal Aunt    • Colon cancer Neg Hx      Past Medical History:   Diagnosis Date   • Anorexia nervosa     pt denies history at time of admission 7/21/18   • Anxiety    • Celiac disease    • Chronic kidney disease    • CPAP (continuous positive airway pressure) dependence     waiting for a new mask   • Depression    • Disease of thyroid gland     hypo   • Gall bladder polyp    • Gastroesophageal reflux disease 9/18/2018   • Hallucination    • Memory difficulty 7/12/2018   • Plantar wart of left foot    • Psychiatric illness    • Raynaud's disease without gangrene    • Renal atrophy    • Schizoaffective disorder ([facility] [address] )    • Self-injurious behavior    • Sleep apnea    • Sleep difficulties    • Suicide attempt [facility])     history of attempt at age 22 by overdose    • Urinary retention      Social History     Socioeconomic History   • Marital status: Single     Spouse name: None   • Number of children: None   • Years of education: None   • Highest education level: None   Occupational History   • Occupation: UNEMPLOYED   Tobacco Use   • Smoking status: Never   • Smokeless tobacco: Never   Vaping Use   • Vaping Use: Never used   Substance and Sexual Activity   • Alcohol use: Not Currently   • Drug use: No   • Sexual activity: Not Currently   Other Topics Concern   • None   Social History Narrative    UNEMPLOYED     Social Determinants of Health     Financial Resource Strain: Not on file   Food Insecurity: Not on file   Transportation Needs: Not on file   Physical Activity: Not on file   Stress: Not on file   Social Connections: Not on file   Intimate Partner Violence: Not on file   Housing Stability: Not on file     Past Surgical History:   Procedure Laterality Date   • EYE SURGERY Left     tear duct   • [place] ESOPHAGOGASTRODUODENOSCOPY TRANSORAL DIAGNOSTIC N/A 2/9/2018    Procedure: ESOPHAGOGASTRODUODENOSCOPY (EGD); Surgeon: Jorden Boston MD;  Location: [facility];  Service: Gastroenterology   • NH ESOPHAGOGASTRODUODENOSCOPY TRANSORAL DIAGNOSTIC N/A 11/6/2018    Procedure: ESOPHAGOGASTRODUODENOSCOPY (EGD); Surgeon: So Bowers MD;  Location: [facility];  Service: Gastroenterology   • NH LAPAROSCOPY PROCTOPEXY PROLAPSE N/A 1/12/2021    Procedure: Rectopexy with sigmoid resection w/ robotics  ;  Surgeon: Bailee Farooq MD;  Location:  MAIN OR;  Service: Colorectal         Review of Systems   Constitutional: Negative for fever  Gastrointestinal: Positive for abdominal pain, constipation, diarrhea and nausea  Negative for vomiting  Genitourinary: Positive for dysuria and frequency  Negative for hematuria  Musculoskeletal: Negative for arthralgias and myalgias  Neurological: Negative for headaches  All other systems reviewed and are negative  Objective:      /70   Pulse 67   Ht 5' 3" (1 6 m)   Wt 53 kg (116 lb 12 8 oz)   SpO2 99%   BMI 20 69 kg/m²          Physical Exam  Constitutional:       Appearance: Normal appearance  She is well-developed  HENT:      Head: Normocephalic and atraumatic  Eyes:      Conjunctiva/sclera: Conjunctivae normal    Cardiovascular:      Rate and Rhythm: Normal rate and regular rhythm  Pulmonary:      Effort: Pulmonary effort is normal       Breath sounds: Normal breath sounds     Abdominal:      General: Bowel sounds are normal  There is no distension  Palpations: Abdomen is soft  Tenderness: There is no abdominal tenderness  Musculoskeletal:         General: Normal range of motion  Cervical back: Normal range of motion  Skin:     General: Skin is warm and dry  Neurological:      Mental Status: She is alert and oriented to person, place, and time     Psychiatric:         Mood and Affect: Mood normal          Behavior: Behavior normal

## 2023-02-03 ENCOUNTER — OFFICE VISIT (OUTPATIENT)
Dept: GASTROENTEROLOGY | Facility: CLINIC | Age: 33
End: 2023-02-03

## 2023-02-03 DIAGNOSIS — K63.89 SMALL INTESTINAL BACTERIAL OVERGROWTH (SIBO): ICD-10-CM

## 2023-02-03 DIAGNOSIS — R14.0 BLOATING: Primary | ICD-10-CM

## 2023-02-03 NOTE — PROGRESS NOTES
[facility]       Bacterial Overgrowth Analytical Record    Kristian Brody 28 y o  female MRN: [id number]      Date of Test: 01/28/2023    Substrate Given: Lactulose    Ordering Provider: Marcelle Noel    Medical Assistant: Vida Jernigan  Symptoms: bloating    The patient presents for bacterial overgrowth testing  Patient fasted overnight  Baseline readings obtained  Breath test performed every 20 min for a total of 3 hr    Sample Clock Time ppmH2 ppmCH4 Co2% Ritika   Baseline   10:40am 4 4 3 8 1 44   #1  20 minutes 11:00am 8 5 4 2 1 30   #2  40 minutes 11:20am 7 5 4 5 1 22   #3  60 minutes 11:40am 4 4 3 9 1 41   #4  80 minutes 12:00pm 4 4 3 9 1 41   #5  100 minutes 12:20pm 8 5 3 5 1 57   #6  120 minutes 12:40pm 14 6 4 7 1 17   #7  140 minutes 1:00pm 74 12 3 6 1 52   #8  160 minutes 1:20pm 73 11 3 9 1 41   #9  180 minutes 1:40pm 102 14 3 5 1 57       Physician interpretation: Positive for intestinal methanogenic overgrowth  Recommendation - Xifaxan 550 mg tid and neomycin 500 mg bid for 14 days

## 2023-02-05 RX ORDER — NEOMYCIN SULFATE 500 MG/1
500 TABLET ORAL 2 TIMES DAILY
Qty: 28 TABLET | Refills: 0 | Status: SHIPPED | OUTPATIENT
Start: 2023-02-05 | End: 2023-02-19

## 2023-02-06 ENCOUNTER — TELEPHONE (OUTPATIENT)
Dept: GASTROENTEROLOGY | Facility: CLINIC | Age: 33
End: 2023-02-06

## 2023-02-06 NOTE — TELEPHONE ENCOUNTER
----- Message from Sima Lezama RN sent at 2/6/2023  8:15 AM EST -----    ----- Message -----  From: Zamzam Cronin MD  Sent: 2/5/2023   3:50 PM EST  To: , #    Please let her know that breath test is positive for small intestine bacteria overgrowth  I sent prescription to her pharmacy -  Xifaxan 550 mg tid and neomycin 500 mg bid for 14 days        Thank you,    Jose Alfredo Aguiar

## 2023-02-06 NOTE — TELEPHONE ENCOUNTER
Spoke with patient, reviewed provider message and discussed medications and that they were sent to [facility]   Patient verbalized understanding, no further questions

## 2023-02-07 DIAGNOSIS — K21.9 GASTROESOPHAGEAL REFLUX DISEASE, UNSPECIFIED WHETHER ESOPHAGITIS PRESENT: ICD-10-CM

## 2023-02-07 RX ORDER — FAMOTIDINE 40 MG/1
TABLET, FILM COATED ORAL
Qty: 15 TABLET | Refills: 0 | Status: SHIPPED | OUTPATIENT
Start: 2023-02-07

## 2023-02-09 ENCOUNTER — DOCUMENTATION (OUTPATIENT)
Dept: GASTROENTEROLOGY | Facility: CLINIC | Age: 33
End: 2023-02-09

## 2023-02-09 NOTE — PROGRESS NOTES
Prior [address] was done on cover my meds   For xifaxan 550mg    [place] code: Dede Tran

## 2023-02-13 DIAGNOSIS — E03.9 HYPOTHYROIDISM, UNSPECIFIED TYPE: ICD-10-CM

## 2023-02-13 RX ORDER — LIOTHYRONINE SODIUM 5 UG/1
TABLET ORAL
Qty: 30 TABLET | Refills: 0 | Status: SHIPPED | OUTPATIENT
Start: 2023-02-13

## 2023-02-14 ENCOUNTER — TELEPHONE (OUTPATIENT)
Dept: GASTROENTEROLOGY | Facility: CLINIC | Age: 33
End: 2023-02-14

## 2023-02-14 ENCOUNTER — TRANSCRIBE ORDERS (OUTPATIENT)
Dept: GASTROENTEROLOGY | Facility: CLINIC | Age: 33
End: 2023-02-14

## 2023-02-14 NOTE — TELEPHONE ENCOUNTER
Patients GI provider:  Dr Condon [place]    Number to return call: [phone]    Reason for call: Pt calling for prior auth for xiafaxan        Scheduled procedure/appointment date if applicable: apt 0/87

## 2023-02-16 DIAGNOSIS — R92.8 ABNORMAL MAMMOGRAM: ICD-10-CM

## 2023-02-16 DIAGNOSIS — Z12.31 BREAST CANCER SCREENING BY MAMMOGRAM: Primary | ICD-10-CM

## 2023-02-19 DIAGNOSIS — F51.01 PRIMARY INSOMNIA: ICD-10-CM

## 2023-02-20 DIAGNOSIS — K63.89 SMALL INTESTINAL BACTERIAL OVERGROWTH (SIBO): Primary | ICD-10-CM

## 2023-02-20 RX ORDER — DOXYCYCLINE 100 MG/1
100 CAPSULE ORAL 2 TIMES DAILY
Qty: 28 CAPSULE | Refills: 0 | Status: SHIPPED | OUTPATIENT
Start: 2023-02-20 | End: 2023-03-06

## 2023-02-20 RX ORDER — ZOLPIDEM TARTRATE 12.5 MG/1
TABLET, FILM COATED, EXTENDED RELEASE ORAL
Qty: 30 TABLET | Refills: 0 | Status: SHIPPED | OUTPATIENT
Start: 2023-02-20

## 2023-02-20 NOTE — TELEPHONE ENCOUNTER
Dr Venita Rajan will be back in the office tomorrow so we will await what medications he would like as it is unclear what he wanted to send in instead  If pt's bloating is worse today, please have her start OTC BEANO with her meals today while we wait for Dr Liz Silva  Thanks!

## 2023-02-20 NOTE — TELEPHONE ENCOUNTER
Pt calling requesting a alternative medication be sent in to substitute xifaxan  She states her sx have gotten worse, she's swollen (clothes do not fit) and in pain  Please call patient at [phone] or [phone]

## 2023-02-20 NOTE — TELEPHONE ENCOUNTER
I spoke with the patient  Pt will wait to hear from our office regarding clarification on medication when Dr Tori Powell is back in the office ifrah  Relayed provider recommended to start OTC beano with her meals

## 2023-02-21 NOTE — TELEPHONE ENCOUNTER
===View-only below this line===  ----- Message -----  From: Nita Dixon MD  Sent: 2/21/2023   3:01 PM EST  To: Latricia Palafox PA-C, Renata Paez, JOSE MARTIN    I spoke to her over the phone today  She can take Doxy and neomycin together      Thank you    Tamiko Hidalgo

## 2023-03-08 ENCOUNTER — TELEPHONE (OUTPATIENT)
Dept: NEUROLOGY | Facility: CLINIC | Age: 33
End: 2023-03-08

## 2023-03-13 ENCOUNTER — TELEPHONE (OUTPATIENT)
Dept: OTHER | Facility: OTHER | Age: 33
End: 2023-03-13

## 2023-03-13 DIAGNOSIS — K21.9 GASTROESOPHAGEAL REFLUX DISEASE, UNSPECIFIED WHETHER ESOPHAGITIS PRESENT: ICD-10-CM

## 2023-03-13 RX ORDER — FAMOTIDINE 40 MG/1
TABLET, FILM COATED ORAL
Qty: 15 TABLET | Refills: 0 | Status: SHIPPED | OUTPATIENT
Start: 2023-03-13

## 2023-03-14 NOTE — TELEPHONE ENCOUNTER
Spoke with pt's father, pt is "currently indisposed at the moment", he will relay the recommendations for OTC gas x  He is agreeable to pt being seen in any office if they can get her in for a sooner appt

## 2023-03-14 NOTE — TELEPHONE ENCOUNTER
Spoke to father  Currently I could not find any sooner openings  Appointment attached to [facility]  - [facility] as high

## 2023-03-14 NOTE — TELEPHONE ENCOUNTER
OV 2/3/23 Jonathan Wilson  FU 4/26/23  [place]    H/O as per last OV    Celiac disease  GERD  Bloating/ + SIBO      Medications   Pantoprazole  Pepcid  Xifaxin - finished on 3/11/23     finished her course of treatment on Saturday for SIBO continuesl with all the same sx's  abdomen is still very distended, with some intermittent abdominal pain  She would like to know what the next steps are  Please advise

## 2023-03-15 ENCOUNTER — OFFICE VISIT (OUTPATIENT)
Dept: NEUROLOGY | Facility: CLINIC | Age: 33
End: 2023-03-15

## 2023-03-15 VITALS
RESPIRATION RATE: 18 BRPM | SYSTOLIC BLOOD PRESSURE: 121 MMHG | WEIGHT: 119 LBS | HEIGHT: 63 IN | TEMPERATURE: 96.9 F | DIASTOLIC BLOOD PRESSURE: 78 MMHG | BODY MASS INDEX: 21.09 KG/M2 | HEART RATE: 80 BPM

## 2023-03-15 DIAGNOSIS — G95.0 SYRINGOHYDROMYELIA (HCC): ICD-10-CM

## 2023-03-15 DIAGNOSIS — R20.2 NUMBNESS AND TINGLING OF BOTH FEET: ICD-10-CM

## 2023-03-15 DIAGNOSIS — R20.0 NUMBNESS AND TINGLING OF BOTH FEET: ICD-10-CM

## 2023-03-15 DIAGNOSIS — G93.0 ARACHNOID CYST: ICD-10-CM

## 2023-03-15 DIAGNOSIS — R90.89 ABNORMAL BRAIN MRI: Primary | ICD-10-CM

## 2023-03-15 RX ORDER — DUPILUMAB 300 MG/2ML
300 INJECTION, SOLUTION SUBCUTANEOUS
COMMUNITY

## 2023-03-15 NOTE — PATIENT INSTRUCTIONS
Continue to follow with PCP and other specialists as indicated  Will not plan any routine follow up through our office at this time, but would be happy to see you back should the need arise    Please call the office if any new or worsening neurologic symptoms

## 2023-03-15 NOTE — PROGRESS NOTES
Patient ID: Denton Guadalupe is a 28 y o  female  Assessment/Plan:  28year old female with history of hypothyroidism, Schizoaffective disorder, arachnoid cyst, Celiac, presents for follow up regarding abnormal brain imaging  PCP has been following brain imaging yearly for surveillance of arachnoid cyst, which has been stable  She was referred for abnormal brain imaging due to white matter changes  On review, patient has stable white matter changes in the periventricular region, mild expansion of lateral ventricles in the occipital region  No evidence of stroke/hemorrhage, no classic signs of MS or other CNS demyelination  I reviewed imaging report from MRI brain in 2016 from [facility] that mentions the same, and that this was also stable from a MRI in 2000  She had updated MRI brain 4/2022 which was stable  Findings likely developmental due to prematurity, as she was 3 months premature  MRI c-spine and t-spine with no signs of demyelination  She did have a syrinx in the thoracic cord from T2-T10  Patient denies any trauma, so 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  Extensive labs unrevealing  Her neurologic exam is unremarkable including strength, reflexes and sensation  She has reported some intermittent numbness in her feet, but no loss of sensation appreciated on exam  She does have celiac disease and hypothyroidism, possibility of a mild small fiber neuropathy not ruled out  She is on gabapentin for psychiatric reasons  At this point, she remains stable, imaging has been stable over many years, no concern for MS or other neurologic disease  Findings on imaging likely developmental   No indication for repeat imaging unless any clinical changes  Will see her back on a PRN basis         Diagnoses and all orders for this visit:    Abnormal brain MRI    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[address] )    Arachnoid cyst    Numbness and tingling of both feet    Other orders  -     dupilumab (Dupixent) subcutaneous injection; 300 mg           Subjective:    HPI    Patient is a 39-year-old female who was referred to the [place] [address] [facility] for evaluation of abnormal imaging studies  Patient was seen by Dr Nell Felipe for initial consult on 12/10/2020  Patient has known history of arachnoid cyst, which PCP has been following serially for stability  She had previously been evaluated by [address] [place] [address] Neurology in 2016 for “cognitive issues” in the setting of schizoaffective disorder and it was felt there were no primary neurologic problems  It was noted at that time that she had an abnormal brain MRI 07/2016, results are as follows: "There are appears be volume loss involving the left greater than right parietal white matter with abnormal T2-weighted signal in the periventricular white matter  Findings were described on prior study and could represent old gliotic changes  Demyelination considered less likely but cannot be excluded  Other disorders causing increased T2-weighted signal including Lyme disease, sarcoidosis or vascular disorders are considered less likely given the apparent  chronicity of the findings which were described on prior study "  According to the MRI report patient had an MRI in 2000 which showed similar findings  She was also seen by a neurologist at [facility] in 2018 for the same and there was no concern for neurologic disorder, felt this was more psychiatric in nature  Consult to our clinic was for [facility], and patient reported her current symptoms were numbness on bottom of feet, fatigue, visual disturbance  MRI brain 8/31/2020: No acute intracranial abnormality  Nonspecific periventricular and deep cerebral white matter hypoattenuation may represent periventricular leukomalacia  Other considerations include demyelinating disease or precocious microangiopathy   Stable right retrocerebellar arachnoid cyst  Stable asymmetry in lateral ventricular size with poor visualization of the previously noted left lateral intraventricular arachnoid cyst   An alternative consideration for the mild expansion includes periventricular leukomalacia  Further history indicates patient was born 3 months premature and was in an incubator for about a month  Unsure of developmental milestones  Good student in   Went to college and significant psychiatric issues started then  She has had several prolonged inpatient behavioral health stays  She had cord imaging performed following her consult here:  MRI c-spine 03/10/21 with normal cord signal  Minor, noncompressive degenerative discogenic disease  MRI T-spine 03/10/2021 demonstrated 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  At the T7 level, the syrinx cavity measures 2 mm in greatest dimension  No associated pathologic enhancement  Several serum labs were ordered and completed on 03/01/2021  Normal long chain fatty acids, normal CMV, normal SPEP  Autoimmune encephalopathy panel to [facility] was remarkable for glutamic acid decarboxylase  Per the note, this is consistent with predisposition for thyrogastric disorders including thyroiditis, pernicious anemia, type 1 diabetes, but has low specificity for autoimmune encephalopathy  GAD65 Antibody levels less than 2 00nM have a lower positive predictive value for neurologic autoimmunity then values of 20  0nM or higher  Patient's value was 0 03 (normal is <0 02)  She does have hypothyroidism  Today, patient reports she has been stable  Denies any new neurologic symptoms  She had updated MRI brain following her last visit 1 year ago  MRI brain 4/7/22 with stable nonspecific periventricular and deep left cerebral white matter signal abnormality  Stable retrocerebellar arachnoid cyst   Stable benign-appearing asymmetric enlargement of the left lateral ventricular atria  She continues to report occasional numbness and tingling in her feet    It has not progressed over time, is not bothersome or painful to her  There is no specific pattern to this  She denies headaches, vision changes, speech/swallowing difficulty  No falls, weakness  She reports ongoing issues with urinating, which are longstanding, has seen urology and takes tamsulosin (she has had some retention and hesitancy)  The following portions of the patient's history were reviewed and updated as appropriate: current medications, past family history, past medical history, past social history, past surgical history and problem list          Objective:    Blood pressure 121/78, pulse 80, temperature (!) 96 9 °F (36 1 °C), temperature source Tympanic, resp  rate 18, height 5' 3" (1 6 m), weight 54 kg (119 lb)    Physical Exam  Constitutional:       Appearance: Normal appearance  HENT:      Head: Normocephalic and atraumatic  Eyes:      Extraocular Movements: EOM normal       Pupils: Pupils are equal, round, and reactive to light  Neurological:      Mental Status: She is alert  Motor: Motor strength is normal       Deep Tendon Reflexes: Reflexes are normal and symmetric  Psychiatric:         Speech: Speech normal       Comments: Flat affect, poor eye contact  Neurological Exam  Mental Status  Alert  Oriented to person, place, time and situation  Speech is normal  Language is fluent with no aphasia  Attention and concentration are normal   Responses to questioning delayed at times  Cranial Nerves  CN II: Visual fields full to confrontation  CN III, IV, VI: Extraocular movements intact bilaterally  Pupils equal round and reactive to light bilaterally  CN V: Facial sensation is normal   CN VII: Full and symmetric facial movement  CN VIII: Hearing is normal   CN IX, X: Palate elevates symmetrically  CN XI: Shoulder shrug strength is normal   CN XII: Tongue midline without atrophy or fasciculations  Motor   Normal muscle tone  Strength is 5/5 throughout all four extremities      Sensory  Sensation is intact to light touch, pinprick, vibration and proprioception in all four extremities  Reflexes  Deep tendon reflexes are 2+ and symmetric in all four extremities  Coordination  Right: Finger-to-nose normal Left: Finger-to-nose normal     Gait  Casual gait is normal including stance, stride, and arm swing  ROS:    Review of Systems   Constitutional: Positive for appetite change (increased)  Negative for chills and fever  HENT: Negative for ear pain and sore throat  Eyes: Negative for pain and visual disturbance  Respiratory: Negative for cough and shortness of breath  Cardiovascular: Negative for chest pain and palpitations  Gastrointestinal: Positive for abdominal pain  Negative for vomiting  Genitourinary: Positive for frequency and urgency  Negative for dysuria and hematuria  Musculoskeletal: Positive for back pain  Negative for arthralgias  Skin: Negative for color change and rash  Neurological: Positive for numbness (bilateral feet)  Negative for seizures and syncope  Psychiatric/Behavioral: Positive for sleep disturbance  All other systems reviewed and are negative      I personally reviewed and updated the ROS as appropriate

## 2023-03-21 DIAGNOSIS — F51.01 PRIMARY INSOMNIA: ICD-10-CM

## 2023-03-21 DIAGNOSIS — E03.9 HYPOTHYROIDISM, UNSPECIFIED TYPE: ICD-10-CM

## 2023-03-21 RX ORDER — LIOTHYRONINE SODIUM 5 UG/1
TABLET ORAL
Qty: 30 TABLET | Refills: 0 | Status: SHIPPED | OUTPATIENT
Start: 2023-03-21

## 2023-03-22 RX ORDER — ZOLPIDEM TARTRATE 12.5 MG/1
TABLET, FILM COATED, EXTENDED RELEASE ORAL
Qty: 30 TABLET | Refills: 0 | Status: SHIPPED | OUTPATIENT
Start: 2023-03-22

## 2023-03-26 DIAGNOSIS — N39.8 VOIDING DYSFUNCTION: ICD-10-CM

## 2023-03-26 RX ORDER — TAMSULOSIN HYDROCHLORIDE 0.4 MG/1
CAPSULE ORAL
Qty: 90 CAPSULE | Refills: 0 | Status: SHIPPED | OUTPATIENT
Start: 2023-03-26

## 2023-04-04 ENCOUNTER — HOSPITAL ENCOUNTER (OUTPATIENT)
Dept: MAMMOGRAPHY | Facility: HOSPITAL | Age: 33
Discharge: HOME | End: 2023-04-04

## 2023-04-04 ENCOUNTER — HOSPITAL ENCOUNTER (OUTPATIENT)
Dept: ULTRASOUND IMAGING | Facility: HOSPITAL | Age: 33
Discharge: HOME | End: 2023-04-04

## 2023-04-04 VITALS — HEIGHT: 63 IN | BODY MASS INDEX: 21.09 KG/M2 | WEIGHT: 119 LBS

## 2023-04-04 DIAGNOSIS — R92.8 ABNORMAL MAMMOGRAM: ICD-10-CM

## 2023-04-04 DIAGNOSIS — N63.20 MASS OF LEFT BREAST, UNSPECIFIED QUADRANT: ICD-10-CM

## 2023-04-23 DIAGNOSIS — E03.9 HYPOTHYROIDISM, UNSPECIFIED TYPE: ICD-10-CM

## 2023-04-23 RX ORDER — LIOTHYRONINE SODIUM 5 UG/1
TABLET ORAL
Qty: 30 TABLET | Refills: 0 | Status: SHIPPED | OUTPATIENT
Start: 2023-04-23

## 2023-05-08 DIAGNOSIS — K21.9 GASTROESOPHAGEAL REFLUX DISEASE, UNSPECIFIED WHETHER ESOPHAGITIS PRESENT: ICD-10-CM

## 2023-05-09 RX ORDER — FAMOTIDINE 40 MG/1
TABLET, FILM COATED ORAL
Qty: 15 TABLET | Refills: 0 | Status: SHIPPED | OUTPATIENT
Start: 2023-05-09

## 2023-05-18 NOTE — TELEPHONE ENCOUNTER
Pt called in stating that she finished her course of treatment on Saturday for SIBO but she is still with all the same sx's  Per pt her abdomen is still very distended and with some intermittent abdominal pain  Pt would like to know what to do now  She is aware the office is closed for the evening and this will be addressed tomorrow  Burow's Advancement Flap Text: The defect edges were debeveled with a #15 scalpel blade. Given the location of the defect and the proximity to free margins a Burow's advancement flap was deemed most appropriate. Using a sterile surgical marker, the appropriate advancement flap was drawn incorporating the defect and placing the expected incisions within the relaxed skin tension lines where possible. The area thus outlined was incised deep to adipose tissue with a #15 scalpel blade. The skin margins were undermined to an appropriate distance in all directions utilizing iris scissors. Following this, the designed flap was advanced and carried over into the primary defect and sutured into place.

## 2023-05-29 DIAGNOSIS — E03.9 HYPOTHYROIDISM, UNSPECIFIED TYPE: ICD-10-CM

## 2023-05-29 RX ORDER — LIOTHYRONINE SODIUM 5 UG/1
TABLET ORAL
Qty: 30 TABLET | Refills: 0 | Status: SHIPPED | OUTPATIENT
Start: 2023-05-29 | End: 2023-05-30 | Stop reason: SDUPTHER

## 2023-05-30 DIAGNOSIS — E03.9 HYPOTHYROIDISM, UNSPECIFIED TYPE: ICD-10-CM

## 2023-05-31 RX ORDER — LIOTHYRONINE SODIUM 5 UG/1
5 TABLET ORAL DAILY
Qty: 30 TABLET | Refills: 0 | Status: SHIPPED | OUTPATIENT
Start: 2023-05-31

## 2023-06-06 NOTE — TELEPHONE ENCOUNTER
Pt called asking if you can send in a rx for bethanechol 25 mg tid for her  She called the urologist and they are no longer there  She has not seen them for a long time      Karl Olivares Upon review of the In Basket request we were able to locate, review, and update the patient chart as requested for Mammogram     Any additional questions or concerns should be emailed to the Practice Liaisons via the appropriate education email address, please do not reply via In Basket      Thank you  Sae Florentino

## 2023-06-11 DIAGNOSIS — K21.9 GASTROESOPHAGEAL REFLUX DISEASE, UNSPECIFIED WHETHER ESOPHAGITIS PRESENT: ICD-10-CM

## 2023-06-11 RX ORDER — FAMOTIDINE 40 MG/1
TABLET, FILM COATED ORAL
Qty: 15 TABLET | Refills: 0 | Status: SHIPPED | OUTPATIENT
Start: 2023-06-11

## 2023-06-21 DIAGNOSIS — F51.01 PRIMARY INSOMNIA: ICD-10-CM

## 2023-06-21 RX ORDER — ZOLPIDEM TARTRATE 12.5 MG/1
TABLET, FILM COATED, EXTENDED RELEASE ORAL
Qty: 30 TABLET | Refills: 0 | Status: SHIPPED | OUTPATIENT
Start: 2023-06-21

## 2023-06-26 DIAGNOSIS — N39.8 VOIDING DYSFUNCTION: ICD-10-CM

## 2023-06-26 RX ORDER — TAMSULOSIN HYDROCHLORIDE 0.4 MG/1
CAPSULE ORAL
Qty: 90 CAPSULE | Refills: 0 | Status: SHIPPED | OUTPATIENT
Start: 2023-06-26

## 2023-07-02 DIAGNOSIS — K21.9 GASTROESOPHAGEAL REFLUX DISEASE, UNSPECIFIED WHETHER ESOPHAGITIS PRESENT: ICD-10-CM

## 2023-07-02 RX ORDER — FAMOTIDINE 40 MG/1
TABLET, FILM COATED ORAL
Qty: 15 TABLET | Refills: 0 | Status: SHIPPED | OUTPATIENT
Start: 2023-07-02 | End: 2023-07-10

## 2023-07-03 DIAGNOSIS — E03.9 HYPOTHYROIDISM, UNSPECIFIED TYPE: ICD-10-CM

## 2023-07-05 RX ORDER — LIOTHYRONINE SODIUM 5 UG/1
5 TABLET ORAL DAILY
Qty: 30 TABLET | Refills: 1 | Status: SHIPPED | OUTPATIENT
Start: 2023-07-05

## 2023-07-09 DIAGNOSIS — K21.9 GASTROESOPHAGEAL REFLUX DISEASE, UNSPECIFIED WHETHER ESOPHAGITIS PRESENT: ICD-10-CM

## 2023-07-10 ENCOUNTER — TELEPHONE (OUTPATIENT)
Dept: INTERNAL MEDICINE CLINIC | Facility: CLINIC | Age: 33
End: 2023-07-10

## 2023-07-10 RX ORDER — FAMOTIDINE 40 MG/1
TABLET, FILM COATED ORAL
Qty: 15 TABLET | Refills: 0 | Status: SHIPPED | OUTPATIENT
Start: 2023-07-10

## 2023-07-10 NOTE — TELEPHONE ENCOUNTER
Patient called the clinical line and not the scheduling line     Left message  As follows     Hi my name is Yogi Meza. My YOB: 1990. I have stitches. It was to remove part of my skin to check for psoriasis by my dermatologist and my dermatology appointment and got moved back by a couple of weeks. So now my stitches are actually getting grown over by my skin and it's about [facility] shot and my stitches will be under my skin. I was wondering if a nurse or a doctor at the office could remove them if I had to make an appointment or something. My phone number is [phone]. Thank you. Bye. Please advise. Thank you.

## 2023-07-11 ENCOUNTER — OFFICE VISIT (OUTPATIENT)
Dept: URGENT CARE | Facility: CLINIC | Age: 33
End: 2023-07-11
Payer: COMMERCIAL

## 2023-07-11 ENCOUNTER — TELEPHONE (OUTPATIENT)
Dept: INTERNAL MEDICINE CLINIC | Facility: CLINIC | Age: 33
End: 2023-07-11

## 2023-07-11 VITALS
OXYGEN SATURATION: 98 % | RESPIRATION RATE: 18 BRPM | TEMPERATURE: 97.8 F | SYSTOLIC BLOOD PRESSURE: 108 MMHG | DIASTOLIC BLOOD PRESSURE: 59 MMHG | HEART RATE: 82 BPM

## 2023-07-11 DIAGNOSIS — Z48.89 ENCOUNTER FOR POSTOPERATIVE WOUND CHECK: Primary | ICD-10-CM

## 2023-07-11 PROCEDURE — 99212 OFFICE O/P EST SF 10 MIN: CPT | Performed by: PHYSICIAN ASSISTANT

## 2023-07-11 NOTE — TELEPHONE ENCOUNTER
pato called to make an appt with you to have her sutures removed on her hand-she said she has 4 of them. She said she was suppose to go back on 6-27 but she did not go. She said the skin is growing over them now.     Can I set her up for an appt with you for this?    thanks

## 2023-07-11 NOTE — PROGRESS NOTES
[facility]    NAME: Jett Bingham is a 28 y.o. female  : 1990    MRN: [id number]  DATE: 2023  TIME: 4:28 PM    Assessment and Plan   Encounter for postoperative wound check [Z48.89]  1. Encounter for postoperative wound check            Patient Instructions     Patient Instructions   Stitches are embedded. Unable to determine how many and what type of sutures are present. A couple tails visible through a small hole in the skin. Will need to be surgically removed/dug out. Recommend patient go back to the dermatologist that did the procedure in the first place for removal.      Chief Complaint     Chief Complaint   Patient presents with   • Suture / Staple Removal     Were placed on  appt got pushed back family doctor told her to come here        History of Present Illness   28year old female here with concern that her stitches have been in too long. Saw dermatologist who removed a small patch of her psoriasis on her hand sometime in . States that she had to cancel  Her appt to have them removed and she has a follow up scheduled but it is not until . Skin is growing over the stitiches. No pain, redness, signs of infection. Review of Systems   Review of Systems   Skin: Positive for wound. All other systems reviewed and are negative.       Current Medications     Current Outpatient Medications:   •  benztropine (COGENTIN) 1 mg tablet, Take 1 mg by mouth TAKE 1 TABLET IN AM AND 2 TABLETS IN PM, Disp: , Rfl:   •  brexpiprazole (REXULTI) 0.25 MG tablet, Take 0.5 mg by mouth daily, Disp: , Rfl:   •  busPIRone (BUSPAR) 10 mg tablet, Take 10 mg by mouth 2 (two) times a day, Disp: , Rfl:   •  clobetasol (TEMOVATE) 0.05 % cream, APPLY THIN LAYER TWICE DAILY TO RASH ON HANDS FOR 2 WEEKS, THEN DAILY FOR 2 WEEKS AS NEEDED FOR PERSISTENT RASH, Disp: , Rfl:   •  clonazePAM (KlonoPIN) 0.5 mg tablet, Take 1 mg by mouth daily at bedtime, Disp: , Rfl:   •  Dermatological Products, Misc.  (EpiCeram) lotion,  , Disp: , Rfl:   •  dupilumab (Dupixent) subcutaneous injection, 300 mg, Disp: , Rfl:   •  famotidine (PEPCID) 40 MG tablet, Take 1/2 (one-half) tablet by mouth once daily, Disp: 15 tablet, Rfl: 0  •  gabapentin (NEURONTIN) 100 mg capsule, 2 tablets in am, Disp: , Rfl:   •  gabapentin (NEURONTIN) 400 mg capsule, 400 mg daily at bedtime , Disp: , Rfl:   •  liothyronine (CYTOMEL) 5 mcg tablet, Take 1 tablet (5 mcg total) by mouth daily, Disp: 30 tablet, Rfl: 1  •  pantoprazole (PROTONIX) 40 mg tablet, Take 1 tablet (40 mg total) by mouth 2 (two) times a day, Disp: 60 tablet, Rfl: 5  •  tamsulosin (FLOMAX) 0.4 mg, TAKE 1 CAPSULE BY MOUTH ONCE DAILY WITH SUPPER, Disp: 90 capsule, Rfl: 0  •  zaleplon (SONATA) 10 MG capsule, Take 1 capsule by mouth once daily at bedtime, Disp: 30 capsule, Rfl: 0  •  zolpidem (AMBIEN CR) 12.5 MG CR tablet, TAKE 1 TABLET BY MOUTH ONCE DAILY AT BEDTIME AS NEEDED FOR SLEEP, Disp: 30 tablet, Rfl: 0    Current Allergies     Allergies as of 07/11/2023 - Reviewed 07/11/2023   Allergen Reaction Noted   • Gluten meal - food allergy  09/26/2018   • Latex Swelling 08/03/2020          The following portions of the patient's history were reviewed and updated as appropriate: allergies, current medications, past family history, past medical history, past social history, past surgical history and problem list.   Past Medical History:   Diagnosis Date   • Anorexia nervosa     pt denies history at time of admission 7/21/18   • Anxiety    • Celiac disease    • Chronic kidney disease    • CPAP (continuous positive airway pressure) dependence     waiting for a new mask   • Depression    • Disease of thyroid gland     hypo   • Gall bladder polyp    • Gastroesophageal reflux disease 9/18/2018   • Hallucination    • Memory difficulty 7/12/2018   • Plantar wart of left foot    • Psychiatric illness    • Raynaud's disease without gangrene    • Renal atrophy    • Schizoaffective disorder [facility])    • Self-injurious behavior    • Sleep apnea    • Sleep difficulties    • Suicide attempt [facility])     history of attempt at age 22 by overdose    • Urinary retention      Past Surgical History:   Procedure Laterality Date   • EYE SURGERY Left     tear duct   • FL ESOPHAGOGASTRODUODENOSCOPY TRANSORAL DIAGNOSTIC N/A 2/9/2018    Procedure: ESOPHAGOGASTRODUODENOSCOPY (EGD); Surgeon: Scott Nava MD;  Location: [facility];  Service: Gastroenterology   • FL ESOPHAGOGASTRODUODENOSCOPY TRANSORAL DIAGNOSTIC N/A 11/6/2018    Procedure: ESOPHAGOGASTRODUODENOSCOPY (EGD); Surgeon: Sravani Richey MD;  Location: [facility];  Service: Gastroenterology   • FL LAPAROSCOPY PROCTOPEXY PROLAPSE N/A 1/12/2021    Procedure: Rectopexy with sigmoid resection w/ robotics. ;  Surgeon: Wen Garrett MD;  Location:  MAIN OR;  Service: Colorectal     Family History   Problem Relation Age of Onset   • Hypertension Father         benign essential   • Hypertension Brother         benign essential    • OCD Brother    • Depression Brother    • Other Family         nasolacrimal duct obstruction, acquired   • Breast cancer Mother 61   • No Known Problems Maternal Grandmother    • No Known Problems Maternal Grandfather    • Breast cancer Paternal Grandmother    • Prostate cancer Paternal Grandfather    • No Known Problems Maternal Aunt    • No Known Problems Maternal Aunt    • Colon cancer Neg Hx      Social History     Socioeconomic History   • Marital status: Single     Spouse name: Not on file   • Number of children: Not on file   • Years of education: Not on file   • Highest education level: Not on file   Occupational History   • Occupation: UNEMPLOYED   Tobacco Use   • Smoking status: Never   • Smokeless tobacco: Never   Vaping Use   • Vaping Use: Never used   Substance and Sexual Activity   • Alcohol use: Not Currently   • Drug use: No   • Sexual activity: Not Currently   Other Topics Concern   • Not on file   Social History Narrative    UNEMPLOYED     Social Determinants of Health     Financial Resource Strain: Not on file   Food Insecurity: Not on file   Transportation Needs: Not on file   Physical Activity: Not on file   Stress: Not on file   Social Connections: Not on file   Intimate Partner Violence: Not on file   Housing Stability: Not on file     Medications have been verified. Objective   /59   Pulse 82   Temp 97.8 °F (36.6 °C)   Resp 18   SpO2 98%      Physical Exam   Physical Exam  Vitals reviewed. Constitutional:       Appearance: Normal appearance. Cardiovascular:      Rate and Rhythm: Normal rate and regular rhythm. Pulses: Normal pulses. Musculoskeletal:      Comments: Sutures noted in hand, can only visualize tails through a small hole in skin. No erythema, non-tender. No signs of infection. Neurological:      Mental Status: She is alert.

## 2023-07-11 NOTE — TELEPHONE ENCOUNTER
Appt with anyone available for this please, if no appts available then urgent care as they are already in too long

## 2023-07-11 NOTE — PATIENT INSTRUCTIONS
Stitches are embedded. Unable to determine how many and what type of sutures are present. A couple tails visible through a small hole in the skin. Will need to be surgically removed/dug out.   Recommend patient go back to the dermatologist that did the procedure in the first place for removal.

## 2023-07-11 NOTE — TELEPHONE ENCOUNTER
She said the dermatologist put them in and she cannot get reach them re this. She did not know if she needs to go to the urgent care or come in here to you.

## 2023-07-16 DIAGNOSIS — F51.01 PRIMARY INSOMNIA: ICD-10-CM

## 2023-07-17 RX ORDER — ZOLPIDEM TARTRATE 12.5 MG/1
TABLET, FILM COATED, EXTENDED RELEASE ORAL
Qty: 30 TABLET | Refills: 0 | Status: SHIPPED | OUTPATIENT
Start: 2023-07-17

## 2023-07-19 ENCOUNTER — APPOINTMENT (OUTPATIENT)
Dept: LAB | Facility: HOSPITAL | Age: 33
End: 2023-07-19
Payer: COMMERCIAL

## 2023-07-19 DIAGNOSIS — Z11.1 SCREENING EXAMINATION FOR PULMONARY TUBERCULOSIS: ICD-10-CM

## 2023-07-19 PROCEDURE — 86480 TB TEST CELL IMMUN MEASURE: CPT

## 2023-07-19 PROCEDURE — 36415 COLL VENOUS BLD VENIPUNCTURE: CPT

## 2023-07-21 LAB
GAMMA INTERFERON BACKGROUND BLD IA-ACNC: 0.03 IU/ML
M TB IFN-G BLD-IMP: NEGATIVE
M TB IFN-G CD4+ BCKGRND COR BLD-ACNC: 0 IU/ML
M TB IFN-G CD4+ BCKGRND COR BLD-ACNC: 0 IU/ML
MITOGEN IGNF BCKGRD COR BLD-ACNC: 9.53 IU/ML

## 2023-07-25 ENCOUNTER — APPOINTMENT (OUTPATIENT)
Dept: LAB | Facility: CLINIC | Age: 33
End: 2023-07-25
Payer: COMMERCIAL

## 2023-07-25 ENCOUNTER — TELEPHONE (OUTPATIENT)
Dept: INTERNAL MEDICINE CLINIC | Facility: CLINIC | Age: 33
End: 2023-07-25

## 2023-07-25 DIAGNOSIS — Z01.84 IMMUNITY STATUS TESTING: ICD-10-CM

## 2023-07-25 DIAGNOSIS — Z01.84 IMMUNITY STATUS TESTING: Primary | ICD-10-CM

## 2023-07-25 PROCEDURE — 86762 RUBELLA ANTIBODY: CPT

## 2023-07-25 PROCEDURE — 86735 MUMPS ANTIBODY: CPT

## 2023-07-25 PROCEDURE — 86803 HEPATITIS C AB TEST: CPT

## 2023-07-25 PROCEDURE — 86480 TB TEST CELL IMMUN MEASURE: CPT

## 2023-07-25 PROCEDURE — 86787 VARICELLA-ZOSTER ANTIBODY: CPT

## 2023-07-25 PROCEDURE — 87340 HEPATITIS B SURFACE AG IA: CPT

## 2023-07-25 PROCEDURE — 36415 COLL VENOUS BLD VENIPUNCTURE: CPT

## 2023-07-25 NOTE — TELEPHONE ENCOUNTER
Patient came to the window ( and waiting here ) and stated that she is trying to volunteer with [email or web address] and she needs a copy of her MMR, and she does not have that. She states she is willing to do the titers.      please advise if you could order them

## 2023-07-26 LAB
HBV SURFACE AG SER QL: NORMAL
HCV AB SER QL: NORMAL
MUV IGG SER QL IA: NORMAL
RUBV IGG SERPL IA-ACNC: 40.8 IU/ML
VZV IGG SER QL IA: NORMAL

## 2023-07-27 ENCOUNTER — TELEPHONE (OUTPATIENT)
Dept: INTERNAL MEDICINE CLINIC | Facility: CLINIC | Age: 33
End: 2023-07-27

## 2023-07-27 NOTE — TELEPHONE ENCOUNTER
patient called stating she believes she has a bladder infection or UTI. Symptoms include pelvic pain, frequency, pain when urinating. No burning or lower back pain. Please advise.

## 2023-07-28 ENCOUNTER — CLINICAL SUPPORT (OUTPATIENT)
Dept: INTERNAL MEDICINE CLINIC | Facility: CLINIC | Age: 33
End: 2023-07-28
Payer: COMMERCIAL

## 2023-07-28 DIAGNOSIS — R39.9 UTI SYMPTOMS: Primary | ICD-10-CM

## 2023-07-28 LAB
SL AMB  POCT GLUCOSE, UA: NORMAL
SL AMB LEUKOCYTE ESTERASE,UA: NORMAL
SL AMB POCT BILIRUBIN,UA: NORMAL
SL AMB POCT BLOOD,UA: NORMAL
SL AMB POCT CLARITY,UA: COLORLESS
SL AMB POCT COLOR,UA: CLEAR
SL AMB POCT KETONES,UA: NORMAL
SL AMB POCT NITRITE,UA: NORMAL
SL AMB POCT PH,UA: 7
SL AMB POCT SPECIFIC GRAVITY,UA: 1
SL AMB POCT URINE PROTEIN: NORMAL
SL AMB POCT UROBILINOGEN: NORMAL

## 2023-07-28 PROCEDURE — 87086 URINE CULTURE/COLONY COUNT: CPT | Performed by: PHYSICIAN ASSISTANT

## 2023-07-28 PROCEDURE — 81002 URINALYSIS NONAUTO W/O SCOPE: CPT

## 2023-07-29 LAB
BACTERIA UR CULT: NORMAL
GAMMA INTERFERON BACKGROUND BLD IA-ACNC: 0.02 IU/ML
M TB IFN-G BLD-IMP: NEGATIVE
M TB IFN-G CD4+ BCKGRND COR BLD-ACNC: 0.01 IU/ML
M TB IFN-G CD4+ BCKGRND COR BLD-ACNC: 0.01 IU/ML
MITOGEN IGNF BCKGRD COR BLD-ACNC: >10 IU/ML

## 2023-08-09 ENCOUNTER — OFFICE VISIT (OUTPATIENT)
Dept: INTERNAL MEDICINE CLINIC | Facility: CLINIC | Age: 33
End: 2023-08-09
Payer: COMMERCIAL

## 2023-08-09 VITALS
OXYGEN SATURATION: 97 % | HEIGHT: 63 IN | TEMPERATURE: 100.1 F | WEIGHT: 120.13 LBS | DIASTOLIC BLOOD PRESSURE: 62 MMHG | SYSTOLIC BLOOD PRESSURE: 128 MMHG | HEART RATE: 79 BPM | BODY MASS INDEX: 21.29 KG/M2

## 2023-08-09 DIAGNOSIS — E03.9 HYPOTHYROIDISM, UNSPECIFIED TYPE: ICD-10-CM

## 2023-08-09 DIAGNOSIS — K21.9 GASTROESOPHAGEAL REFLUX DISEASE, UNSPECIFIED WHETHER ESOPHAGITIS PRESENT: ICD-10-CM

## 2023-08-09 DIAGNOSIS — Z00.00 WELL ADULT EXAM: Primary | ICD-10-CM

## 2023-08-09 PROCEDURE — 99395 PREV VISIT EST AGE 18-39: CPT | Performed by: PHYSICIAN ASSISTANT

## 2023-08-09 PROCEDURE — 99213 OFFICE O/P EST LOW 20 MIN: CPT | Performed by: PHYSICIAN ASSISTANT

## 2023-08-09 RX ORDER — CLOBETASOL PROPIONATE 0.5 MG/G
CREAM TOPICAL
COMMUNITY

## 2023-08-09 RX ORDER — FAMOTIDINE 40 MG/1
20 TABLET, FILM COATED ORAL DAILY
Qty: 15 TABLET | Refills: 3 | Status: SHIPPED | OUTPATIENT
Start: 2023-08-09

## 2023-08-09 RX ORDER — TRALOKINUMAB-LDRM 150 MG/ML
INJECTION, SOLUTION SUBCUTANEOUS
COMMUNITY

## 2023-08-09 RX ORDER — LIOTHYRONINE SODIUM 5 UG/1
5 TABLET ORAL DAILY
Qty: 30 TABLET | Refills: 1 | Status: SHIPPED | OUTPATIENT
Start: 2023-08-09

## 2023-08-09 NOTE — PROGRESS NOTES
Assessment/Plan     Healthy female exam.     1. Form completed  2. Patient Counseling:  --Nutrition: Stressed importance of moderation in sodium/caffeine intake, saturated fat and cholesterol, caloric balance, sufficient intake of fresh fruits, vegetables, fiber, calcium, iron, and 1 mg of folate supplement per day (for females capable of pregnancy). --Discussed the issue of estrogen replacement, calcium supplement, and the daily use of baby aspirin. --Exercise: Stressed the importance of regular exercise. --Substance Abuse: Discussed cessation/primary prevention of tobacco, alcohol, or other drug use; driving or other dangerous activities under the influence; availability of treatment for abuse. --Sexuality: Discussed sexually transmitted diseases, partner selection, use of condoms, avoidance of unintended pregnancy  and contraceptive alternatives. --Injury prevention: Discussed safety belts, safety helmets, smoke detector, smoking near bedding or upholstery. --Dental health: Discussed importance of regular tooth brushing, flossing, and dental visits. --Immunizations reviewed. --Discussed benefits of screening colonoscopy. --After hours service discussed with patient    3. Discussed the patient's BMI with her. The BMI is in the acceptable range  4. Follow up in one year. Janell Akins is a 28 y.o. female and is here for a comprehensive physical exam. The patient reports no problems. Do you take any herbs or supplements that were not prescribed by a doctor? no  Are you taking calcium supplements?  no  Are you taking aspirin daily? no      The following portions of the patient's history were reviewed and updated as appropriate:   She  has a past medical history of Anorexia nervosa, Anxiety, Celiac disease, Chronic kidney disease, CPAP (continuous positive airway pressure) dependence, Depression, Disease of thyroid gland, Gall bladder polyp, Gastroesophageal reflux disease (9/18/2018), Hallucination, Memory difficulty (7/12/2018), Plantar wart of left foot, Psychiatric illness, Raynaud's disease without gangrene, Renal atrophy, Schizoaffective disorder ([address]), Self-injurious behavior, Sleep apnea, Sleep difficulties, Suicide attempt ([address]), and Urinary retention. 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   • Abnormal brain MRI 03/25/2021   • Raynaud's phenomenon without gangrene 09/12/2020   • Rectal prolapse 08/07/2020   • Gallbladder polyp 09/11/2019   • Numbness and tingling of both feet 09/11/2019   • Chronic kidney disease (CKD) stage G2/A2, mildly decreased glomerular filtration rate (GFR) between 60-89 mL/min/1.73 square meter and albuminuria creatinine ratio between  mg/g 09/03/2019   • Retention, urine 09/03/2019   • Renal atrophy 07/26/2019   • Arachnoid cyst 07/26/2019   • Celiac disease 09/18/2018   • Gastroesophageal reflux disease 09/18/2018   • Memory difficulty 07/12/2018   • Primary insomnia 05/15/2018   • Sleep disorder 04/11/2018   • Schizoaffective disorder ([address]) 07/07/2016   • Hypothyroidism 07/25/2014     She  has a past surgical history that includes Eye surgery (Left); pr esophagogastroduodenoscopy transoral diagnostic (N/A, 2/9/2018); pr esophagogastroduodenoscopy transoral diagnostic (N/A, 11/6/2018); and pr laparoscopy proctopexy prolapse (N/A, 1/12/2021). Her family history includes Breast cancer in her paternal grandmother; Breast cancer (age of onset: 61) in her mother; Depression in her brother; Hypertension in her brother and father; No Known Problems in her maternal aunt, maternal aunt, maternal grandfather, and maternal grandmother; OCD in her brother; Other in her family; Prostate cancer in her paternal grandfather.   She  reports that she has never smoked. She has never used smokeless tobacco. She reports that she does not currently use alcohol. She reports that she does not use drugs. Current Outpatient Medications   Medication Sig Dispense Refill   • benztropine (COGENTIN) 1 mg tablet Take 1 mg by mouth TAKE 1 TABLET IN AM AND 2 TABLETS IN PM     • brexpiprazole (Rexulti) 0.25 MG tablet Take 0.25 mg by mouth daily     • busPIRone (BUSPAR) 10 mg tablet Take 10 mg by mouth 2 (two) times a day     • Clobetasol Prop Emollient Base 0.05 % emollient cream      • clonazePAM (KlonoPIN) 0.5 mg tablet Take 1 mg by mouth daily at bedtime     • Dermatological Products, Misc. (EpiCeram) lotion       • famotidine (PEPCID) 40 MG tablet Take 0.5 tablets (20 mg total) by mouth in the morning 15 tablet 3   • gabapentin (NEURONTIN) 100 mg capsule 2 tablets in am     • gabapentin (NEURONTIN) 400 mg capsule 400 mg daily at bedtime      • liothyronine (CYTOMEL) 5 mcg tablet Take 1 tablet (5 mcg total) by mouth daily 30 tablet 1   • pantoprazole (PROTONIX) 40 mg tablet Take 1 tablet (40 mg total) by mouth 2 (two) times a day 60 tablet 5   • tamsulosin (FLOMAX) 0.4 mg TAKE 1 CAPSULE BY MOUTH ONCE DAILY WITH SUPPER 90 capsule 0   • Tralokinumab-ldrm (Adbry) 150 MG/ML SOSY      • zolpidem (AMBIEN CR) 12.5 MG CR tablet TAKE 1 TABLET BY MOUTH ONCE DAILY AT BEDTIME AS NEEDED FOR SLEEP 30 tablet 0     No current facility-administered medications for this visit. Current Outpatient Medications on File Prior to Visit   Medication Sig   • benztropine (COGENTIN) 1 mg tablet Take 1 mg by mouth TAKE 1 TABLET IN AM AND 2 TABLETS IN PM   • brexpiprazole (Rexulti) 0.25 MG tablet Take 0.25 mg by mouth daily   • busPIRone (BUSPAR) 10 mg tablet Take 10 mg by mouth 2 (two) times a day   • Clobetasol Prop Emollient Base 0.05 % emollient cream    • clonazePAM (KlonoPIN) 0.5 mg tablet Take 1 mg by mouth daily at bedtime   • Dermatological Products, Misc.  (EpiCeram) lotion     • gabapentin (NEURONTIN) 100 mg capsule 2 tablets in am   • gabapentin (NEURONTIN) 400 mg capsule 400 mg daily at bedtime    • pantoprazole (PROTONIX) 40 mg tablet Take 1 tablet (40 mg total) by mouth 2 (two) times a day   • tamsulosin (FLOMAX) 0.4 mg TAKE 1 CAPSULE BY MOUTH ONCE DAILY WITH SUPPER   • Tralokinumab-ldrm (Adbry) 150 MG/ML SOSY    • zolpidem (AMBIEN CR) 12.5 MG CR tablet TAKE 1 TABLET BY MOUTH ONCE DAILY AT BEDTIME AS NEEDED FOR SLEEP   • [DISCONTINUED] famotidine (PEPCID) 40 MG tablet Take 1/2 (one-half) tablet by mouth once daily   • [DISCONTINUED] liothyronine (CYTOMEL) 5 mcg tablet Take 1 tablet (5 mcg total) by mouth daily   • [DISCONTINUED] brexpiprazole (REXULTI) 0.25 MG tablet Take 0.5 mg by mouth daily   • [DISCONTINUED] clobetasol (TEMOVATE) 0.05 % cream APPLY THIN LAYER TWICE DAILY TO RASH ON HANDS FOR 2 WEEKS, THEN DAILY FOR 2 WEEKS AS NEEDED FOR PERSISTENT RASH   • [DISCONTINUED] dupilumab (Dupixent) subcutaneous injection 300 mg   • [DISCONTINUED] oxybutynin (DITROPAN-XL) 10 MG 24 hr tablet Take 1 tablet (10 mg total) by mouth daily at bedtime   • [DISCONTINUED] zaleplon (SONATA) 10 MG capsule Take 1 capsule by mouth once daily at bedtime     No current facility-administered medications on file prior to visit. She is allergic to gluten meal - food allergy and latex. .    Review of Systems  Do you have pain that bothers you in your daily life? no  Constitutional: negative  Ears, nose, mouth, throat, and face: negative  Respiratory: negative  Cardiovascular: negative  Integument/breast: negative  Hematologic/lymphatic: negative  Musculoskeletal:negative  Neurological: negative  Behavioral/Psych: negative.     Objective     /62 (BP Location: Left arm, Patient Position: Sitting)   Pulse 79   Temp 100.1 °F (37.8 °C)   Ht 5' 3" (1.6 m)   Wt 54.5 kg (120 lb 2 oz)   SpO2 97%   BMI 21.28 kg/m²     General Appearance:    Alert, cooperative, no distress, appears stated age   Head:    Normocephalic, without obvious abnormality, atraumatic   Eyes:    PERRL, conjunctiva/corneas clear, EOM's intact, fundi     benign, both eyes   Ears:    Normal TM's and external ear canals, both ears   Nose:   Nares normal, septum midline, mucosa normal, no drainage    or sinus tenderness   Throat:   Lips, mucosa, and tongue normal; teeth and gums normal   Neck:   Supple, symmetrical, trachea midline, no adenopathy;     thyroid:  no enlargement/tenderness/nodules; no carotid    bruit or JVD   Back:     Symmetric, no curvature, ROM normal, no CVA tenderness   Lungs:     Clear to auscultation bilaterally, respirations unlabored   Chest Wall:    No tenderness or deformity    Heart:    Regular rate and rhythm, S1 and S2 normal, no murmur, rub   or gallop   Breast Exam:    No tenderness, masses, or nipple abnormality   Abdomen:     Soft, non-tender, bowel sounds active all four quadrants,     no masses, no organomegaly   Genitalia:    Normal female without lesion, discharge or tenderness   Rectal:    Normal tone, no masses or tenderness; guaiac negative stool   Extremities:   Extremities normal, atraumatic, no cyanosis or edema   Pulses:   2+ and symmetric all extremities   Skin:   Skin color, texture, turgor normal, no rashes or lesions   Lymph nodes:   Cervical, supraclavicular, and axillary nodes normal   Neurologic:   CNII-XII intact, normal strength, sensation and reflexes     throughout   .

## 2023-08-19 DIAGNOSIS — F51.01 PRIMARY INSOMNIA: ICD-10-CM

## 2023-08-22 RX ORDER — ZOLPIDEM TARTRATE 12.5 MG/1
TABLET, FILM COATED, EXTENDED RELEASE ORAL
Qty: 30 TABLET | Refills: 0 | Status: SHIPPED | OUTPATIENT
Start: 2023-08-22

## 2023-08-23 ENCOUNTER — APPOINTMENT (OUTPATIENT)
Dept: LAB | Facility: HOSPITAL | Age: 33
End: 2023-08-23
Payer: COMMERCIAL

## 2023-08-23 DIAGNOSIS — Z01.84 IMMUNITY STATUS TESTING: ICD-10-CM

## 2023-08-23 LAB — MEV IGG SER QL IA: NORMAL

## 2023-08-23 PROCEDURE — 36415 COLL VENOUS BLD VENIPUNCTURE: CPT

## 2023-08-23 PROCEDURE — 86765 RUBEOLA ANTIBODY: CPT

## 2023-08-28 LAB — MISCELLANEOUS LAB TEST RESULT: NORMAL

## 2023-08-29 ENCOUNTER — VBI (OUTPATIENT)
Dept: ADMINISTRATIVE | Facility: OTHER | Age: 33
End: 2023-08-29

## 2023-09-17 DIAGNOSIS — F51.01 PRIMARY INSOMNIA: ICD-10-CM

## 2023-09-18 RX ORDER — ZOLPIDEM TARTRATE 12.5 MG/1
TABLET, FILM COATED, EXTENDED RELEASE ORAL
Qty: 30 TABLET | Refills: 0 | Status: SHIPPED | OUTPATIENT
Start: 2023-09-18

## 2023-09-24 DIAGNOSIS — N39.8 VOIDING DYSFUNCTION: ICD-10-CM

## 2023-09-24 RX ORDER — TAMSULOSIN HYDROCHLORIDE 0.4 MG/1
CAPSULE ORAL
Qty: 90 CAPSULE | Refills: 0 | Status: SHIPPED | OUTPATIENT
Start: 2023-09-24

## 2023-09-25 ENCOUNTER — APPOINTMENT (OUTPATIENT)
Dept: LAB | Facility: CLINIC | Age: 33
End: 2023-09-25
Payer: COMMERCIAL

## 2023-09-25 DIAGNOSIS — N18.2 CKD (CHRONIC KIDNEY DISEASE) STAGE 2, GFR 60-89 ML/MIN: ICD-10-CM

## 2023-09-25 LAB
ALBUMIN SERPL BCP-MCNC: 4.4 G/DL (ref 3.5–5)
ALP SERPL-CCNC: 48 U/L (ref 34–104)
ALT SERPL W P-5'-P-CCNC: 40 U/L (ref 7–52)
ANION GAP SERPL CALCULATED.3IONS-SCNC: 6 MMOL/L
AST SERPL W P-5'-P-CCNC: 30 U/L (ref 13–39)
BACTERIA UR QL AUTO: NORMAL /HPF
BASOPHILS # BLD AUTO: 0.05 THOUSANDS/ÂΜL (ref 0–0.1)
BASOPHILS NFR BLD AUTO: 1 % (ref 0–1)
BILIRUB SERPL-MCNC: 1.16 MG/DL (ref 0.2–1)
BILIRUB UR QL STRIP: NEGATIVE
BUN SERPL-MCNC: 14 MG/DL (ref 5–25)
CALCIUM SERPL-MCNC: 9.5 MG/DL (ref 8.4–10.2)
CHLORIDE SERPL-SCNC: 103 MMOL/L (ref 96–108)
CLARITY UR: CLEAR
CO2 SERPL-SCNC: 29 MMOL/L (ref 21–32)
COLOR UR: COLORLESS
CREAT SERPL-MCNC: 0.99 MG/DL (ref 0.6–1.3)
EOSINOPHIL # BLD AUTO: 0.1 THOUSAND/ÂΜL (ref 0–0.61)
EOSINOPHIL NFR BLD AUTO: 2 % (ref 0–6)
ERYTHROCYTE [DISTWIDTH] IN BLOOD BY AUTOMATED COUNT: 12.2 % (ref 11.6–15.1)
GFR SERPL CREATININE-BSD FRML MDRD: 74 ML/MIN/1.73SQ M
GLUCOSE P FAST SERPL-MCNC: 87 MG/DL (ref 65–99)
GLUCOSE UR STRIP-MCNC: NEGATIVE MG/DL
HCT VFR BLD AUTO: 43.2 % (ref 34.8–46.1)
HGB BLD-MCNC: 14.6 G/DL (ref 11.5–15.4)
HGB UR QL STRIP.AUTO: NEGATIVE
IMM GRANULOCYTES # BLD AUTO: 0.02 THOUSAND/UL (ref 0–0.2)
IMM GRANULOCYTES NFR BLD AUTO: 0 % (ref 0–2)
KETONES UR STRIP-MCNC: NEGATIVE MG/DL
LEUKOCYTE ESTERASE UR QL STRIP: NEGATIVE
LYMPHOCYTES # BLD AUTO: 2.03 THOUSANDS/ÂΜL (ref 0.6–4.47)
LYMPHOCYTES NFR BLD AUTO: 31 % (ref 14–44)
MCH RBC QN AUTO: 30.5 PG (ref 26.8–34.3)
MCHC RBC AUTO-ENTMCNC: 33.8 G/DL (ref 31.4–37.4)
MCV RBC AUTO: 90 FL (ref 82–98)
MONOCYTES # BLD AUTO: 0.43 THOUSAND/ÂΜL (ref 0.17–1.22)
MONOCYTES NFR BLD AUTO: 7 % (ref 4–12)
NEUTROPHILS # BLD AUTO: 4.03 THOUSANDS/ÂΜL (ref 1.85–7.62)
NEUTS SEG NFR BLD AUTO: 59 % (ref 43–75)
NITRITE UR QL STRIP: NEGATIVE
NON-SQ EPI CELLS URNS QL MICRO: NORMAL /HPF
NRBC BLD AUTO-RTO: 0 /100 WBCS
PH UR STRIP.AUTO: 6.5 [PH]
PLATELET # BLD AUTO: 194 THOUSANDS/UL (ref 149–390)
PMV BLD AUTO: 10.7 FL (ref 8.9–12.7)
POTASSIUM SERPL-SCNC: 3.9 MMOL/L (ref 3.5–5.3)
PROT SERPL-MCNC: 6.9 G/DL (ref 6.4–8.4)
PROT UR STRIP-MCNC: NEGATIVE MG/DL
RBC # BLD AUTO: 4.79 MILLION/UL (ref 3.81–5.12)
RBC #/AREA URNS AUTO: NORMAL /HPF
SODIUM SERPL-SCNC: 138 MMOL/L (ref 135–147)
SP GR UR STRIP.AUTO: 1.01 (ref 1–1.03)
UROBILINOGEN UR STRIP-ACNC: <2 MG/DL
WBC # BLD AUTO: 6.66 THOUSAND/UL (ref 4.31–10.16)
WBC #/AREA URNS AUTO: NORMAL /HPF

## 2023-09-25 PROCEDURE — 82043 UR ALBUMIN QUANTITATIVE: CPT

## 2023-09-25 PROCEDURE — 84156 ASSAY OF PROTEIN URINE: CPT

## 2023-09-25 PROCEDURE — 36415 COLL VENOUS BLD VENIPUNCTURE: CPT

## 2023-09-25 PROCEDURE — 81001 URINALYSIS AUTO W/SCOPE: CPT

## 2023-09-25 PROCEDURE — 82570 ASSAY OF URINE CREATININE: CPT

## 2023-09-25 PROCEDURE — 80053 COMPREHEN METABOLIC PANEL: CPT

## 2023-09-25 PROCEDURE — 85025 COMPLETE CBC W/AUTO DIFF WBC: CPT

## 2023-09-26 LAB
CREAT UR-MCNC: 25 MG/DL
CREAT UR-MCNC: 25 MG/DL
MICROALBUMIN UR-MCNC: <7 MG/L
MICROALBUMIN/CREAT 24H UR: <28 MG/G CREATININE (ref 0–30)
PROT UR-MCNC: <4 MG/DL

## 2023-10-17 ENCOUNTER — OFFICE VISIT (OUTPATIENT)
Dept: NEPHROLOGY | Facility: CLINIC | Age: 33
End: 2023-10-17

## 2023-10-17 VITALS
HEART RATE: 74 BPM | SYSTOLIC BLOOD PRESSURE: 108 MMHG | HEIGHT: 63 IN | OXYGEN SATURATION: 98 % | WEIGHT: 117.8 LBS | DIASTOLIC BLOOD PRESSURE: 62 MMHG | BODY MASS INDEX: 20.87 KG/M2

## 2023-10-17 DIAGNOSIS — N39.8 VOIDING DYSFUNCTION: ICD-10-CM

## 2023-10-17 DIAGNOSIS — N18.2 CKD (CHRONIC KIDNEY DISEASE) STAGE 2, GFR 60-89 ML/MIN: Primary | ICD-10-CM

## 2023-10-17 DIAGNOSIS — K21.9 GASTROESOPHAGEAL REFLUX DISEASE: ICD-10-CM

## 2023-10-17 RX ORDER — TRIAMCINOLONE ACETONIDE 1 MG/G
CREAM TOPICAL
COMMUNITY
Start: 2023-08-29 | End: 2023-10-17

## 2023-10-17 RX ORDER — PANTOPRAZOLE SODIUM 40 MG/1
40 TABLET, DELAYED RELEASE ORAL DAILY
Qty: 60 TABLET | Refills: 5
Start: 2023-10-17

## 2023-10-17 RX ORDER — CALCIPOTRIENE 50 UG/G
CREAM TOPICAL
COMMUNITY
Start: 2023-08-30

## 2023-10-17 RX ORDER — TAMSULOSIN HYDROCHLORIDE 0.4 MG/1
0.4 CAPSULE ORAL
Qty: 90 CAPSULE | Refills: 0
Start: 2023-10-17

## 2023-10-17 RX ORDER — FLUTICASONE PROPIONATE 50 MCG
SPRAY, SUSPENSION (ML) NASAL
COMMUNITY

## 2023-10-17 RX ORDER — CLOBETASOL PROPIONATE 0.5 MG/G
CREAM TOPICAL
COMMUNITY
Start: 2023-10-03

## 2023-10-17 RX ORDER — FEXOFENADINE HYDROCHLORIDE 180 MG/1
TABLET, FILM COATED ORAL
COMMUNITY

## 2023-10-17 NOTE — ASSESSMENT & PLAN NOTE
Lab Results   Component Value Date    EGFR 74 09/25/2023    EGFR 66 10/03/2022    EGFR 69 03/23/2022    CREATININE 0.99 09/25/2023    CREATININE 1.09 10/03/2022    CREATININE 1.05 03/23/2022     Patient's kidney function is stable with a creatinine of around 1 g/dL. Continue optimize care avoid potential for toxins.

## 2023-10-17 NOTE — PROGRESS NOTES
[facility] of [address] [place] [address]    Name: Ruthann Fregoso  YOB: 1990      Assessment/Plan:    CKD (chronic kidney disease) stage 2, GFR 60-89 ml/min  Lab Results   Component Value Date    EGFR 74 09/25/2023    EGFR 66 10/03/2022    EGFR 69 03/23/2022    CREATININE 0.99 09/25/2023    CREATININE 1.09 10/03/2022    CREATININE 1.05 03/23/2022     Patient's kidney function is stable with a creatinine of around 1 g/dL. Continue optimize care avoid potential for toxins. Gastroesophageal reflux disease  Patient was able to reduce pantoprazole from twice daily down to just once daily. She also continues to take famotidine 20 mg once daily. Symptoms are currently controlled. Ultimately if possible, continue reduction of proton pump inhibitor use. Problem List Items Addressed This Visit          Digestive    Gastroesophageal reflux disease     Patient was able to reduce pantoprazole from twice daily down to just once daily. She also continues to take famotidine 20 mg once daily. Symptoms are currently controlled. Ultimately if possible, continue reduction of proton pump inhibitor use. Relevant Medications    pantoprazole (PROTONIX) 40 mg tablet       Genitourinary    CKD (chronic kidney disease) stage 2, GFR 60-89 ml/min - Primary     Lab Results   Component Value Date    EGFR 74 09/25/2023    EGFR 66 10/03/2022    EGFR 69 03/23/2022    CREATININE 0.99 09/25/2023    CREATININE 1.09 10/03/2022    CREATININE 1.05 03/23/2022   Patient's kidney function is stable with a creatinine of around 1 g/dL. Continue optimize care avoid potential for toxins. Other Visit Diagnoses       Voiding dysfunction        Relevant Medications    tamsulosin (FLOMAX) 0.4 mg            Patient is stable from renal standpoint, we will see her back in about 1 year for regular appointment.   Patient on a rare occasion taking nonsteroid inflammatory medications, she was asked to try Tylenol first to see if this can help manage prior to NSAIDs. We will review blood work and urine studies prior to appointment. Subjective:      Patient ID: Antony Veloz is a 28 y.o. female. Patient presents for follow-up appoint. Reviewed the patient's labs in detail, creatinine 1 mg/dL, serum sodium levels 138 mmol/L. There were no other significant electrolyte abnormalities noted. Patient's serum glucose level was 224 mg/dL. She is taking all medications as prescribed with no specific side effects at this time. She taking nonsteroid anti-inflammatory medications rarely to help with her pain. The following portions of the patient's history were reviewed and updated as appropriate: allergies, current medications, past family history, past medical history, past social history, past surgical history and problem list.    Review of Systems   All other systems reviewed and are negative.         Social History     Socioeconomic History    Marital status: Single     Spouse name: None    Number of children: None    Years of education: None    Highest education level: None   Occupational History    Occupation: UNEMPLOYED   Tobacco Use    Smoking status: Never    Smokeless tobacco: Never   Vaping Use    Vaping Use: Never used   Substance and Sexual Activity    Alcohol use: Not Currently    Drug use: No    Sexual activity: Not Currently   Other Topics Concern    None   Social History Narrative    UNEMPLOYED     Social Determinants of Health     Financial Resource Strain: Not on file   Food Insecurity: Not on file   Transportation Needs: Not on file   Physical Activity: Not on file   Stress: Not on file   Social Connections: Not on file   Intimate Partner Violence: Not on file   Housing Stability: Not on file     Past Medical History:   Diagnosis Date    Anorexia nervosa     pt denies history at time of admission 7/21/18    Anxiety     Celiac disease     Chronic kidney disease     CPAP (continuous positive airway pressure) dependence     waiting for a new mask    Depression     Disease of thyroid gland     hypo    Gall bladder polyp     Gastroesophageal reflux disease 09/18/2018    GERD (gastroesophageal reflux disease) 9/18/2018    Hallucination     Memory difficulty 07/12/2018    Plantar wart of left foot     Psychiatric illness     Raynaud's disease without gangrene     Renal atrophy     Schizoaffective disorder ([address])     Self-injurious behavior     Sleep apnea     Sleep difficulties     Suicide attempt [facility])     history of attempt at age 22 by overdose     Urinary retention      Past Surgical History:   Procedure Laterality Date    EYE SURGERY Left     tear duct    TN ESOPHAGOGASTRODUODENOSCOPY TRANSORAL DIAGNOSTIC N/A 2/9/2018    Procedure: ESOPHAGOGASTRODUODENOSCOPY (EGD); Surgeon: Hi Garcia MD;  Location: [facility];  Service: Gastroenterology    TN ESOPHAGOGASTRODUODENOSCOPY TRANSORAL DIAGNOSTIC N/A 11/6/2018    Procedure: ESOPHAGOGASTRODUODENOSCOPY (EGD); Surgeon: Elis Her MD;  Location: [facility];  Service: Gastroenterology    TN LAPAROSCOPY PROCTOPEXY PROLAPSE N/A 1/12/2021    Procedure: Rectopexy with sigmoid resection w/ robotics. ;  Surgeon: Ale Marc MD;  Location:  MAIN OR;  Service: Colorectal       Current Outpatient Medications:     Allegra Allergy 180 MG tablet, , Disp: , Rfl:     benztropine (COGENTIN) 1 mg tablet, Take 1 mg by mouth TAKE 1 TABLET IN AM AND 2 TABLETS IN PM, Disp: , Rfl:     brexpiprazole (Rexulti) 0.25 MG tablet, Take 0.25 mg by mouth daily, Disp: , Rfl:     busPIRone (BUSPAR) 10 mg tablet, Take 10 mg by mouth 2 (two) times a day, Disp: , Rfl:     calcipotriene (DOVONEX) 0.005 % cream, APPLY THIN LAYER TWICE DAILY TIMES 16 WEEKS TO DRY, FLAKY RASH ON HANDS, Disp: , Rfl:     clobetasol (TEMOVATE) 0.05 % cream, APPLY A THIN LAYER 3 NIGHTS WEEKLY FOR 12 WEEKS TO FLAKY RASH ON BOTH HANDS.  AVOID FACE AND BODY SKIN FOLDS, Disp: , Rfl:     Clobetasol Prop Emollient Base 0.05 % emollient cream, , Disp: , Rfl:     Dermatological Products, Misc.  (EpiCeram) lotion,  , Disp: , Rfl:     famotidine (PEPCID) 40 MG tablet, Take 0.5 tablets (20 mg total) by mouth in the morning, Disp: 15 tablet, Rfl: 3    fluticasone (Flonase Allergy Relief) 50 mcg/act nasal spray, , Disp: , Rfl:     gabapentin (NEURONTIN) 100 mg capsule, 2 tablets in am, Disp: , Rfl:     liothyronine (CYTOMEL) 5 mcg tablet, Take 1 tablet (5 mcg total) by mouth daily, Disp: 30 tablet, Rfl: 1    pantoprazole (PROTONIX) 40 mg tablet, Take 1 tablet (40 mg total) by mouth daily, Disp: 60 tablet, Rfl: 5    tamsulosin (FLOMAX) 0.4 mg, Take 1 capsule (0.4 mg total) by mouth daily with dinner, Disp: 90 capsule, Rfl: 0    zolpidem (AMBIEN CR) 12.5 MG CR tablet, TAKE 1 TABLET BY MOUTH ONCE DAILY AT BEDTIME AS NEEDED FOR SLEEP, Disp: 30 tablet, Rfl: 0    Lab Results   Component Value Date    SODIUM 138 09/25/2023    K 3.9 09/25/2023     09/25/2023    CO2 29 09/25/2023    AGAP 6 09/25/2023    BUN 14 09/25/2023    CREATININE 0.99 09/25/2023    GLUC 224 (H) 01/14/2021    GLUF 87 09/25/2023    CALCIUM 9.5 09/25/2023    AST 30 09/25/2023    ALT 40 09/25/2023    ALKPHOS 48 09/25/2023    PROT 7.2 11/25/2014    TP 6.9 09/25/2023    BILITOT 1.1 (H) 11/25/2014    TBILI 1.16 (H) 09/25/2023    EGFR 74 09/25/2023     Lab Results   Component Value Date    WBC 6.66 09/25/2023    HGB 14.6 09/25/2023    HCT 43.2 09/25/2023    MCV 90 09/25/2023     09/25/2023     Lab Results   Component Value Date    CHOLESTEROL 118 11/14/2022    CHOLESTEROL 113 10/03/2022    CHOLESTEROL 114 03/03/2022     Lab Results   Component Value Date    HDL 55 11/14/2022    HDL 58 10/03/2022    HDL 62 03/03/2022     Lab Results   Component Value Date    LDLCALC 55 11/14/2022    LDLCALC 49 10/03/2022    LDLCALC 44 03/03/2022     Lab Results   Component Value Date    TRIG 42 11/14/2022    TRIG 32 10/03/2022    TRIG 40 03/03/2022     No results found for: "CHOLHDL"  Lab Results   Component Value Date    ATV4OWGWAGVT 0.639 10/03/2022     Lab Results   Component Value Date    PTH 50.9 09/17/2020    CALCIUM 9.5 09/25/2023    PHOS 2.5 (L) 01/14/2021     Lab Results   Component Value Date    SPEP See Comment 03/01/2021     No results found for: "Yady Zuniga", "[id number]"        Objective:      /62 (BP Location: Left arm, Patient Position: Sitting)   Pulse 74   Ht 5' 3" (1.6 m)   Wt 53.4 kg (117 lb 12.8 oz)   SpO2 98%   BMI 20.87 kg/m²          Physical Exam  Vitals reviewed. Constitutional:       General: She is not in acute distress. Appearance: She is well-developed. HENT:      Head: Normocephalic and atraumatic. Eyes:      Conjunctiva/sclera: Conjunctivae normal.   Cardiovascular:      Rate and Rhythm: Normal rate and regular rhythm. Pulmonary:      Effort: Pulmonary effort is normal.      Breath sounds: Normal breath sounds. Abdominal:      Palpations: Abdomen is soft. Musculoskeletal:      Cervical back: Neck supple. Skin:     General: Skin is warm. Findings: No rash. Neurological:      Mental Status: She is alert and oriented to person, place, and time. Cranial Nerves: No cranial nerve deficit.    Psychiatric:         Behavior: Behavior normal.

## 2023-10-17 NOTE — ASSESSMENT & PLAN NOTE
Patient was able to reduce pantoprazole from twice daily down to just once daily. She also continues to take famotidine 20 mg once daily. Symptoms are currently controlled. Ultimately if possible, continue reduction of proton pump inhibitor use.

## 2023-11-02 ENCOUNTER — OFFICE VISIT (OUTPATIENT)
Dept: INTERNAL MEDICINE CLINIC | Facility: CLINIC | Age: 33
End: 2023-11-02
Payer: COMMERCIAL

## 2023-11-02 VITALS
DIASTOLIC BLOOD PRESSURE: 68 MMHG | HEIGHT: 63 IN | SYSTOLIC BLOOD PRESSURE: 112 MMHG | BODY MASS INDEX: 21.44 KG/M2 | TEMPERATURE: 97.9 F | WEIGHT: 121 LBS | OXYGEN SATURATION: 99 % | HEART RATE: 78 BPM

## 2023-11-02 DIAGNOSIS — E55.9 VITAMIN D DEFICIENCY: ICD-10-CM

## 2023-11-02 DIAGNOSIS — Z13.0 SCREENING FOR DEFICIENCY ANEMIA: ICD-10-CM

## 2023-11-02 DIAGNOSIS — E03.9 HYPOTHYROIDISM, UNSPECIFIED TYPE: ICD-10-CM

## 2023-11-02 DIAGNOSIS — R39.9 UTI SYMPTOMS: ICD-10-CM

## 2023-11-02 DIAGNOSIS — E03.9 ACQUIRED HYPOTHYROIDISM: Primary | ICD-10-CM

## 2023-11-02 DIAGNOSIS — Z13.1 SCREENING FOR DIABETES MELLITUS: ICD-10-CM

## 2023-11-02 DIAGNOSIS — Z13.220 SCREENING, LIPID: ICD-10-CM

## 2023-11-02 DIAGNOSIS — D64.9 ANEMIA, UNSPECIFIED TYPE: ICD-10-CM

## 2023-11-02 LAB
SL AMB  POCT GLUCOSE, UA: NEGATIVE
SL AMB LEUKOCYTE ESTERASE,UA: NEGATIVE
SL AMB POCT BILIRUBIN,UA: NEGATIVE
SL AMB POCT BLOOD,UA: NEGATIVE
SL AMB POCT CLARITY,UA: CLEAR
SL AMB POCT COLOR,UA: YELLOW
SL AMB POCT KETONES,UA: NORMAL
SL AMB POCT NITRITE,UA: NEGATIVE
SL AMB POCT PH,UA: 6
SL AMB POCT SPECIFIC GRAVITY,UA: 1.01
SL AMB POCT URINE PROTEIN: NEGATIVE
SL AMB POCT UROBILINOGEN: NORMAL

## 2023-11-02 PROCEDURE — 99214 OFFICE O/P EST MOD 30 MIN: CPT | Performed by: PHYSICIAN ASSISTANT

## 2023-11-02 PROCEDURE — 81002 URINALYSIS NONAUTO W/O SCOPE: CPT | Performed by: PHYSICIAN ASSISTANT

## 2023-11-02 RX ORDER — LIOTHYRONINE SODIUM 5 UG/1
5 TABLET ORAL DAILY
Qty: 30 TABLET | Refills: 0 | Status: SHIPPED | OUTPATIENT
Start: 2023-11-02

## 2023-11-02 RX ORDER — GABAPENTIN 600 MG/1
600 TABLET ORAL DAILY
COMMUNITY

## 2023-11-03 ENCOUNTER — APPOINTMENT (OUTPATIENT)
Dept: LAB | Facility: CLINIC | Age: 33
End: 2023-11-03
Payer: COMMERCIAL

## 2023-11-03 DIAGNOSIS — E55.9 VITAMIN D DEFICIENCY: ICD-10-CM

## 2023-11-03 DIAGNOSIS — K21.9 GASTROESOPHAGEAL REFLUX DISEASE: ICD-10-CM

## 2023-11-03 DIAGNOSIS — Z13.220 SCREENING, LIPID: ICD-10-CM

## 2023-11-03 DIAGNOSIS — D64.9 ANEMIA, UNSPECIFIED TYPE: ICD-10-CM

## 2023-11-03 DIAGNOSIS — E03.9 ACQUIRED HYPOTHYROIDISM: ICD-10-CM

## 2023-11-03 DIAGNOSIS — Z13.1 SCREENING FOR DIABETES MELLITUS: ICD-10-CM

## 2023-11-03 DIAGNOSIS — Z13.0 SCREENING FOR DEFICIENCY ANEMIA: ICD-10-CM

## 2023-11-03 PROBLEM — R39.9 UTI SYMPTOMS: Status: ACTIVE | Noted: 2023-11-03

## 2023-11-03 LAB
ALBUMIN SERPL BCP-MCNC: 4.4 G/DL (ref 3.5–5)
ALP SERPL-CCNC: 57 U/L (ref 34–104)
ALT SERPL W P-5'-P-CCNC: 44 U/L (ref 7–52)
ANION GAP SERPL CALCULATED.3IONS-SCNC: 10 MMOL/L
AST SERPL W P-5'-P-CCNC: 31 U/L (ref 13–39)
BASOPHILS # BLD AUTO: 0.03 THOUSANDS/ÂΜL (ref 0–0.1)
BASOPHILS NFR BLD AUTO: 0 % (ref 0–1)
BILIRUB SERPL-MCNC: 1.16 MG/DL (ref 0.2–1)
BUN SERPL-MCNC: 21 MG/DL (ref 5–25)
CALCIUM SERPL-MCNC: 9.5 MG/DL (ref 8.4–10.2)
CHLORIDE SERPL-SCNC: 102 MMOL/L (ref 96–108)
CHOLEST SERPL-MCNC: 114 MG/DL
CO2 SERPL-SCNC: 26 MMOL/L (ref 21–32)
CREAT SERPL-MCNC: 1.05 MG/DL (ref 0.6–1.3)
EOSINOPHIL # BLD AUTO: 0.1 THOUSAND/ÂΜL (ref 0–0.61)
EOSINOPHIL NFR BLD AUTO: 1 % (ref 0–6)
ERYTHROCYTE [DISTWIDTH] IN BLOOD BY AUTOMATED COUNT: 12.4 % (ref 11.6–15.1)
GFR SERPL CREATININE-BSD FRML MDRD: 69 ML/MIN/1.73SQ M
GLUCOSE P FAST SERPL-MCNC: 96 MG/DL (ref 65–99)
HCT VFR BLD AUTO: 46 % (ref 34.8–46.1)
HDLC SERPL-MCNC: 54 MG/DL
HGB BLD-MCNC: 14.9 G/DL (ref 11.5–15.4)
IMM GRANULOCYTES # BLD AUTO: 0.02 THOUSAND/UL (ref 0–0.2)
IMM GRANULOCYTES NFR BLD AUTO: 0 % (ref 0–2)
IRON SATN MFR SERPL: 31 % (ref 15–50)
IRON SERPL-MCNC: 127 UG/DL (ref 50–212)
LDLC SERPL CALC-MCNC: 55 MG/DL (ref 0–100)
LYMPHOCYTES # BLD AUTO: 1.66 THOUSANDS/ÂΜL (ref 0.6–4.47)
LYMPHOCYTES NFR BLD AUTO: 22 % (ref 14–44)
MCH RBC QN AUTO: 29.4 PG (ref 26.8–34.3)
MCHC RBC AUTO-ENTMCNC: 32.4 G/DL (ref 31.4–37.4)
MCV RBC AUTO: 91 FL (ref 82–98)
MONOCYTES # BLD AUTO: 0.55 THOUSAND/ÂΜL (ref 0.17–1.22)
MONOCYTES NFR BLD AUTO: 7 % (ref 4–12)
NEUTROPHILS # BLD AUTO: 5.15 THOUSANDS/ÂΜL (ref 1.85–7.62)
NEUTS SEG NFR BLD AUTO: 70 % (ref 43–75)
NRBC BLD AUTO-RTO: 0 /100 WBCS
PLATELET # BLD AUTO: 234 THOUSANDS/UL (ref 149–390)
PMV BLD AUTO: 10.5 FL (ref 8.9–12.7)
POTASSIUM SERPL-SCNC: 4.2 MMOL/L (ref 3.5–5.3)
PROT SERPL-MCNC: 7.1 G/DL (ref 6.4–8.4)
RBC # BLD AUTO: 5.06 MILLION/UL (ref 3.81–5.12)
SODIUM SERPL-SCNC: 138 MMOL/L (ref 135–147)
TIBC SERPL-MCNC: 405 UG/DL (ref 250–450)
TRIGL SERPL-MCNC: 27 MG/DL
UIBC SERPL-MCNC: 278 UG/DL (ref 155–355)
WBC # BLD AUTO: 7.51 THOUSAND/UL (ref 4.31–10.16)

## 2023-11-03 PROCEDURE — 80061 LIPID PANEL: CPT

## 2023-11-03 PROCEDURE — 36415 COLL VENOUS BLD VENIPUNCTURE: CPT

## 2023-11-03 PROCEDURE — 84443 ASSAY THYROID STIM HORMONE: CPT

## 2023-11-03 PROCEDURE — 83550 IRON BINDING TEST: CPT

## 2023-11-03 PROCEDURE — 82607 VITAMIN B-12: CPT

## 2023-11-03 PROCEDURE — 80053 COMPREHEN METABOLIC PANEL: CPT

## 2023-11-03 PROCEDURE — 82728 ASSAY OF FERRITIN: CPT

## 2023-11-03 PROCEDURE — 82306 VITAMIN D 25 HYDROXY: CPT

## 2023-11-03 PROCEDURE — 85025 COMPLETE CBC W/AUTO DIFF WBC: CPT

## 2023-11-03 PROCEDURE — 83540 ASSAY OF IRON: CPT

## 2023-11-03 RX ORDER — PANTOPRAZOLE SODIUM 40 MG/1
40 TABLET, DELAYED RELEASE ORAL 2 TIMES DAILY
Qty: 60 TABLET | Refills: 0 | Status: SHIPPED | OUTPATIENT
Start: 2023-11-03

## 2023-11-03 NOTE — PROGRESS NOTES
Name: Willie Mendez      : 1990      MRN: [id number]  Encounter Provider: Brent Coelho PA-C  Encounter Date: 2023   Encounter department: [id number] [place] [address]     1. Acquired hypothyroidism  -     TSH, 3rd generation with Free T4 reflex; Future    2. Vitamin D deficiency  -     Vitamin D 25 hydroxy; Future    3. Screening, lipid  -     Lipid Panel with Direct LDL reflex; Future    4. Anemia, unspecified type  -     Vitamin B12; Future  -     Iron Panel (Includes Ferritin, Iron Sat%, Iron, and TIBC); Future    5. Screening for deficiency anemia  -     CBC and differential; Future    6. Screening for diabetes mellitus  -     Comprehensive metabolic panel; Future    7. UTI symptoms  Assessment & Plan:  UA in office is normal, will check labs to better evaluate symptoms and treat according to results    Orders:  -     POCT urine dip        Depression Screening and Follow-up Plan: Patient was screened for depression during today's encounter. They screened negative with a PHQ-2 score of 0. Subjective      Pt presents for evaluation of symptoms that have been occurring x 1 month. She has a hard time explaining her symptoms. She notes some pelvic and back pain. She notes intermittent discomfort with urination but not urinary urgency or frequency and no hematuria. Pain is along the low back and into the buttocks. She also notes issues with feeling 'disconnected' and not herself. She is concerned she may have an infection. Review of Systems   Constitutional:  Negative for chills and fever. HENT:  Negative for congestion, ear pain, hearing loss, postnasal drip, rhinorrhea, sinus pressure, sinus pain, sore throat and trouble swallowing. Eyes:  Negative for pain and visual disturbance. Respiratory:  Negative for cough, chest tightness, shortness of breath and wheezing. Cardiovascular: Negative. Negative for chest pain, palpitations and leg swelling. Gastrointestinal:  Negative for abdominal pain, blood in stool, constipation, diarrhea, nausea and vomiting. Endocrine: Negative for cold intolerance, heat intolerance, polydipsia, polyphagia and polyuria. Genitourinary:  Positive for pelvic pain. Negative for difficulty urinating, dysuria, flank pain and urgency. Musculoskeletal:  Positive for back pain. Negative for arthralgias, gait problem and myalgias. Skin:  Negative for rash. Allergic/Immunologic: Negative. Neurological:  Negative for dizziness, weakness, light-headedness and headaches. Hematological: Negative. Psychiatric/Behavioral:  Positive for confusion. Negative for behavioral problems, dysphoric mood and sleep disturbance. The patient is not nervous/anxious. Current Outpatient Medications on File Prior to Visit   Medication Sig   • Allegra Allergy 180 MG tablet    • benztropine (COGENTIN) 1 mg tablet Take 1 mg by mouth TAKE 1 TABLET IN AM AND 2 TABLETS IN PM   • brexpiprazole (Rexulti) 0.25 MG tablet Take 0.25 mg by mouth daily   • busPIRone (BUSPAR) 10 mg tablet Take 10 mg by mouth 2 (two) times a day   • calcipotriene (DOVONEX) 0.005 % cream APPLY THIN LAYER TWICE DAILY TIMES 16 WEEKS TO DRY, FLAKY RASH ON HANDS   • clobetasol (TEMOVATE) 0.05 % cream APPLY A THIN LAYER 3 NIGHTS WEEKLY FOR 12 WEEKS TO FLAKY RASH ON BOTH HANDS. AVOID FACE AND BODY SKIN FOLDS   • Dermatological Products, Misc.  (EpiCeram) lotion     • famotidine (PEPCID) 40 MG tablet Take 0.5 tablets (20 mg total) by mouth in the morning   • gabapentin (NEURONTIN) 100 mg capsule 2 tablets in am   • gabapentin (NEURONTIN) 600 MG tablet Take 600 mg by mouth daily   • pantoprazole (PROTONIX) 40 mg tablet Take 1 tablet (40 mg total) by mouth daily   • tamsulosin (FLOMAX) 0.4 mg Take 1 capsule (0.4 mg total) by mouth daily with dinner   • zolpidem (AMBIEN CR) 12.5 MG CR tablet TAKE 1 TABLET BY MOUTH ONCE DAILY AT BEDTIME AS NEEDED FOR SLEEP   • [DISCONTINUED] oxybutynin (DITROPAN-XL) 10 MG 24 hr tablet Take 1 tablet (10 mg total) by mouth daily at bedtime       Objective     /68 (BP Location: Right arm, Patient Position: Sitting)   Pulse 78   Temp 97.9 °F (36.6 °C) (Tympanic)   Ht 5' 3" (1.6 m)   Wt 54.9 kg (121 lb)   LMP 10/08/2023 (Exact Date)   SpO2 99%   BMI 21.43 kg/m²     Physical Exam  Vitals and nursing note reviewed. Constitutional:       General: She is not in acute distress. Appearance: Normal appearance. She is well-developed. She is not diaphoretic. HENT:      Head: Normocephalic and atraumatic. Right Ear: External ear normal.      Left Ear: External ear normal.      Nose: Nose normal.      Mouth/Throat:      Pharynx: No oropharyngeal exudate. Eyes:      General: No scleral icterus. Right eye: No discharge. Left eye: No discharge. Conjunctiva/sclera: Conjunctivae normal.      Pupils: Pupils are equal, round, and reactive to light. Neck:      Thyroid: No thyromegaly. Cardiovascular:      Rate and Rhythm: Normal rate and regular rhythm. Heart sounds: Normal heart sounds. No murmur heard. No friction rub. No gallop. Pulmonary:      Effort: Pulmonary effort is normal. No respiratory distress. Breath sounds: Normal breath sounds. No wheezing or rales. Abdominal:      General: Bowel sounds are normal. There is no distension. Palpations: Abdomen is soft. Tenderness: There is no abdominal tenderness. Musculoskeletal:         General: No tenderness or deformity. Normal range of motion. Cervical back: Normal range of motion and neck supple. Skin:     General: Skin is warm and dry. Neurological:      Mental Status: She is alert and oriented to person, place, and time. Cranial Nerves: No cranial nerve deficit. Psychiatric:         Mood and Affect: Affect is flat. Behavior: Behavior is withdrawn. Thought Content:  Thought content normal.         Judgment: Judgment normal.       Anita Meyer PA-C

## 2023-11-04 LAB
25(OH)D3 SERPL-MCNC: 34.7 NG/ML (ref 30–100)
FERRITIN SERPL-MCNC: 31 NG/ML (ref 11–307)
TSH SERPL DL<=0.05 MIU/L-ACNC: 0.74 UIU/ML (ref 0.45–4.5)
VIT B12 SERPL-MCNC: 581 PG/ML (ref 180–914)

## 2023-11-07 NOTE — RESULT ENCOUNTER NOTE
Mihir Malave  I received and reviewed your recent labs and everything looked good. Please reach out with any specific questions or concerns.  Have a great day  -Anita

## 2023-11-23 DIAGNOSIS — E03.9 HYPOTHYROIDISM, UNSPECIFIED TYPE: ICD-10-CM

## 2023-11-23 RX ORDER — LIOTHYRONINE SODIUM 5 UG/1
5 TABLET ORAL DAILY
Qty: 30 TABLET | Refills: 0 | Status: SHIPPED | OUTPATIENT
Start: 2023-11-23

## 2023-12-04 DIAGNOSIS — K21.9 GASTROESOPHAGEAL REFLUX DISEASE: ICD-10-CM

## 2023-12-04 RX ORDER — PANTOPRAZOLE SODIUM 40 MG/1
40 TABLET, DELAYED RELEASE ORAL 2 TIMES DAILY
Qty: 60 TABLET | Refills: 5 | Status: SHIPPED | OUTPATIENT
Start: 2023-12-04

## 2023-12-08 NOTE — ANESTHESIA POSTPROCEDURE EVALUATION
Post-Op Assessment Note    CV Status:  Stable  Pain Score: 1    Pain management: adequate     Mental Status:  Alert   PONV Controlled:  Controlled   Airway Patency:  Patent      Post Op Vitals Reviewed: Yes      Staff: Anesthesiologist, CRNA         No complications documented      BP      Temp     Pulse     Resp      SpO2
Detail Level: Generalized

## 2023-12-18 DIAGNOSIS — N39.8 VOIDING DYSFUNCTION: ICD-10-CM

## 2023-12-18 RX ORDER — TAMSULOSIN HYDROCHLORIDE 0.4 MG/1
CAPSULE ORAL
Qty: 90 CAPSULE | Refills: 0 | Status: SHIPPED | OUTPATIENT
Start: 2023-12-18

## 2023-12-28 DIAGNOSIS — E03.9 HYPOTHYROIDISM, UNSPECIFIED TYPE: ICD-10-CM

## 2023-12-28 RX ORDER — LIOTHYRONINE SODIUM 5 UG/1
5 TABLET ORAL DAILY
Qty: 30 TABLET | Refills: 0 | Status: SHIPPED | OUTPATIENT
Start: 2023-12-28

## 2024-01-06 DIAGNOSIS — K21.9 GASTROESOPHAGEAL REFLUX DISEASE, UNSPECIFIED WHETHER ESOPHAGITIS PRESENT: ICD-10-CM

## 2024-01-08 RX ORDER — FAMOTIDINE 40 MG/1
TABLET, FILM COATED ORAL
Qty: 15 TABLET | Refills: 0 | Status: SHIPPED | OUTPATIENT
Start: 2024-01-08

## 2024-02-03 DIAGNOSIS — K21.9 GASTROESOPHAGEAL REFLUX DISEASE, UNSPECIFIED WHETHER ESOPHAGITIS PRESENT: ICD-10-CM

## 2024-02-05 RX ORDER — FAMOTIDINE 40 MG/1
TABLET, FILM COATED ORAL
Qty: 15 TABLET | Refills: 1 | Status: SHIPPED | OUTPATIENT
Start: 2024-02-05

## 2024-02-11 DIAGNOSIS — E03.9 HYPOTHYROIDISM, UNSPECIFIED TYPE: ICD-10-CM

## 2024-02-12 RX ORDER — LIOTHYRONINE SODIUM 5 UG/1
5 TABLET ORAL DAILY
Qty: 30 TABLET | Refills: 2 | Status: SHIPPED | OUTPATIENT
Start: 2024-02-12

## 2024-03-16 DIAGNOSIS — N39.8 VOIDING DYSFUNCTION: ICD-10-CM

## 2024-03-18 RX ORDER — TAMSULOSIN HYDROCHLORIDE 0.4 MG/1
CAPSULE ORAL
Qty: 90 CAPSULE | Refills: 1 | Status: SHIPPED | OUTPATIENT
Start: 2024-03-18

## 2024-04-02 DIAGNOSIS — K21.9 GASTROESOPHAGEAL REFLUX DISEASE, UNSPECIFIED WHETHER ESOPHAGITIS PRESENT: ICD-10-CM

## 2024-04-02 RX ORDER — FAMOTIDINE 40 MG/1
TABLET, FILM COATED ORAL
Qty: 45 TABLET | Refills: 1 | Status: SHIPPED | OUTPATIENT
Start: 2024-04-02

## 2024-04-09 ENCOUNTER — HOSPITAL ENCOUNTER (OUTPATIENT)
Dept: MAMMOGRAPHY | Facility: HOSPITAL | Age: 34
Discharge: HOME | End: 2024-04-09
Payer: COMMERCIAL

## 2024-04-09 ENCOUNTER — HOSPITAL ENCOUNTER (OUTPATIENT)
Dept: ULTRASOUND IMAGING | Facility: HOSPITAL | Age: 34
Discharge: HOME | End: 2024-04-09
Payer: COMMERCIAL

## 2024-04-09 DIAGNOSIS — R92.8 ABNORMAL MAMMOGRAM: ICD-10-CM

## 2024-04-09 PROCEDURE — G0279 TOMOSYNTHESIS, MAMMO: HCPCS

## 2024-04-09 PROCEDURE — 76642 ULTRASOUND BREAST LIMITED: CPT

## 2024-04-09 PROCEDURE — 77066 DX MAMMO INCL CAD BI: CPT

## 2024-04-10 NOTE — RESULT ENCOUNTER NOTE
Kristofer Kelley  Your breast imaging was normal, we recommend routine screening mammograms starting at age 40 as your next follow up  -Anita

## 2024-04-25 ENCOUNTER — TELEPHONE (OUTPATIENT)
Dept: NEPHROLOGY | Facility: CLINIC | Age: 34
End: 2024-04-25

## 2024-05-07 DIAGNOSIS — E03.9 HYPOTHYROIDISM, UNSPECIFIED TYPE: ICD-10-CM

## 2024-05-07 RX ORDER — LIOTHYRONINE SODIUM 5 UG/1
5 TABLET ORAL DAILY
Qty: 30 TABLET | Refills: 5 | Status: SHIPPED | OUTPATIENT
Start: 2024-05-07

## 2024-06-11 ENCOUNTER — APPOINTMENT (OUTPATIENT)
Dept: LAB | Facility: CLINIC | Age: 34
End: 2024-06-11
Payer: COMMERCIAL

## 2024-06-11 DIAGNOSIS — L20.89 OTHER ATOPIC DERMATITIS: ICD-10-CM

## 2024-06-11 LAB
ALBUMIN SERPL BCP-MCNC: 4.9 G/DL (ref 3.5–5)
ALP SERPL-CCNC: 70 U/L (ref 34–104)
ALT SERPL W P-5'-P-CCNC: 43 U/L (ref 7–52)
ANION GAP SERPL CALCULATED.3IONS-SCNC: 6 MMOL/L (ref 4–13)
AST SERPL W P-5'-P-CCNC: 25 U/L (ref 13–39)
BASOPHILS # BLD AUTO: 0.04 THOUSANDS/ÂΜL (ref 0–0.1)
BASOPHILS NFR BLD AUTO: 1 % (ref 0–1)
BILIRUB SERPL-MCNC: 0.95 MG/DL (ref 0.2–1)
BUN SERPL-MCNC: 17 MG/DL (ref 5–25)
CALCIUM SERPL-MCNC: 9.7 MG/DL (ref 8.4–10.2)
CHLORIDE SERPL-SCNC: 104 MMOL/L (ref 96–108)
CHOLEST SERPL-MCNC: 129 MG/DL
CO2 SERPL-SCNC: 29 MMOL/L (ref 21–32)
CREAT SERPL-MCNC: 0.97 MG/DL (ref 0.6–1.3)
EOSINOPHIL # BLD AUTO: 0.09 THOUSAND/ÂΜL (ref 0–0.61)
EOSINOPHIL NFR BLD AUTO: 2 % (ref 0–6)
ERYTHROCYTE [DISTWIDTH] IN BLOOD BY AUTOMATED COUNT: 12.3 % (ref 11.6–15.1)
GFR SERPL CREATININE-BSD FRML MDRD: 75 ML/MIN/1.73SQ M
GLUCOSE P FAST SERPL-MCNC: 64 MG/DL (ref 65–99)
HCT VFR BLD AUTO: 49.2 % (ref 34.8–46.1)
HDLC SERPL-MCNC: 64 MG/DL
HGB BLD-MCNC: 15.9 G/DL (ref 11.5–15.4)
IMM GRANULOCYTES # BLD AUTO: 0.01 THOUSAND/UL (ref 0–0.2)
IMM GRANULOCYTES NFR BLD AUTO: 0 % (ref 0–2)
LDLC SERPL CALC-MCNC: 54 MG/DL (ref 0–100)
LDLC SERPL DIRECT ASSAY-MCNC: 54 MG/DL (ref 0–100)
LYMPHOCYTES # BLD AUTO: 1.48 THOUSANDS/ÂΜL (ref 0.6–4.47)
LYMPHOCYTES NFR BLD AUTO: 27 % (ref 14–44)
MCH RBC QN AUTO: 29.9 PG (ref 26.8–34.3)
MCHC RBC AUTO-ENTMCNC: 32.3 G/DL (ref 31.4–37.4)
MCV RBC AUTO: 93 FL (ref 82–98)
MONOCYTES # BLD AUTO: 0.43 THOUSAND/ÂΜL (ref 0.17–1.22)
MONOCYTES NFR BLD AUTO: 8 % (ref 4–12)
NEUTROPHILS # BLD AUTO: 3.53 THOUSANDS/ÂΜL (ref 1.85–7.62)
NEUTS SEG NFR BLD AUTO: 62 % (ref 43–75)
NONHDLC SERPL-MCNC: 65 MG/DL
NRBC BLD AUTO-RTO: 0 /100 WBCS
PLATELET # BLD AUTO: 180 THOUSANDS/UL (ref 149–390)
PMV BLD AUTO: 11.4 FL (ref 8.9–12.7)
POTASSIUM SERPL-SCNC: 3.9 MMOL/L (ref 3.5–5.3)
PROT SERPL-MCNC: 7.5 G/DL (ref 6.4–8.4)
RBC # BLD AUTO: 5.32 MILLION/UL (ref 3.81–5.12)
SODIUM SERPL-SCNC: 139 MMOL/L (ref 135–147)
TRIGL SERPL-MCNC: 57 MG/DL
WBC # BLD AUTO: 5.58 THOUSAND/UL (ref 4.31–10.16)

## 2024-06-11 PROCEDURE — 86704 HEP B CORE ANTIBODY TOTAL: CPT

## 2024-06-11 PROCEDURE — 36415 COLL VENOUS BLD VENIPUNCTURE: CPT

## 2024-06-11 PROCEDURE — 86706 HEP B SURFACE ANTIBODY: CPT

## 2024-06-11 PROCEDURE — 83721 ASSAY OF BLOOD LIPOPROTEIN: CPT

## 2024-06-11 PROCEDURE — 86803 HEPATITIS C AB TEST: CPT

## 2024-06-11 PROCEDURE — 85025 COMPLETE CBC W/AUTO DIFF WBC: CPT

## 2024-06-11 PROCEDURE — 87340 HEPATITIS B SURFACE AG IA: CPT

## 2024-06-11 PROCEDURE — 80053 COMPREHEN METABOLIC PANEL: CPT

## 2024-06-11 PROCEDURE — 86480 TB TEST CELL IMMUN MEASURE: CPT

## 2024-06-11 PROCEDURE — 80061 LIPID PANEL: CPT

## 2024-06-12 LAB
GAMMA INTERFERON BACKGROUND BLD IA-ACNC: 0.01 IU/ML
HBV CORE AB SER QL: NORMAL
HBV SURFACE AB SER-ACNC: >500 MIU/ML
HBV SURFACE AG SER QL: NORMAL
HCV AB SER QL: NORMAL
M TB IFN-G BLD-IMP: NEGATIVE
M TB IFN-G CD4+ BCKGRND COR BLD-ACNC: -0.01 IU/ML
M TB IFN-G CD4+ BCKGRND COR BLD-ACNC: 0.02 IU/ML
MITOGEN IGNF BCKGRD COR BLD-ACNC: 9.99 IU/ML

## 2024-06-26 ENCOUNTER — VBI (OUTPATIENT)
Dept: ADMINISTRATIVE | Facility: OTHER | Age: 34
End: 2024-06-26

## 2024-06-26 NOTE — TELEPHONE ENCOUNTER
06/26/24 2:04 PM     Chart reviewed for Pap Smear (HPV) aka Cervical Cancer Screening ; nothing is submitted to the patient's insurance at this time.     Farhan Bueno MA   [facility]

## 2024-08-09 ENCOUNTER — TELEPHONE (OUTPATIENT)
Dept: INTERNAL MEDICINE CLINIC | Facility: CLINIC | Age: 34
End: 2024-08-09

## 2024-08-09 NOTE — TELEPHONE ENCOUNTER
Left voicemail to reschedule patients appointment with Mel Espinoza on 8/15.    Left message for patient to reschedule appointment due to provider not being in the office that day. If patient returns call to reschedule please let patient know provider is transferring to [facility], patient may transfer with provider or stay at [facility] and continue here with a new provider.

## 2024-08-30 ENCOUNTER — APPOINTMENT (OUTPATIENT)
Dept: RADIOLOGY | Facility: CLINIC | Age: 34
End: 2024-08-30
Payer: COMMERCIAL

## 2024-08-30 ENCOUNTER — APPOINTMENT (OUTPATIENT)
Dept: LAB | Facility: CLINIC | Age: 34
End: 2024-08-30
Payer: COMMERCIAL

## 2024-08-30 ENCOUNTER — OFFICE VISIT (OUTPATIENT)
Dept: INTERNAL MEDICINE CLINIC | Facility: CLINIC | Age: 34
End: 2024-08-30
Payer: COMMERCIAL

## 2024-08-30 VITALS
HEIGHT: 63 IN | WEIGHT: 119 LBS | TEMPERATURE: 96.9 F | SYSTOLIC BLOOD PRESSURE: 98 MMHG | HEART RATE: 84 BPM | BODY MASS INDEX: 21.09 KG/M2 | DIASTOLIC BLOOD PRESSURE: 62 MMHG | OXYGEN SATURATION: 99 %

## 2024-08-30 DIAGNOSIS — Z11.4 SCREENING FOR HIV (HUMAN IMMUNODEFICIENCY VIRUS): ICD-10-CM

## 2024-08-30 DIAGNOSIS — M54.17 RADICULAR PAIN OF LUMBOSACRAL REGION: ICD-10-CM

## 2024-08-30 DIAGNOSIS — Z12.4 SCREENING FOR CERVICAL CANCER: ICD-10-CM

## 2024-08-30 DIAGNOSIS — M54.17 RADICULAR PAIN OF LUMBOSACRAL REGION: Primary | ICD-10-CM

## 2024-08-30 LAB
BASOPHILS # BLD AUTO: 0.04 THOUSANDS/ÂΜL (ref 0–0.1)
BASOPHILS NFR BLD AUTO: 1 % (ref 0–1)
CRP SERPL QL: <1 MG/L
EOSINOPHIL # BLD AUTO: 0.08 THOUSAND/ÂΜL (ref 0–0.61)
EOSINOPHIL NFR BLD AUTO: 1 % (ref 0–6)
ERYTHROCYTE [DISTWIDTH] IN BLOOD BY AUTOMATED COUNT: 13.2 % (ref 11.6–15.1)
HCT VFR BLD AUTO: 43.5 % (ref 34.8–46.1)
HGB BLD-MCNC: 14 G/DL (ref 11.5–15.4)
HIV 1+2 AB+HIV1 P24 AG SERPL QL IA: NORMAL
HIV 2 AB SERPL QL IA: NORMAL
HIV1 AB SERPL QL IA: NORMAL
HIV1 P24 AG SERPL QL IA: NORMAL
IMM GRANULOCYTES # BLD AUTO: 0.01 THOUSAND/UL (ref 0–0.2)
IMM GRANULOCYTES NFR BLD AUTO: 0 % (ref 0–2)
LYMPHOCYTES # BLD AUTO: 2.44 THOUSANDS/ÂΜL (ref 0.6–4.47)
LYMPHOCYTES NFR BLD AUTO: 41 % (ref 14–44)
MCH RBC QN AUTO: 30 PG (ref 26.8–34.3)
MCHC RBC AUTO-ENTMCNC: 32.2 G/DL (ref 31.4–37.4)
MCV RBC AUTO: 93 FL (ref 82–98)
MONOCYTES # BLD AUTO: 0.48 THOUSAND/ÂΜL (ref 0.17–1.22)
MONOCYTES NFR BLD AUTO: 8 % (ref 4–12)
NEUTROPHILS # BLD AUTO: 2.91 THOUSANDS/ÂΜL (ref 1.85–7.62)
NEUTS SEG NFR BLD AUTO: 49 % (ref 43–75)
NRBC BLD AUTO-RTO: 0 /100 WBCS
PLATELET # BLD AUTO: 233 THOUSANDS/UL (ref 149–390)
PMV BLD AUTO: 10.5 FL (ref 8.9–12.7)
RBC # BLD AUTO: 4.67 MILLION/UL (ref 3.81–5.12)
WBC # BLD AUTO: 5.96 THOUSAND/UL (ref 4.31–10.16)

## 2024-08-30 PROCEDURE — 85025 COMPLETE CBC W/AUTO DIFF WBC: CPT

## 2024-08-30 PROCEDURE — 86140 C-REACTIVE PROTEIN: CPT

## 2024-08-30 PROCEDURE — 72110 X-RAY EXAM L-2 SPINE 4/>VWS: CPT

## 2024-08-30 PROCEDURE — 87389 HIV-1 AG W/HIV-1&-2 AB AG IA: CPT

## 2024-08-30 PROCEDURE — 99214 OFFICE O/P EST MOD 30 MIN: CPT | Performed by: INTERNAL MEDICINE

## 2024-08-30 PROCEDURE — 36415 COLL VENOUS BLD VENIPUNCTURE: CPT

## 2024-08-30 RX ORDER — PREDNISONE 20 MG/1
TABLET ORAL
Qty: 20 TABLET | Refills: 0 | Status: SHIPPED | OUTPATIENT
Start: 2024-08-30

## 2024-08-30 RX ORDER — ESZOPICLONE 1 MG/1
1 TABLET, FILM COATED ORAL
COMMUNITY

## 2024-08-30 RX ORDER — UPADACITINIB 15 MG/1
TABLET, EXTENDED RELEASE ORAL
COMMUNITY

## 2024-08-30 RX ORDER — CYCLOBENZAPRINE HCL 10 MG
10 TABLET ORAL 3 TIMES DAILY PRN
Qty: 63 TABLET | Refills: 0 | Status: SHIPPED | OUTPATIENT
Start: 2024-08-30 | End: 2024-09-20

## 2024-08-30 NOTE — PROGRESS NOTES
Ambulatory Visit  Name: Allie Donnelly      : 1990      MRN: [id number]  Encounter Provider: Edison Michael DO  Encounter Date: 2024   Encounter department: [facility]      Assessment & Plan  Screening for HIV (human immunodeficiency virus)    Orders:    HIV 1/2 AG/AB w Reflex [facility] for 2 yr old and above; Future    Screening for cervical cancer         Radicular pain of lumbosacral region  Low back pain for several months with acute worsening with spasm like pain.  Severe and debilitating making work difficult  Spasm like  Recently episodic numbness in the posterior legs bilaterally  No fevers or chills   Orders:    XR spine lumbar minimum 4 views non injury; Future    predniSONE 20 mg tablet; 3 Tabs PO QDay x 3 Days, Then 2 Tabs PO QDay x 3 Days, Then 1 Tab PO QDay x 3 Days, Then 1/2 Tab PO QDay x 4 Days, Then Stop    cyclobenzaprine (FLEXERIL) 10 mg tablet; Take 1 tablet (10 mg total) by mouth 3 (three) times a day as needed for muscle spasms for up to 21 days    CBC and differential; Future    C-reactive protein; Future         History of Present Illness     Low back pain for several month with worsening and radicular symptoms noted recently    Back Pain  This is a recurrent problem. The current episode started more than 1 year ago. The problem occurs 2 to 4 times per day. The problem has been waxing and waning since onset. The pain is present in the gluteal and sacro-iliac. The quality of the pain is described as aching, cramping and stabbing. The pain radiates to the left foot, left knee, left thigh, right foot, right knee and right thigh. The pain is at a severity of 10/10. The pain is The same all the time. The symptoms are aggravated by bending, position, lying down, sitting and twisting. Stiffness is present All day. Associated symptoms include numbness, paresthesias and tingling. Pertinent negatives include no abdominal pain, bladder incontinence, bowel incontinence, chest  "pain, dysuria, fever, headaches, leg pain, paresis, pelvic pain, perianal numbness, weakness or weight loss.     Review of Systems   Constitutional:  Negative for fever and weight loss.   Cardiovascular:  Negative for chest pain.   Gastrointestinal:  Negative for abdominal pain and bowel incontinence.   Genitourinary:  Negative for bladder incontinence, dysuria and pelvic pain.   Musculoskeletal:  Positive for back pain.   Neurological:  Positive for tingling, numbness and paresthesias. Negative for weakness and headaches.     Objective     BP 98/62   Pulse 84   Temp (!) 96.9 °F (36.1 °C)   Ht 5' 3\" (1.6 m)   Wt 54 kg (119 lb)   LMP 07/18/2024 (Approximate)   SpO2 99%   BMI 21.08 kg/m²     Physical Exam  Vitals and nursing note reviewed.   Constitutional:       General: She is not in acute distress.     Appearance: She is well-developed.   HENT:      Head: Normocephalic and atraumatic.   Eyes:      Conjunctiva/sclera: Conjunctivae normal.   Cardiovascular:      Rate and Rhythm: Normal rate and regular rhythm.      Heart sounds: No murmur heard.  Pulmonary:      Effort: Pulmonary effort is normal. No respiratory distress.      Breath sounds: Normal breath sounds.   Abdominal:      Palpations: Abdomen is soft.      Tenderness: There is no abdominal tenderness.   Musculoskeletal:         General: Tenderness present. No swelling.      Cervical back: Neck supple.        Back:    Skin:     General: Skin is warm and dry.      Capillary Refill: Capillary refill takes less than 2 seconds.   Neurological:      Mental Status: She is alert.   Psychiatric:         Mood and Affect: Mood normal.         Edison Michael, DO       "

## 2024-08-30 NOTE — PATIENT INSTRUCTIONS
"Patient Education     Back exercises   The Basics   Written by the doctors and editors at [facility]   Why do I need to do back exercises? -- Back exercises can help ease back pain and might help prevent future back pain. Long term, it is important to strengthen the muscles in your lower back, buttocks, and belly. These are your \"core muscles.\" Stretching exercises are also important to keep your muscles flexible.  Below are some stretching and strengthening exercises that might help you. Other forms of movement can help ease or prevent back pain, too. For example, some people like to walk, do aerobic exercise, or do yoga or evangelist chi. The most important thing is to move your body. Your doctor, nurse, or physical therapist can help you find different types of activity that work for you.  What stretching exercises should I do? -- Below are some examples of stretching exercises. Warm up your muscles before stretching to help prevent injury. To warm up, you can walk, jog, or cycle for a few minutes.  Start by repeating each of these stretches 2 to 3 times. Try to hold each stretch for 5 to 10 seconds, and try to do the stretches 2 to 3 times each day. Breathe slowly and deeply as you do the exercises. Never bounce when doing stretches.   Cat-cow stretch (figure 1) - Start on all fours on the floor, with your hands under your shoulders, knees under your hips, and your back flat. First, tuck your chin and tighten your stomach muscles as you round your back (like a \"cat\"). Hold the stretch for about 10 seconds. Rest for a few seconds as you flatten your back. Next, lift your chin and let your belly and lower back sink toward the floor (like a \"cow\"). Hold the stretch for about 10 seconds.   Single knee-to-chest stretches (figure 2) ? While lying on your back, bend your knees with your feet flat on the floor. Pull 1 knee toward your chest until you feel a stretch in your lower back and buttock area. Lower, and repeat with the " other knee. If you have knee problems, pull your knee up by grabbing the back of your thigh instead of the front of your knee. You can also do this exercise by grabbing both knees at the same time.   Lower trunk rotations (figure 3) ? While lying on your back, bend your knees with your feet flat on the floor. Keep your knees and ankles together, and then drop them to 1 side. Keep both of your shoulders touching the floor until you feel a stretch in the muscles at the side of the back. Repeat on the other side.   Lower back stretches seated (figure 4) ? Sit in a chair with your feet spread about shoulder width apart. Then, lean forward until you feel a stretch in your lower back.  What strengthening exercises should I do? -- Below are some examples of strengthening exercises.  Start by doing each exercise 2 to 3 times. Work up to doing each exercise 10 times. Hold each exercise for 3 to 5 seconds, and try to do the exercises 2 to 3 times each day. Do all exercises slowly.   Shoulder blade squeezes (figure 5) ? Pinch your shoulder blades together on your upper back, and hold 3 to 5 seconds. You can also do these 1 side at a time. Sit with good posture, and make sure that your shoulders do not rise up when you do this exercise. Relax.   Pelvic tilts (figure 6) ? Lie on your back with your knees bent and feet flat on the floor. Tighten your stomach muscles, and press your lower back down to the floor. Relax. You should be able to breathe comfortably during this exercise.   Hip lifts (figure 7) ? Lie on your back with your knees bent and feet flat on the floor. Tighten your stomach muscles, keep your back flat, and lift your buttocks off of the floor. Relax. You should feel this in your buttocks, not in your lower back.  What else should I know?    Exercise, including stretching, might be slightly uncomfortable. But you should not have sharp or severe pain. If you do get severe pain, stop what you are doing. If severe  "pain continues, call your doctor or nurse.   Do not hold your breath when exercising. If you tend to hold your breath, try counting out loud when exercising. If any exercise bothers you, stop right away.   Always warm up before exercising. Warm muscles stretch much easier than cool muscles. Stretching cool muscles can lead to injury.   Doing exercises before a meal can be a good way to get into a routine.  All topics are updated as new evidence becomes available and our peer review process is complete.  This topic retrieved from [employer] on: Apr 03, 2024.  Topic 027791 Version 2.0  Release: 32.2.4 - C32.92  © 2024 UpToDate, Inc. and/or its affiliates. All rights reserved.  figure 1: Cat-cow stretch     Start on all fours on the floor, with hands under your shoulders, knees under your hips, and your back flat. First, tuck your chin and tighten your stomach muscles as you round your back (like a \"cat\"). Hold the stretch for about 10 seconds. Rest for a few seconds as you flatten your back. Next, lift your chin and let your belly and lower back sink toward the floor (like a \"cow\"). Hold the stretch for about 10 seconds.  Graphic [id number] Version 1.0  figure 2: Single knee-to-chest stretch     Lie on your back, bend your knees, and have your feet flat on the floor. Pull 1 knee toward your chest until you feel a stretch in your lower back and buttock area. Repeat with the other knee. If you have knee problems, pull your knee up by grabbing the back of your thigh instead of the front of your knee. You can also do this exercise by grabbing both knees at the same time.  Graphic [id number] Version 1.0  figure 3: Lower trunk rotation     While lying on your back, bend your knees and have your feet flat on the floor. Keep your legs together and then drop them to 1 side. Keep both of your shoulders touching the floor until you feel a stretch in the muscles at the side of the back. Repeat on the other side.  Graphic [id number] Version " 1.0  figure 4: Lower back stretch     Sit in a chair with your feet spread about shoulder width apart. Then, lean forward until you feel a stretch in your lower back.  Graphic [id number] Version 1.0  figure 5: Shoulder blade squeezes     Pinch your shoulder blades together on your upper back and hold for 3 to 5 seconds. Make sure that you are sitting with good posture and that your shoulders do not raise up when you do this exercise. Relax.  Graphic [id number] Version 1.0  figure 6: Pelvic tilts     Lie on your back with your knees bent and feet flat on the floor. Tighten your stomach muscles and press your lower back down to the floor. Relax.  Graphic [id number] Version 1.0  figure 7: Hip lifts     Lie on your back with your knees bent and feet flat on the floor. Tighten your stomach muscles and lift your buttocks off of the floor. Relax.  Graphic 219551 Version 1.0  Consumer Information Use and Disclaimer   Disclaimer: This generalized information is a limited summary of diagnosis, treatment, and/or medication information. It is not meant to be comprehensive and should be used as a tool to help the user understand and/or assess potential diagnostic and treatment options. It does NOT include all information about conditions, treatments, medications, side effects, or risks that may apply to a specific patient. It is not intended to be medical advice or a substitute for the medical advice, diagnosis, or treatment of a health care provider based on the health care provider's examination and assessment of a patient's specific and unique circumstances. Patients must speak with a health care provider for complete information about their health, medical questions, and treatment options, including any risks or benefits regarding use of medications. This information does not endorse any treatments or medications as safe, effective, or approved for treating a specific patient. UpToDate, Inc. and its affiliates disclaim any warranty or  liability relating to this information or the use thereof.The use of this information is governed by the Terms of Use, available at https://www.wolterskluwer.com/en/know/clinical-effectiveness-terms. 2024© UpToDate, Inc. and its affiliates and/or licensors. All rights reserved.  Copyright   © 2024 [employer] and/or its affiliates. All rights reserved.

## 2024-09-09 ENCOUNTER — TELEPHONE (OUTPATIENT)
Dept: INTERNAL MEDICINE CLINIC | Facility: CLINIC | Age: 34
End: 2024-09-09

## 2024-09-13 ENCOUNTER — OFFICE VISIT (OUTPATIENT)
Dept: INTERNAL MEDICINE CLINIC | Facility: CLINIC | Age: 34
End: 2024-09-13
Payer: COMMERCIAL

## 2024-09-13 ENCOUNTER — TELEPHONE (OUTPATIENT)
Dept: INTERNAL MEDICINE CLINIC | Facility: CLINIC | Age: 34
End: 2024-09-13

## 2024-09-13 ENCOUNTER — APPOINTMENT (OUTPATIENT)
Dept: LAB | Facility: CLINIC | Age: 34
End: 2024-09-13
Payer: COMMERCIAL

## 2024-09-13 VITALS
TEMPERATURE: 99.5 F | SYSTOLIC BLOOD PRESSURE: 110 MMHG | BODY MASS INDEX: 20.98 KG/M2 | DIASTOLIC BLOOD PRESSURE: 64 MMHG | WEIGHT: 118.38 LBS | HEIGHT: 63 IN | OXYGEN SATURATION: 96 % | HEART RATE: 91 BPM

## 2024-09-13 DIAGNOSIS — L20.89 OTHER ATOPIC DERMATITIS: ICD-10-CM

## 2024-09-13 DIAGNOSIS — M54.17 RADICULAR PAIN OF LUMBOSACRAL REGION: Primary | ICD-10-CM

## 2024-09-13 DIAGNOSIS — N39.8 VOIDING DYSFUNCTION: ICD-10-CM

## 2024-09-13 DIAGNOSIS — Z12.4 SCREENING FOR CERVICAL CANCER: ICD-10-CM

## 2024-09-13 LAB
ALBUMIN SERPL BCG-MCNC: 4.4 G/DL (ref 3.5–5)
ALP SERPL-CCNC: 57 U/L (ref 34–104)
ALT SERPL W P-5'-P-CCNC: 18 U/L (ref 7–52)
ANION GAP SERPL CALCULATED.3IONS-SCNC: 4 MMOL/L (ref 4–13)
AST SERPL W P-5'-P-CCNC: 16 U/L (ref 13–39)
BILIRUB SERPL-MCNC: 0.88 MG/DL (ref 0.2–1)
BUN SERPL-MCNC: 16 MG/DL (ref 5–25)
CALCIUM SERPL-MCNC: 9.1 MG/DL (ref 8.4–10.2)
CHLORIDE SERPL-SCNC: 104 MMOL/L (ref 96–108)
CHOLEST SERPL-MCNC: 122 MG/DL
CO2 SERPL-SCNC: 32 MMOL/L (ref 21–32)
CREAT SERPL-MCNC: 1.02 MG/DL (ref 0.6–1.3)
ERYTHROCYTE [DISTWIDTH] IN BLOOD BY AUTOMATED COUNT: 13.4 % (ref 11.6–15.1)
GFR SERPL CREATININE-BSD FRML MDRD: 71 ML/MIN/1.73SQ M
GLUCOSE P FAST SERPL-MCNC: 91 MG/DL (ref 65–99)
HCT VFR BLD AUTO: 41.2 % (ref 34.8–46.1)
HDLC SERPL-MCNC: 69 MG/DL
HGB BLD-MCNC: 13.6 G/DL (ref 11.5–15.4)
LDLC SERPL CALC-MCNC: 46 MG/DL (ref 0–100)
LDLC SERPL DIRECT ASSAY-MCNC: 46 MG/DL (ref 0–100)
MCH RBC QN AUTO: 30.2 PG (ref 26.8–34.3)
MCHC RBC AUTO-ENTMCNC: 33 G/DL (ref 31.4–37.4)
MCV RBC AUTO: 92 FL (ref 82–98)
NONHDLC SERPL-MCNC: 53 MG/DL
PLATELET # BLD AUTO: 272 THOUSANDS/UL (ref 149–390)
PMV BLD AUTO: 9.5 FL (ref 8.9–12.7)
POTASSIUM SERPL-SCNC: 4.1 MMOL/L (ref 3.5–5.3)
PROT SERPL-MCNC: 6.6 G/DL (ref 6.4–8.4)
RBC # BLD AUTO: 4.5 MILLION/UL (ref 3.81–5.12)
SODIUM SERPL-SCNC: 140 MMOL/L (ref 135–147)
TRIGL SERPL-MCNC: 35 MG/DL
WBC # BLD AUTO: 8.5 THOUSAND/UL (ref 4.31–10.16)

## 2024-09-13 PROCEDURE — 36415 COLL VENOUS BLD VENIPUNCTURE: CPT

## 2024-09-13 PROCEDURE — 85027 COMPLETE CBC AUTOMATED: CPT

## 2024-09-13 PROCEDURE — 83721 ASSAY OF BLOOD LIPOPROTEIN: CPT

## 2024-09-13 PROCEDURE — 80061 LIPID PANEL: CPT

## 2024-09-13 PROCEDURE — 80053 COMPREHEN METABOLIC PANEL: CPT

## 2024-09-13 PROCEDURE — 99213 OFFICE O/P EST LOW 20 MIN: CPT | Performed by: INTERNAL MEDICINE

## 2024-09-13 RX ORDER — TAMSULOSIN HYDROCHLORIDE 0.4 MG/1
CAPSULE ORAL
Qty: 90 CAPSULE | Refills: 1 | Status: SHIPPED | OUTPATIENT
Start: 2024-09-13

## 2024-09-13 RX ORDER — GABAPENTIN 800 MG/1
800 TABLET ORAL DAILY
Qty: 90 TABLET | Refills: 1 | Status: SHIPPED | OUTPATIENT
Start: 2024-09-13 | End: 2025-03-12

## 2024-09-13 RX ORDER — BUSPIRONE HYDROCHLORIDE 15 MG/1
15 TABLET ORAL 2 TIMES DAILY
Qty: 180 TABLET | Refills: 1 | Status: SHIPPED | OUTPATIENT
Start: 2024-09-13 | End: 2025-03-12

## 2024-09-13 NOTE — PROGRESS NOTES
"Ambulatory Visit  Name: Allie Donnelly      : 1990      MRN: [id number]  Encounter Provider: Edison Michael DO  Encounter Date: 2024   Encounter department: [facility]    Assessment & Plan  Screening for cervical cancer    Orders:    Ambulatory referral to Obstetrics / Gynecology; Future    Radicular pain of lumbosacral region  Reviewed the Lumbar spine xray  Improved pain and the patient states that she now only has LBP with radiation into her bilateral buttocks  Tylenol 650 mg Q6 with Heat PRN  PT/OT  Discussed MRI if persistent pain  Orders:    Ambulatory Referral to Physical Therapy; Future       History of Present Illness     Continued LBP noted after prednisone taper.  No weakness, numbness or red flag symptoms          Review of Systems   Constitutional:  Negative for chills and fever.   HENT:  Negative for ear pain and sore throat.    Eyes:  Negative for pain and visual disturbance.   Respiratory:  Negative for cough and shortness of breath.    Cardiovascular:  Negative for chest pain and palpitations.   Gastrointestinal:  Negative for abdominal pain and vomiting.   Genitourinary:  Negative for dysuria and hematuria.   Musculoskeletal:  Positive for back pain. Negative for arthralgias.   Skin:  Negative for color change and rash.   Neurological:  Negative for seizures and syncope.   All other systems reviewed and are negative.          Objective     /64 (BP Location: Left arm, Patient Position: Sitting)   Pulse 91   Temp 99.5 °F (37.5 °C) (Tympanic)   Ht 5' 3\" (1.6 m)   Wt 53.7 kg (118 lb 6 oz)   LMP 2024 (Approximate)   SpO2 96%   BMI 20.97 kg/m²     Physical Exam  Vitals and nursing note reviewed.   Constitutional:       General: She is not in acute distress.     Appearance: She is well-developed.   HENT:      Head: Normocephalic and atraumatic.   Eyes:      Conjunctiva/sclera: Conjunctivae normal.   Cardiovascular:      Rate and Rhythm: Normal rate and " regular rhythm.      Heart sounds: No murmur heard.  Pulmonary:      Effort: Pulmonary effort is normal. No respiratory distress.      Breath sounds: Normal breath sounds.   Abdominal:      Palpations: Abdomen is soft.      Tenderness: There is no abdominal tenderness.   Musculoskeletal:         General: Tenderness present. No swelling.        Arms:       Cervical back: Neck supple.   Skin:     General: Skin is warm and dry.      Capillary Refill: Capillary refill takes less than 2 seconds.   Neurological:      Mental Status: She is alert.   Psychiatric:         Mood and Affect: Mood normal.

## 2024-09-16 ENCOUNTER — OFFICE VISIT (OUTPATIENT)
Dept: INTERNAL MEDICINE CLINIC | Facility: CLINIC | Age: 34
End: 2024-09-16
Payer: COMMERCIAL

## 2024-09-16 VITALS
HEIGHT: 63 IN | TEMPERATURE: 98 F | OXYGEN SATURATION: 97 % | DIASTOLIC BLOOD PRESSURE: 64 MMHG | BODY MASS INDEX: 20.7 KG/M2 | SYSTOLIC BLOOD PRESSURE: 100 MMHG | HEART RATE: 77 BPM | WEIGHT: 116.8 LBS

## 2024-09-16 DIAGNOSIS — Z01.818 PRE-OPERATIVE CLEARANCE: Primary | ICD-10-CM

## 2024-09-16 DIAGNOSIS — Z12.4 SCREENING FOR CERVICAL CANCER: ICD-10-CM

## 2024-09-16 PROCEDURE — 99213 OFFICE O/P EST LOW 20 MIN: CPT | Performed by: PHYSICIAN ASSISTANT

## 2024-09-16 NOTE — PROGRESS NOTES
Pre-operative Clearance  Name: Allie Donnelly      : 1990      MRN: [id number]  Encounter Provider: Anita Meyer PA-C  Encounter Date: 2024   Encounter department: [facility]    Assessment & Plan  Screening for cervical cancer         Pre-operative clearance         Pre-operative Clearance:     Revised Cardiac Risk Index:  RCI RISK CLASS I (0 risk factors, risk of major cardiac complications approximately 0.5%)    Clearance:  Patient is medically optimized (CLEARED) for proposed surgery without any additional cardiac testing.      Medication Instructions:   - Avoid aspirin containing medications or non-steroidal anti-inflammatory drugs one week preceding surgery    - May take tylenol for pain up until the night before surgery    - Alpha-1 Adrenergic Blocker (ie, tamsulosin, doxazosin, alfusozin): Continue to take this medication on your normal schedule.  - Antiparkinson meds (ie, carbidopa-levodopa, amantadine, pramipexole, ropinirole): Continue to take this medication on your normal schedule.  - Antipsychotic meds: Continue to take this medication on your normal schedule.  - Buspirone: Continue to take this medication on your normal schedule.  - H2 Blocker: Continue to take this medication on your normal schedule.  - Sleep meds (zolpidem, zaleplon, eszopiclone): Continue taking medication up to the evening before procedure/surgery, but do not take this medication on the morning of procedure/surgery.  - Thyroid meds: Continue to take this medication on your normal schedule.       History of Present Illness     Pre-op Exam  Surgery: bilateral inferior nasal turbinate reduction  Surgeon: Dr. Shahnaz Corea    Previous history of bleeding disorders or clots?: No  Previous Anesthesia reaction?: No  Prolonged steroid use in the last 6 months?: No    Assessment of Cardiac Risk:   - Unstable or severe angina or MI in the last 6 weeks or history of stent placement in the last year?: No    - Decompensated heart failure (e.g. New onset heart failure, NYHA  Class IV heart failure, or worsening existing heart failure)?: No  - Significant arrhythmias such as high grade AV block, symptomatic ventricular arrhythmia, newly recognized ventricular tachycardia, supraventricular tachycardia with resting heart rate >100, or symptomatic bradycardia?: No  - Severe heart valve disease including aortic stenosis or symptomatic mitral stenosis?: No      Pre-operative Risk Factors:  Elevated-risk surgery: No    History of cerebrovascular disease: No    History of ischemic heart disease: No  Pre-operative treatment with insulin: No  Pre-operative creatinine >2 mg/dL: No    History of congestive heart failure: No    Review of Systems   Constitutional:  Negative for chills and fever.   HENT:  Negative for congestion, ear pain, hearing loss, postnasal drip, rhinorrhea, sinus pressure, sinus pain, sore throat and trouble swallowing.    Eyes:  Negative for pain and visual disturbance.   Respiratory:  Negative for cough, chest tightness, shortness of breath and wheezing.    Cardiovascular: Negative.  Negative for chest pain, palpitations and leg swelling.   Gastrointestinal:  Negative for abdominal pain, blood in stool, constipation, diarrhea, nausea and vomiting.   Endocrine: Negative for cold intolerance, heat intolerance, polydipsia, polyphagia and polyuria.   Genitourinary:  Negative for difficulty urinating, dysuria, flank pain and urgency.   Musculoskeletal:  Negative for arthralgias, back pain, gait problem and myalgias.   Skin:  Negative for rash.   Allergic/Immunologic: Negative.    Neurological:  Negative for dizziness, weakness, light-headedness and headaches.   Hematological: Negative.    Psychiatric/Behavioral:  Negative for behavioral problems, dysphoric mood and sleep disturbance. The patient is not nervous/anxious.      Past Medical History   Past Medical History:   Diagnosis Date    Allergic     Anorexia  nervosa     pt denies history at time of admission 7/21/18    Anxiety     Celiac disease     Chronic kidney disease     CPAP (continuous positive airway pressure) dependence     waiting for a new mask    Depression     Disease of thyroid gland     hypo    Gall bladder polyp     Gastroesophageal reflux disease 09/18/2018    GERD (gastroesophageal reflux disease) 9/18/2018    Hallucination     Memory difficulty 07/12/2018    Memory loss 7/12/2018    Plantar wart of left foot     Psychiatric illness     Raynaud's disease without gangrene     Renal atrophy     Schizoaffective disorder (HCC)     Self-injurious behavior     Sleep apnea     Sleep difficulties     Suicide attempt (HCC)     history of attempt at age 18 by overdose     Urinary retention      Past Surgical History:   Procedure Laterality Date    COLON SURGERY  January 12 2021    EYE SURGERY Left     tear duct    LYMPH NODE BIOPSY  January 12 2021    CA ESOPHAGOGASTRODUODENOSCOPY TRANSORAL DIAGNOSTIC N/A 02/09/2018    Procedure: ESOPHAGOGASTRODUODENOSCOPY (EGD);  Surgeon: Umberto Hummel MD;  Location: [facility];  Service: Gastroenterology    CA ESOPHAGOGASTRODUODENOSCOPY TRANSORAL DIAGNOSTIC N/A 11/06/2018    Procedure: ESOPHAGOGASTRODUODENOSCOPY (EGD);  Surgeon: Mike Liu MD;  Location: [facility];  Service: Gastroenterology    CA LAPAROSCOPY PROCTOPEXY PROLAPSE N/A 01/12/2021    Procedure: Rectopexy with sigmoid resection w/ robotics.;  Surgeon: RUT Suarez MD;  Location:  MAIN OR;  Service: Colorectal     Family History   Problem Relation Age of Onset    Hypertension Father         benign essential    Cancer Father     Depression Father     Asthma Father     COPD Father     Prostate cancer Father     Hearing loss Father     Hypertension Brother         benign essential     OCD Brother     Depression Brother     Autoimmune disease Brother     Anxiety disorder Brother     Psychiatric Illness Brother     Other Family         nasolacrimal duct  obstruction, acquired    Breast cancer Mother 60    Cancer Mother     Diabetes Mother     Hypertension Mother     Arthritis Mother     Dementia Maternal Grandmother     Psychiatric Illness Maternal Grandmother     Psychosis Maternal Grandmother     Schizophrenia Maternal Grandmother     No Known Problems Maternal Grandfather     Breast cancer Paternal Grandmother     Cancer Paternal Grandmother     Prostate cancer Paternal Grandfather     No Known Problems Maternal Aunt     No Known Problems Maternal Aunt     Bipolar disorder Paternal Uncle     Psychiatric Illness Paternal Uncle     Colon cancer Neg Hx      Social History     Tobacco Use    Smoking status: Never     Passive exposure: Never    Smokeless tobacco: Never   Vaping Use    Vaping status: Never Used   Substance and Sexual Activity    Alcohol use: Not Currently    Drug use: No    Sexual activity: Not Currently     Current Outpatient Medications on File Prior to Visit   Medication Sig    benztropine (COGENTIN) 1 mg tablet Take 1 mg by mouth TAKE 1 TABLET IN AM AND 2 TABLETS IN PM    brexpiprazole (Rexulti) 0.25 MG tablet Take 0.25 mg by mouth daily    busPIRone (BUSPAR) 15 mg tablet Take 1 tablet (15 mg total) by mouth 2 (two) times a day    clobetasol (TEMOVATE) 0.05 % cream APPLY A THIN LAYER 3 NIGHTS WEEKLY FOR 12 WEEKS TO FLAKY RASH ON BOTH HANDS. AVOID FACE AND BODY SKIN FOLDS    cyclobenzaprine (FLEXERIL) 10 mg tablet Take 1 tablet (10 mg total) by mouth 3 (three) times a day as needed for muscle spasms for up to 21 days    eszopiclone (LUNESTA) 1 mg tablet Take 1 mg by mouth daily at bedtime    famotidine (PEPCID) 40 MG tablet TAKE 1/2 (ONE-HALF) TABLET BY MOUTH IN THE MORNING    gabapentin (NEURONTIN) 800 mg tablet Take 1 tablet (800 mg total) by mouth daily    liothyronine (CYTOMEL) 5 mcg tablet Take 1 tablet by mouth once daily    pantoprazole (PROTONIX) 40 mg tablet Take 1 tablet by mouth twice daily (Patient taking differently: Take 40 mg by mouth  "daily)    Rinvoq 15 MG TB24     tamsulosin (FLOMAX) 0.4 mg TAKE 1 CAPSULE BY MOUTH ONCE DAILY WITH SUPPER    [DISCONTINUED] oxybutynin (DITROPAN-XL) 10 MG 24 hr tablet Take 1 tablet (10 mg total) by mouth daily at bedtime     Allergies   Allergen Reactions    Gluten Meal - Food Allergy     Latex Swelling     Objective     /64 (BP Location: Left arm, Patient Position: Sitting, Cuff Size: Adult)   Pulse 77   Temp 98 °F (36.7 °C) (Tympanic)   Ht 5' 3\" (1.6 m)   Wt 53 kg (116 lb 12.8 oz)   LMP 09/09/2024 (Approximate)   SpO2 97%   BMI 20.69 kg/m²     Physical Exam  Vitals and nursing note reviewed.   Constitutional:       General: She is not in acute distress.     Appearance: Normal appearance. She is well-developed. She is not diaphoretic.   HENT:      Head: Normocephalic and atraumatic.      Right Ear: External ear normal.      Left Ear: External ear normal.      Nose: Nose normal.      Mouth/Throat:      Pharynx: No oropharyngeal exudate.   Eyes:      General: No scleral icterus.        Right eye: No discharge.         Left eye: No discharge.      Conjunctiva/sclera: Conjunctivae normal.      Pupils: Pupils are equal, round, and reactive to light.   Neck:      Thyroid: No thyromegaly.   Cardiovascular:      Rate and Rhythm: Normal rate and regular rhythm.      Heart sounds: Normal heart sounds. No murmur heard.     No friction rub. No gallop.   Pulmonary:      Effort: Pulmonary effort is normal. No respiratory distress.      Breath sounds: Normal breath sounds. No wheezing or rales.   Abdominal:      General: Bowel sounds are normal. There is no distension.      Palpations: Abdomen is soft.      Tenderness: There is no abdominal tenderness.   Musculoskeletal:         General: No tenderness or deformity. Normal range of motion.      Cervical back: Normal range of motion and neck supple.   Skin:     General: Skin is warm and dry.   Neurological:      Mental Status: She is alert and oriented to person, " place, and time.      Cranial Nerves: No cranial nerve deficit.   Psychiatric:         Behavior: Behavior normal.         Thought Content: Thought content normal.         Judgment: Judgment normal.           Anita Meyer PA-C

## 2024-10-03 ENCOUNTER — TELEPHONE (OUTPATIENT)
Age: 34
End: 2024-10-03

## 2024-10-03 NOTE — TELEPHONE ENCOUNTER
Patient scheduled my my chart and is a new patient and she put:   I have pain in my genitals and in my butt. They feel as if they are being slowly pulled apart. It happens sometimes when I move or sit. This has been going on for months. I looked at appointment decision tree and she put pelvic floor concerns and pain.  Please verify if you think she should be seen sooner than she scheduled in December. Thank you

## 2024-10-07 DIAGNOSIS — K21.9 GASTROESOPHAGEAL REFLUX DISEASE, UNSPECIFIED WHETHER ESOPHAGITIS PRESENT: ICD-10-CM

## 2024-10-08 RX ORDER — FAMOTIDINE 40 MG/1
TABLET, FILM COATED ORAL
Qty: 45 TABLET | Refills: 0 | Status: SHIPPED | OUTPATIENT
Start: 2024-10-08

## 2024-10-10 ENCOUNTER — EVALUATION (OUTPATIENT)
Dept: PHYSICAL THERAPY | Facility: CLINIC | Age: 34
End: 2024-10-10
Payer: COMMERCIAL

## 2024-10-10 DIAGNOSIS — M54.17 RADICULAR PAIN OF LUMBOSACRAL REGION: ICD-10-CM

## 2024-10-10 PROCEDURE — 97110 THERAPEUTIC EXERCISES: CPT

## 2024-10-10 PROCEDURE — 97161 PT EVAL LOW COMPLEX 20 MIN: CPT

## 2024-10-10 NOTE — PROGRESS NOTES
PT Evaluation     Today's date: 10/10/2024  Patient name: Allie Donnelly  : 1990  MRN: [id number]  Referring provider: Edison Michael DO  Dx:   Encounter Diagnosis     ICD-10-CM    1. Radicular pain of lumbosacral region  M54.17 Ambulatory Referral to Physical Therapy          Start Time: 0709  Stop Time: 0800  Total time in clinic (min): 51 minutes    Assessment  Impairments: abnormal coordination, abnormal or restricted ROM, activity intolerance, impaired physical strength, lacks appropriate home exercise program and pain with function  Symptom irritability: moderate    Assessment details: Allie Donnelly is a 34 y.o. female presenting to outpatient physical therapy with noted impairments including lower back/buttock pain, painful lumbar range of motion, reduced hip/core/gluteal strength, reduced postural awareness, and reduced activity tolerance. No red flags present per examination today. Noting generalized hypermobility with lumbar ROM with slight increase in pain. Tender at PSIS region bilaterally. No significant neural irritation noted per exam today though noting some tension with L passive SLR. Pt resting in slightly forward flexed posture when sitting and standing. Overall impaired activation of spine and pelvic stabilizers. Discussed with pt how impaired stability and hypermobility may be contributing to symptoms. Due to noted impairments, the patient's present functional limitations include difficulty with ADLs/IADLs, reliance on change in position for pain relief, reduced tolerance for functional mobility and activity, difficulty with lifting objects, and difficulty completing HH responsibilities and hobbies. Patient to benefit from skilled outpatient physical therapy 1-2x/week for 6-8 weeks in order to reduce pain, maximize pain free range of motion, increase strength and stability, and improve functional mobility/functional activity in order to maximize  function with reduced limitations. Home  "exercise program was provided and all questions answered to patient's level of satisfaction. Thank you for your referral.       Understanding of Dx/Px/POC: good     Prognosis: good    Goals  STGs to be achieved in 4 weeks:  1. Pt to demonstrate reduced subjective pain rating \"at worst\" by at least 2-3 points from Initial Eval in order to allow for reduced pain with ADLs and improved functional activity tolerance.   2. Pt to report at least 25% improvement in reported symptoms with performance of daily activities to demo improving function.   3. Pt to demonstrate increased MMT of B hip in all deficient planes by at least 1/2-1 grade in order to improve safety and stability with ADLs and functional mobility.   4. Pt to demonstrate increased MMT of B gluteus lorri and gluteus medius by at least 1/2-1 grade in order to improve safety and stability with ADLs and functional mobility.     LTGs to be achieved by discharge:  1. Pt will be I with HEP in order to continue to improve quality of life and independence and reduce risk for re-injury.   2. Pt to report at least 75% improvement with performance of duties as volunteer at [employer] to demo improved function.     3. Pt to demonstrate improved function as noted by achieving or exceeding predicted score on FOTO outcomes assessment tool.    4. Pt to demonstrate at least 4+-5/5 MMT strength of LE hip and B gluteus lorri and medius to demo improved functional strength.        Plan  Patient would benefit from: skilled physical therapy  Planned modality interventions: cryotherapy and thermotherapy: hydrocollator packs    Planned therapy interventions: abdominal trunk stabilization, joint mobilization, IASTM, manual therapy, massage, neuromuscular re-education, patient/caregiver education, strengthening, stretching, therapeutic activities, therapeutic exercise, home exercise program, flexibility, coordination, body mechanics training and balance    Frequency: 2x " "week  Duration in weeks: 8  Plan of Care beginning date: 10/10/2024  Plan of Care expiration date: 12/18/2024  Treatment plan discussed with: patient        Subjective Evaluation    History of Present Illness  Mechanism of injury: Pt is presenting to OPPT with chief complaint of  lower back pain and leg pain. Reports that this started about 9 months ago. Reports she initially hurt back but she does not remember how. 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 Reports that pain is located around tailbone and buttocks, and reports radiating pain into both legs L>R. Reports that \"fernánedz happen all over body\". Reports that doing any activity for a long period will cause pain; laying, sitting, standing, walking. Reports difficulty with cleaning house and performing daily activities. Reports that she has been doing HEP since seeing referring provider. Reports that change in position will help to alleviate pain. Reports she currently does not take any medication and does not ice/heat.   Reports she volunteers at [employer] for work; reports she has to carry a lot of clothes and stand for a long time.   Denies any changes in bowel/bladder, saddles paresthesia. Reports that legs do not feel as though they are progressively weaker. Reports she does have numbness occasionally in both legs; L>R. Reports that she does have occasional tingling as well. Reports muscle spasms as well.     Narrative & Impression  XR SPINE LUMBAR MINIMUM 4 VIEWS NON INJURY     INDICATION: M54.17: Radiculopathy, lumbosacral region. Lower back and buttock pain since doing XS lifting a few months ago.     COMPARISON: 5/25/2016     FINDINGS:     6 nonrib-bearing vertebral bodies. 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     Bones are intact.' Unchanged limbus vertebrae.     Alignment is preserved.     Normal disc heights.     No lytic or blastic lesions.     Soft tissues are within normal " "limits.  Visualized lungs are clear.     IMPRESSION:  No acute osseous abnormality.     Developmental transitional vertebral segmentation.           Computerized Assisted Algorithm (CAA) may have been used to analyze all applicable images.        Workstation performed: [id number]              Recurrent probem    Quality of life: fair    Patient Goals  Patient goals for therapy: decreased pain, increased motion, increased strength, independence with ADLs/IADLs and return to sport/leisure activities  Patient goal: decrease pain  Pain  Current pain ratin  At best pain ratin  At worst pain ratin  Location: lower back; B buttock; B leg  Quality: radiating, sharp and dull ache (\"fernández\")  Relieving factors: change in position  Aggravating factors: standing, walking, sitting, stair climbing and lifting  Progression: improved    Social Support  Stairs in house: yes   Lives in: multiple-level home  Lives with: parents    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[employer])  Exercise history: has been performing HEP      Diagnostic Tests  X-ray: abnormal  Treatments  Current treatment: physical therapy        Objective     Concurrent Complaints  Negative for night pain, disturbed sleep, bladder dysfunction, bowel dysfunction and saddle (S4) numbness    Active Range of Motion     Lumbar   Flexion:  with pain Restriction level: minimal  Extension:  with pain Restriction level: minimal  Left lateral flexion:  Restriction level: minimal  Right lateral flexion:  Restriction level: minimal  Left rotation:  Restriction level: minimal  Right rotation:  Restriction level: minimal  Mechanical Assessment    Cervical      Thoracic      Lumbar    Standing flexion: repeated movements   Pain intensity: better  Pain level: decreased  Standing extension: repeated movements  Pain intensity: worse  Pain level: increased    Strength/Myotome Testing     Lumbar   Left   Heel walk: normal  Toe walk: normal    Right   Heel walk: " normal  Toe walk: normal    Left Hip   Planes of Motion   Flexion: 4-  Extension: 4-  Abduction: 4-  Adduction: 4-  External rotation: 4  Internal rotation: 4    Isolated Muscles   Gluteus lorri: 4-  Gluteus medius: 4-    Right Hip   Planes of Motion   Flexion: 4  Extension: 4  Abduction: 4  Adduction: 4  External rotation: 4  Internal rotation: 4    Isolated Muscles   Gluteus maximums: 4-  Gluteus medius: 4-    Left Knee   Flexion: 4-  Extension: 4    Right Knee   Flexion: 4+  Extension: 4+    Left Ankle/Foot   Dorsiflexion: 4+  Plantar flexion: 4+  Great toe extension: 4+    Right Ankle/Foot   Dorsiflexion: 4+  Plantar flexion: 4+  Great toe extension: 4+    Muscle Activation     Additional Muscle Activation Details  Impaired TA activation with performance of PPT  Noting weak activation of gluteal musculature when biased in prone hip extension     Tests     Lumbar   Positive SIJ compression and sacral thrust.     Left   Positive passive SLR.   Negative crossed SLR and slump test.     Right   Negative crossed SLR, passive SLR and slump test.     Left Pelvic Girdle/Sacrum   Positive: thigh thrust.     Right Pelvic Girdle/Sacrum   Negative: thigh thrust.     Left Hip   Positive NATALEE.   Negative FADIR.     Right Hip   Negative NATALEE and FADIR.     Additional Tests Details  Significant muscle tightness with L natalee testing              Precautions:       Re-eval Date:  11/20/24    Date 10/10/2024        Visit Count 1       FOTO 10/10/2024        Pain In See IE       Pain Out See IE           Manuals 10/10/2024                                        Neuro Re-Ed        TA with PPT Reviewed HEP       TA with BKFO        TA with jose alfredo        TA with SLR        Alt UE/LE seated        Palloff press        Multifidus hip hike        Bird dog        Planks                 Ther Ex        nustep        bridges Reviewed HEP       clamshells Reviewed HEP       Piriformis stretch Reviewed HEP       Side steps        Monster  walks        Stability ball rollout        Leg press                Ther Activity         pt education regarding pathophysiology/pathoanatomy of present pain/sx and condition, role of PT in improving pain/sx and function, pt education regarding activity modification to avoid exacerbation of sx and delayed recovery                 Gait Training                        Modalities        MHP prn                      10/10/2024  - HEP was issued and reviewed this date for above noted exercises. Pt demonstrated understanding without incident and without questions/concerns. Will continue to update upcoming.

## 2024-10-15 ENCOUNTER — OFFICE VISIT (OUTPATIENT)
Dept: PHYSICAL THERAPY | Facility: CLINIC | Age: 34
End: 2024-10-15
Payer: COMMERCIAL

## 2024-10-15 DIAGNOSIS — M54.17 RADICULAR PAIN OF LUMBOSACRAL REGION: Primary | ICD-10-CM

## 2024-10-15 PROCEDURE — 97110 THERAPEUTIC EXERCISES: CPT

## 2024-10-15 PROCEDURE — 97112 NEUROMUSCULAR REEDUCATION: CPT

## 2024-10-15 NOTE — PROGRESS NOTES
"Daily Note     Today's date: 10/16/2024  Patient name: Allie Donnelly  : 1990  MRN: [id number]  Referring provider: Edison Michael DO  Dx:   Encounter Diagnosis     ICD-10-CM    1. Radicular pain of lumbosacral region  M54.17           Start Time: 0915  Stop Time: 1000  Total time in clinic (min): 45 minutes    Subjective: Patient reports more pain at her tailbone today which may be attributed to exercises initiated at yesterday's appointment.       Objective: See treatment diary below      Assessment: Patient tolerated treatment session well. Held progressions this visit as an increase in symptoms were present at arrival. Time on Nustep shortened as patient experienced an exacerbation of LBP. Exhibited good technique/form following thorough explanations of exercises. Patient exhibited good static recruitment of TrA although, was very challenged maintaining contraction with dynamic movements. Patient demonstrated fatigue post-tx and would benefit from continued PT to improve level of function.       Plan: Continue per POC. Increase reps/resistance as tolerated.      Precautions:       Re-eval Date:  24    Date 10/10/2024  10/15/24 10/16     Visit Count 1 2 3     FOTO 10/10/2024        Pain In See IE       Pain Out See IE           Manuals 10/10/2024  10/15 10/16                                             Neuro Re-Ed        TA with PPT Reviewed HEP 2x10 5\"  2x10 5\"      TA with BKFO  X10 ea  X10 ea     TA with marches  x20 X20      TA with SLR  X10 ea X10 ea      Alt UE/LE seated        Palloff press        Multifidus hip hike        Bird dog        Planks                 Ther Ex        nustep  L2 10'  L2 8'     bridges Reviewed HEP 2x10 3\"  2x10 3\"      clamshells Reviewed HEP  Red 3-5\" 2x10     Piriformis stretch Reviewed HEP 3x30\" supine  3x30\" supine     Side steps        Monster walks        Stability ball rollout  X10 5\" 3 way X10 5\" 3 way     Leg press          Reviewed of HEP      Ther Activity  "        pt education regarding pathophysiology/pathoanatomy of present pain/sx and condition, role of PT in improving pain/sx and function, pt education regarding activity modification to avoid exacerbation of sx and delayed recovery                 Gait Training                        Modalities        MHP prn                      10/10/2024  - HEP was issued and reviewed this date for above noted exercises. Pt demonstrated understanding without incident and without questions/concerns. Will continue to update upcoming.

## 2024-10-15 NOTE — PROGRESS NOTES
"Daily Note     Today's date: 10/15/2024  Patient name: Allie Donnelly  : 1990  MRN: [id number]  Referring provider: Edison Michael DO  Dx:   Encounter Diagnosis     ICD-10-CM    1. Radicular pain of lumbosacral region  M54.17           Start Time: 0745  Stop Time: 0830  Total time in clinic (min): 45 minutes    Subjective: \"My back feels about the same. I did try the exercises at home\"       Objective: See treatment diary below      Assessment: Tolerated treatment well. Able to complete POC without incident. Requiring frequent verbal and tactile cues for proper technique of there ex and neuro juan. Noting challenge maintaining TA activation with dynamic LE movements. Pt unable to feel appropriate stretch with seated piriformis stretch so completed this supine with appropriate stretch felt. Noting improvement in gluteal activation but gluteal musculature quick to fatigue. Reviewed HEP with pt. Will cont to progress as tolerated. Patient demonstrated fatigue post treatment and would benefit from continued PT      Plan: Continue per plan of care.  Progress treatment as tolerated.       Precautions:       Re-eval Date:  24    Date 10/10/2024  10/15/24      Visit Count 1 2      FOTO 10/10/2024        Pain In See IE       Pain Out See IE           Manuals 10/10/2024  10/15                                      Neuro Re-Ed        TA with PPT Reviewed HEP 2x10 5\"       TA with BKFO  X10 ea       TA with marches  x20      TA with SLR  X10 ea      Alt UE/LE seated        Palloff press        Multifidus hip hike        Bird dog        Planks                 Ther Ex        nustep  L2 10'       bridges Reviewed HEP 2x10 3\"       clamshells Reviewed HEP       Piriformis stretch Reviewed HEP 3x30\" supine       Side steps        Monster walks        Stability ball rollout  X10 5\" 3 way      Leg press          Reviewed of HEP      Ther Activity         pt education regarding pathophysiology/pathoanatomy of present " pain/sx and condition, role of PT in improving pain/sx and function, pt education regarding activity modification to avoid exacerbation of sx and delayed recovery                 Gait Training                        Modalities        MHP prn                      10/10/2024  - HEP was issued and reviewed this date for above noted exercises. Pt demonstrated understanding without incident and without questions/concerns. Will continue to update upcoming.

## 2024-10-16 ENCOUNTER — OFFICE VISIT (OUTPATIENT)
Dept: PHYSICAL THERAPY | Facility: CLINIC | Age: 34
End: 2024-10-16
Payer: COMMERCIAL

## 2024-10-16 DIAGNOSIS — M54.17 RADICULAR PAIN OF LUMBOSACRAL REGION: Primary | ICD-10-CM

## 2024-10-16 PROCEDURE — 97112 NEUROMUSCULAR REEDUCATION: CPT

## 2024-10-16 PROCEDURE — 97110 THERAPEUTIC EXERCISES: CPT

## 2024-10-25 ENCOUNTER — OFFICE VISIT (OUTPATIENT)
Dept: PHYSICAL THERAPY | Facility: CLINIC | Age: 34
End: 2024-10-25
Payer: COMMERCIAL

## 2024-10-25 DIAGNOSIS — M54.17 RADICULAR PAIN OF LUMBOSACRAL REGION: Primary | ICD-10-CM

## 2024-10-25 PROCEDURE — 97110 THERAPEUTIC EXERCISES: CPT

## 2024-10-25 PROCEDURE — 97112 NEUROMUSCULAR REEDUCATION: CPT

## 2024-10-25 NOTE — PROGRESS NOTES
"Daily Note     Today's date: 10/25/2024  Patient name: Allie Donnelly  : 1990  MRN: [id number]  Referring provider: Edison Michael DO  Dx:   Encounter Diagnosis     ICD-10-CM    1. Radicular pain of lumbosacral region  M54.17           Start Time: 0800  Stop Time: 0845  Total time in clinic (min): 45 minutes    Subjective: \"I do feel I am getting better, but I have some pain this morning because I was vacuuming this morning\"      Objective: See treatment diary below      Assessment: Tolerated treatment well. Able to complete POC without incident. Noting improvement in TA activation with dynamic LE movements and improvement in abdominal endurance. Progressed core/spine stabilization today with addition of palloff press which pt tolerated well; did report mild numbness in LLE after which was no longer experienced at end of session. Will cont to progress as tolerated.  Patient demonstrated fatigue post treatment and would benefit from continued PT      Plan: Continue per plan of care.  Progress treatment as tolerated.       Precautions:       Re-eval Date:  24    Date 10/10/2024  10/15/24 10/16 10/25    Visit Count 1 2 3 4    FOTO 10/10/2024        Pain In See IE       Pain Out See IE           Manuals 10/10/2024  10/15 10/16 10/25                                            Neuro Re-Ed        TA with PPT Reviewed HEP 2x10 5\"  2x10 5\"  2x10 5\"    TA with BKFO  X10 ea  X10 ea X10 ea    TA with marches  x20 X20  x20    TA with SLR  X10 ea X10 ea  X15 ea    Alt UE/LE seated        Palloff press    Gtb x15 ea 3\"    Multifidus hip hike        Bird dog        Planks                 Ther Ex        nustep  L2 10'  L2 8' L3 10'     bridges Reviewed HEP 2x10 3\"  2x10 3\"  2x10 3\"    clamshells Reviewed HEP  Red 3-5\" 2x10     Piriformis stretch Reviewed HEP 3x30\" supine  3x30\" supine 3x30\" supine     Side steps    NV    Monster walks        Stability ball rollout  X10 5\" 3 way X10 5\" 3 way     Leg press          " Reviewed of HEP      Ther Activity         pt education regarding pathophysiology/pathoanatomy of present pain/sx and condition, role of PT in improving pain/sx and function, pt education regarding activity modification to avoid exacerbation of sx and delayed recovery                 Gait Training                        Modalities        MHP prn                      10/10/2024  - HEP was issued and reviewed this date for above noted exercises. Pt demonstrated understanding without incident and without questions/concerns. Will continue to update upcoming.

## 2024-10-28 ENCOUNTER — OFFICE VISIT (OUTPATIENT)
Age: 34
End: 2024-10-28
Payer: COMMERCIAL

## 2024-10-28 VITALS
TEMPERATURE: 97.8 F | SYSTOLIC BLOOD PRESSURE: 98 MMHG | DIASTOLIC BLOOD PRESSURE: 68 MMHG | BODY MASS INDEX: 21.09 KG/M2 | OXYGEN SATURATION: 98 % | WEIGHT: 119 LBS | HEIGHT: 63 IN | HEART RATE: 90 BPM

## 2024-10-28 DIAGNOSIS — N18.2 CKD (CHRONIC KIDNEY DISEASE) STAGE 2, GFR 60-89 ML/MIN: Primary | ICD-10-CM

## 2024-10-28 PROCEDURE — 99213 OFFICE O/P EST LOW 20 MIN: CPT

## 2024-10-28 RX ORDER — GABAPENTIN 300 MG/1
CAPSULE ORAL
COMMUNITY
Start: 2024-09-23

## 2024-10-28 RX ORDER — LORAZEPAM 0.5 MG/1
1 TABLET ORAL EVERY 6 HOURS PRN
COMMUNITY
Start: 2024-10-04

## 2024-10-28 RX ORDER — PANTOPRAZOLE SODIUM 40 MG/1
TABLET, DELAYED RELEASE ORAL
COMMUNITY

## 2024-10-28 NOTE — PATIENT INSTRUCTIONS
It was nice seeing you today. Your kidney function is stable. Please follow up with us in 12 months. I have ordered lab work for you to get done before then. In the meantime, please avoid NSAIDs and have a healthy diet and exercise.  Aim for around 3 grams of sodium per day.

## 2024-10-28 NOTE — PROGRESS NOTES
OFFICE FOLLOW UP - Nephrology   Allie Donnelly 34 y.o. female MRN: [id number]         Interim HPI:   Allie Donnelly has a PMH of GERD, CKD and returns today in the renal clinic for the continued management of CKD.   Patient was last seen on October 17, 2023 with Dr. Gallagher and was stable from renal standpoint. Since the last visit, there has been no ER visits or hospitilizations. Patient has no complaints at this time and is feeling well. Patient denies any chest pain, shortness of breath or swelling. The last blood work was done on 9/13/24  which we have reviewed together.        ASSESSMENT and PLAN:  Allie was seen today for follow-up.    Diagnoses and all orders for this visit:    CKD (chronic kidney disease) stage 2, GFR 60-89 ml/min  -     Comprehensive metabolic panel; Future  -     Urinalysis with microscopic; Future        Chronic kidney disease stage II:  Etiology: Patient had previously had etiology of kidney disease attributed to NSAID/PPI/lithium use.  She is now using less NSAIDs has been able to cut down on PPI use.  Baseline creatinine around 1 mg/dL  Current creatinine 1.02 mg/dL  Avoid NSAIDs    Blood pressure  -Blood pressure in the office today is 98/68, patient is asymptomatic at this time. She feels like flomax might be lowering her blood pressure.  Instructed follow-up with PCP if there are other options for her voiding dysfunction.  In the meantime was counseled to consume around 3 g of sodium per day    Acid/Base/Electrolytes:  No abnormalities noted from the standpoint        Patient Instructions   It was nice seeing you today. Your kidney function is stable. Please follow up with us in 12 months. I have ordered lab work for you to get done before then. In the meantime, please avoid NSAIDs and have a healthy diet and exercise.  Aim for around 3 grams of sodium per day.           ROS:   Review of Systems   Constitutional:  Negative for chills and fever.   Respiratory:  Negative for cough and  shortness of breath.    Cardiovascular:  Negative for chest pain and leg swelling.   Gastrointestinal:  Negative for abdominal pain, nausea and vomiting.   Genitourinary:  Negative for dysuria and hematuria.   Neurological:  Negative for dizziness and light-headedness.        Allergies: Gluten meal - food allergy and Latex    Medications:   Current Outpatient Medications:     benztropine (COGENTIN) 1 mg tablet, Take 1 mg by mouth TAKE 1 TABLET IN AM AND 2 TABLETS IN PM, Disp: , Rfl:     brexpiprazole (Rexulti) 0.25 MG tablet, Take 0.25 mg by mouth daily, Disp: , Rfl:     busPIRone (BUSPAR) 15 mg tablet, Take 1 tablet (15 mg total) by mouth 2 (two) times a day, Disp: 180 tablet, Rfl: 1    clobetasol (TEMOVATE) 0.05 % cream, APPLY A THIN LAYER 3 NIGHTS WEEKLY FOR 12 WEEKS TO FLAKY RASH ON BOTH HANDS. AVOID FACE AND BODY SKIN FOLDS, Disp: , Rfl:     famotidine (PEPCID) 40 MG tablet, TAKE 1/2 (ONE-HALF) TABLET BY MOUTH IN THE MORNING, Disp: 45 tablet, Rfl: 0    gabapentin (NEURONTIN) 300 mg capsule, , Disp: , Rfl:     liothyronine (CYTOMEL) 5 mcg tablet, Take 1 tablet by mouth once daily, Disp: 30 tablet, Rfl: 5    LORazepam (ATIVAN) 0.5 mg tablet, Take 1 mg by mouth every 6 (six) hours as needed, Disp: , Rfl:     Rinvoq 15 MG TB24, , Disp: , Rfl:     tamsulosin (FLOMAX) 0.4 mg, TAKE 1 CAPSULE BY MOUTH ONCE DAILY WITH SUPPER, Disp: 90 capsule, Rfl: 1    cyclobenzaprine (FLEXERIL) 10 mg tablet, Take 1 tablet (10 mg total) by mouth 3 (three) times a day as needed for muscle spasms for up to 21 days, Disp: 63 tablet, Rfl: 0    eszopiclone (LUNESTA) 1 mg tablet, Take 1 mg by mouth daily at bedtime, Disp: , Rfl:     gabapentin (NEURONTIN) 800 mg tablet, Take 1 tablet (800 mg total) by mouth daily, Disp: 90 tablet, Rfl: 1    pantoprazole (PROTONIX) 40 mg tablet, Take 1 tablet by mouth twice daily (Patient taking differently: Take 40 mg by mouth daily), Disp: 60 tablet, Rfl: 5    pantoprazole (PROTONIX) 40 mg tablet, , Disp: ,  Rfl:     Past Medical History:   Diagnosis Date    Allergic     Anorexia nervosa     pt denies history at time of admission 7/21/18    Anxiety     Celiac disease     Chronic kidney disease     CPAP (continuous positive airway pressure) dependence     waiting for a new mask    Depression     Disease of thyroid gland     hypo    Gall bladder polyp     Gastroesophageal reflux disease 09/18/2018    GERD (gastroesophageal reflux disease) 9/18/2018    Hallucination     Memory difficulty 07/12/2018    Memory loss 7/12/2018    Plantar wart of left foot     Psychiatric illness     Raynaud's disease without gangrene     Renal atrophy     Schizoaffective disorder (HCC)     Self-injurious behavior     Sleep apnea     Sleep difficulties     Suicide attempt (HCC)     history of attempt at age 18 by overdose     Urinary retention      Past Surgical History:   Procedure Laterality Date    COLON SURGERY  January 12 2021    EYE SURGERY Left     tear duct    LYMPH NODE BIOPSY  January 12 2021    VA ESOPHAGOGASTRODUODENOSCOPY TRANSORAL DIAGNOSTIC N/A 02/09/2018    Procedure: ESOPHAGOGASTRODUODENOSCOPY (EGD);  Surgeon: Umberto Hummel MD;  Location: [facility];  Service: Gastroenterology    VA ESOPHAGOGASTRODUODENOSCOPY TRANSORAL DIAGNOSTIC N/A 11/06/2018    Procedure: ESOPHAGOGASTRODUODENOSCOPY (EGD);  Surgeon: Mike Liu MD;  Location: [facility];  Service: Gastroenterology    VA LAPAROSCOPY PROCTOPEXY PROLAPSE N/A 01/12/2021    Procedure: Rectopexy with sigmoid resection w/ robotics.;  Surgeon: RUT Suarez MD;  Location:  MAIN OR;  Service: Colorectal     Family History   Problem Relation Age of Onset    Hypertension Father         benign essential    Cancer Father     Depression Father     Asthma Father     COPD Father     Prostate cancer Father     Hearing loss Father     Hypertension Brother         benign essential     OCD Brother     Depression Brother     Autoimmune disease Brother     Anxiety disorder Brother      "Psychiatric Illness Brother     Other Family         nasolacrimal duct obstruction, acquired    Breast cancer Mother 60    Cancer Mother     Diabetes Mother     Hypertension Mother     Arthritis Mother     Dementia Maternal Grandmother     Psychiatric Illness Maternal Grandmother     Psychosis Maternal Grandmother     Schizophrenia Maternal Grandmother     No Known Problems Maternal Grandfather     Breast cancer Paternal Grandmother     Cancer Paternal Grandmother     Prostate cancer Paternal Grandfather     No Known Problems Maternal Aunt     No Known Problems Maternal Aunt     Bipolar disorder Paternal Uncle     Psychiatric Illness Paternal Uncle     Colon cancer Neg Hx       reports that she has never smoked. She has never been exposed to tobacco smoke. She has never used smokeless tobacco. She reports that she does not currently use alcohol. She reports that she does not use drugs.      Physical Exam:   Vitals:    10/28/24 1539   BP: 98/68   BP Location: Left arm   Patient Position: Sitting   Cuff Size: Standard   Pulse: 90   Temp: 97.8 °F (36.6 °C)   SpO2: 98%   Weight: 54 kg (119 lb)   Height: 5' 3\" (1.6 m)     Body mass index is 21.08 kg/m².  Physical Exam  Constitutional:       General: She is not in acute distress.  HENT:      Head: Normocephalic and atraumatic.   Cardiovascular:      Rate and Rhythm: Normal rate and regular rhythm.      Pulses: Normal pulses.      Heart sounds: No murmur heard.  Pulmonary:      Effort: Pulmonary effort is normal. No respiratory distress.      Breath sounds: Normal breath sounds.   Abdominal:      General: Bowel sounds are normal.      Palpations: Abdomen is soft.      Tenderness: There is no abdominal tenderness.   Musculoskeletal:      Right lower leg: No edema.      Left lower leg: No edema.   Skin:     General: Skin is warm and dry.   Neurological:      General: No focal deficit present.      Mental Status: She is alert.   Psychiatric:         Mood and Affect: Mood " "normal.         Behavior: Behavior normal.            Lab Results:  Results for orders placed or performed in visit on 09/13/24   CBC and Platelet    Collection Time: 09/13/24  2:14 PM   Result Value Ref Range    WBC 8.50 4.31 - 10.16 Thousand/uL    RBC 4.50 3.81 - 5.12 Million/uL    Hemoglobin 13.6 11.5 - 15.4 g/dL    Hematocrit 41.2 34.8 - 46.1 %    MCV 92 82 - 98 fL    MCH 30.2 26.8 - 34.3 pg    MCHC 33.0 31.4 - 37.4 g/dL    RDW 13.4 11.6 - 15.1 %    Platelets 272 149 - 390 Thousands/uL    MPV 9.5 8.9 - 12.7 fL   Comprehensive metabolic panel    Collection Time: 09/13/24  2:14 PM   Result Value Ref Range    Sodium 140 135 - 147 mmol/L    Potassium 4.1 3.5 - 5.3 mmol/L    Chloride 104 96 - 108 mmol/L    CO2 32 21 - 32 mmol/L    ANION GAP 4 4 - 13 mmol/L    BUN 16 5 - 25 mg/dL    Creatinine 1.02 0.60 - 1.30 mg/dL    Glucose, Fasting 91 65 - 99 mg/dL    Calcium 9.1 8.4 - 10.2 mg/dL    AST 16 13 - 39 U/L    ALT 18 7 - 52 U/L    Alkaline Phosphatase 57 34 - 104 U/L    Total Protein 6.6 6.4 - 8.4 g/dL    Albumin 4.4 3.5 - 5.0 g/dL    Total Bilirubin 0.88 0.20 - 1.00 mg/dL    eGFR 71 ml/min/1.73sq m   Lipid panel    Collection Time: 09/13/24  2:14 PM   Result Value Ref Range    Cholesterol 122 See Comment mg/dL    Triglycerides 35 See Comment mg/dL    HDL, Direct 69 >=50 mg/dL    LDL Calculated 46 0 - 100 mg/dL    Non-HDL-Chol (CHOL-HDL) 53 mg/dl   LDL cholesterol, direct    Collection Time: 09/13/24  2:14 PM   Result Value Ref Range    LDL Direct 46 0 - 100 mg/dl             Invalid input(s): \"ALBUMIN\"        Portions of the record may have been created with voice recognition software. Occasional wrong word or \"sound a like\" substitutions may have occurred due to the inherent limitations of voice recognition software. Read the chart carefully and recognize,    "

## 2024-10-30 ENCOUNTER — OFFICE VISIT (OUTPATIENT)
Dept: PHYSICAL THERAPY | Facility: CLINIC | Age: 34
End: 2024-10-30
Payer: COMMERCIAL

## 2024-10-30 DIAGNOSIS — M54.17 RADICULAR PAIN OF LUMBOSACRAL REGION: Primary | ICD-10-CM

## 2024-10-30 PROCEDURE — 97110 THERAPEUTIC EXERCISES: CPT

## 2024-10-30 PROCEDURE — 97112 NEUROMUSCULAR REEDUCATION: CPT

## 2024-10-30 NOTE — PROGRESS NOTES
"Daily Note     Today's date: 10/30/2024  Patient name: Allei Donnelly  : 1990  MRN: [id number]  Referring provider: Edison Michael DO  Dx:   Encounter Diagnosis     ICD-10-CM    1. Radicular pain of lumbosacral region  M54.17           Start Time: 0800  Stop Time: 0845  Total time in clinic (min): 45 minutes    Subjective: \"I went for a 1 hour walk after work and my back held up well for about 20-30 mins which it would not have before. I do feel that I am more coordinated and stronger. My left side just still feels a little weaker\"       Objective: See treatment diary below      Assessment: Tolerated treatment well. Able to complete POC without incident. Performed neuro juan and there ex focused on LE/hip strengthening and core/spine stabilization/motor control which pt tolerated well. Noting improvement in TA activation with dynamic LE movements; added resistance today to progress this which pt tolerated well but was challenged with. Pt able to achieve appropriate TA activation without cues. Cont to tolerate all progressions well. Noting good upright posture with performance of pallof press. Reporting decrease in sx at end of session. Pt is progressing well to date. Will cont to progress as tolerated.  Patient demonstrated fatigue post treatment and would benefit from continued PT      Plan: Continue per plan of care.  Progress treatment as tolerated.       Precautions:       Re-eval Date:  24    Date 10/10/2024  10/15/24 10/16 10/25 10/30   Visit Count 1 2 3 4 5   FOTO 10/10/2024        Pain In See IE       Pain Out See IE           Manuals 10/10/2024  10/15 10/16 10/25 10/30                                           Neuro Re-Ed        TA with PPT Reviewed HEP 2x10 5\"  2x10 5\"  2x10 5\" 2x10 5\"    TA with BKFO  X10 ea  X10 ea X10 ea X10 ea   TA with marches  x20 X20  x20 X20 1#   TA with SLR  X10 ea X10 ea  X15 ea 2x10 ea 1#   Alt UE/LE seated        Palloff press    Gtb x15 ea 3\" Gtb x15 ea 3\"  " "  Multifidus hip hike     TA add ball squeeze x20 5\"    Bird dog        Planks                 Ther Ex        nustep  L2 10'  L2 8' L3 10'  L3 10'    bridges Reviewed HEP 2x10 3\"  2x10 3\"  2x10 3\" 2x10 3\" with ytb hip abduction   clamshells Reviewed HEP  Red 3-5\" 2x10     Piriformis stretch Reviewed HEP 3x30\" supine  3x30\" supine 3x30\" supine     Side steps    NV    Monster walks        Stability ball rollout  X10 5\" 3 way X10 5\" 3 way     Leg press          Reviewed of HEP      Ther Activity         pt education regarding pathophysiology/pathoanatomy of present pain/sx and condition, role of PT in improving pain/sx and function, pt education regarding activity modification to avoid exacerbation of sx and delayed recovery                 Gait Training                        Modalities        MHP prn                      10/10/2024  - HEP was issued and reviewed this date for above noted exercises. Pt demonstrated understanding without incident and without questions/concerns. Will continue to update upcoming.               "

## 2024-10-31 ENCOUNTER — OFFICE VISIT (OUTPATIENT)
Dept: PHYSICAL THERAPY | Facility: CLINIC | Age: 34
End: 2024-10-31
Payer: COMMERCIAL

## 2024-10-31 DIAGNOSIS — M54.17 RADICULAR PAIN OF LUMBOSACRAL REGION: Primary | ICD-10-CM

## 2024-10-31 PROCEDURE — 97110 THERAPEUTIC EXERCISES: CPT

## 2024-10-31 PROCEDURE — 97112 NEUROMUSCULAR REEDUCATION: CPT

## 2024-10-31 NOTE — PROGRESS NOTES
"Daily Note     Today's date: 10/31/2024  Patient name: Allie Donnelly  : 1990  MRN: [id number]  Referring provider: Edison Michael DO  Dx:   Encounter Diagnosis     ICD-10-CM    1. Radicular pain of lumbosacral region  M54.17           Start Time: 0800  Stop Time: 0845  Total time in clinic (min): 45 minutes    Subjective: \"I feel good today. I did laundry and had to break it up but overall it went well. I felt good after last session\".       Objective: See treatment diary below      Assessment: Tolerated treatment well. Able to complete POC without incident. Performed neuro juan and there ex focused on LE/hip strengthening and core/spine stabilization/motor control which pt tolerated well. Cont to note improvement in TA activation with dynamic LE movements. Able to perform palloff press with double band today which pt tolerated well; did note increased core fatigue due to increased resistance. Pt is progressing well to date. Will cont to progress as tolerated. Patient demonstrated fatigue post treatment and would benefit from continued PT      Plan: Continue per plan of care.  Progress treatment as tolerated.       Precautions:       Re-eval Date:  24    Date 10/31  10/15/24 10/16 10/25 10/30   Visit Count 6 2 3 4 5   FOTO        Pain In        Pain Out            Manuals 10/31/24 10/15 10/16 10/25 10/30                                           Neuro Re-Ed        TA with PPT  2x10 5\"  2x10 5\"  2x10 5\" 2x10 5\"    TA with BKFO X10 ea X10 ea  X10 ea X10 ea X10 ea   TA with marches X20 1# x20 X20  x20 X20 1#   TA with SLR 2x10 ea 1# X10 ea X10 ea  X15 ea 2x10 ea 1#   Alt UE/LE seated        Palloff press Gtb x15 ea 3\"   Gtb x15 ea 3\" Gtb x15 ea 3\"    Multifidus hip hike TA add ball squeeze x20 5\"     TA add ball squeeze x20 5\"    Bird dog        Planks                 Ther Ex        nustep L3 10'  L2 10'  L2 8' L3 10'  L3 10'    bridges 2x10 3\" with ytb hip abduction 2x10 3\"  2x10 3\"  2x10 3\" 2x10 3\" with " "ytb hip abduction   clamshells   Red 3-5\" 2x10     Piriformis stretch 3x30\" supine  3x30\" supine  3x30\" supine 3x30\" supine     Side steps    NV    Monster walks        Stability ball rollout  X10 5\" 3 way X10 5\" 3 way     Leg press          Reviewed of HEP      Ther Activity                        Gait Training                        Modalities        MHP prn                      10/10/2024  - HEP was issued and reviewed this date for above noted exercises. Pt demonstrated understanding without incident and without questions/concerns. Will continue to update upcoming.                 "

## 2024-11-06 ENCOUNTER — OFFICE VISIT (OUTPATIENT)
Dept: PHYSICAL THERAPY | Facility: CLINIC | Age: 34
End: 2024-11-06
Payer: COMMERCIAL

## 2024-11-06 DIAGNOSIS — M54.17 RADICULAR PAIN OF LUMBOSACRAL REGION: Primary | ICD-10-CM

## 2024-11-06 PROCEDURE — 97112 NEUROMUSCULAR REEDUCATION: CPT

## 2024-11-06 PROCEDURE — 97110 THERAPEUTIC EXERCISES: CPT

## 2024-11-06 NOTE — PROGRESS NOTES
"Daily Note     Today's date: 2024  Patient name: Allie Donnelly  : 1990  MRN: [id number]  Referring provider: Edison Michael DO  Dx:   Encounter Diagnosis     ICD-10-CM    1. Radicular pain of lumbosacral region  M54.17           Start Time: 0800  Stop Time: 0845  Total time in clinic (min): 45 minutes    Subjective: \"I think I am doing better and pain is less frequent. I still have some pain when standing in one place long periods but it is getting better. I do still have some numbness\"       Objective: See treatment diary below      Assessment: Tolerated treatment well. Able to complete POC without incident. Noting significant improvement in TA activation and control with dynamic LE movements. Able to maintain PPT well throughout neuro juan. Noting improved upright posture with palloff press demonstrating improving core/spine stability. Improved gluteal activation with performance of bridges. Pt reporting decrease in pain/sx at end of session. Pt is progressing appropriately to date. Will cont to progress as tolerated. Patient demonstrated fatigue post treatment and would benefit from continued PT      Plan: Continue per plan of care.  Progress treatment as tolerated.       Precautions:       Re-eval Date:  24    Date 10/31  11/6 10/16 10/25 10/30   Visit Count 6 7 3 4 5   FOTO        Pain In        Pain Out            Manuals 10/31/24 11/6 10/16 10/25 10/30                                           Neuro Re-Ed        TA with PPT  2x10 5\"  2x10 5\"  2x10 5\" 2x10 5\"    TA with BKFO X10 ea X10 ea  X10 ea X10 ea X10 ea   TA with marches X20 1# X20 1# X20  x20 X20 1#   TA with SLR 2x10 ea 1# X10 ea 1# X10 ea  X15 ea 2x10 ea 1#   Alt UE/LE seated        Palloff press Gtb x15 ea 3\" Gtb double x15 ea 3\"  Gtb x15 ea 3\" Gtb x15 ea 3\"    Multifidus hip hike TA add ball squeeze x20 5\"  TA add ball squeeze x20 5\"    TA add ball squeeze x20 5\"    Bird dog        Planks                 Ther Ex        nustep L3 " "10'  L2 10'  L2 8' L3 10'  L3 10'    bridges 2x10 3\" with ytb hip abduction 2x10 3\" with ytb hip abduction 2x10 3\"  2x10 3\" 2x10 3\" with ytb hip abduction   clamshells   Red 3-5\" 2x10     Piriformis stretch 3x30\" supine   3x30\" supine 3x30\" supine     Side steps    NV    Monster walks        Stability ball rollout   X10 5\" 3 way     Leg press                Ther Activity                        Gait Training                        Modalities        MHP prn                      10/10/2024  - HEP was issued and reviewed this date for above noted exercises. Pt demonstrated understanding without incident and without questions/concerns. Will continue to update upcoming.                   "

## 2024-11-07 ENCOUNTER — OFFICE VISIT (OUTPATIENT)
Dept: PHYSICAL THERAPY | Facility: CLINIC | Age: 34
End: 2024-11-07
Payer: COMMERCIAL

## 2024-11-07 DIAGNOSIS — M54.17 RADICULAR PAIN OF LUMBOSACRAL REGION: Primary | ICD-10-CM

## 2024-11-07 PROCEDURE — 97112 NEUROMUSCULAR REEDUCATION: CPT

## 2024-11-07 PROCEDURE — 97110 THERAPEUTIC EXERCISES: CPT

## 2024-11-07 NOTE — PROGRESS NOTES
"Daily Note     Today's date: 2024  Patient name: Allie Donnelly  : 1990  MRN: [id number]  Referring provider: Edison Michael DO  Dx:   Encounter Diagnosis     ICD-10-CM    1. Radicular pain of lumbosacral region  M54.17           Start Time: 0800  Stop Time: 0845  Total time in clinic (min): 45 minutes    Subjective: \"I feel like I can clean for longer periods now, but still do have pain when vacuuming. I felt good after lasts session\"       Objective: See treatment diary below      Assessment: Tolerated treatment well. Able to complete POC without incident. Noting continued improvement in TA activation with performance of dynamic LE movements. Pt requiring less cues for proper technique and engagement throughout session. Improved upright posture with performance of palloff press with less discomfort reported. Able to progress resistance with PREs today with good tolerance and appropriate mm fatigue. Progressed core stabilization training today with seated alt UE/LE which pt tolerated well; noting impaired abdominal endurance and fatigue. Overall, pt is progressing well to date. Will cont to progress as tolerated.  Patient demonstrated fatigue post treatment and would benefit from continued PT      Plan: Continue per plan of care.  Progress treatment as tolerated.       Precautions:       Re-eval Date:  24    Date 10/31  11/6 11/7 10/25 10/30   Visit Count 6 7 8 4 5   FOTO        Pain In        Pain Out            Manuals 10/31/24 11/6 11/7 10/25 10/30                                           Neuro Re-Ed        TA with PPT  2x10 5\"  2x10 5\"  2x10 5\" 2x10 5\"    TA with BKFO X10 ea X10 ea  X10 ea X10 ea X10 ea   TA with marches X20 1# X20 1# X20 1# x20 X20 1#   TA with SLR 2x10 ea 1# X10 ea 1# X10 ea 1 # X15 ea 2x10 ea 1#   Alt UE/LE seated   Seated x20     Palloff press Gtb x15 ea 3\" Gtb double x15 ea 3\" Gtb double x15 ea 3\" Gtb x15 ea 3\" Gtb x15 ea 3\"    Multifidus hip hike TA add ball squeeze x20 " "5\"  TA add ball squeeze x20 5\"  TA add ball squeeze x20 5\"  TA add ball squeeze x20 5\"  TA add ball squeeze x20 5\"    Bird dog        Planks                 Ther Ex        nustep L3 10'  L2 10'  L2 8' L3 10'  L3 10'    bridges 2x10 3\" with ytb hip abduction 2x10 3\" with ytb hip abduction 2x10 3\" with rtb hip abduction 2x10 3\" 2x10 3\" with ytb hip abduction   clamshells        Piriformis stretch 3x30\" supine   2x30\"  3x30\" supine     Side steps    NV    Monster walks        Stability ball rollout        Leg press                Ther Activity                        Gait Training                        Modalities        MHP prn                      10/10/2024  - HEP was issued and reviewed this date for above noted exercises. Pt demonstrated understanding without incident and without questions/concerns. Will continue to update upcoming.                     "

## 2024-11-08 DIAGNOSIS — E03.9 HYPOTHYROIDISM, UNSPECIFIED TYPE: ICD-10-CM

## 2024-11-08 RX ORDER — LIOTHYRONINE SODIUM 5 UG/1
5 TABLET ORAL DAILY
Qty: 30 TABLET | Refills: 0 | Status: SHIPPED | OUTPATIENT
Start: 2024-11-08

## 2024-11-13 ENCOUNTER — OFFICE VISIT (OUTPATIENT)
Dept: PHYSICAL THERAPY | Facility: CLINIC | Age: 34
End: 2024-11-13
Payer: COMMERCIAL

## 2024-11-13 ENCOUNTER — APPOINTMENT (OUTPATIENT)
Dept: RADIOLOGY | Facility: CLINIC | Age: 34
End: 2024-11-13
Payer: COMMERCIAL

## 2024-11-13 ENCOUNTER — OFFICE VISIT (OUTPATIENT)
Dept: INTERNAL MEDICINE CLINIC | Facility: CLINIC | Age: 34
End: 2024-11-13
Payer: COMMERCIAL

## 2024-11-13 VITALS
TEMPERATURE: 97.8 F | OXYGEN SATURATION: 98 % | WEIGHT: 117.8 LBS | HEIGHT: 63 IN | HEART RATE: 75 BPM | BODY MASS INDEX: 20.87 KG/M2

## 2024-11-13 DIAGNOSIS — M54.17 RADICULAR PAIN OF LUMBOSACRAL REGION: ICD-10-CM

## 2024-11-13 DIAGNOSIS — M48.02 CERVICAL SPINAL STENOSIS: ICD-10-CM

## 2024-11-13 DIAGNOSIS — R53.1 LEFT-SIDED WEAKNESS: ICD-10-CM

## 2024-11-13 DIAGNOSIS — M48.02 CERVICAL SPINAL STENOSIS: Primary | ICD-10-CM

## 2024-11-13 DIAGNOSIS — M54.17 RADICULAR PAIN OF LUMBOSACRAL REGION: Primary | ICD-10-CM

## 2024-11-13 PROCEDURE — 97112 NEUROMUSCULAR REEDUCATION: CPT

## 2024-11-13 PROCEDURE — 97530 THERAPEUTIC ACTIVITIES: CPT

## 2024-11-13 PROCEDURE — 72050 X-RAY EXAM NECK SPINE 4/5VWS: CPT

## 2024-11-13 PROCEDURE — 99214 OFFICE O/P EST MOD 30 MIN: CPT | Performed by: INTERNAL MEDICINE

## 2024-11-13 PROCEDURE — 97110 THERAPEUTIC EXERCISES: CPT

## 2024-11-13 NOTE — PROGRESS NOTES
"Ambulatory Visit  Name: Allie Donnelly      : 1990      MRN: [id number]  Encounter Provider: Edison Michael DO  Encounter Date: 2024   Encounter department: [facility]    Assessment & Plan  Cervical spinal stenosis    Orders:    XR spine cervical complete 4 or 5 vw non injury; Future  Left sided numbness and weakness noted   Reviewed  MRI of the cervical spine  Negative exam of he cervical spine  Left-sided weakness  CT of the head without contrast  Orders:    CT head wo contrast; Future    Radicular pain of lumbosacral region  Persistent numbness of the L3-L4 dermatome.  6 weeks of conservative care  MRI of the Lumbar spine.  Orders:    MRI lumbar spine wo contrast; Future       History of Present Illness     Numbness on the left side of the body with persistent numbness of the left leg.  The patient has engaged in patient guided therapy and has been in PT for several weeks.  She has completed 6 weeks of conservative care and is interested in the next step.          Review of Systems   Constitutional:  Negative for chills, fatigue and fever.   HENT: Negative.     Respiratory:  Negative for cough, chest tightness and shortness of breath.    Cardiovascular:  Negative for chest pain, palpitations and leg swelling.   Gastrointestinal:  Negative for abdominal pain, constipation, diarrhea, nausea and vomiting.   Genitourinary: Negative.    Musculoskeletal:  Positive for back pain. Negative for arthralgias and myalgias.   Skin: Negative.    Neurological:  Positive for numbness.   Psychiatric/Behavioral: Negative.             Objective     Pulse 75   Temp 97.8 °F (36.6 °C)   Ht 5' 3\" (1.6 m)   Wt 53.4 kg (117 lb 12.8 oz)   SpO2 98%   BMI 20.87 kg/m²     Physical Exam  Vitals and nursing note reviewed.   Constitutional:       General: She is not in acute distress.     Appearance: She is well-developed.   HENT:      Head: Normocephalic and atraumatic.   Eyes:      Conjunctiva/sclera: " Conjunctivae normal.   Cardiovascular:      Rate and Rhythm: Normal rate and regular rhythm.      Heart sounds: No murmur heard.  Pulmonary:      Effort: Pulmonary effort is normal. No respiratory distress.      Breath sounds: Normal breath sounds.   Abdominal:      Palpations: Abdomen is soft.      Tenderness: There is no abdominal tenderness.   Musculoskeletal:         General: No swelling.      Cervical back: Neck supple.   Skin:     General: Skin is warm and dry.      Capillary Refill: Capillary refill takes less than 2 seconds.   Neurological:      Mental Status: She is alert.      Sensory: Sensory deficit present.      Motor: No weakness.      Comments: Numbness in the L3-4 dermatome   Psychiatric:         Mood and Affect: Mood normal.

## 2024-11-13 NOTE — PROGRESS NOTES
"Daily Note     Today's date: 2024  Patient name: Allie Donnelly  : 1990  MRN: [id number]  Referring provider: Edison Michael DO  Dx:   Encounter Diagnosis     ICD-10-CM    1. Radicular pain of lumbosacral region  M54.17           Start Time: 0747  Stop Time: 0830  Total time in clinic (min): 43 minutes    Subjective: \"I still have difficulty lifting and cleaning around the house and at work but pain is less severe and frequent. I do still have numbness in my L side occasionally and locking up\"       Objective: See treatment diary below      Assessment: Tolerated treatment well. Able to complete POC without incident. Noting appropriate TA activation with dynamic LE movements today but does require rest breaks due to mm fatigue and decreased abdominal endurance. Added in work specific tasks today including box/bolster lifts and bolster/boxy carry up/down steps which pt tolerated well. Requiring frequent visual and verbal cues for proper squat and lifting mechanics but able to correct with cues. Improvement noted in mechanics after several reps. Discussed with pt to be aware of mechanics when lifting boxes of clothing at work. Pt reporting improvement in symptoms with proper lifting mechanics. No significant increase in pain at end of session or throughout session. Will cont to progress as tolerated. Patient demonstrated fatigue post treatment and would benefit from continued PT      Plan: Continue per plan of care.  Progress treatment as tolerated.       Precautions:       Re-eval Date:  24    Date 10/31  11/6 11/7 11/13 10/30   Visit Count 6 7 8 4 5   FOTO        Pain In        Pain Out            Manuals 10/31/24 11/6 11/7 11/13 10/30                                           Neuro Re-Ed        TA with PPT  2x10 5\"  2x10 5\"   2x10 5\"    TA with BKFO X10 ea X10 ea  X10 ea  X10 ea   TA with marches X20 1# X20 1# X20 1# X30 1# X20 1#   TA with SLR 2x10 ea 1# X10 ea 1# X10 ea 1 # 2x10 1# 2x10 ea 1# " "  Alt UE/LE seated   Seated x20     Palloff press Gtb x15 ea 3\" Gtb double x15 ea 3\" Gtb double x15 ea 3\" bluetb x15 ea 3\" Gtb x15 ea 3\"    Multifidus hip hike TA add ball squeeze x20 5\"  TA add ball squeeze x20 5\"  TA add ball squeeze x20 5\"  TA add ball squeeze x20 5\"  TA add ball squeeze x20 5\"    Bird dog        Planks                 Ther Ex        nustep L3 10'  L2 10'  L2 8' L3 10'  L3 10'    bridges 2x10 3\" with ytb hip abduction 2x10 3\" with ytb hip abduction 2x10 3\" with rtb hip abduction  2x10 3\" with ytb hip abduction   clamshells        Piriformis stretch 3x30\" supine   2x30\"      Side steps        Monster walks        Stability ball rollout        Leg press                Ther Activity            Box lifts 10# 2x10 with cues for proper mechanics      Box lift and carry up steps (holding bolster)  x3             Gait Training                        Modalities        MHP prn                      10/10/2024  - HEP was issued and reviewed this date for above noted exercises. Pt demonstrated understanding without incident and without questions/concerns. Will continue to update upcoming.                       "

## 2024-11-14 ENCOUNTER — RESULTS FOLLOW-UP (OUTPATIENT)
Dept: INTERNAL MEDICINE CLINIC | Facility: CLINIC | Age: 34
End: 2024-11-14

## 2024-11-14 NOTE — PROGRESS NOTES
PT Re-Evaluation     Today's date: 11/15/2024  Patient name: Allie Donnelly  : 1990  MRN: [id number]  Referring provider: Edison Michael DO  Dx:   Encounter Diagnosis     ICD-10-CM    1. Radicular pain of lumbosacral region  M54.17             Start Time: 0845  Stop Time: 0930  Total time in clinic (min): 45 minutes    Assessment  Impairments: abnormal coordination, abnormal or restricted ROM, activity intolerance, impaired physical strength, lacks appropriate home exercise program and pain with function  Symptom irritability: moderate    Assessment details: Allie Donnelly has been consistent and compliant with attending PT sessions and performing HEP. Noting significant improvement in core/spine stabilization and LE strength since SOC. Noting improved TA activation with PPT and with dynamic LE movements. Noting improvement in squats and lifting mechanics with less pain reported with lifting when using proper mechanics. Pt reporting less severe pain overall and less frequent pain with performance of ADLs/IADLs. Pt reporting less severe pain with cleaning around house and work duties. Pt has been tolerating all progressions of PREs well with noted improvements in mm engagement and LE/core strength. Pt does still continue with lower back pain and occasional L sided numbness with lifting, cleaning (specifically vacuuming), and work duties. Gluteal strength, core strength, and LE strength remains impaired overall though improving steadily which continues to impact performance of previously stated activities. Pt will continue to benefit from continued core/spine stabilization training and LE strengthening/stretching to continue to improve overall function and QOL. Pt is progressing well towards achievement of STG/LTG that were established at SOC. Pt will continue to benefit from skilled PT services 1-2x/wk for another 4 weeks to address continued impairments that continue to impact overall function, performance of  "ADLs/IADLs, and mobility. Exam findings were discussed with pt and all questions answered to pt satisfaction.       Understanding of Dx/Px/POC: good     Prognosis: good    Goals  STGs to be achieved in 4 weeks:  1. Pt to demonstrate reduced subjective pain rating \"at worst\" by at least 2-3 points from Initial Eval in order to allow for reduced pain with ADLs and improved functional activity tolerance. MET  2. Pt to report at least 25% improvement in reported symptoms with performance of daily activities to demo improving function. MET  3. Pt to demonstrate increased MMT of B hip in all deficient planes by at least 1/2-1 grade in order to improve safety and stability with ADLs and functional mobility. MET  4. Pt to demonstrate increased MMT of B gluteus lorri and gluteus medius by at least 1/2-1 grade in order to improve safety and stability with ADLs and functional mobility. MET    LTGs to be achieved by discharge:  1. Pt will be I with HEP in order to continue to improve quality of life and independence and reduce risk for re-injury. MET  2. Pt to report at least 75% improvement with performance of duties as volunteer at [employer] to demo improved function.   PROGRESSING  3. Pt to demonstrate improved function as noted by achieving or exceeding predicted score on FOTO outcomes assessment tool.  MET  4. Pt to demonstrate at least 4+-5/5 MMT strength of LE hip and B gluteus lorri and medius to demo improved functional strength.  PROGRESSING      Plan  Patient would benefit from: skilled physical therapy  Planned modality interventions: cryotherapy and thermotherapy: hydrocollator packs    Planned therapy interventions: abdominal trunk stabilization, joint mobilization, IASTM, manual therapy, massage, neuromuscular re-education, patient/caregiver education, strengthening, stretching, therapeutic activities, therapeutic exercise, home exercise program, flexibility, coordination, body mechanics training and " "balance    Frequency: 1-2x week  Duration in weeks: 4  Plan of Care beginning date: 11/15/2024  Plan of Care expiration date: 12/18/2024  Treatment plan discussed with: patient        Subjective Evaluation    History of Present Illness  Mechanism of injury: Pt is presenting to OPPT with chief complaint of  lower back pain and leg pain. Reports that this started about 9 months ago. Reports she initially hurt back but she does not remember how. 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 Reports that pain is located around tailbone and buttocks, and reports radiating pain into both legs L>R. Reports that \"fernández happen all over body\". Reports that doing any activity for a long period will cause pain; laying, sitting, standing, walking. Reports difficulty with cleaning house and performing daily activities. Reports that she has been doing HEP since seeing referring provider. Reports that change in position will help to alleviate pain. Reports she currently does not take any medication and does not ice/heat.   Reports she volunteers at [employer] for work; reports she has to carry a lot of clothes and stand for a long time.   Denies any changes in bowel/bladder, saddles paresthesia. Reports that legs do not feel as though they are progressively weaker. Reports she does have numbness occasionally in both legs; L>R. Reports that she does have occasional tingling as well. Reports muscle spasms as well.     Narrative & Impression  XR SPINE LUMBAR MINIMUM 4 VIEWS NON INJURY     INDICATION: M54.17: Radiculopathy, lumbosacral region. Lower back and buttock pain since doing XS lifting a few months ago.     COMPARISON: 5/25/2016     FINDINGS:     6 nonrib-bearing vertebral bodies. 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     Bones are intact.' Unchanged limbus vertebrae.     Alignment is preserved.     Normal disc heights.     No lytic or blastic lesions.     Soft " "tissues are within normal limits.  Visualized lungs are clear.     IMPRESSION:  No acute osseous abnormality.     Developmental transitional vertebral segmentation.           Computerized Assisted Algorithm (CAA) may have been used to analyze all applicable images.        Workstation performed: [id number]    11/15/24: Pt reports symptoms are less severe since SOC. Reports pain intensity is decreased and frequency of pain is decreased as well. Reports that she feels strength is improving as well and reports she can perform ADLs/IADLs with less pain/symptoms/ Reports she does continue with difficulty vacuuming and lifting at work due to pain, and does continue with occasional numbness into L side. Reports overall symptoms are steadily improving. Reports she does continue with pain but when she does experience this, the pain is not as severe.           Recurrent probem    Quality of life: fair    Patient Goals  Patient goals for therapy: decreased pain, increased motion, increased strength, independence with ADLs/IADLs and return to sport/leisure activities  Patient goal: decrease pain PARTIALLY MET  Pain  Current pain rating: 3  At best pain ratin  At worst pain ratin  Location: lower back; B buttock; B leg  Quality: radiating, sharp and dull ache (\"fernández\")  Relieving factors: change in position  Aggravating factors: standing, walking, sitting, stair climbing and lifting  Progression: improved    Social Support  Stairs in house: yes   Lives in: multiple-level home  Lives with: parents    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[employer])  Exercise history: has been performing HEP      Diagnostic Tests  X-ray: abnormal  Treatments  Current treatment: physical therapy        Objective     Concurrent Complaints  Negative for night pain, disturbed sleep, bladder dysfunction, bowel dysfunction and saddle (S4) numbness    Active Range of Motion     Lumbar   Flexion:  Restriction level: minimal  Extension:  " "Restriction level: minimal  Left lateral flexion:  Restriction level: minimal  Right lateral flexion:  Restriction level: minimal  Left rotation:  Restriction level: minimal  Right rotation:  Restriction level: minimal    Additional Active Range of Motion Details  Reporting decrease in pain with AROM of lumbar spine     Strength/Myotome Testing     Lumbar   Left   Heel walk: normal  Toe walk: normal    Right   Heel walk: normal  Toe walk: normal    Left Hip   Planes of Motion   Flexion: 4  Extension: 4  Abduction: 4  Adduction: 4  External rotation: 4  Internal rotation: 4+    Isolated Muscles   Gluteus lorri: 4  Gluteus medius: 4    Right Hip   Planes of Motion   Flexion: 4+  Extension: 4+  Abduction: 4+  Adduction: 4+  External rotation: 4+  Internal rotation: 4+    Isolated Muscles   Gluteus maximums: 4  Gluteus medius: 4    Left Knee   Flexion: 4+  Extension: 4+    Right Knee   Flexion: 4+  Extension: 4+    Left Ankle/Foot   Dorsiflexion: 4+  Plantar flexion: 4+  Great toe extension: 4+    Right Ankle/Foot   Dorsiflexion: 4+  Plantar flexion: 4+  Great toe extension: 4+    Muscle Activation     Additional Muscle Activation Details  Improved TA activation with performance of PPT; improving activation with dynamic LE movements demonstrating improving core/spine stability  Improving activation of gluteal musculature when biased in prone hip extension; noting improving strength     Tests     Additional Tests Details  Improvement noted in LE soft tissue tightness              Precautions:         Re-eval Date:  12/15/24     Date 10/31  11/6 11/7 11/13 11/15   Visit Count 6 7 8 9 10   FOTO             Pain In             Pain Out                   Manuals 10/31/24 11/6 11/7 11/13 11/15                                                                         Neuro Re-Ed             TA with PPT   2x10 5\"  2x10 5\"       TA with BKFO X10 ea X10 ea  X10 ea   X10 ea   TA with marches X20 1# X20 1# X20 1# X30 1# X30 1.5# " "  TA with SLR 2x10 ea 1# X10 ea 1# X10 ea 1 # 2x10 1# 3x10 ea 1.5#   Alt UE/LE seated     Seated x20    seated x20   Palloff press Gtb x15 ea 3\" Gtb double x15 ea 3\" Gtb double x15 ea 3\" bluetb x15 ea 3\" bluetb x15 ea 3\"   Multifidus hip hike TA add ball squeeze x20 5\"  TA add ball squeeze x20 5\"  TA add ball squeeze x20 5\"  TA add ball squeeze x20 5\"  TA add ball squeeze x20 5\"    Bird dog             Planks                            Ther Ex             nustep L3 10'  L2 10'  L2 8' L3 10'  L3 10'    bridges 2x10 3\" with ytb hip abduction 2x10 3\" with ytb hip abduction 2x10 3\" with rtb hip abduction      clamshells             Piriformis stretch 3x30\" supine    2x30\"        Side steps             Monster walks             Stability ball rollout             Leg press                           Ther Activity                     Box lifts 10# 2x10 with cues for proper mechanics        Box lift and carry up steps (holding bolster)  x3                    Gait Training                                         Modalities             MHP prn                                   "

## 2024-11-15 ENCOUNTER — EVALUATION (OUTPATIENT)
Dept: PHYSICAL THERAPY | Facility: CLINIC | Age: 34
End: 2024-11-15
Payer: COMMERCIAL

## 2024-11-15 DIAGNOSIS — M54.17 RADICULAR PAIN OF LUMBOSACRAL REGION: Primary | ICD-10-CM

## 2024-11-15 PROCEDURE — 97112 NEUROMUSCULAR REEDUCATION: CPT

## 2024-11-15 PROCEDURE — 97110 THERAPEUTIC EXERCISES: CPT

## 2024-11-20 ENCOUNTER — OFFICE VISIT (OUTPATIENT)
Dept: PHYSICAL THERAPY | Facility: CLINIC | Age: 34
End: 2024-11-20
Payer: COMMERCIAL

## 2024-11-20 DIAGNOSIS — M54.17 RADICULAR PAIN OF LUMBOSACRAL REGION: Primary | ICD-10-CM

## 2024-11-20 PROCEDURE — 97110 THERAPEUTIC EXERCISES: CPT

## 2024-11-20 PROCEDURE — 97112 NEUROMUSCULAR REEDUCATION: CPT

## 2024-11-20 NOTE — PROGRESS NOTES
"Daily Note     Today's date: 2024  Patient name: Allie Donnelly  : 1990  MRN: [id number]  Referring provider: Edison Michael DO  Dx:   Encounter Diagnosis     ICD-10-CM    1. Radicular pain of lumbosacral region  M54.17           Start Time: 0815  Stop Time: 0900  Total time in clinic (min): 45 minutes    Subjective: \"I do not have any pain but my L side is numb. I have been doing okay though\"       Objective: See treatment diary below      Assessment: Tolerated treatment well. Able to complete POC without incident. Noting significant improvement in lifting/squat mechanics this session with less verbal/visual cues required for proper technique. Progressed weight with box lifts today which pt tolerated well and did not have any increase in lower back pain/numbness. Cont to improve TA engagement with dynamic LE movements demo improving core/spine stabilization. No increase in pain at end of session with appropriate mm fatigue noted. Will cont to progress as tolerated. Patient demonstrated fatigue post treatment and would benefit from continued PT      Plan: Continue per plan of care.  Progress treatment as tolerated.       Precautions:         Re-eval Date:  12/15/24     Date 11/20 11/6 11/7 11/13 11/15   Visit Count 11 7 8 9 10   FOTO             Pain In             Pain Out                   Manuals 11/20 11/6 11/7 11/13 11/15                                                                         Neuro Re-Ed             TA with PPT   2x10 5\"  2x10 5\"       TA with BKFO X10 ea X10 ea  X10 ea   X10 ea   TA with marches X30 1.5# X20 1# X20 1# X30 1# X30 1.5#   TA with SLR 3x10 ea 1.5# X10 ea 1# X10 ea 1 # 2x10 1# 3x10 ea 1.5#   Alt UE/LE seated     Seated x20    seated x20   Palloff press  Gtb double x15 ea 3\" Gtb double x15 ea 3\" bluetb x15 ea 3\" bluetb x15 ea 3\"   Multifidus hip hike TA add ball squeeze x20 5\"  TA add ball squeeze x20 5\"  TA add ball squeeze x20 5\"  TA add ball squeeze x20 5\"  TA add " "ball squeeze x20 5\"    Bird dog             Planks                            Ther Ex             nustep L3 10'  L2 10'  L2 8' L3 10'  L3 10'    bridges 2x10 3\" with rtb hip abduction 2x10 3\" with ytb hip abduction 2x10 3\" with rtb hip abduction      clamshells             Piriformis stretch    2x30\"        Side steps             Monster walks             Stability ball rollout             Leg press                           Ther Activity                Box lifts 20# x10 with cues for proper mechanics        Box lift and step ups 2x10 20# with cues     Box lifts 10# 2x10 with cues for proper mechanics        Box lift and carry up steps (holding bolster)  x3                    Gait Training                                         Modalities             MHP prn                                   "

## 2024-11-21 ENCOUNTER — TELEPHONE (OUTPATIENT)
Dept: INTERNAL MEDICINE CLINIC | Facility: CLINIC | Age: 34
End: 2024-11-21

## 2024-11-21 NOTE — TELEPHONE ENCOUNTER
Call received from Karen at the prior auth department and states patient's CT was not approved with diagnosis codes, questioning if you would like to adjust diagnosis codes or do a peer to peer? Please advise.

## 2024-11-22 ENCOUNTER — OFFICE VISIT (OUTPATIENT)
Dept: PHYSICAL THERAPY | Facility: CLINIC | Age: 34
End: 2024-11-22
Payer: COMMERCIAL

## 2024-11-22 DIAGNOSIS — M54.17 RADICULAR PAIN OF LUMBOSACRAL REGION: Primary | ICD-10-CM

## 2024-11-22 PROCEDURE — 97110 THERAPEUTIC EXERCISES: CPT

## 2024-11-22 PROCEDURE — 97530 THERAPEUTIC ACTIVITIES: CPT

## 2024-11-22 PROCEDURE — 97112 NEUROMUSCULAR REEDUCATION: CPT

## 2024-11-22 NOTE — PROGRESS NOTES
"Daily Note     Today's date: 2024  Patient name: Allie Donnelly  : 1990  MRN: [id number]  Referring provider: Edison Michael DO  Dx:   Encounter Diagnosis     ICD-10-CM    1. Radicular pain of lumbosacral region  M54.17           Start Time: 0830  Stop Time: 0930  Total time in clinic (min): 60 minutes    Subjective: \"I did lift small boxes yesterday and it went well my back felt okay, and I vacuumed for about 1 hour and my back did hurt but not as bad as it has previously\"       Objective: See treatment diary below      Assessment: Tolerated treatment well. Able to complete POC without incident. Noting improvement in TA activation with dynamic LE movements; progressed core stabilization training today with good tolerance but did require cues for proper mm engagement and technique. Progressed gluteal strengthening today for improvement of pelvic/spine stabilization which pt tolerated well with appropriate mm fatigue noted. Noting significant improvement in lifting/squat mechanics with less cues required for proper performance.e Will cont to progress as tolerated. Patient demonstrated fatigue post treatment and would benefit from continued PT      Plan: Continue per plan of care.  Progress treatment as tolerated.       Precautions:         Re-eval Date:  12/15/24     Date 11/20 11/22 11/7 11/13 11/15   Visit Count 11 12 8 9 10   FOTO             Pain In             Pain Out                   Manuals 11/20 11/22 11/7 11/13 11/15                                                                         Neuro Re-Ed             TA with PPT    2x10 5\"       TA with BKFO X10 ea X15 ea  X10 ea   X10 ea   TA with marches X30 1.5# X30 2# X20 1# X30 1# X30 1.5#   TA with SLR 3x10 ea 1.5# X10 ea 2# X10 ea 1 # 2x10 1# 3x10 ea 1.5#   Alt UE/LE seated     Seated x20    seated x20   Palloff press  Half knee palloff press with rotation x10 ea single gtb Gtb double x15 ea 3\" bluetb x15 ea 3\" bluetb x15 ea 3\"   Multifidus " "hip hike TA add ball squeeze x20 5\"  TA add ball squeeze x30 5\"  TA add ball squeeze x20 5\"  TA add ball squeeze x20 5\"  TA add ball squeeze x20 5\"    Bird dog    dead bug with cues 2x15         Planks                            Ther Ex             nustep L3 10'  L2 10'  L2 8' L3 10'  L3 10'    bridges 2x10 3\" with rtb hip abduction 2x10 3\" with rtb hip abduction 2x10 3\" with rtb hip abduction      clamshells             Piriformis stretch    2x30\"        Side steps    ytb x5 laps          Monster walks             Stability ball rollout             Leg press                           Ther Activity                Box lifts 20# x10 with cues for proper mechanics        Box lift and step ups 2x10 20# with cues  Box lifts 20# x10 with cues for proper mechanics        Box lift and ascending/descending stairs (4 steps) x10 20# with cues   Box lifts 10# 2x10 with cues for proper mechanics        Box lift and carry up steps (holding bolster)  x3                    Gait Training                                         Modalities             MHP prn                                     "

## 2024-11-26 ENCOUNTER — OFFICE VISIT (OUTPATIENT)
Dept: PHYSICAL THERAPY | Facility: CLINIC | Age: 34
End: 2024-11-26
Payer: COMMERCIAL

## 2024-11-26 DIAGNOSIS — M54.17 RADICULAR PAIN OF LUMBOSACRAL REGION: Primary | ICD-10-CM

## 2024-11-26 PROCEDURE — 97110 THERAPEUTIC EXERCISES: CPT

## 2024-11-26 PROCEDURE — 97112 NEUROMUSCULAR REEDUCATION: CPT

## 2024-11-26 NOTE — PROGRESS NOTES
"Daily Note     Today's date: 2024  Patient name: Allie Donnelly  : 1990  MRN: [id number]  Referring provider: Edison Michael DO  Dx:   Encounter Diagnosis     ICD-10-CM    1. Radicular pain of lumbosacral region  M54.17           Start Time: 0715  Stop Time: 0800  Total time in clinic (min): 45 minutes    Subjective: \"I did work on my lifting this weekend and I think it has been going better, my back has been feeling better\"       Objective: See treatment diary below      Assessment: Tolerated treatment well. Able to complete POC without incident. Noting improvement in TA engagement and core stabilization each session; noting no increase in lower back pain with core stabilization training. Noting improved technique with all neuro juan and there ex. Improved abdominal endurance with performance of neuro juan. Able to progress PREs today with good tolerance and appropriate muscle fatigue. Pt is progressing appropriately to date.  Will cont to progress as tolerated. Patient demonstrated fatigue post treatment and would benefit from continued PT      Plan: Continue per plan of care.  Progress treatment as tolerated.       Precautions:         Re-eval Date:  12/15/24     Date 11/20 11/22 11/26 11/13 11/15   Visit Count 11 12 13 9 10   FOTO             Pain In             Pain Out                   Manuals 11/20 11/22 11/26 11/13 11/15                                                                         Neuro Re-Ed             TA with PPT          TA with BKFO X10 ea X15 ea     X10 ea   TA with marches X30 1.5# X30 2# X30 2# X30 1# X30 1.5#   TA with SLR 3x10 ea 1.5# X10 ea 2# X10 ea 2 # 2x10 1# 3x10 ea 1.5#   Alt UE/LE seated         seated x20   Palloff press  Half knee palloff press with rotation x10 ea single gtb Half knee palloff press with rotation x10 ea single gtb bluetb x15 ea 3\" bluetb x15 ea 3\"   Multifidus hip hike TA add ball squeeze x20 5\"  TA add ball squeeze x30 5\"   TA add ball squeeze x20 " "5\"  TA add ball squeeze x20 5\"    Bird dog    dead bug with cues 2x15  dead bug with cues 2x20       Planks                            Ther Ex             nustep L3 10'  L2 10'  L2 10' L3 10'  L3 10'    bridges 2x10 3\" with rtb hip abduction 2x10 3\" with rtb hip abduction 2x10 3\" with gtb hip abduction      clamshells             Piriformis stretch           Side steps    ytb x5 laps   rtb side steps x3 laps      Rtb monster walks x3 laps       Monster walks             Stability ball rollout             Leg press                           Ther Activity                Box lifts 20# x10 with cues for proper mechanics        Box lift and step ups 2x10 20# with cues  Box lifts 20# x10 with cues for proper mechanics        Box lift and ascending/descending stairs (4 steps) x10 20# with cues   Box lifts 10# 2x10 with cues for proper mechanics        Box lift and carry up steps (holding bolster)  x3                    Gait Training                                         Modalities             MHP prn                                       "

## 2024-11-27 ENCOUNTER — OFFICE VISIT (OUTPATIENT)
Dept: PHYSICAL THERAPY | Facility: CLINIC | Age: 34
End: 2024-11-27
Payer: COMMERCIAL

## 2024-11-27 DIAGNOSIS — M54.17 RADICULAR PAIN OF LUMBOSACRAL REGION: Primary | ICD-10-CM

## 2024-11-27 PROCEDURE — 97112 NEUROMUSCULAR REEDUCATION: CPT

## 2024-11-27 PROCEDURE — 97110 THERAPEUTIC EXERCISES: CPT

## 2024-11-27 PROCEDURE — 97530 THERAPEUTIC ACTIVITIES: CPT

## 2024-11-27 NOTE — PROGRESS NOTES
"Daily Note     Today's date: 2024  Patient name: Allie Donnelly  : 1990  MRN: [id number]  Referring provider: Edison Michael DO  Dx:   Encounter Diagnosis     ICD-10-CM    1. Radicular pain of lumbosacral region  M54.17           Start Time: 916  Stop Time: 1000  Total time in clinic (min): 44 minutes    Subjective: \"I feel pretty good today, I did climb a ladder yesterday and had some numbness but my pain has been better\"       Objective: See treatment diary below      Assessment: Tolerated treatment well. Able to complete POC without incident. Noting improved performance of progressions in core stabilization; improvement in abdominal endurance and activation. Requiring less cues for proper technique with performance of interventions. Significant improvement in squat/lifting mechanics without need for verbal/visual cues now. No increase in low back pain throughout session or at end of session. Will cont to progress as tolerated.  Patient demonstrated fatigue post treatment and would benefit from continued PT      Plan: Continue per plan of care.  Progress treatment as tolerated.       Precautions:         Re-eval Date:  12/15/24     Date 11/20 11/22 11/26 11/27 11/15   Visit Count 11 12 13 14 10   FOTO             Pain In             Pain Out                   Manuals 11/20 11/22 11/26 11/27 11/15                                                                         Neuro Re-Ed             TA with PPT          TA with BKFO X10 ea X15 ea     X10 ea   TA with marches X30 1.5# X30 2# X30 2# X30 2# X30 1.5#   TA with SLR 3x10 ea 1.5# X10 ea 2# X10 ea 2 # 2x10 2# 3x10 ea 1.5#   Alt UE/LE seated         seated x20   Palloff press  Half knee palloff press with rotation x10 ea single gtb Half knee palloff press with rotation x10 ea single gtb Half knee palloff press with rotation x10 ea single gtb bluetb x15 ea 3\"   Multifidus hip hike TA add ball squeeze x20 5\"  TA add ball squeeze x30 5\"    TA add ball " "squeeze x20 5\"    Bird dog    dead bug with cues 2x15  dead bug with cues 2x20  dead bug with cues 2x20     Planks                            Ther Ex             nustep L3 10'  L2 10'  L2 10' L3 10'  L3 10'    bridges 2x10 3\" with rtb hip abduction 2x10 3\" with rtb hip abduction 2x10 3\" with gtb hip abduction  2x10 3\" with gtb hip abduction    clamshells             Piriformis stretch           Side steps    ytb x5 laps   rtb side steps x3 laps      Rtb monster walks x3 laps  gtb side steps x3 laps      gtb monster walks x3 laps     Monster walks             Stability ball rollout             Leg press                           Ther Activity                Box lifts 20# x10 with cues for proper mechanics        Box lift and step ups 2x10 20# with cues  Box lifts 20# x10 with cues for proper mechanics        Box lift and ascending/descending stairs (4 steps) x10 20# with cues   Box lifts 20# x10 with cues for proper mechanics                   Gait Training                                         Modalities             MHP prn                                         "

## 2024-12-02 NOTE — PROGRESS NOTES
Name: Allie Donnelly      : 1990      MRN: [id number]  Encounter Provider: Torie Parson CNM  Encounter Date: 12/3/2024   Encounter department: [facility] [place]  :  Assessment & Plan  Pelvic pain  Based on description of symptoms, consider nerve and pelvic floor tightness as possible cause of symptoms  Curies states that she will review previous PT exercises  Discussed possible need for relaxation exercises of specific pelvic muscles, if there is spasm  May benefit from repeat eval through physical therapy  Orders:    US pelvis transabdominal only; Future    Liquid-based pap, screening    Urine culture    Urinalysis with microscopic    Screening for cervical cancer    Orders:    Liquid-based pap, screening        History of Present Illness     Shannon presents with complaints of pelvic floor/perineal pain. 2024 LMP. Menses is monthly, flow moderate and lasting longer recently 6 days and the color of last one was lighter. Menses is usually lasting 8-9 days. No hx of hormonal use to control menstrual cycle. Uses pads and tampons with menses. Last pap smear - unknown date. History of abnormal pap smear- no. Sexually active- never. HPV vaccine - unknown.  No routine exercise per week. 3 plus servings of caffeine per day. Pain is not worse with period and notes that it feels like electrical shocks. Decreased dose of gabapentin in September. Feels that pain is worse if sitting to long or standing to long. Notes that it feels like an electrical shock. Notes some tightness at vaginal opening. No change in hygiene products- unscented soap. Notes that she had rectal prolapse surgery- states that she went to PT for urinary support and bowel. Notes PT in .  States that she has frequency and feels that she has to go soon after she has gone. Voids at least once during the night. Notes that symptoms have been for present since at least August. Minimal vinegar, minimal carbonation. Increased  coffee intake, minimal citrus, no alcohol, no irritating fruits.       Allie Donnelly is a 34 y.o. female who presents with perineal/vaginal pain  History obtained from: patient    Review of Systems   Gastrointestinal:  Positive for constipation. Negative for diarrhea.   Genitourinary:  Positive for difficulty urinating, dysuria, frequency, menstrual problem, pelvic pain and vaginal pain. Negative for decreased urine volume, urgency, vaginal bleeding and vaginal discharge.     Medical History Reviewed by provider this encounter:     .  Current Outpatient Medications on File Prior to Visit   Medication Sig Dispense Refill    benztropine (COGENTIN) 1 mg tablet Take 1 mg by mouth TAKE 1 TABLET IN AM AND 2 TABLETS IN PM      brexpiprazole (Rexulti) 0.25 MG tablet Take 0.25 mg by mouth daily      busPIRone (BUSPAR) 15 mg tablet Take 1 tablet (15 mg total) by mouth 2 (two) times a day 180 tablet 1    clobetasol (TEMOVATE) 0.05 % cream APPLY A THIN LAYER 3 NIGHTS WEEKLY FOR 12 WEEKS TO FLAKY RASH ON BOTH HANDS. AVOID FACE AND BODY SKIN FOLDS      famotidine (PEPCID) 40 MG tablet TAKE 1/2 (ONE-HALF) TABLET BY MOUTH IN THE MORNING 45 tablet 0    gabapentin (NEURONTIN) 300 mg capsule       liothyronine (CYTOMEL) 5 mcg tablet Take 1 tablet by mouth once daily 30 tablet 0    LORazepam (ATIVAN) 0.5 mg tablet Take 1 mg by mouth every 6 (six) hours as needed      pantoprazole (PROTONIX) 40 mg tablet  (Patient taking differently: Take 40 mg by mouth daily)      Rinvoq 15 MG TB24       tamsulosin (FLOMAX) 0.4 mg TAKE 1 CAPSULE BY MOUTH ONCE DAILY WITH SUPPER 90 capsule 1    cyclobenzaprine (FLEXERIL) 10 mg tablet Take 1 tablet (10 mg total) by mouth 3 (three) times a day as needed for muscle spasms for up to 21 days 63 tablet 0    eszopiclone (LUNESTA) 1 mg tablet Take 1 mg by mouth daily at bedtime (Patient not taking: Reported on 12/3/2024)      [DISCONTINUED] gabapentin (NEURONTIN) 800 mg tablet Take 1 tablet (800 mg total) by  "mouth daily (Patient not taking: Reported on 12/3/2024) 90 tablet 1    [DISCONTINUED] oxybutynin (DITROPAN-XL) 10 MG 24 hr tablet Take 1 tablet (10 mg total) by mouth daily at bedtime 30 tablet 4    [DISCONTINUED] pantoprazole (PROTONIX) 40 mg tablet Take 1 tablet by mouth twice daily (Patient not taking: Reported on 12/3/2024) 60 tablet 5     No current facility-administered medications on file prior to visit.      Social History     Tobacco Use    Smoking status: Never     Passive exposure: Never    Smokeless tobacco: Never   Vaping Use    Vaping status: Never Used   Substance and Sexual Activity    Alcohol use: Not Currently    Drug use: No    Sexual activity: Not Currently        Objective   /70   Ht 5' 3\" (1.6 m)   Wt 54.2 kg (119 lb 6.4 oz)   LMP 11/07/2024 (Exact Date)   BMI 21.15 kg/m²      Physical Exam  Vitals reviewed.   Pulmonary:      Effort: Pulmonary effort is normal.   Genitourinary:     General: Normal vulva.      Exam position: Lithotomy position.      Labia:         Right: No rash, tenderness or lesion.         Left: No rash, tenderness or lesion.       Urethra: No urethral pain, urethral swelling or urethral lesion.      Vagina: No vaginal discharge, erythema, tenderness, bleeding or lesions.      Cervix: No cervical motion tenderness, discharge, friability, lesion or erythema.      Uterus: Normal. Not enlarged and not tender.       Adnexa:         Right: Tenderness and fullness present. No mass.          Left: Tenderness present. No mass or fullness.     Neurological:      Mental Status: She is alert and oriented to person, place, and time.   Psychiatric:         Mood and Affect: Mood normal.         Behavior: Behavior normal.           "

## 2024-12-03 ENCOUNTER — OFFICE VISIT (OUTPATIENT)
Dept: OBGYN CLINIC | Facility: CLINIC | Age: 34
End: 2024-12-03
Payer: COMMERCIAL

## 2024-12-03 VITALS
DIASTOLIC BLOOD PRESSURE: 70 MMHG | WEIGHT: 119.4 LBS | HEIGHT: 63 IN | BODY MASS INDEX: 21.16 KG/M2 | SYSTOLIC BLOOD PRESSURE: 108 MMHG

## 2024-12-03 DIAGNOSIS — R10.2 PELVIC PAIN: Primary | ICD-10-CM

## 2024-12-03 DIAGNOSIS — Z12.4 SCREENING FOR CERVICAL CANCER: ICD-10-CM

## 2024-12-03 PROCEDURE — 87086 URINE CULTURE/COLONY COUNT: CPT | Performed by: ADVANCED PRACTICE MIDWIFE

## 2024-12-03 PROCEDURE — G0476 HPV COMBO ASSAY CA SCREEN: HCPCS | Performed by: ADVANCED PRACTICE MIDWIFE

## 2024-12-03 PROCEDURE — 81001 URINALYSIS AUTO W/SCOPE: CPT | Performed by: ADVANCED PRACTICE MIDWIFE

## 2024-12-03 PROCEDURE — 99203 OFFICE O/P NEW LOW 30 MIN: CPT | Performed by: ADVANCED PRACTICE MIDWIFE

## 2024-12-03 PROCEDURE — G0145 SCR C/V CYTO,THINLAYER,RESCR: HCPCS | Performed by: ADVANCED PRACTICE MIDWIFE

## 2024-12-04 ENCOUNTER — OFFICE VISIT (OUTPATIENT)
Dept: PHYSICAL THERAPY | Facility: CLINIC | Age: 34
End: 2024-12-04
Payer: COMMERCIAL

## 2024-12-04 ENCOUNTER — RESULTS FOLLOW-UP (OUTPATIENT)
Dept: OBGYN CLINIC | Facility: CLINIC | Age: 34
End: 2024-12-04

## 2024-12-04 DIAGNOSIS — M54.17 RADICULAR PAIN OF LUMBOSACRAL REGION: Primary | ICD-10-CM

## 2024-12-04 LAB
BACTERIA UR QL AUTO: NORMAL /HPF
BILIRUB UR QL STRIP: NEGATIVE
CLARITY UR: CLEAR
COLOR UR: NORMAL
GLUCOSE UR STRIP-MCNC: NEGATIVE MG/DL
HGB UR QL STRIP.AUTO: NEGATIVE
HPV HR 12 DNA CVX QL NAA+PROBE: NEGATIVE
HPV16 DNA CVX QL NAA+PROBE: NEGATIVE
HPV18 DNA CVX QL NAA+PROBE: NEGATIVE
KETONES UR STRIP-MCNC: NEGATIVE MG/DL
LEUKOCYTE ESTERASE UR QL STRIP: NEGATIVE
NITRITE UR QL STRIP: NEGATIVE
NON-SQ EPI CELLS URNS QL MICRO: NORMAL /HPF
PH UR STRIP.AUTO: 6 [PH]
PROT UR STRIP-MCNC: NEGATIVE MG/DL
RBC #/AREA URNS AUTO: NORMAL /HPF
SP GR UR STRIP.AUTO: 1.02 (ref 1–1.03)
UROBILINOGEN UR STRIP-ACNC: <2 MG/DL
WBC #/AREA URNS AUTO: NORMAL /HPF

## 2024-12-04 PROCEDURE — 97112 NEUROMUSCULAR REEDUCATION: CPT

## 2024-12-04 PROCEDURE — 97530 THERAPEUTIC ACTIVITIES: CPT

## 2024-12-04 PROCEDURE — 97110 THERAPEUTIC EXERCISES: CPT

## 2024-12-04 NOTE — PROGRESS NOTES
"Daily Note     Today's date: 2024  Patient name: Allie Donnelly  : 1990  MRN: [id number]  Referring provider: Edison Michael DO  Dx:   Encounter Diagnosis     ICD-10-CM    1. Radicular pain of lumbosacral region  M54.17           Start Time: 744  Stop Time: 0830  Total time in clinic (min): 46 minutes    Subjective: \"My back has been feeling okay, lifting has been going better\"       Objective: See treatment diary below      Assessment: Tolerated treatment well. Able to complete POC without incident. Noting significant improvement in TA/abdominal endurance and activation with dynamic LE movements demonstrating improving core stabilization. Requiring less verbal/tactile cues for proper technique/performance of there ex/neuro juan and for proper mm engagement. No increase in numbness/pain throughout session or at end of session. Noting improving coordination as well. Does require occasional cues for proper upright posture which pt is able to self correct with cues. Pt progressing appropriately to date; will cont to progress as tolerated. Patient demonstrated fatigue post treatment and would benefit from continued PT      Plan: Continue per plan of care.  Progress treatment as tolerated.       Precautions:         Re-eval Date:  12/15/24     Date    Visit Count 11 12 13 14 15   FOTO             Pain In             Pain Out                   Manuals                                                                          Neuro Re-Ed             TA with PPT          TA with BKFO X10 ea X15 ea     X10 ea   TA with marches X30 1.5# X30 2# X30 2# X30 2# X30 2.5#   TA with SLR 3x10 ea 1.5# X10 ea 2# X10 ea 2 # 2x10 2# 3x10 ea 2.5#   Alt UE/LE seated            Palloff press  Half knee palloff press with rotation x10 ea single gtb Half knee palloff press with rotation x10 ea single gtb Half knee palloff press with rotation x10 ea single gtb Half knee palloff " "press with rotation x10 ea single gtb   Multifidus hip hike TA add ball squeeze x20 5\"  TA add ball squeeze x30 5\"    TA add ball squeeze x30 5\"    Bird dog    dead bug with cues 2x15  dead bug with cues 2x20  dead bug with cues 2x20  dead bug with cues 2x20   Planks                            Ther Ex             nustep L3 10'  L2 10'  L2 10' L3 10'  L4 10'    bridges 2x10 3\" with rtb hip abduction 2x10 3\" with rtb hip abduction 2x10 3\" with gtb hip abduction  2x10 3\" with gtb hip abduction 2x10 3\" with blue tb hip abduction   clamshells             Piriformis stretch           Side steps    ytb x5 laps   rtb side steps x3 laps      Rtb monster walks x3 laps  gtb side steps x3 laps      gtb monster walks x3 laps  gtb side steps x3 laps      gtb monster walks x3 laps   Monster walks             Stability ball rollout             Leg press                           Ther Activity                Box lifts 20# x10 with cues for proper mechanics        Box lift and step ups 2x10 20# with cues  Box lifts 20# x10 with cues for proper mechanics        Box lift and ascending/descending stairs (4 steps) x10 20# with cues   Box lifts 20# x10 with cues for proper mechanics  Box lifts 20# x10 with cues for proper mechanics                 Gait Training                                         Modalities             MHP prn                                           "

## 2024-12-05 ENCOUNTER — HOSPITAL ENCOUNTER (OUTPATIENT)
Dept: MRI IMAGING | Facility: HOSPITAL | Age: 34
End: 2024-12-05
Payer: COMMERCIAL

## 2024-12-05 ENCOUNTER — APPOINTMENT (OUTPATIENT)
Dept: CT IMAGING | Facility: HOSPITAL | Age: 34
End: 2024-12-05
Payer: COMMERCIAL

## 2024-12-05 DIAGNOSIS — E03.9 HYPOTHYROIDISM, UNSPECIFIED TYPE: ICD-10-CM

## 2024-12-05 DIAGNOSIS — M54.17 RADICULAR PAIN OF LUMBOSACRAL REGION: ICD-10-CM

## 2024-12-05 LAB — BACTERIA UR CULT: NORMAL

## 2024-12-05 PROCEDURE — 72148 MRI LUMBAR SPINE W/O DYE: CPT

## 2024-12-05 RX ORDER — LIOTHYRONINE SODIUM 5 UG/1
5 TABLET ORAL DAILY
Qty: 30 TABLET | Refills: 0 | Status: SHIPPED | OUTPATIENT
Start: 2024-12-05

## 2024-12-06 ENCOUNTER — OFFICE VISIT (OUTPATIENT)
Dept: PHYSICAL THERAPY | Facility: CLINIC | Age: 34
End: 2024-12-06
Payer: COMMERCIAL

## 2024-12-06 DIAGNOSIS — M54.17 RADICULAR PAIN OF LUMBOSACRAL REGION: Primary | ICD-10-CM

## 2024-12-06 PROCEDURE — 97110 THERAPEUTIC EXERCISES: CPT

## 2024-12-06 PROCEDURE — 97112 NEUROMUSCULAR REEDUCATION: CPT

## 2024-12-06 NOTE — PROGRESS NOTES
"Daily Note/Discharge     Today's date: 2024  Patient name: Allie Donnelly  : 1990  MRN: [id number]  Referring provider: Edison Michael DO  Dx:   Encounter Diagnosis     ICD-10-CM    1. Radicular pain of lumbosacral region  M54.17           Start Time: 0900  Stop Time: 0945  Total time in clinic (min): 45 minutes    Subjective: \"I have been feeling a lot better since starting PT. My pain is not as bad and I can lift easier. I feel like I can clean easier as well. I feel stronger too\"      Objective: See treatment diary below      Assessment: Tolerated treatment well. Pt has been participating in skilled PT focused on strengthening and stretching which pt has tolerated well. Since SOC, noting significant improvement in core/hip/gluteal strength and core stability. Noting improvement in squat/lifting mechanics and symptoms severity and irritability as well. Subjective reports of less pain and decrease in numbness. Pt has been tolerating all progressions of PREs well with appropriate mm fatigue. Improved mm endurance. Function and mobility has progressed well with skilled PT services. Pt appears to have reached plateau/max potential with skilled PT services and optimal improvement in function/mobility.  Patient  to be discharged from formal PT services and transition to HEP.  Reviewed HEP with pt. Pt understanding and in agreement. All questions answered to pt satisfaction. Educated to contact clinic with any questions or concerns.       Plan:  Discharge from formal PT services and transition to HEP.      Precautions:         Re-eval Date:  12/15/24     Date    Visit Count 16 12 13 14 15   FOTO             Pain In             Pain Out                   Manuals                                                                          Neuro Re-Ed             TA with PPT          TA with BKFO X10 ea X15 ea     X10 ea   TA with marches X30 2.5# X30 2# X30 2# " "X30 2# X30 2.5#   TA with SLR 3x10 ea 2.5# X10 ea 2# X10 ea 2 # 2x10 2# 3x10 ea 2.5#   Alt UE/LE seated            Palloff press Half knee palloff press with rotation x10 ea single gtb Half knee palloff press with rotation x10 ea single gtb Half knee palloff press with rotation x10 ea single gtb Half knee palloff press with rotation x10 ea single gtb Half knee palloff press with rotation x10 ea single gtb   Multifidus hip hike TA add ball squeeze x20 5\"  TA add ball squeeze x30 5\"    TA add ball squeeze x30 5\"    Bird dog  dead bug with cues 2x20  dead bug with cues 2x15  dead bug with cues 2x20  dead bug with cues 2x20  dead bug with cues 2x20   Planks                            Ther Ex             nustep L3 10'  L2 10'  L2 10' L3 10'  L4 10'    bridges 2x10 3\" with rtb hip abduction 2x10 3\" with rtb hip abduction 2x10 3\" with gtb hip abduction  2x10 3\" with gtb hip abduction 2x10 3\" with blue tb hip abduction   clamshells             Piriformis stretch           Side steps  gtb side steps x3 laps      gtb monster walks x3 laps  ytb x5 laps   rtb side steps x3 laps      Rtb monster walks x3 laps  gtb side steps x3 laps      gtb monster walks x3 laps  gtb side steps x3 laps      gtb monster walks x3 laps   Monster walks             Stability ball rollout             Leg press                           Ther Activity                  Box lifts 20# x10 with cues for proper mechanics        Box lift and ascending/descending stairs (4 steps) x10 20# with cues   Box lifts 20# x10 with cues for proper mechanics  Box lifts 20# x10 with cues for proper mechanics                 Gait Training                                         Modalities             MHP prn                                             "

## 2024-12-09 LAB
LAB AP GYN PRIMARY INTERPRETATION: NORMAL
Lab: NORMAL

## 2024-12-12 ENCOUNTER — HOSPITAL ENCOUNTER (OUTPATIENT)
Dept: ULTRASOUND IMAGING | Facility: HOSPITAL | Age: 34
End: 2024-12-12
Payer: COMMERCIAL

## 2024-12-12 DIAGNOSIS — R10.2 PELVIC PAIN: ICD-10-CM

## 2024-12-12 PROCEDURE — 76856 US EXAM PELVIC COMPLETE: CPT

## 2024-12-17 ENCOUNTER — OFFICE VISIT (OUTPATIENT)
Dept: INTERNAL MEDICINE CLINIC | Facility: CLINIC | Age: 34
End: 2024-12-17
Payer: COMMERCIAL

## 2024-12-17 ENCOUNTER — APPOINTMENT (OUTPATIENT)
Dept: LAB | Facility: CLINIC | Age: 34
End: 2024-12-17
Payer: COMMERCIAL

## 2024-12-17 VITALS
HEART RATE: 85 BPM | HEIGHT: 63 IN | SYSTOLIC BLOOD PRESSURE: 98 MMHG | WEIGHT: 117.25 LBS | OXYGEN SATURATION: 97 % | TEMPERATURE: 97.7 F | DIASTOLIC BLOOD PRESSURE: 72 MMHG | BODY MASS INDEX: 20.77 KG/M2

## 2024-12-17 DIAGNOSIS — M54.17 RADICULAR PAIN OF LUMBOSACRAL REGION: Primary | ICD-10-CM

## 2024-12-17 DIAGNOSIS — R53.1 LEFT-SIDED WEAKNESS: ICD-10-CM

## 2024-12-17 DIAGNOSIS — L20.9 ATOPIC DERMATITIS, UNSPECIFIED TYPE: ICD-10-CM

## 2024-12-17 DIAGNOSIS — R10.84 GENERALIZED ABDOMINAL PAIN: ICD-10-CM

## 2024-12-17 LAB
HBV CORE AB SER QL: NORMAL
HBV SURFACE AB SER-ACNC: >500 MIU/ML
HBV SURFACE AG SER QL: NORMAL
HCV AB SER QL: NORMAL

## 2024-12-17 PROCEDURE — 86706 HEP B SURFACE ANTIBODY: CPT

## 2024-12-17 PROCEDURE — 86480 TB TEST CELL IMMUN MEASURE: CPT

## 2024-12-17 PROCEDURE — 99214 OFFICE O/P EST MOD 30 MIN: CPT | Performed by: INTERNAL MEDICINE

## 2024-12-17 PROCEDURE — 87340 HEPATITIS B SURFACE AG IA: CPT

## 2024-12-17 PROCEDURE — 36415 COLL VENOUS BLD VENIPUNCTURE: CPT

## 2024-12-17 PROCEDURE — 86704 HEP B CORE ANTIBODY TOTAL: CPT

## 2024-12-17 PROCEDURE — 86803 HEPATITIS C AB TEST: CPT

## 2024-12-17 RX ORDER — DICLOFENAC SODIUM 75 MG/1
75 TABLET, DELAYED RELEASE ORAL 2 TIMES DAILY PRN
Qty: 28 TABLET | Refills: 0 | Status: SHIPPED | OUTPATIENT
Start: 2024-12-17 | End: 2024-12-31

## 2024-12-17 RX ORDER — CLINDAMYCIN PHOSPHATE AND BENZOYL PEROXIDE 10; 50 MG/G; MG/G
GEL TOPICAL 2 TIMES DAILY
COMMUNITY
Start: 2024-12-08

## 2024-12-17 NOTE — PROGRESS NOTES
"Name: Allie Donnelly      : 1990      MRN: [id number]  Encounter Provider: Edison Michael DO  Encounter Date: 2024   Encounter department: [facility]  :  Assessment & Plan  Radicular pain of lumbosacral region  Reviewed MRI of the lumbar spine and noted no significant pathology  ITB? Trial of Diclofenac 75 mg BID # 28 PRN  Heat  Orders:  •  diclofenac (VOLTAREN) 75 mg EC tablet; Take 1 tablet (75 mg total) by mouth 2 (two) times a day as needed (Muscular skeletal pain) for up to 14 days    Left-sided weakness  See above  Orders:  •  diclofenac (VOLTAREN) 75 mg EC tablet; Take 1 tablet (75 mg total) by mouth 2 (two) times a day as needed (Muscular skeletal pain) for up to 14 days    Generalized abdominal pain  Constipation?  US of pelvis unread  Follow up with US of the abdomen  Orders:  •  US abdomen complete; Future           History of Present Illness     Continue pain radiating down legs.  Negative MRI reviewed.  Abdominal pain.  Ct declined by insurance.  US of Abdomen ordered.      Review of Systems   Constitutional:  Negative for chills, fatigue and fever.   HENT: Negative.     Respiratory:  Negative for cough, chest tightness and shortness of breath.    Cardiovascular:  Negative for chest pain, palpitations and leg swelling.   Gastrointestinal:  Negative for abdominal pain, constipation, diarrhea, nausea and vomiting.   Genitourinary: Negative.    Musculoskeletal:  Negative for arthralgias, back pain and myalgias.   Skin: Negative.    Neurological: Negative.    Psychiatric/Behavioral: Negative.         Objective   BP 98/72 (BP Location: Left arm, Patient Position: Sitting)   Pulse 85   Temp 97.7 °F (36.5 °C) (Tympanic)   Ht 5' 3\" (1.6 m)   Wt 53.2 kg (117 lb 4 oz)   LMP 2024 (Exact Date)   SpO2 97%   BMI 20.77 kg/m²      Physical Exam  Vitals and nursing note reviewed.   Constitutional:       General: She is not in acute distress.     Appearance: She is " well-developed.   HENT:      Head: Normocephalic and atraumatic.   Eyes:      Conjunctiva/sclera: Conjunctivae normal.   Cardiovascular:      Rate and Rhythm: Normal rate and regular rhythm.      Heart sounds: No murmur heard.  Pulmonary:      Effort: Pulmonary effort is normal. No respiratory distress.      Breath sounds: Normal breath sounds.   Abdominal:      Palpations: Abdomen is soft.      Tenderness: There is no abdominal tenderness.   Musculoskeletal:         General: No swelling.      Cervical back: Neck supple.   Skin:     General: Skin is warm and dry.      Capillary Refill: Capillary refill takes less than 2 seconds.   Neurological:      Mental Status: She is alert.   Psychiatric:         Mood and Affect: Mood normal.

## 2024-12-17 NOTE — ASSESSMENT & PLAN NOTE
Constipation?  US of pelvis unread  Follow up with US of the abdomen  Orders:  •  US abdomen complete; Future

## 2024-12-18 LAB
GAMMA INTERFERON BACKGROUND BLD IA-ACNC: <0 IU/ML
M TB IFN-G BLD-IMP: NEGATIVE
M TB IFN-G CD4+ BCKGRND COR BLD-ACNC: 0 IU/ML
M TB IFN-G CD4+ BCKGRND COR BLD-ACNC: 0.01 IU/ML
MITOGEN IGNF BCKGRD COR BLD-ACNC: 10 IU/ML

## 2024-12-21 ENCOUNTER — HOSPITAL ENCOUNTER (OUTPATIENT)
Dept: ULTRASOUND IMAGING | Facility: HOSPITAL | Age: 34
Discharge: HOME | End: 2024-12-21
Payer: COMMERCIAL

## 2024-12-21 DIAGNOSIS — R10.84 GENERALIZED ABDOMINAL PAIN: ICD-10-CM

## 2024-12-21 PROCEDURE — 76700 US EXAM ABDOM COMPLETE: CPT

## 2024-12-30 DIAGNOSIS — E03.9 HYPOTHYROIDISM, UNSPECIFIED TYPE: ICD-10-CM

## 2024-12-31 ENCOUNTER — RESULTS FOLLOW-UP (OUTPATIENT)
Dept: INTERNAL MEDICINE CLINIC | Facility: CLINIC | Age: 34
End: 2024-12-31

## 2024-12-31 DIAGNOSIS — E03.9 ACQUIRED HYPOTHYROIDISM: Primary | ICD-10-CM

## 2024-12-31 RX ORDER — LIOTHYRONINE SODIUM 5 UG/1
5 TABLET ORAL DAILY
Qty: 30 TABLET | Refills: 0 | Status: SHIPPED | OUTPATIENT
Start: 2024-12-31

## 2025-01-09 ENCOUNTER — APPOINTMENT (OUTPATIENT)
Dept: LAB | Facility: CLINIC | Age: 35
End: 2025-01-09
Payer: COMMERCIAL

## 2025-01-09 DIAGNOSIS — K21.9 GASTROESOPHAGEAL REFLUX DISEASE, UNSPECIFIED WHETHER ESOPHAGITIS PRESENT: Primary | ICD-10-CM

## 2025-01-09 DIAGNOSIS — N18.2 CKD (CHRONIC KIDNEY DISEASE) STAGE 2, GFR 60-89 ML/MIN: ICD-10-CM

## 2025-01-09 DIAGNOSIS — E03.9 ACQUIRED HYPOTHYROIDISM: ICD-10-CM

## 2025-01-09 LAB — TSH SERPL DL<=0.05 MIU/L-ACNC: 1.12 UIU/ML (ref 0.45–4.5)

## 2025-01-09 PROCEDURE — 84443 ASSAY THYROID STIM HORMONE: CPT

## 2025-01-09 PROCEDURE — 36415 COLL VENOUS BLD VENIPUNCTURE: CPT

## 2025-01-13 RX ORDER — PANTOPRAZOLE SODIUM 40 MG/1
40 TABLET, DELAYED RELEASE ORAL DAILY
Qty: 90 TABLET | Refills: 1 | Status: SHIPPED | OUTPATIENT
Start: 2025-01-13

## 2025-01-18 DIAGNOSIS — K21.9 GASTROESOPHAGEAL REFLUX DISEASE, UNSPECIFIED WHETHER ESOPHAGITIS PRESENT: ICD-10-CM

## 2025-01-20 RX ORDER — FAMOTIDINE 40 MG/1
TABLET, FILM COATED ORAL
Qty: 45 TABLET | Refills: 1 | Status: SHIPPED | OUTPATIENT
Start: 2025-01-20

## 2025-01-26 DIAGNOSIS — E03.9 HYPOTHYROIDISM, UNSPECIFIED TYPE: ICD-10-CM

## 2025-01-27 RX ORDER — LIOTHYRONINE SODIUM 5 UG/1
5 TABLET ORAL DAILY
Qty: 30 TABLET | Refills: 5 | Status: SHIPPED | OUTPATIENT
Start: 2025-01-27

## 2025-02-19 ENCOUNTER — OFFICE VISIT (OUTPATIENT)
Dept: OBGYN CLINIC | Facility: CLINIC | Age: 35
End: 2025-02-19
Payer: COMMERCIAL

## 2025-02-19 VITALS
SYSTOLIC BLOOD PRESSURE: 104 MMHG | BODY MASS INDEX: 21.62 KG/M2 | WEIGHT: 122 LBS | DIASTOLIC BLOOD PRESSURE: 60 MMHG | HEIGHT: 63 IN

## 2025-02-19 DIAGNOSIS — N91.5: Primary | ICD-10-CM

## 2025-02-19 PROCEDURE — 99212 OFFICE O/P EST SF 10 MIN: CPT | Performed by: OBSTETRICS & GYNECOLOGY

## 2025-02-19 NOTE — PROGRESS NOTES
A/P       Concern about her change in cycle      Previous - 9 days never heavy no pain darker red        Now 6-7 days still not heavy but redder.     Since she started her cycle she does get change in her cycles in term of timing about every 3 months from begin to middle to end of the cycle but the flow always stays the same      She will keep track and let me know how she is doing

## 2025-03-07 ENCOUNTER — OFFICE VISIT (OUTPATIENT)
Dept: URGENT CARE | Facility: CLINIC | Age: 35
End: 2025-03-07
Payer: COMMERCIAL

## 2025-03-07 VITALS
RESPIRATION RATE: 18 BRPM | SYSTOLIC BLOOD PRESSURE: 100 MMHG | HEART RATE: 81 BPM | OXYGEN SATURATION: 99 % | TEMPERATURE: 98.4 F | DIASTOLIC BLOOD PRESSURE: 62 MMHG

## 2025-03-07 DIAGNOSIS — J01.90 ACUTE SINUSITIS, RECURRENCE NOT SPECIFIED, UNSPECIFIED LOCATION: Primary | ICD-10-CM

## 2025-03-07 PROCEDURE — 99213 OFFICE O/P EST LOW 20 MIN: CPT | Performed by: PHYSICIAN ASSISTANT

## 2025-03-07 RX ORDER — AMOXICILLIN 875 MG/1
875 TABLET, COATED ORAL 2 TIMES DAILY
Qty: 20 TABLET | Refills: 0 | Status: SHIPPED | OUTPATIENT
Start: 2025-03-07 | End: 2025-03-17

## 2025-03-07 NOTE — PROGRESS NOTES
[facility]    NAME: Allie Donnelly is a 35 y.o. female  : 1990    MRN: [id number]  DATE: 2025  TIME: 3:52 PM    Assessment and Plan   Acute sinusitis, recurrence not specified, unspecified location [J01.90]  1. Acute sinusitis, recurrence not specified, unspecified location  amoxicillin (AMOXIL) 875 mg tablet          Patient Instructions     Patient Instructions   I have prescribed an antibiotic for the infection.  Please take the antibiotic as prescribed and finish the entire prescription.  Take an over the counter probiotic or eat yogurt with live cultures in it (activia) to keep good bacteria in the gut and help prevent diarrhea.  Wash hands frequently to prevent the spread of infection.  Can use over the counter cough and cold medications to help with symptoms.  Ibuprofen and/or tylenol as needed for pain or fever.  If not improving over the next 7-10 days, follow up with PCP.          Chief Complaint     Chief Complaint   Patient presents with    Cold Like Symptoms     Sinus congestion headaches cough lightheaded 7 days        History of Present Illness   35-year-old female here with sinus congestion, headaches and cough.  Has been feeling slightly lightheaded and dizzy.  Symptoms started 7 days ago.  Denies any fever or chills.        Review of Systems   Review of Systems   Constitutional:  Negative for appetite change, chills and fever.   HENT:  Positive for congestion, postnasal drip and sinus pressure. Negative for ear discharge, ear pain, facial swelling, sneezing and sore throat.    Respiratory:  Positive for cough. Negative for shortness of breath and wheezing.    Neurological:  Positive for dizziness and headaches.       Current Medications     Current Outpatient Medications:     amoxicillin (AMOXIL) 875 mg tablet, Take 1 tablet (875 mg total) by mouth 2 (two) times a day for 10 days, Disp: 20 tablet, Rfl: 0    benztropine (COGENTIN) 1 mg tablet, Take 1 mg by mouth TAKE 1 TABLET  IN AM AND 2 TABLETS IN PM, Disp: , Rfl:     brexpiprazole (Rexulti) 0.25 MG tablet, Take 0.25 mg by mouth daily, Disp: , Rfl:     busPIRone (BUSPAR) 15 mg tablet, Take 1 tablet (15 mg total) by mouth 2 (two) times a day, Disp: 180 tablet, Rfl: 1    famotidine (PEPCID) 40 MG tablet, TAKE 1/2 (ONE-HALF) TABLET BY MOUTH IN THE MORNING, Disp: 45 tablet, Rfl: 1    gabapentin (NEURONTIN) 300 mg capsule, , Disp: , Rfl:     liothyronine (CYTOMEL) 5 mcg tablet, Take 1 tablet by mouth once daily, Disp: 30 tablet, Rfl: 5    pantoprazole (PROTONIX) 40 mg tablet, Take 1 tablet (40 mg total) by mouth daily, Disp: 90 tablet, Rfl: 1    Rinvoq 15 MG TB24, , Disp: , Rfl:     tamsulosin (FLOMAX) 0.4 mg, TAKE 1 CAPSULE BY MOUTH ONCE DAILY WITH SUPPER, Disp: 90 capsule, Rfl: 1    Clindamycin Phos-Benzoyl Perox gel, Apply topically 2 (two) times a day, Disp: , Rfl:     clobetasol (TEMOVATE) 0.05 % cream, APPLY A THIN LAYER 3 NIGHTS WEEKLY FOR 12 WEEKS TO FLAKY RASH ON BOTH HANDS. AVOID FACE AND BODY SKIN FOLDS, Disp: , Rfl:     cyclobenzaprine (FLEXERIL) 10 mg tablet, Take 1 tablet (10 mg total) by mouth 3 (three) times a day as needed for muscle spasms for up to 21 days, Disp: 63 tablet, Rfl: 0    diclofenac (VOLTAREN) 75 mg EC tablet, Take 1 tablet (75 mg total) by mouth 2 (two) times a day as needed (Muscular skeletal pain) for up to 14 days, Disp: 28 tablet, Rfl: 0    eszopiclone (LUNESTA) 1 mg tablet, Take 1 mg by mouth daily at bedtime (Patient not taking: Reported on 12/3/2024), Disp: , Rfl:     LORazepam (ATIVAN) 0.5 mg tablet, Take 1 mg by mouth every 6 (six) hours as needed, Disp: , Rfl:     Current Allergies     Allergies as of 03/07/2025 - Reviewed 03/07/2025   Allergen Reaction Noted    Gluten meal - food allergy  09/26/2018    Latex Swelling 08/03/2020          The following portions of the patient's history were reviewed and updated as appropriate: allergies, current medications, past family history, past medical history,  past social history, past surgical history and problem list.   Past Medical History:   Diagnosis Date    Allergic     Anorexia nervosa     pt denies history at time of admission 7/21/18    Anxiety     Celiac disease     Chronic kidney disease     CPAP (continuous positive airway pressure) dependence     waiting for a new mask    Depression     Disease of thyroid gland     hypo    Gall bladder polyp     Gastroesophageal reflux disease 09/18/2018    GERD (gastroesophageal reflux disease) 9/18/2018    Hallucination     Hypothyroidism     Memory difficulty 07/12/2018    Memory loss 7/12/2018    Plantar wart of left foot     Psychiatric illness     Raynaud's disease without gangrene     Renal atrophy     Schizoaffective disorder (HCC)     Self-injurious behavior     Sleep apnea     Sleep difficulties     Suicide attempt (HCC)     history of attempt at age 18 by overdose     Urinary retention      Past Surgical History:   Procedure Laterality Date    COLON SURGERY  January 12 2021    EYE SURGERY Left     tear duct    LYMPH NODE BIOPSY  January 12 2021    NASAL TURBINATE REDUCTION Bilateral 10/21/2024    AZ ESOPHAGOGASTRODUODENOSCOPY TRANSORAL DIAGNOSTIC N/A 02/09/2018    Procedure: ESOPHAGOGASTRODUODENOSCOPY (EGD);  Surgeon: Umberto Hummel MD;  Location: [facility];  Service: Gastroenterology    AZ ESOPHAGOGASTRODUODENOSCOPY TRANSORAL DIAGNOSTIC N/A 11/06/2018    Procedure: ESOPHAGOGASTRODUODENOSCOPY (EGD);  Surgeon: Mike Liu MD;  Location: [facility];  Service: Gastroenterology    AZ LAPAROSCOPY PROCTOPEXY PROLAPSE N/A 01/12/2021    Procedure: Rectopexy with sigmoid resection w/ robotics.;  Surgeon: RUT Suarez MD;  Location:  MAIN OR;  Service: Colorectal     Family History   Problem Relation Age of Onset    Hypertension Father         benign essential    Cancer Father     Depression Father     Asthma Father     COPD Father     Prostate cancer Father     Hearing loss Father     Hypertension Brother         benign  essential     OCD Brother     Depression Brother     Autoimmune disease Brother     Anxiety disorder Brother     Psychiatric Illness Brother     Breast cancer Mother 60    Cancer Mother     Diabetes Mother     Hypertension Mother     Arthritis Mother     Dementia Maternal Grandmother     Psychiatric Illness Maternal Grandmother     Psychosis Maternal Grandmother     Schizophrenia Maternal Grandmother     Osteoporosis Maternal Grandmother     No Known Problems Maternal Grandfather     Breast cancer Paternal Grandmother     Cancer Paternal Grandmother     Osteoporosis Paternal Grandmother     Prostate cancer Paternal Grandfather     No Known Problems Maternal Aunt     No Known Problems Maternal Aunt     Bipolar disorder Paternal Uncle     Psychiatric Illness Paternal Uncle     Colon cancer Neg Hx      Social History     Socioeconomic History    Marital status: Single     Spouse name: Not on file    Number of children: Not on file    Years of education: Not on file    Highest education level: Not on file   Occupational History    Occupation: UNEMPLOYED   Tobacco Use    Smoking status: Never     Passive exposure: Never    Smokeless tobacco: Never   Vaping Use    Vaping status: Never Used   Substance and Sexual Activity    Alcohol use: Not Currently    Drug use: No    Sexual activity: Not Currently   Other Topics Concern    Not on file   Social History Narrative    UNEMPLOYED     Social Drivers of Health     Financial Resource Strain: Not on file   Food Insecurity: Not on file   Transportation Needs: Not on file   Physical Activity: Not on file   Stress: Not on file   Social Connections: Unknown (6/18/2024)    Received from [employer]     How often do you feel lonely or isolated from those around you? (Adult - for ages 18 years and over): Not on file   Intimate Partner Violence: Not on file   Housing Stability: Not on file     Medications have been verified.    Objective   /62   Pulse 81    Temp 98.4 °F (36.9 °C)   Resp 18   LMP 01/30/2025 (Exact Date)   SpO2 99%      Physical Exam   Physical Exam  Vitals and nursing note reviewed.   Constitutional:       General: She is not in acute distress.     Appearance: She is well-developed.   HENT:      Head: Normocephalic and atraumatic.      Right Ear: Tympanic membrane normal.      Left Ear: Tympanic membrane normal.      Nose: Congestion present. No mucosal edema.      Right Sinus: No maxillary sinus tenderness or frontal sinus tenderness.      Left Sinus: No maxillary sinus tenderness or frontal sinus tenderness.      Mouth/Throat:      Pharynx: Posterior oropharyngeal erythema present. No oropharyngeal exudate.   Eyes:      Conjunctiva/sclera: Conjunctivae normal.   Cardiovascular:      Rate and Rhythm: Normal rate and regular rhythm.      Heart sounds: Normal heart sounds. No murmur heard.  Pulmonary:      Effort: Pulmonary effort is normal.      Breath sounds: Normal breath sounds. No wheezing.

## 2025-03-07 NOTE — PATIENT INSTRUCTIONS
I have prescribed an antibiotic for the infection.  Please take the antibiotic as prescribed and finish the entire prescription.  Take an over the counter probiotic or eat yogurt with live cultures in it (activia) to keep good bacteria in the gut and help prevent diarrhea.  Wash hands frequently to prevent the spread of infection.  Can use over the counter cough and cold medications to help with symptoms.  Ibuprofen and/or tylenol as needed for pain or fever.  If not improving over the next 7-10 days, follow up with PCP.        
Mother

## 2025-03-14 DIAGNOSIS — N39.8 VOIDING DYSFUNCTION: ICD-10-CM

## 2025-03-14 RX ORDER — TAMSULOSIN HYDROCHLORIDE 0.4 MG/1
CAPSULE ORAL
Qty: 90 CAPSULE | Refills: 1 | Status: SHIPPED | OUTPATIENT
Start: 2025-03-14

## 2025-04-18 ENCOUNTER — OFFICE VISIT (OUTPATIENT)
Dept: URGENT CARE | Facility: CLINIC | Age: 35
End: 2025-04-18
Payer: COMMERCIAL

## 2025-04-18 VITALS
OXYGEN SATURATION: 98 % | HEART RATE: 77 BPM | TEMPERATURE: 98.7 F | RESPIRATION RATE: 18 BRPM | WEIGHT: 120 LBS | DIASTOLIC BLOOD PRESSURE: 70 MMHG | HEIGHT: 63 IN | SYSTOLIC BLOOD PRESSURE: 111 MMHG | BODY MASS INDEX: 21.26 KG/M2

## 2025-04-18 DIAGNOSIS — N89.8 ITCHING IN THE VAGINAL AREA: Primary | ICD-10-CM

## 2025-04-18 LAB
SL AMB  POCT GLUCOSE, UA: NORMAL
SL AMB LEUKOCYTE ESTERASE,UA: NORMAL
SL AMB POCT BILIRUBIN,UA: NORMAL
SL AMB POCT BLOOD,UA: NORMAL
SL AMB POCT CLARITY,UA: CLEAR
SL AMB POCT COLOR,UA: YELLOW
SL AMB POCT KETONES,UA: NORMAL
SL AMB POCT NITRITE,UA: NORMAL
SL AMB POCT PH,UA: 6
SL AMB POCT SPECIFIC GRAVITY,UA: 1.01
SL AMB POCT URINE PROTEIN: NORMAL
SL AMB POCT UROBILINOGEN: 0.2

## 2025-04-18 PROCEDURE — 99213 OFFICE O/P EST LOW 20 MIN: CPT

## 2025-04-18 PROCEDURE — 81002 URINALYSIS NONAUTO W/O SCOPE: CPT

## 2025-04-18 PROCEDURE — 87086 URINE CULTURE/COLONY COUNT: CPT

## 2025-04-18 RX ORDER — FLUCONAZOLE 150 MG/1
150 TABLET ORAL ONCE
Qty: 1 TABLET | Refills: 0 | Status: SHIPPED | OUTPATIENT
Start: 2025-04-18 | End: 2025-04-18

## 2025-04-18 NOTE — PROGRESS NOTES
"  [facility]        NAME: Allie Donnelly is a 35 y.o. female  : 1990    MRN: [id number]  DATE: 2025  TIME: 11:37 AM    Assessment and Plan   Dysuria [R30.0]  1. Dysuria  POCT urine dip    Urine culture        Urine dip in office within normal limits, urine culture pending    Discussed with patient that urine dip is not showing any signs of UTI, also symptoms discussed during clinic visit are suggestive of a yeast infection.  Will trial one-time dose of Diflucan.  Advised patient that if symptoms are not improving within the following 2 to 3 days after Diflucan dosage to follow-up with GYN for further evaluation and treatment.    Patient Instructions   Take Diflucan as directed.  If symptoms are not improved within 3 days, follow up with GYN.    Follow up with PCP in 3-5 days.  Proceed to  ER if symptoms worsen.    If tests have been performed at [facility], our office will contact you with results if changes need to be made to the care plan discussed with you at the visit.  You can review your full results on [facility] MyChart.    Chief Complaint     Chief Complaint   Patient presents with    Possible UTI     Urine frequency, pain and itching for 3 weeks.         History of Present Illness       35-year-old female with past medical history of urinary retention presenting with UTI symptoms X 3 weeks.  Patient states that for the past 3 weeks she has had intermittent pressure in her pelvic region with a full bladder that resolves after voiding.  Patient has been taking Flomax and has been voiding appropriately.  Patient is stating that she is having vaginal itching, denies rash, however thinks she \"had a cut down there\".  Patient is not currently sexually active and last menstrual period ended first week of April.  She is currently denying burning with urination, hematuria, urinary frequency, urinary urgency, malodorous urine, abdominal pain, flank pain, fevers, chills, vaginal discharge, " vaginal bleeding.  Patient was seen in clinic on 3/7 and was prescribed Augmentin for acute sinusitis, patient states symptoms began shortly after course of Augmentin.        Review of Systems   Review of Systems   Constitutional:  Negative for chills and fever.   HENT:  Negative for ear pain and sore throat.    Eyes:  Negative for pain and visual disturbance.   Respiratory:  Negative for cough and shortness of breath.    Cardiovascular:  Negative for chest pain and palpitations.   Gastrointestinal:  Negative for abdominal distention, abdominal pain, diarrhea, nausea and vomiting.   Genitourinary:  Positive for difficulty urinating and vaginal pain (Itching). Negative for decreased urine volume, dysuria, flank pain, frequency, hematuria, urgency, vaginal bleeding and vaginal discharge.   Musculoskeletal:  Negative for arthralgias and back pain.   Skin:  Negative for color change and rash.   Neurological:  Negative for seizures and syncope.   All other systems reviewed and are negative.        Current Medications       Current Outpatient Medications:     benztropine (COGENTIN) 1 mg tablet, Take 1 mg by mouth TAKE 1 TABLET IN AM AND 2 TABLETS IN PM, Disp: , Rfl:     brexpiprazole (Rexulti) 0.25 MG tablet, Take 0.25 mg by mouth daily, Disp: , Rfl:     gabapentin (NEURONTIN) 300 mg capsule, , Disp: , Rfl:     liothyronine (CYTOMEL) 5 mcg tablet, Take 1 tablet by mouth once daily, Disp: 30 tablet, Rfl: 5    pantoprazole (PROTONIX) 40 mg tablet, Take 1 tablet (40 mg total) by mouth daily, Disp: 90 tablet, Rfl: 1    Rinvoq 15 MG TB24, , Disp: , Rfl:     tamsulosin (FLOMAX) 0.4 mg, TAKE 1 CAPSULE BY MOUTH ONCE DAILY WITH SUPPER, Disp: 90 capsule, Rfl: 1    busPIRone (BUSPAR) 15 mg tablet, Take 1 tablet (15 mg total) by mouth 2 (two) times a day, Disp: 180 tablet, Rfl: 1    Clindamycin Phos-Benzoyl Perox gel, Apply topically 2 (two) times a day, Disp: , Rfl:     clobetasol (TEMOVATE) 0.05 % cream, APPLY A THIN LAYER 3 NIGHTS  WEEKLY FOR 12 WEEKS TO FLAKY RASH ON BOTH HANDS. AVOID FACE AND BODY SKIN FOLDS, Disp: , Rfl:     cyclobenzaprine (FLEXERIL) 10 mg tablet, Take 1 tablet (10 mg total) by mouth 3 (three) times a day as needed for muscle spasms for up to 21 days, Disp: 63 tablet, Rfl: 0    diclofenac (VOLTAREN) 75 mg EC tablet, Take 1 tablet (75 mg total) by mouth 2 (two) times a day as needed (Muscular skeletal pain) for up to 14 days, Disp: 28 tablet, Rfl: 0    eszopiclone (LUNESTA) 1 mg tablet, Take 1 mg by mouth daily at bedtime (Patient not taking: Reported on 12/3/2024), Disp: , Rfl:     famotidine (PEPCID) 40 MG tablet, TAKE 1/2 (ONE-HALF) TABLET BY MOUTH IN THE MORNING, Disp: 45 tablet, Rfl: 1    LORazepam (ATIVAN) 0.5 mg tablet, Take 1 mg by mouth every 6 (six) hours as needed, Disp: , Rfl:     Current Allergies     Allergies as of 04/18/2025 - Reviewed 04/18/2025   Allergen Reaction Noted    Gluten meal - food allergy  09/26/2018    Latex Swelling 08/03/2020            The following portions of the patient's history were reviewed and updated as appropriate: allergies, current medications, past family history, past medical history, past social history, past surgical history and problem list.     Past Medical History:   Diagnosis Date    Allergic     Anorexia nervosa     pt denies history at time of admission 7/21/18    Anxiety     Celiac disease     Chronic kidney disease     CPAP (continuous positive airway pressure) dependence     waiting for a new mask    Depression     Disease of thyroid gland     hypo    Gall bladder polyp     Gastroesophageal reflux disease 09/18/2018    GERD (gastroesophageal reflux disease) 9/18/2018    Hallucination     Hypothyroidism     Memory difficulty 07/12/2018    Memory loss 7/12/2018    Plantar wart of left foot     Psychiatric illness     Raynaud's disease without gangrene     Renal atrophy     Schizoaffective disorder (HCC)     Self-injurious behavior     Sleep apnea     Sleep difficulties      Suicide attempt (HCC)     history of attempt at age 18 by overdose     Urinary retention        Past Surgical History:   Procedure Laterality Date    COLON SURGERY  January 12 2021    EYE SURGERY Left     tear duct    LYMPH NODE BIOPSY  January 12 2021    NASAL TURBINATE REDUCTION Bilateral 10/21/2024    LA ESOPHAGOGASTRODUODENOSCOPY TRANSORAL DIAGNOSTIC N/A 02/09/2018    Procedure: ESOPHAGOGASTRODUODENOSCOPY (EGD);  Surgeon: Umberto Hummel MD;  Location: [facility];  Service: Gastroenterology    LA ESOPHAGOGASTRODUODENOSCOPY TRANSORAL DIAGNOSTIC N/A 11/06/2018    Procedure: ESOPHAGOGASTRODUODENOSCOPY (EGD);  Surgeon: Mike Liu MD;  Location: [facility];  Service: Gastroenterology    LA LAPAROSCOPY PROCTOPEXY PROLAPSE N/A 01/12/2021    Procedure: Rectopexy with sigmoid resection w/ robotics.;  Surgeon: RUT Suarez MD;  Location:  MAIN OR;  Service: Colorectal       Family History   Problem Relation Age of Onset    Hypertension Father         benign essential    Cancer Father     Depression Father     Asthma Father     COPD Father     Prostate cancer Father     Hearing loss Father     Hypertension Brother         benign essential     OCD Brother     Depression Brother     Autoimmune disease Brother     Anxiety disorder Brother     Psychiatric Illness Brother     Breast cancer Mother 60    Cancer Mother     Diabetes Mother     Hypertension Mother     Arthritis Mother     Dementia Maternal Grandmother     Psychiatric Illness Maternal Grandmother     Psychosis Maternal Grandmother     Schizophrenia Maternal Grandmother     Osteoporosis Maternal Grandmother     No Known Problems Maternal Grandfather     Breast cancer Paternal Grandmother     Cancer Paternal Grandmother     Osteoporosis Paternal Grandmother     Prostate cancer Paternal Grandfather     No Known Problems Maternal Aunt     No Known Problems Maternal Aunt     Bipolar disorder Paternal Uncle     Psychiatric Illness Paternal Uncle     Colon cancer Neg  "Hx          Medications have been verified.        Objective   /70   Pulse 77   Temp 98.7 °F (37.1 °C)   Resp 18   Ht 5' 3\" (1.6 m)   Wt 54.4 kg (120 lb)   SpO2 98%   BMI 21.26 kg/m²   No LMP recorded.       Physical Exam     Physical Exam  Constitutional:       General: She is not in acute distress.     Appearance: Normal appearance. She is not ill-appearing.   HENT:      Head: Normocephalic and atraumatic.      Nose: Nose normal.      Mouth/Throat:      Mouth: Mucous membranes are moist.      Pharynx: No oropharyngeal exudate or posterior oropharyngeal erythema.   Eyes:      Conjunctiva/sclera: Conjunctivae normal.      Pupils: Pupils are equal, round, and reactive to light.   Cardiovascular:      Rate and Rhythm: Normal rate and regular rhythm.      Pulses: Normal pulses.      Heart sounds: Normal heart sounds.   Pulmonary:      Effort: Pulmonary effort is normal.      Breath sounds: Normal breath sounds.   Abdominal:      General: Abdomen is flat.      Palpations: Abdomen is soft.   Skin:     General: Skin is warm and dry.      Capillary Refill: Capillary refill takes less than 2 seconds.   Neurological:      General: No focal deficit present.      Mental Status: She is alert and oriented to person, place, and time.                   "

## 2025-04-18 NOTE — PROGRESS NOTES
[facility]        NAME: Allie Donnelly is a 35 y.o. female  : 1990    MRN: [id number]  DATE: 2025  TIME: 11:48 AM    Assessment and Plan   Itching in the vaginal area [N89.8]  1. Itching in the vaginal area  POCT urine dip    Urine culture    Urine culture    fluconazole (DIFLUCAN) 150 mg tablet            Patient Instructions   Take Diflucan as directed.  If symptoms are not improved within 3 days, follow up with GYN.    Follow up with PCP in 3-5 days.  Proceed to  ER if symptoms worsen.    If tests have been performed at [facility], our office will contact you with results if changes need to be made to the care plan discussed with you at the visit.  You can review your full results on [facility].    Chief Complaint     Chief Complaint   Patient presents with   • Possible UTI     Urine frequency, pain and itching for 3 weeks.         History of Present Illness       HPI    Review of Systems   Review of Systems      Current Medications       Current Outpatient Medications:   •  benztropine (COGENTIN) 1 mg tablet, Take 1 mg by mouth TAKE 1 TABLET IN AM AND 2 TABLETS IN PM, Disp: , Rfl:   •  brexpiprazole (Rexulti) 0.25 MG tablet, Take 0.25 mg by mouth daily, Disp: , Rfl:   •  fluconazole (DIFLUCAN) 150 mg tablet, Take 1 tablet (150 mg total) by mouth once for 1 dose, Disp: 1 tablet, Rfl: 0  •  gabapentin (NEURONTIN) 300 mg capsule, , Disp: , Rfl:   •  liothyronine (CYTOMEL) 5 mcg tablet, Take 1 tablet by mouth once daily, Disp: 30 tablet, Rfl: 5  •  pantoprazole (PROTONIX) 40 mg tablet, Take 1 tablet (40 mg total) by mouth daily, Disp: 90 tablet, Rfl: 1  •  Rinvoq 15 MG TB24, , Disp: , Rfl:   •  tamsulosin (FLOMAX) 0.4 mg, TAKE 1 CAPSULE BY MOUTH ONCE DAILY WITH SUPPER, Disp: 90 capsule, Rfl: 1  •  busPIRone (BUSPAR) 15 mg tablet, Take 1 tablet (15 mg total) by mouth 2 (two) times a day, Disp: 180 tablet, Rfl: 1  •  Clindamycin Phos-Benzoyl Perox gel, Apply topically 2 (two) times a  day, Disp: , Rfl:   •  clobetasol (TEMOVATE) 0.05 % cream, APPLY A THIN LAYER 3 NIGHTS WEEKLY FOR 12 WEEKS TO FLAKY RASH ON BOTH HANDS. AVOID FACE AND BODY SKIN FOLDS, Disp: , Rfl:   •  cyclobenzaprine (FLEXERIL) 10 mg tablet, Take 1 tablet (10 mg total) by mouth 3 (three) times a day as needed for muscle spasms for up to 21 days, Disp: 63 tablet, Rfl: 0  •  diclofenac (VOLTAREN) 75 mg EC tablet, Take 1 tablet (75 mg total) by mouth 2 (two) times a day as needed (Muscular skeletal pain) for up to 14 days, Disp: 28 tablet, Rfl: 0  •  eszopiclone (LUNESTA) 1 mg tablet, Take 1 mg by mouth daily at bedtime (Patient not taking: Reported on 12/3/2024), Disp: , Rfl:   •  famotidine (PEPCID) 40 MG tablet, TAKE 1/2 (ONE-HALF) TABLET BY MOUTH IN THE MORNING, Disp: 45 tablet, Rfl: 1  •  LORazepam (ATIVAN) 0.5 mg tablet, Take 1 mg by mouth every 6 (six) hours as needed, Disp: , Rfl:     Current Allergies     Allergies as of 04/18/2025 - Reviewed 04/18/2025   Allergen Reaction Noted   • Gluten meal - food allergy  09/26/2018   • Latex Swelling 08/03/2020            The following portions of the patient's history were reviewed and updated as appropriate: allergies, current medications, past family history, past medical history, past social history, past surgical history and problem list.     Past Medical History:   Diagnosis Date   • Allergic    • Anorexia nervosa     pt denies history at time of admission 7/21/18   • Anxiety    • Celiac disease    • Chronic kidney disease    • CPAP (continuous positive airway pressure) dependence     waiting for a new mask   • Depression    • Disease of thyroid gland     hypo   • Gall bladder polyp    • Gastroesophageal reflux disease 09/18/2018   • GERD (gastroesophageal reflux disease) 9/18/2018   • Hallucination    • Hypothyroidism    • Memory difficulty 07/12/2018   • Memory loss 7/12/2018   • Plantar wart of left foot    • Psychiatric illness    • Raynaud's disease without gangrene    •  Renal atrophy    • Schizoaffective disorder (HCC)    • Self-injurious behavior    • Sleep apnea    • Sleep difficulties    • Suicide attempt (HCC)     history of attempt at age 18 by overdose    • Urinary retention        Past Surgical History:   Procedure Laterality Date   • COLON SURGERY  January 12 2021   • EYE SURGERY Left     tear duct   • LYMPH NODE BIOPSY  January 12 2021   • NASAL TURBINATE REDUCTION Bilateral 10/21/2024   • IL ESOPHAGOGASTRODUODENOSCOPY TRANSORAL DIAGNOSTIC N/A 02/09/2018    Procedure: ESOPHAGOGASTRODUODENOSCOPY (EGD);  Surgeon: Umberto Hummel MD;  Location: [facility];  Service: Gastroenterology   • IL ESOPHAGOGASTRODUODENOSCOPY TRANSORAL DIAGNOSTIC N/A 11/06/2018    Procedure: ESOPHAGOGASTRODUODENOSCOPY (EGD);  Surgeon: Mike Liu MD;  Location: [facility];  Service: Gastroenterology   • IL LAPAROSCOPY PROCTOPEXY PROLAPSE N/A 01/12/2021    Procedure: Rectopexy with sigmoid resection w/ robotics.;  Surgeon: RUT Suarez MD;  Location:  MAIN OR;  Service: Colorectal       Family History   Problem Relation Age of Onset   • Hypertension Father         benign essential   • Cancer Father    • Depression Father    • Asthma Father    • COPD Father    • Prostate cancer Father    • Hearing loss Father    • Hypertension Brother         benign essential    • OCD Brother    • Depression Brother    • Autoimmune disease Brother    • Anxiety disorder Brother    • Psychiatric Illness Brother    • Breast cancer Mother 60   • Cancer Mother    • Diabetes Mother    • Hypertension Mother    • Arthritis Mother    • Dementia Maternal Grandmother    • Psychiatric Illness Maternal Grandmother    • Psychosis Maternal Grandmother    • Schizophrenia Maternal Grandmother    • Osteoporosis Maternal Grandmother    • No Known Problems Maternal Grandfather    • Breast cancer Paternal Grandmother    • Cancer Paternal Grandmother    • Osteoporosis Paternal Grandmother    • Prostate cancer Paternal Grandfather    • No  "Known Problems Maternal Aunt    • No Known Problems Maternal Aunt    • Bipolar disorder Paternal Uncle    • Psychiatric Illness Paternal Uncle    • Colon cancer Neg Hx          Medications have been verified.        Objective   /70   Pulse 77   Temp 98.7 °F (37.1 °C)   Resp 18   Ht 5' 3\" (1.6 m)   Wt 54.4 kg (120 lb)   SpO2 98%   BMI 21.26 kg/m²   No LMP recorded.       Physical Exam     Physical Exam              "

## 2025-04-20 LAB — BACTERIA UR CULT: NORMAL

## 2025-04-22 ENCOUNTER — NURSE TRIAGE (OUTPATIENT)
Age: 35
End: 2025-04-22

## 2025-04-22 NOTE — TELEPHONE ENCOUNTER
"FOLLOW UP: Review with MD for other recommendations.     REASON FOR CONVERSATION: Vaginal Itching    SYMPTOMS: vaginal itching, intermittent pelvic pain, urinary frequency, intermittent dysuria and difficulty telling if her bladder is full vs empty    OTHER: Pt called in reporting she went to  on 4/18/25 for a yeast infection/possible UTI, symptoms ongoing x3 weeks. She had urine testing done which was negative. Given diflucan and advised to follow up with gyn if no improvement in 3 days. Pt reports mild improvement but still has vaginal itching, intermittent pelvic pain, urinary frequency, intermittent dysuria and difficulty telling if her bladder is full vs empty. Denies hematuria, back pain, fever, vaginal discharge, odor. Pt not able to come in for appointment, advised will review with provider. Encouraged her to reach out to her PCP for urinary evaluation/possible urology referral. Pt encouraged to increase hydration and monitor symptoms in the meantime. Pt verbalized understanding and is thankful.     DISPOSITION: Discuss with Provider and Call Back Patient (overriding Home Care)        Reason for Disposition   Mild vaginal itching    Answer Assessment - Initial Assessment Questions  1. SYMPTOM: \"What's the main symptom you're concerned about?\" (e.g., pain, itching, dryness)      Vaginal itching  2. LOCATION: \"Where is the  symptom located?\" (e.g., inside/outside, left/right)      Vaginal area  3. ONSET: \"When did the  symptoms  start?\"      A few weeks ago  4. PAIN: \"Is there any pain?\" If Yes, ask: \"How bad is it?\" (Scale: 1-10; mild, moderate, severe)      Mild-moderate pelvic pain  5. ITCHING: \"Is there any itching?\" If Yes, ask: \"How bad is it?\" (Scale: 1-10; mild, moderate, severe)      mild  6. CAUSE: \"What do you think is causing the discharge?\" \"Have you had the same problem before?\" \"What happened then?\"      Denies discharge  7. OTHER SYMPTOMS: \"Do you have any other symptoms?\" (e.g., fever, " "itching, vaginal bleeding, pain with urination, injury to genital area, vaginal foreign body)      Itching and intermittent dysuria  8. PREGNANCY: \"Is there any chance you are pregnant?\" \"When was your last menstrual period?\"      LMP 3/27/25. Denies chance of pregnancy    Protocols used: Vaginal Symptoms-Adult-[place]    "

## 2025-04-29 ENCOUNTER — APPOINTMENT (OUTPATIENT)
Dept: LAB | Facility: CLINIC | Age: 35
End: 2025-04-29
Attending: NURSE PRACTITIONER
Payer: COMMERCIAL

## 2025-04-29 DIAGNOSIS — L20.89 OTHER ATOPIC DERMATITIS: ICD-10-CM

## 2025-04-29 DIAGNOSIS — L70.0 ACNE VULGARIS: ICD-10-CM

## 2025-04-29 LAB
ALBUMIN SERPL BCG-MCNC: 4.4 G/DL (ref 3.5–5)
ALP SERPL-CCNC: 52 U/L (ref 34–104)
ALT SERPL W P-5'-P-CCNC: 45 U/L (ref 7–52)
ANION GAP SERPL CALCULATED.3IONS-SCNC: 9 MMOL/L (ref 4–13)
AST SERPL W P-5'-P-CCNC: 30 U/L (ref 13–39)
BILIRUB SERPL-MCNC: 1.66 MG/DL (ref 0.2–1)
BUN SERPL-MCNC: 20 MG/DL (ref 5–25)
CALCIUM SERPL-MCNC: 9.3 MG/DL (ref 8.4–10.2)
CHLORIDE SERPL-SCNC: 105 MMOL/L (ref 96–108)
CHOLEST SERPL-MCNC: 136 MG/DL (ref ?–200)
CO2 SERPL-SCNC: 28 MMOL/L (ref 21–32)
CREAT SERPL-MCNC: 1 MG/DL (ref 0.6–1.3)
ERYTHROCYTE [DISTWIDTH] IN BLOOD BY AUTOMATED COUNT: 12.5 % (ref 11.6–15.1)
GFR SERPL CREATININE-BSD FRML MDRD: 72 ML/MIN/1.73SQ M
GLUCOSE P FAST SERPL-MCNC: 93 MG/DL (ref 65–99)
HCT VFR BLD AUTO: 41.3 % (ref 34.8–46.1)
HDLC SERPL-MCNC: 68 MG/DL
HGB BLD-MCNC: 13.7 G/DL (ref 11.5–15.4)
LDLC SERPL CALC-MCNC: 61 MG/DL (ref 0–100)
LDLC SERPL DIRECT ASSAY-MCNC: 61 MG/DL (ref 0–100)
MCH RBC QN AUTO: 30.8 PG (ref 26.8–34.3)
MCHC RBC AUTO-ENTMCNC: 33.2 G/DL (ref 31.4–37.4)
MCV RBC AUTO: 93 FL (ref 82–98)
NONHDLC SERPL-MCNC: 68 MG/DL
PLATELET # BLD AUTO: 230 THOUSANDS/UL (ref 149–390)
PMV BLD AUTO: 10.1 FL (ref 8.9–12.7)
POTASSIUM SERPL-SCNC: 4.5 MMOL/L (ref 3.5–5.3)
PROT SERPL-MCNC: 6.6 G/DL (ref 6.4–8.4)
RBC # BLD AUTO: 4.45 MILLION/UL (ref 3.81–5.12)
SODIUM SERPL-SCNC: 142 MMOL/L (ref 135–147)
TRIGL SERPL-MCNC: 36 MG/DL (ref ?–150)
WBC # BLD AUTO: 4.71 THOUSAND/UL (ref 4.31–10.16)

## 2025-04-29 PROCEDURE — 80053 COMPREHEN METABOLIC PANEL: CPT

## 2025-04-29 PROCEDURE — 85027 COMPLETE CBC AUTOMATED: CPT

## 2025-04-29 PROCEDURE — 83721 ASSAY OF BLOOD LIPOPROTEIN: CPT

## 2025-04-29 PROCEDURE — 80061 LIPID PANEL: CPT

## 2025-04-29 PROCEDURE — 36415 COLL VENOUS BLD VENIPUNCTURE: CPT

## 2025-06-19 ENCOUNTER — OFFICE VISIT (OUTPATIENT)
Dept: INTERNAL MEDICINE CLINIC | Facility: CLINIC | Age: 35
End: 2025-06-19
Payer: COMMERCIAL

## 2025-06-19 VITALS
HEIGHT: 63 IN | OXYGEN SATURATION: 97 % | HEART RATE: 97 BPM | TEMPERATURE: 98.2 F | BODY MASS INDEX: 20.38 KG/M2 | SYSTOLIC BLOOD PRESSURE: 104 MMHG | WEIGHT: 115 LBS | DIASTOLIC BLOOD PRESSURE: 78 MMHG

## 2025-06-19 DIAGNOSIS — N39.8 VOIDING DYSFUNCTION: ICD-10-CM

## 2025-06-19 DIAGNOSIS — Z71.89 ENCOUNTER FOR MEDICATION REVIEW AND COUNSELING: Primary | ICD-10-CM

## 2025-06-19 PROCEDURE — 99213 OFFICE O/P EST LOW 20 MIN: CPT | Performed by: PHYSICAL MEDICINE & REHABILITATION

## 2025-06-19 RX ORDER — OXYBUTYNIN CHLORIDE 5 MG/1
5 TABLET, EXTENDED RELEASE ORAL DAILY
Qty: 30 TABLET | Refills: 5 | Status: SHIPPED | OUTPATIENT
Start: 2025-06-19

## 2025-06-19 RX ORDER — FLUOXETINE 10 MG/1
10 CAPSULE ORAL EVERY MORNING
COMMUNITY
Start: 2025-06-10

## 2025-06-19 NOTE — PROGRESS NOTES
Name: Allie Donnelly      : 1990      MRN: [id number]  Encounter Provider: Naila Reyes PA-C  Encounter Date: 2025   Encounter department: [facility]  :  Assessment & Plan  Encounter for medication review and counseling  Reviewed components for both Gabapentin and Cytomel. No wheat ingredients identified  Patient has not had any issues on this medications   Reassurance provided       Voiding dysfunction  Patient adamant on discontinuing Flomax  Did offer to trial a period off of Flomax to determine if a new medication is needed  Patient would like new medication  Flomax flagged under vaginal dysfunction   She admits to urinating frequently, especially throughout the night  Patient admits to bladder spasms as reason for starting medications   Noted previously on Oxybutynin back in , can trial this medication again to see if any relief in symptoms  Scheduled 3 month follow up to review if this medication is working well for her  Can consider repeating urinary workup if warranted   Patient noted to have hx of urinary retention in chart, patient states she was experiencing bladder spasms. However, she has admitted to issues with urinating in the past. Per today, she reports frequent urination especially over night.  Oxybutynin can cause worsening urinary retention. Educated on if any symptoms of retention start to call the office to discontinue this medication     Orders:    oxybutynin (DITROPAN-XL) 5 mg 24 hr tablet; Take 1 tablet (5 mg total) by mouth daily          Depression Screening and Follow-up Plan: Patient was screened for depression during today's encounter. They screened negative with a PHQ-2 score of 0.        History of Present Illness   Allie is a 35 year old female presenting today to discuss current long-standing medications; Cytomel, Gabapentin and Tamulosin. Patient reports hx of celiac ds and notes new concerns for cystomel and Gabapentin possibly containing wheat  "products and wants to discuss adjusting the medications. She also would like to discuss Tamulosin being a potential cause of Hay fever; congestion/swelling. Patient states she has dealt with chronic issues to her turbinates and has had a surgical repair with no relief in symptoms. She researched that Flomax had a rare side effect for causing congestion and would like to switch medications.  Patient states she was placed on Flomax for bladder spasms. She admits to frequently urinating especially overnight.       Review of Systems   Constitutional:  Negative for chills, diaphoresis, fatigue and fever.   HENT:  Negative for congestion, ear pain, rhinorrhea, sinus pressure and voice change.    Eyes: Negative.    Respiratory:  Negative for cough, chest tightness and shortness of breath.    Cardiovascular:  Negative for chest pain and palpitations.   Gastrointestinal:  Negative for constipation, diarrhea, nausea and vomiting.   Endocrine: Negative.    Genitourinary:  Negative for dysuria.   Musculoskeletal:  Negative for back pain, myalgias and neck pain.   Skin:  Negative for pallor and rash.   Allergic/Immunologic: Negative.    Neurological:  Negative for dizziness, syncope and headaches.   Hematological: Negative.    Psychiatric/Behavioral: Negative.         Objective   /78 (Patient Position: Sitting, Cuff Size: Large)   Pulse 97   Temp 98.2 °F (36.8 °C) (Tympanic)   Ht 5' 3\" (1.6 m)   Wt 52.2 kg (115 lb)   LMP 05/23/2025 (Exact Date)   SpO2 97%   BMI 20.37 kg/m²      Physical Exam  Constitutional:       General: She is not in acute distress.     Appearance: Normal appearance. She is well-developed. She is not ill-appearing or diaphoretic.   HENT:      Head: Normocephalic and atraumatic.      Nose: Nose normal.     Eyes:      Pupils: Pupils are equal, round, and reactive to light.       Cardiovascular:      Rate and Rhythm: Normal rate and regular rhythm.      Heart sounds: Normal heart sounds. "   Pulmonary:      Effort: Pulmonary effort is normal. No respiratory distress.      Breath sounds: Normal breath sounds.   Abdominal:      Palpations: Abdomen is soft.     Musculoskeletal:      Cervical back: Normal range of motion and neck supple.     Skin:     General: Skin is warm and dry.      Capillary Refill: Capillary refill takes less than 2 seconds.      Findings: No rash.     Neurological:      Mental Status: She is alert and oriented to person, place, and time.       Administrative Statements   I have spent a total time of 40 minutes in caring for this patient on the day of the visit/encounter including Risks and benefits of tx options, Instructions for management, Patient and family education, Importance of tx compliance, Impressions, Documenting in the medical record, Reviewing/placing orders in the medical record (including tests, medications, and/or procedures), Obtaining or reviewing history  , and Communicating with other healthcare professionals .

## 2025-07-06 DIAGNOSIS — K21.9 GASTROESOPHAGEAL REFLUX DISEASE, UNSPECIFIED WHETHER ESOPHAGITIS PRESENT: ICD-10-CM

## 2025-07-07 RX ORDER — PANTOPRAZOLE SODIUM 40 MG/1
40 TABLET, DELAYED RELEASE ORAL DAILY
Qty: 90 TABLET | Refills: 0 | Status: SHIPPED | OUTPATIENT
Start: 2025-07-07

## 2025-08-03 DIAGNOSIS — K21.9 GASTROESOPHAGEAL REFLUX DISEASE, UNSPECIFIED WHETHER ESOPHAGITIS PRESENT: ICD-10-CM

## 2025-08-05 RX ORDER — FAMOTIDINE 40 MG/1
TABLET, FILM COATED ORAL
Qty: 45 TABLET | Refills: 1 | Status: SHIPPED | OUTPATIENT
Start: 2025-08-05

## (undated) DEVICE — GLOVE INDICATOR PI UNDERGLOVE SZ 8 BLUE

## (undated) DEVICE — CONMED SCOPE SAVER BITE BLOCK, 20X27 MM: Brand: SCOPE SAVER

## (undated) DEVICE — SUT SILK 2-0 TIES 144 IN LA55G

## (undated) DEVICE — 3000CC GUARDIAN II: Brand: GUARDIAN

## (undated) DEVICE — FIRST STEP BEDSIDE KIT - STAND-UP POUCH, ENDOSCOPIC CLEANING PAD - 1 POUCH: Brand: FIRST STEP BEDSIDE KIT - STAND-UP POUCH, ENDOSCOPIC CLEANING PAD

## (undated) DEVICE — LUBRICANT INST ELECTROLUBE ANTISTK WO PAD

## (undated) DEVICE — TUBING SUCTION 5MM X 12 FT

## (undated) DEVICE — SINGLE-USE SYRINGE/GAUGE ASSEMBLY: Brand: ALLIANCE II

## (undated) DEVICE — INTENDED FOR TISSUE SEPARATION, AND OTHER PROCEDURES THAT REQUIRE A SHARP SURGICAL BLADE TO PUNCTURE OR CUT.: Brand: BARD-PARKER SAFETY BLADES SIZE 11, STERILE

## (undated) DEVICE — CURVED INTRALUMINAL STAPLER (ILS) 24 TITANIUM ADJUSTABLE HEIGHT STAPLES

## (undated) DEVICE — TRAY FOLEY 16FR URIMETER SILICONE SURESTEP

## (undated) DEVICE — ADHESIVE SKIN HIGH VISCOSITY EXOFIN 1ML

## (undated) DEVICE — THE EXACTO COLD SNARE IS INTENDED TO BE USED WITHOUT DIATHERMIC ENERGY FOR THE ENDOSCOPIC RESECTION OF POLYP TISSUE IN THE GASTROINTESTINAL TRACT.: Brand: EXACTO

## (undated) DEVICE — SUT MONOCRYL 4-0 PS-2 18 IN Y496G

## (undated) DEVICE — CANNULA SEAL

## (undated) DEVICE — Device: Brand: DEFENDO AIR/WATER/SUCTION AND BIOPSY VALVE

## (undated) DEVICE — STAPLER 60 RELOAD GREEN: Brand: SUREFORM

## (undated) DEVICE — CO2 AND WATER TUBING/CAP SET FOR OLYMPUS® SCOPES & UCR: Brand: ERBE

## (undated) DEVICE — 2000CC GUARDIAN II: Brand: GUARDIAN

## (undated) DEVICE — ARM DRAPE

## (undated) DEVICE — SEAL

## (undated) DEVICE — TIP COVER ACCESSORY

## (undated) DEVICE — SUT PDS II 1 CTX 36 IN Z371T

## (undated) DEVICE — THE LARIAT SNARE IS AN ELECTROSURGICAL DEVICE USED TO ENDOSCOPICALLY GRASP, DISSECT, AND TRANSECT TISSUE DURING GASTROINTESTINAL (GI) ENDOSCOPIC PROCEDURES.  THE SNARE CAN BE USED WITH OR WITHOUT THE USE OF MONOPOLAR DIATHERMIC ENERGY.: Brand: LARIAT

## (undated) DEVICE — REDUCER: Brand: ENDOWRIST

## (undated) DEVICE — VESSEL SEALER EXTEND: Brand: ENDOWRIST

## (undated) DEVICE — ACCESS PLATFORM FOR MINIMALLY INVASIVE SURGERY.: Brand: GELPORT® LAPAROSCOPIC  SYSTEM

## (undated) DEVICE — SUT PROLENE 0 CT-2 30 IN 8412H

## (undated) DEVICE — TIP-UP FENESTRATED GRASPER: Brand: ENDOWRIST

## (undated) DEVICE — VISUALIZATION SYSTEM: Brand: CLEARIFY

## (undated) DEVICE — ESOPHAGEAL/COLONIC/BILIARY WIREGUIDED BALLOON DILATATION CATHETER: Brand: CRE™ PRO

## (undated) DEVICE — SUT VICRYL 0 CT-1 27 IN J260H

## (undated) DEVICE — KIT, BETHLEHEM ROBOTIC PROST: Brand: CARDINAL HEALTH

## (undated) DEVICE — CHLORAPREP HI-LITE 26ML ORANGE

## (undated) DEVICE — POOLE SUCTION HANDLE: Brand: CARDINAL HEALTH

## (undated) DEVICE — LUBRICANT SURGILUBE TUBE 4 OZ  FLIP TOP

## (undated) DEVICE — TROCAR: Brand: KII FIOS FIRST ENTRY

## (undated) DEVICE — CLAMP TOWEL TUBING DISPOSABLE

## (undated) DEVICE — COLUMN DRAPE

## (undated) DEVICE — SUT PROLENE 2-0 KS 30 IN 8623H

## (undated) DEVICE — STAPLER 60: Brand: SUREFORM

## (undated) DEVICE — FORCEPS BIOPSY ALLIGATOR JAW W/NEEDLE 2.8MM

## (undated) DEVICE — INTENDED FOR TISSUE SEPARATION, AND OTHER PROCEDURES THAT REQUIRE A SHARP SURGICAL BLADE TO PUNCTURE OR CUT.: Brand: BARD-PARKER SAFETY BLADES SIZE 15, STERILE

## (undated) DEVICE — LARGE NEEDLE DRIVER: Brand: ENDOWRIST

## (undated) DEVICE — SUT VICRYL 2-0 REEL 54 IN J286G

## (undated) DEVICE — FENESTRATED BIPOLAR FORCEPS: Brand: ENDOWRIST